# Patient Record
Sex: MALE | Race: WHITE | NOT HISPANIC OR LATINO | Employment: OTHER | URBAN - METROPOLITAN AREA
[De-identification: names, ages, dates, MRNs, and addresses within clinical notes are randomized per-mention and may not be internally consistent; named-entity substitution may affect disease eponyms.]

---

## 2017-01-05 ENCOUNTER — ALLSCRIPTS OFFICE VISIT (OUTPATIENT)
Dept: OTHER | Facility: OTHER | Age: 62
End: 2017-01-05

## 2017-01-05 ENCOUNTER — TRANSCRIBE ORDERS (OUTPATIENT)
Dept: LAB | Facility: HOSPITAL | Age: 62
End: 2017-01-05

## 2017-01-05 ENCOUNTER — APPOINTMENT (OUTPATIENT)
Dept: LAB | Facility: HOSPITAL | Age: 62
End: 2017-01-05
Attending: INTERNAL MEDICINE
Payer: COMMERCIAL

## 2017-01-05 DIAGNOSIS — E78.00 PURE HYPERCHOLESTEROLEMIA: ICD-10-CM

## 2017-01-05 DIAGNOSIS — J32.9 UNSPECIFIED SINUSITIS (CHRONIC): ICD-10-CM

## 2017-01-05 DIAGNOSIS — Z12.11 SPECIAL SCREENING FOR MALIGNANT NEOPLASMS, COLON: ICD-10-CM

## 2017-01-05 DIAGNOSIS — N40.0 BENIGN PROSTATIC HYPERPLASIA, PRESENCE OF LOWER URINARY TRACT SYMPTOMS UNSPECIFIED, UNSPECIFIED MORPHOLOGY: ICD-10-CM

## 2017-01-05 DIAGNOSIS — R10.0 ACUTE ABDOMINAL PAIN SYNDROME: ICD-10-CM

## 2017-01-05 DIAGNOSIS — R10.0 ACUTE ABDOMINAL PAIN SYNDROME: Primary | ICD-10-CM

## 2017-01-05 LAB
ALBUMIN SERPL BCP-MCNC: 3.9 G/DL (ref 3.5–5)
ALP SERPL-CCNC: 83 U/L (ref 46–116)
ALT SERPL W P-5'-P-CCNC: 33 U/L (ref 12–78)
ANION GAP SERPL CALCULATED.3IONS-SCNC: 6 MMOL/L (ref 4–13)
AST SERPL W P-5'-P-CCNC: 15 U/L (ref 5–45)
BACTERIA UR QL AUTO: ABNORMAL /HPF
BILIRUB SERPL-MCNC: 0.49 MG/DL (ref 0.2–1)
BILIRUB UR QL STRIP: NEGATIVE
BUN SERPL-MCNC: 16 MG/DL (ref 5–25)
CALCIUM SERPL-MCNC: 8.7 MG/DL (ref 8.3–10.1)
CHLORIDE SERPL-SCNC: 107 MMOL/L (ref 100–108)
CHOLEST SERPL-MCNC: 255 MG/DL (ref 50–200)
CLARITY UR: CLEAR
CO2 SERPL-SCNC: 28 MMOL/L (ref 21–32)
COLOR UR: YELLOW
CREAT SERPL-MCNC: 0.84 MG/DL (ref 0.6–1.3)
ERYTHROCYTE [DISTWIDTH] IN BLOOD BY AUTOMATED COUNT: 12.6 % (ref 11.6–15.1)
GFR SERPL CREATININE-BSD FRML MDRD: >60 ML/MIN/1.73SQ M
GLUCOSE SERPL-MCNC: 93 MG/DL (ref 65–140)
GLUCOSE UR STRIP-MCNC: NEGATIVE MG/DL
HCT VFR BLD AUTO: 43.7 % (ref 36.5–49.3)
HDLC SERPL-MCNC: 69 MG/DL (ref 40–60)
HGB BLD-MCNC: 14.9 G/DL (ref 12–17)
HGB UR QL STRIP.AUTO: NEGATIVE
KETONES UR STRIP-MCNC: NEGATIVE MG/DL
LDLC SERPL CALC-MCNC: 174 MG/DL (ref 0–100)
LEUKOCYTE ESTERASE UR QL STRIP: NEGATIVE
MCH RBC QN AUTO: 31.7 PG (ref 26.8–34.3)
MCHC RBC AUTO-ENTMCNC: 34.1 G/DL (ref 31.4–37.4)
MCV RBC AUTO: 93 FL (ref 82–98)
NITRITE UR QL STRIP: NEGATIVE
NON-SQ EPI CELLS URNS QL MICRO: ABNORMAL /HPF
PH UR STRIP.AUTO: 7 [PH] (ref 4.5–8)
PLATELET # BLD AUTO: 213 THOUSANDS/UL (ref 149–390)
PMV BLD AUTO: 9.7 FL (ref 8.9–12.7)
POTASSIUM SERPL-SCNC: 3.8 MMOL/L (ref 3.5–5.3)
PROT SERPL-MCNC: 7.2 G/DL (ref 6.4–8.2)
PROT UR STRIP-MCNC: NEGATIVE MG/DL
RBC # BLD AUTO: 4.7 MILLION/UL (ref 3.88–5.62)
RBC #/AREA URNS AUTO: ABNORMAL /HPF
SODIUM SERPL-SCNC: 141 MMOL/L (ref 136–145)
SP GR UR STRIP.AUTO: 1.02 (ref 1–1.03)
TRIGL SERPL-MCNC: 60 MG/DL
UROBILINOGEN UR QL STRIP.AUTO: 0.2 E.U./DL
WBC # BLD AUTO: 4.47 THOUSAND/UL (ref 4.31–10.16)
WBC #/AREA URNS AUTO: ABNORMAL /HPF

## 2017-01-05 PROCEDURE — 36415 COLL VENOUS BLD VENIPUNCTURE: CPT

## 2017-01-05 PROCEDURE — 80053 COMPREHEN METABOLIC PANEL: CPT

## 2017-01-05 PROCEDURE — 85027 COMPLETE CBC AUTOMATED: CPT

## 2017-01-05 PROCEDURE — 80061 LIPID PANEL: CPT

## 2017-01-05 PROCEDURE — 81001 URINALYSIS AUTO W/SCOPE: CPT | Performed by: INTERNAL MEDICINE

## 2017-01-25 ENCOUNTER — ALLSCRIPTS OFFICE VISIT (OUTPATIENT)
Dept: OTHER | Facility: OTHER | Age: 62
End: 2017-01-25

## 2017-02-02 ENCOUNTER — ALLSCRIPTS OFFICE VISIT (OUTPATIENT)
Dept: OTHER | Facility: OTHER | Age: 62
End: 2017-02-02

## 2017-02-03 PROBLEM — I48.91 ATRIAL FIBRILLATION (HCC): Chronic | Status: ACTIVE | Noted: 2017-02-03

## 2017-02-03 PROBLEM — I20.0 UNSTABLE ANGINA (HCC): Status: ACTIVE | Noted: 2017-02-03

## 2017-02-05 ENCOUNTER — TELEPHONE (OUTPATIENT)
Dept: INPATIENT UNIT | Facility: HOSPITAL | Age: 62
End: 2017-02-05

## 2017-02-06 ENCOUNTER — HOSPITAL ENCOUNTER (OUTPATIENT)
Dept: NON INVASIVE DIAGNOSTICS | Facility: HOSPITAL | Age: 62
Discharge: HOME | End: 2017-02-06
Attending: INTERNAL MEDICINE | Admitting: INTERNAL MEDICINE
Payer: COMMERCIAL

## 2017-02-06 ENCOUNTER — GENERIC CONVERSION - ENCOUNTER (OUTPATIENT)
Dept: OTHER | Facility: OTHER | Age: 62
End: 2017-02-06

## 2017-02-06 VITALS
RESPIRATION RATE: 18 BRPM | HEART RATE: 64 BPM | DIASTOLIC BLOOD PRESSURE: 76 MMHG | HEIGHT: 68 IN | OXYGEN SATURATION: 98 % | SYSTOLIC BLOOD PRESSURE: 136 MMHG | WEIGHT: 195 LBS | TEMPERATURE: 97.5 F | BODY MASS INDEX: 29.55 KG/M2

## 2017-02-06 DIAGNOSIS — R07.9 CHEST PAIN: ICD-10-CM

## 2017-02-06 LAB
ANION GAP SERPL CALCULATED.3IONS-SCNC: 6 MMOL/L (ref 4–13)
ATRIAL RATE: 57 BPM
BUN SERPL-MCNC: 19 MG/DL (ref 5–25)
CALCIUM SERPL-MCNC: 8.6 MG/DL (ref 8.3–10.1)
CHLORIDE SERPL-SCNC: 109 MMOL/L (ref 100–108)
CHOLEST SERPL-MCNC: 229 MG/DL (ref 50–200)
CO2 SERPL-SCNC: 26 MMOL/L (ref 21–32)
CREAT SERPL-MCNC: 0.85 MG/DL (ref 0.6–1.3)
ERYTHROCYTE [DISTWIDTH] IN BLOOD BY AUTOMATED COUNT: 12.3 % (ref 11.6–15.1)
GFR SERPL CREATININE-BSD FRML MDRD: >60 ML/MIN/1.73SQ M
GLUCOSE SERPL-MCNC: 88 MG/DL (ref 65–140)
HCT VFR BLD AUTO: 43.6 % (ref 36.5–49.3)
HDLC SERPL-MCNC: 70 MG/DL (ref 40–60)
HGB BLD-MCNC: 15.1 G/DL (ref 12–17)
INR PPP: 1.03 (ref 0.86–1.16)
LDLC SERPL CALC-MCNC: 146 MG/DL (ref 0–100)
MAGNESIUM SERPL-MCNC: 2.3 MG/DL (ref 1.6–2.6)
MCH RBC QN AUTO: 31.8 PG (ref 26.8–34.3)
MCHC RBC AUTO-ENTMCNC: 34.6 G/DL (ref 31.4–37.4)
MCV RBC AUTO: 92 FL (ref 82–98)
P AXIS: 47 DEGREES
PLATELET # BLD AUTO: 203 THOUSANDS/UL (ref 149–390)
PMV BLD AUTO: 9.5 FL (ref 8.9–12.7)
POTASSIUM SERPL-SCNC: 4 MMOL/L (ref 3.5–5.3)
PR INTERVAL: 170 MS
PROTHROMBIN TIME: 13.6 SECONDS (ref 12–14.3)
QRS AXIS: 12 DEGREES
QRSD INTERVAL: 78 MS
QT INTERVAL: 428 MS
QTC INTERVAL: 416 MS
RBC # BLD AUTO: 4.75 MILLION/UL (ref 3.88–5.62)
SODIUM SERPL-SCNC: 141 MMOL/L (ref 136–145)
T WAVE AXIS: 35 DEGREES
TRIGL SERPL-MCNC: 65 MG/DL
VENTRICULAR RATE: 57 BPM
WBC # BLD AUTO: 7.88 THOUSAND/UL (ref 4.31–10.16)

## 2017-02-06 PROCEDURE — 99152 MOD SED SAME PHYS/QHP 5/>YRS: CPT | Performed by: INTERNAL MEDICINE

## 2017-02-06 PROCEDURE — 83735 ASSAY OF MAGNESIUM: CPT | Performed by: INTERNAL MEDICINE

## 2017-02-06 PROCEDURE — C1887 CATHETER, GUIDING: HCPCS | Performed by: INTERNAL MEDICINE

## 2017-02-06 PROCEDURE — C1894 INTRO/SHEATH, NON-LASER: HCPCS | Performed by: INTERNAL MEDICINE

## 2017-02-06 PROCEDURE — 93458 L HRT ARTERY/VENTRICLE ANGIO: CPT | Performed by: INTERNAL MEDICINE

## 2017-02-06 PROCEDURE — C1769 GUIDE WIRE: HCPCS | Performed by: INTERNAL MEDICINE

## 2017-02-06 PROCEDURE — 85027 COMPLETE CBC AUTOMATED: CPT | Performed by: INTERNAL MEDICINE

## 2017-02-06 PROCEDURE — 93571 IV DOP VEL&/PRESS C FLO 1ST: CPT | Performed by: INTERNAL MEDICINE

## 2017-02-06 PROCEDURE — 99153 MOD SED SAME PHYS/QHP EA: CPT | Performed by: INTERNAL MEDICINE

## 2017-02-06 PROCEDURE — 85610 PROTHROMBIN TIME: CPT | Performed by: INTERNAL MEDICINE

## 2017-02-06 PROCEDURE — 80048 BASIC METABOLIC PNL TOTAL CA: CPT | Performed by: INTERNAL MEDICINE

## 2017-02-06 PROCEDURE — 80061 LIPID PANEL: CPT | Performed by: INTERNAL MEDICINE

## 2017-02-06 PROCEDURE — 93005 ELECTROCARDIOGRAM TRACING: CPT | Performed by: INTERNAL MEDICINE

## 2017-02-06 RX ORDER — MONTELUKAST SODIUM 10 MG/1
10 TABLET ORAL
COMMUNITY
End: 2018-01-27 | Stop reason: SDUPTHER

## 2017-02-06 RX ORDER — AMPICILLIN TRIHYDRATE 250 MG
1 CAPSULE ORAL DAILY
COMMUNITY

## 2017-02-06 RX ORDER — OXCARBAZEPINE 300 MG/1
300 TABLET, FILM COATED ORAL 2 TIMES DAILY
COMMUNITY
End: 2020-03-02 | Stop reason: SDUPTHER

## 2017-02-06 RX ORDER — SODIUM CHLORIDE 9 MG/ML
150 INJECTION, SOLUTION INTRAVENOUS CONTINUOUS
Status: DISPENSED | OUTPATIENT
Start: 2017-02-06 | End: 2017-02-06

## 2017-02-06 RX ORDER — MAG HYDROX/ALUMINUM HYD/SIMETH 400-400-40
1 SUSPENSION, ORAL (FINAL DOSE FORM) ORAL DAILY
COMMUNITY
End: 2018-02-02

## 2017-02-06 RX ORDER — LIDOCAINE HYDROCHLORIDE 10 MG/ML
INJECTION, SOLUTION INFILTRATION; PERINEURAL CODE/TRAUMA/SEDATION MEDICATION
Status: COMPLETED | OUTPATIENT
Start: 2017-02-06 | End: 2017-02-06

## 2017-02-06 RX ORDER — VERAPAMIL HYDROCHLORIDE 2.5 MG/ML
INJECTION, SOLUTION INTRAVENOUS CODE/TRAUMA/SEDATION MEDICATION
Status: COMPLETED | OUTPATIENT
Start: 2017-02-06 | End: 2017-02-06

## 2017-02-06 RX ORDER — DILTIAZEM HYDROCHLORIDE 120 MG/1
180 TABLET, FILM COATED ORAL DAILY
Qty: 45 TABLET | Refills: 0 | Status: SHIPPED | OUTPATIENT
Start: 2017-02-06 | End: 2018-07-02 | Stop reason: SDUPTHER

## 2017-02-06 RX ORDER — DIPHENOXYLATE HYDROCHLORIDE AND ATROPINE SULFATE 2.5; .025 MG/1; MG/1
1 TABLET ORAL
COMMUNITY

## 2017-02-06 RX ORDER — ASCORBIC ACID 500 MG
500 TABLET ORAL 2 TIMES DAILY
COMMUNITY
End: 2018-02-02 | Stop reason: DRUGHIGH

## 2017-02-06 RX ORDER — CHOLECALCIFEROL (VITAMIN D3) 25 MCG
1 CAPSULE ORAL 2 TIMES DAILY
COMMUNITY
End: 2018-08-17

## 2017-02-06 RX ORDER — SODIUM CHLORIDE 9 MG/ML
125 INJECTION, SOLUTION INTRAVENOUS CONTINUOUS
Status: DISCONTINUED | OUTPATIENT
Start: 2017-02-06 | End: 2017-02-06 | Stop reason: HOSPADM

## 2017-02-06 RX ORDER — NITROGLYCERIN 0.4 MG/1
0.4 TABLET SUBLINGUAL
Status: DISCONTINUED | OUTPATIENT
Start: 2017-02-06 | End: 2017-02-06 | Stop reason: HOSPADM

## 2017-02-06 RX ORDER — FENTANYL CITRATE 50 UG/ML
INJECTION, SOLUTION INTRAMUSCULAR; INTRAVENOUS CODE/TRAUMA/SEDATION MEDICATION
Status: COMPLETED | OUTPATIENT
Start: 2017-02-06 | End: 2017-02-06

## 2017-02-06 RX ORDER — DILTIAZEM HYDROCHLORIDE 120 MG/1
120 TABLET, FILM COATED ORAL DAILY
Status: ON HOLD | COMMUNITY
End: 2017-02-06

## 2017-02-06 RX ORDER — NITROGLYCERIN 20 MG/100ML
INJECTION INTRAVENOUS CODE/TRAUMA/SEDATION MEDICATION
Status: COMPLETED | OUTPATIENT
Start: 2017-02-06 | End: 2017-02-06

## 2017-02-06 RX ORDER — ASPIRIN 81 MG/1
324 TABLET, CHEWABLE ORAL ONCE
Status: COMPLETED | OUTPATIENT
Start: 2017-02-06 | End: 2017-02-06

## 2017-02-06 RX ORDER — HYOSCYAMINE SULFATE 0.125 MG
0.12 TABLET,DISINTEGRATING ORAL EVERY 4 HOURS PRN
COMMUNITY
End: 2018-08-17 | Stop reason: ALTCHOICE

## 2017-02-06 RX ORDER — MIDAZOLAM HYDROCHLORIDE 1 MG/ML
INJECTION INTRAMUSCULAR; INTRAVENOUS CODE/TRAUMA/SEDATION MEDICATION
Status: COMPLETED | OUTPATIENT
Start: 2017-02-06 | End: 2017-02-06

## 2017-02-06 RX ORDER — HEPARIN SODIUM 1000 [USP'U]/ML
INJECTION, SOLUTION INTRAVENOUS; SUBCUTANEOUS CODE/TRAUMA/SEDATION MEDICATION
Status: COMPLETED | OUTPATIENT
Start: 2017-02-06 | End: 2017-02-06

## 2017-02-06 RX ADMIN — HEPARIN SODIUM 4000 UNITS: 1000 INJECTION INTRAVENOUS; SUBCUTANEOUS at 15:35

## 2017-02-06 RX ADMIN — VERAPAMIL HYDROCHLORIDE 2.5 MG: 2.5 INJECTION, SOLUTION INTRAVENOUS at 15:35

## 2017-02-06 RX ADMIN — ASPIRIN 81 MG 324 MG: 81 TABLET ORAL at 12:35

## 2017-02-06 RX ADMIN — LIDOCAINE HYDROCHLORIDE 1 ML: 10 INJECTION, SOLUTION INFILTRATION; PERINEURAL at 15:26

## 2017-02-06 RX ADMIN — IOHEXOL 110 ML: 350 INJECTION, SOLUTION INTRAVENOUS at 16:11

## 2017-02-06 RX ADMIN — FENTANYL CITRATE 50 MCG: 50 INJECTION, SOLUTION INTRAMUSCULAR; INTRAVENOUS at 15:17

## 2017-02-06 RX ADMIN — SODIUM CHLORIDE 125 ML/HR: 0.9 INJECTION, SOLUTION INTRAVENOUS at 12:36

## 2017-02-06 RX ADMIN — MIDAZOLAM HYDROCHLORIDE 1 MG: 1 INJECTION, SOLUTION INTRAMUSCULAR; INTRAVENOUS at 15:31

## 2017-02-06 RX ADMIN — MIDAZOLAM HYDROCHLORIDE 2 MG: 1 INJECTION, SOLUTION INTRAMUSCULAR; INTRAVENOUS at 15:18

## 2017-02-06 RX ADMIN — FENTANYL CITRATE 25 MCG: 50 INJECTION, SOLUTION INTRAMUSCULAR; INTRAVENOUS at 15:31

## 2017-02-06 RX ADMIN — NITROGLYCERIN 200 MCG: 20 INJECTION INTRAVENOUS at 15:35

## 2017-02-16 ENCOUNTER — ALLSCRIPTS OFFICE VISIT (OUTPATIENT)
Dept: OTHER | Facility: OTHER | Age: 62
End: 2017-02-16

## 2017-02-16 ENCOUNTER — TRANSCRIBE ORDERS (OUTPATIENT)
Dept: ADMINISTRATIVE | Facility: HOSPITAL | Age: 62
End: 2017-02-16

## 2017-02-16 DIAGNOSIS — E88.01 ALPHA-1-ANTITRYPSIN DEFICIENCY (HCC): Primary | ICD-10-CM

## 2017-03-07 RX ORDER — ALBUTEROL SULFATE 2.5 MG/3ML
2.5 SOLUTION RESPIRATORY (INHALATION) ONCE AS NEEDED
Status: DISCONTINUED | OUTPATIENT
Start: 2017-03-07 | End: 2017-03-07

## 2017-03-07 RX ORDER — SODIUM CHLORIDE FOR INHALATION 0.9 %
3 VIAL, NEBULIZER (ML) INHALATION ONCE AS NEEDED
Status: DISCONTINUED | OUTPATIENT
Start: 2017-03-07 | End: 2017-03-07

## 2017-03-08 RX ORDER — ALBUTEROL SULFATE 2.5 MG/3ML
2.5 SOLUTION RESPIRATORY (INHALATION) ONCE AS NEEDED
Status: CANCELLED | OUTPATIENT
Start: 2017-03-08

## 2017-03-09 ENCOUNTER — HOSPITAL ENCOUNTER (OUTPATIENT)
Dept: NON INVASIVE DIAGNOSTICS | Facility: HOSPITAL | Age: 62
Discharge: HOME | End: 2017-03-09
Attending: INTERNAL MEDICINE
Payer: COMMERCIAL

## 2017-03-09 ENCOUNTER — HOSPITAL ENCOUNTER (OUTPATIENT)
Dept: PULMONOLOGY | Facility: HOSPITAL | Age: 62
Discharge: HOME | End: 2017-03-09
Attending: INTERNAL MEDICINE
Payer: COMMERCIAL

## 2017-03-09 ENCOUNTER — GENERIC CONVERSION - ENCOUNTER (OUTPATIENT)
Dept: OTHER | Facility: OTHER | Age: 62
End: 2017-03-09

## 2017-03-09 DIAGNOSIS — E88.01 ALPHA-1-ANTITRYPSIN DEFICIENCY (HCC): ICD-10-CM

## 2017-03-09 PROCEDURE — 94070 EVALUATION OF WHEEZING: CPT

## 2017-03-09 PROCEDURE — 94760 N-INVAS EAR/PLS OXIMETRY 1: CPT

## 2017-03-09 PROCEDURE — 94726 PLETHYSMOGRAPHY LUNG VOLUMES: CPT

## 2017-03-09 PROCEDURE — 94729 DIFFUSING CAPACITY: CPT

## 2017-03-09 PROCEDURE — 93306 TTE W/DOPPLER COMPLETE: CPT

## 2017-03-09 RX ORDER — ALBUTEROL SULFATE 2.5 MG/3ML
2.5 SOLUTION RESPIRATORY (INHALATION) ONCE AS NEEDED
Status: COMPLETED | OUTPATIENT
Start: 2017-03-09 | End: 2017-03-09

## 2017-03-09 RX ORDER — SODIUM CHLORIDE FOR INHALATION 0.9 %
3 VIAL, NEBULIZER (ML) INHALATION ONCE AS NEEDED
Status: COMPLETED | OUTPATIENT
Start: 2017-03-09 | End: 2017-03-09

## 2017-03-09 RX ADMIN — ISODIUM CHLORIDE 3 ML: 0.03 SOLUTION RESPIRATORY (INHALATION) at 14:00

## 2017-03-09 RX ADMIN — ALBUTEROL SULFATE 2.5 MG: 2.5 SOLUTION RESPIRATORY (INHALATION) at 14:00

## 2017-03-09 RX ADMIN — METHACHOLINE CHLORIDE 5 BREATH: 100 POWDER, FOR SOLUTION RESPIRATORY (INHALATION) at 13:30

## 2017-05-02 ENCOUNTER — APPOINTMENT (OUTPATIENT)
Dept: LAB | Facility: HOSPITAL | Age: 62
End: 2017-05-02
Payer: COMMERCIAL

## 2017-05-02 ENCOUNTER — TRANSCRIBE ORDERS (OUTPATIENT)
Dept: LAB | Facility: HOSPITAL | Age: 62
End: 2017-05-02

## 2017-05-02 DIAGNOSIS — Z12.5 ENCOUNTER FOR SCREENING FOR MALIGNANT NEOPLASM OF PROSTATE: ICD-10-CM

## 2017-05-02 LAB — PSA SERPL-MCNC: 2.2 NG/ML (ref 0–4)

## 2017-05-02 PROCEDURE — 36415 COLL VENOUS BLD VENIPUNCTURE: CPT

## 2017-05-02 PROCEDURE — G0103 PSA SCREENING: HCPCS

## 2017-05-09 ENCOUNTER — ALLSCRIPTS OFFICE VISIT (OUTPATIENT)
Dept: OTHER | Facility: OTHER | Age: 62
End: 2017-05-09

## 2017-07-11 ENCOUNTER — TRANSCRIBE ORDERS (OUTPATIENT)
Dept: LAB | Facility: HOSPITAL | Age: 62
End: 2017-07-11

## 2017-07-11 ENCOUNTER — APPOINTMENT (OUTPATIENT)
Dept: LAB | Facility: HOSPITAL | Age: 62
End: 2017-07-11
Attending: INTERNAL MEDICINE
Payer: COMMERCIAL

## 2017-07-11 DIAGNOSIS — R10.0 ACUTE ABDOMINAL PAIN SYNDROME: ICD-10-CM

## 2017-07-11 DIAGNOSIS — E78.00 PURE HYPERCHOLESTEROLEMIA: ICD-10-CM

## 2017-07-11 DIAGNOSIS — J32.9 UNSPECIFIED SINUSITIS (CHRONIC): ICD-10-CM

## 2017-07-11 DIAGNOSIS — N40.0 BENIGN PROSTATIC HYPERPLASIA, PRESENCE OF LOWER URINARY TRACT SYMPTOMS UNSPECIFIED, UNSPECIFIED MORPHOLOGY: Primary | ICD-10-CM

## 2017-07-11 DIAGNOSIS — Z12.11 SPECIAL SCREENING FOR MALIGNANT NEOPLASMS, COLON: ICD-10-CM

## 2017-07-11 DIAGNOSIS — Z00.00 ROUTINE GENERAL MEDICAL EXAMINATION AT A HEALTH CARE FACILITY: ICD-10-CM

## 2017-07-11 DIAGNOSIS — N40.0 BENIGN PROSTATIC HYPERPLASIA, PRESENCE OF LOWER URINARY TRACT SYMPTOMS UNSPECIFIED, UNSPECIFIED MORPHOLOGY: ICD-10-CM

## 2017-07-11 LAB
ALBUMIN SERPL BCP-MCNC: 4 G/DL (ref 3.5–5)
ALP SERPL-CCNC: 80 U/L (ref 46–116)
ALT SERPL W P-5'-P-CCNC: 36 U/L (ref 12–78)
ANION GAP SERPL CALCULATED.3IONS-SCNC: 6 MMOL/L (ref 4–13)
AST SERPL W P-5'-P-CCNC: 21 U/L (ref 5–45)
BILIRUB SERPL-MCNC: 0.31 MG/DL (ref 0.2–1)
BUN SERPL-MCNC: 19 MG/DL (ref 5–25)
CALCIUM SERPL-MCNC: 8.9 MG/DL (ref 8.3–10.1)
CHLORIDE SERPL-SCNC: 107 MMOL/L (ref 100–108)
CHOLEST SERPL-MCNC: 235 MG/DL (ref 50–200)
CO2 SERPL-SCNC: 28 MMOL/L (ref 21–32)
CREAT SERPL-MCNC: 0.9 MG/DL (ref 0.6–1.3)
ERYTHROCYTE [DISTWIDTH] IN BLOOD BY AUTOMATED COUNT: 12.6 % (ref 11.6–15.1)
GFR SERPL CREATININE-BSD FRML MDRD: >60 ML/MIN/1.73SQ M
GLUCOSE P FAST SERPL-MCNC: 89 MG/DL (ref 65–99)
HCT VFR BLD AUTO: 43.1 % (ref 36.5–49.3)
HDLC SERPL-MCNC: 68 MG/DL (ref 40–60)
HGB BLD-MCNC: 14.9 G/DL (ref 12–17)
LDLC SERPL CALC-MCNC: 155 MG/DL (ref 0–100)
MCH RBC QN AUTO: 31.8 PG (ref 26.8–34.3)
MCHC RBC AUTO-ENTMCNC: 34.6 G/DL (ref 31.4–37.4)
MCV RBC AUTO: 92 FL (ref 82–98)
PLATELET # BLD AUTO: 194 THOUSANDS/UL (ref 149–390)
PMV BLD AUTO: 9.5 FL (ref 8.9–12.7)
POTASSIUM SERPL-SCNC: 4 MMOL/L (ref 3.5–5.3)
PROT SERPL-MCNC: 7.4 G/DL (ref 6.4–8.2)
RBC # BLD AUTO: 4.69 MILLION/UL (ref 3.88–5.62)
SODIUM SERPL-SCNC: 141 MMOL/L (ref 136–145)
TRIGL SERPL-MCNC: 60 MG/DL
WBC # BLD AUTO: 4.84 THOUSAND/UL (ref 4.31–10.16)

## 2017-07-11 PROCEDURE — 36415 COLL VENOUS BLD VENIPUNCTURE: CPT

## 2017-07-11 PROCEDURE — 85027 COMPLETE CBC AUTOMATED: CPT

## 2017-07-11 PROCEDURE — 80061 LIPID PANEL: CPT

## 2017-07-11 PROCEDURE — 80053 COMPREHEN METABOLIC PANEL: CPT

## 2017-07-20 ENCOUNTER — ALLSCRIPTS OFFICE VISIT (OUTPATIENT)
Dept: OTHER | Facility: OTHER | Age: 62
End: 2017-07-20

## 2017-08-10 ENCOUNTER — ALLSCRIPTS OFFICE VISIT (OUTPATIENT)
Dept: OTHER | Facility: OTHER | Age: 62
End: 2017-08-10

## 2018-01-12 VITALS
WEIGHT: 202.25 LBS | DIASTOLIC BLOOD PRESSURE: 72 MMHG | HEIGHT: 69 IN | SYSTOLIC BLOOD PRESSURE: 116 MMHG | HEART RATE: 64 BPM | BODY MASS INDEX: 29.96 KG/M2

## 2018-01-12 VITALS
DIASTOLIC BLOOD PRESSURE: 70 MMHG | BODY MASS INDEX: 29.7 KG/M2 | WEIGHT: 200.5 LBS | HEIGHT: 69 IN | SYSTOLIC BLOOD PRESSURE: 122 MMHG | HEART RATE: 62 BPM

## 2018-01-12 VITALS
WEIGHT: 196 LBS | SYSTOLIC BLOOD PRESSURE: 100 MMHG | HEART RATE: 62 BPM | DIASTOLIC BLOOD PRESSURE: 62 MMHG | HEIGHT: 69 IN | BODY MASS INDEX: 29.03 KG/M2

## 2018-01-13 VITALS
BODY MASS INDEX: 29.99 KG/M2 | TEMPERATURE: 98.5 F | DIASTOLIC BLOOD PRESSURE: 80 MMHG | OXYGEN SATURATION: 97 % | HEIGHT: 69 IN | HEART RATE: 68 BPM | SYSTOLIC BLOOD PRESSURE: 138 MMHG | WEIGHT: 202.5 LBS

## 2018-01-13 VITALS
WEIGHT: 198.5 LBS | OXYGEN SATURATION: 98 % | SYSTOLIC BLOOD PRESSURE: 124 MMHG | DIASTOLIC BLOOD PRESSURE: 76 MMHG | HEIGHT: 69 IN | TEMPERATURE: 98.2 F | HEART RATE: 69 BPM | BODY MASS INDEX: 29.4 KG/M2

## 2018-01-14 VITALS
SYSTOLIC BLOOD PRESSURE: 110 MMHG | TEMPERATURE: 99 F | OXYGEN SATURATION: 98 % | WEIGHT: 199 LBS | HEART RATE: 70 BPM | DIASTOLIC BLOOD PRESSURE: 64 MMHG | HEIGHT: 69 IN | BODY MASS INDEX: 29.47 KG/M2

## 2018-01-14 VITALS
WEIGHT: 200.19 LBS | HEART RATE: 65 BPM | SYSTOLIC BLOOD PRESSURE: 126 MMHG | DIASTOLIC BLOOD PRESSURE: 74 MMHG | HEIGHT: 69 IN | BODY MASS INDEX: 29.65 KG/M2

## 2018-01-20 ENCOUNTER — APPOINTMENT (OUTPATIENT)
Dept: LAB | Facility: HOSPITAL | Age: 63
End: 2018-01-20
Attending: INTERNAL MEDICINE
Payer: COMMERCIAL

## 2018-01-20 ENCOUNTER — TRANSCRIBE ORDERS (OUTPATIENT)
Dept: LAB | Facility: HOSPITAL | Age: 63
End: 2018-01-20

## 2018-01-20 DIAGNOSIS — I10 ESSENTIAL (PRIMARY) HYPERTENSION: ICD-10-CM

## 2018-01-20 DIAGNOSIS — I48.0 PAROXYSMAL ATRIAL FIBRILLATION (HCC): ICD-10-CM

## 2018-01-20 DIAGNOSIS — J30.1 ALLERGIC RHINITIS DUE TO POLLEN: ICD-10-CM

## 2018-01-20 DIAGNOSIS — E78.00 PURE HYPERCHOLESTEROLEMIA: ICD-10-CM

## 2018-01-20 LAB
ALBUMIN SERPL BCP-MCNC: 3.8 G/DL (ref 3.5–5)
ALP SERPL-CCNC: 86 U/L (ref 46–116)
ALT SERPL W P-5'-P-CCNC: 30 U/L (ref 12–78)
ANION GAP SERPL CALCULATED.3IONS-SCNC: 5 MMOL/L (ref 4–13)
AST SERPL W P-5'-P-CCNC: 17 U/L (ref 5–45)
BILIRUB SERPL-MCNC: 0.59 MG/DL (ref 0.2–1)
BUN SERPL-MCNC: 17 MG/DL (ref 5–25)
CALCIUM SERPL-MCNC: 9.1 MG/DL (ref 8.3–10.1)
CHLORIDE SERPL-SCNC: 107 MMOL/L (ref 100–108)
CHOLEST SERPL-MCNC: 256 MG/DL (ref 50–200)
CO2 SERPL-SCNC: 29 MMOL/L (ref 21–32)
CREAT SERPL-MCNC: 0.84 MG/DL (ref 0.6–1.3)
ERYTHROCYTE [DISTWIDTH] IN BLOOD BY AUTOMATED COUNT: 12.2 % (ref 11.6–15.1)
GFR SERPL CREATININE-BSD FRML MDRD: 93 ML/MIN/1.73SQ M
GLUCOSE P FAST SERPL-MCNC: 87 MG/DL (ref 65–99)
HCT VFR BLD AUTO: 43.7 % (ref 36.5–49.3)
HDLC SERPL-MCNC: 59 MG/DL (ref 40–60)
HGB BLD-MCNC: 14.8 G/DL (ref 12–17)
LDLC SERPL CALC-MCNC: 182 MG/DL (ref 0–100)
MCH RBC QN AUTO: 31.5 PG (ref 26.8–34.3)
MCHC RBC AUTO-ENTMCNC: 33.9 G/DL (ref 31.4–37.4)
MCV RBC AUTO: 93 FL (ref 82–98)
PLATELET # BLD AUTO: 280 THOUSANDS/UL (ref 149–390)
PMV BLD AUTO: 9.3 FL (ref 8.9–12.7)
POTASSIUM SERPL-SCNC: 3.9 MMOL/L (ref 3.5–5.3)
PROT SERPL-MCNC: 7.5 G/DL (ref 6.4–8.2)
RBC # BLD AUTO: 4.7 MILLION/UL (ref 3.88–5.62)
SODIUM SERPL-SCNC: 141 MMOL/L (ref 136–145)
TRIGL SERPL-MCNC: 77 MG/DL
WBC # BLD AUTO: 6.66 THOUSAND/UL (ref 4.31–10.16)

## 2018-01-20 PROCEDURE — 80053 COMPREHEN METABOLIC PANEL: CPT

## 2018-01-20 PROCEDURE — 85027 COMPLETE CBC AUTOMATED: CPT

## 2018-01-20 PROCEDURE — 36415 COLL VENOUS BLD VENIPUNCTURE: CPT

## 2018-01-20 PROCEDURE — 80061 LIPID PANEL: CPT

## 2018-01-27 DIAGNOSIS — J30.1 CHRONIC SEASONAL ALLERGIC RHINITIS DUE TO POLLEN: Primary | ICD-10-CM

## 2018-01-29 RX ORDER — MONTELUKAST SODIUM 10 MG/1
TABLET ORAL
Qty: 90 TABLET | Refills: 1 | Status: SHIPPED | OUTPATIENT
Start: 2018-01-29 | End: 2018-01-30 | Stop reason: SDUPTHER

## 2018-01-30 DIAGNOSIS — J30.1 CHRONIC SEASONAL ALLERGIC RHINITIS DUE TO POLLEN: ICD-10-CM

## 2018-01-30 RX ORDER — MONTELUKAST SODIUM 10 MG/1
TABLET ORAL
Qty: 90 TABLET | Refills: 1 | Status: SHIPPED | OUTPATIENT
Start: 2018-01-30 | End: 2018-07-23 | Stop reason: SDUPTHER

## 2018-02-02 ENCOUNTER — OFFICE VISIT (OUTPATIENT)
Dept: INTERNAL MEDICINE CLINIC | Facility: CLINIC | Age: 63
End: 2018-02-02
Payer: COMMERCIAL

## 2018-02-02 VITALS
TEMPERATURE: 98.2 F | OXYGEN SATURATION: 98 % | HEART RATE: 63 BPM | WEIGHT: 199.2 LBS | DIASTOLIC BLOOD PRESSURE: 78 MMHG | HEIGHT: 69 IN | BODY MASS INDEX: 29.51 KG/M2 | SYSTOLIC BLOOD PRESSURE: 120 MMHG

## 2018-02-02 DIAGNOSIS — E78.00 HYPERCHOLESTEROLEMIA: ICD-10-CM

## 2018-02-02 DIAGNOSIS — I48.0 PAROXYSMAL ATRIAL FIBRILLATION (HCC): Chronic | ICD-10-CM

## 2018-02-02 DIAGNOSIS — J30.1 CHRONIC SEASONAL ALLERGIC RHINITIS DUE TO POLLEN: ICD-10-CM

## 2018-02-02 DIAGNOSIS — I25.10 CORONARY ARTERY DISEASE INVOLVING NATIVE CORONARY ARTERY OF NATIVE HEART WITHOUT ANGINA PECTORIS: Primary | ICD-10-CM

## 2018-02-02 PROBLEM — E88.01 ALPHA-1-ANTITRYPSIN DEFICIENCY (HCC): Status: ACTIVE | Noted: 2017-02-16

## 2018-02-02 PROBLEM — I10 BENIGN ESSENTIAL HYPERTENSION: Status: ACTIVE | Noted: 2017-01-03

## 2018-02-02 PROCEDURE — 99214 OFFICE O/P EST MOD 30 MIN: CPT | Performed by: INTERNAL MEDICINE

## 2018-02-02 RX ORDER — MULTIVIT WITH MINERALS/LUTEIN
250 TABLET ORAL 2 TIMES DAILY
COMMUNITY

## 2018-02-02 RX ORDER — ASPIRIN 81 MG/1
81 TABLET ORAL DAILY
COMMUNITY
End: 2019-09-23

## 2018-02-02 RX ORDER — TIZANIDINE 2 MG/1
2 TABLET ORAL
COMMUNITY
Start: 2013-03-26 | End: 2018-07-19 | Stop reason: ALTCHOICE

## 2018-02-02 NOTE — PROGRESS NOTES
Assessment/Plan:    Seasonal allergic rhinitis  Presently stable  Singulair was recently renewed  No other changes are necessary at the present time    Coronary artery disease involving native coronary artery  Clinically stable without any chest pain or dyspnea  Problematic management with the patient intolerant to statin medications  He may be a candidate for Repatha if he is not limited by his insurance  Hypercholesterolemia  As previously noted may be a candidate for  Repatha therapy    Atrial fibrillation ([address] )  Clinically stable  His insurance is dropping diltiazem off the formulary         Problem List Items Addressed This Visit        Respiratory    Seasonal allergic rhinitis     Presently stable  Singulair was recently renewed  No other changes are necessary at the present time         Relevant Orders    CBC and Platelet       Cardiovascular and Mediastinum    Atrial fibrillation (HCC) (Chronic)     Clinically stable  His insurance is dropping diltiazem off the formulary         Relevant Orders    CBC and Platelet    Coronary artery disease involving native coronary artery - Primary     Clinically stable without any chest pain or dyspnea  Problematic management with the patient intolerant to statin medications  He may be a candidate for Repatha if he is not limited by his insurance  Relevant Orders    CBC and Platelet    Comprehensive metabolic panel    Lipid Panel with Direct LDL reflex       Other    Hypercholesterolemia     As previously noted may be a candidate for  Repatha therapy         Relevant Orders    Comprehensive metabolic panel    Lipid Panel with Direct LDL reflex            Subjective:      Patient ID: Thee Almendarez is a 57 y o  male  The patient presents for 6 month follow-up visit and has no particular complaints at this time  He had some follow-up labs drawn prior to today's visit    His CMP is essentially normal   CBC is likewise normal his lipid profile reviewed baby modest increase in his total cholesterol level with an LDL cholesterol level calculated at 182  He has had no issues with any chest pain or dyspnea  He denies any palpitations  He has had no lightheadedness or dizziness  He denies any neurological symptoms  He has nocturia once or twice nightly but otherwise no problems  Appetite has been good weight has been stable        The following portions of the patient's history were reviewed and updated as appropriate: allergies, current medications, past family history, past medical history, past social history, past surgical history and problem list   Family History   Problem Relation Age of Onset    Diabetes type II Mother     Cancer Father      angioma     Social History     Social History    Marital status: /Civil Union     Spouse name: N/A    Number of children: N/A    Years of education: N/A     Occupational History    massage therapist      Social History Main Topics    Smoking status: Former Smoker    Smokeless tobacco: Former User    Alcohol use Yes      Comment: 6-8 ounces daily    Drug use: Yes     Types: Marijuana      Comment: Medical use    Sexual activity: Not on file     Other Topics Concern    Not on file     Social History Narrative    Caffeine: drinks 1 cup coffee/ tea a day       Past Medical History:   Diagnosis Date    A-fib ([facility] )     Allergic rhinitis     Cerebral vascular disease     Chronic sinusitis     Hyperlipidemia     Melanoma ([facility] )        Current Outpatient Prescriptions:     apixaban (ELIQUIS) 5 mg, Take 5 mg by mouth 2 (two) times a day, Disp: , Rfl:     ascorbic acid (VITAMIN C) 250 mg tablet, Take 250 mg by mouth 2 (two) times a day, Disp: , Rfl:     aspirin (ECOTRIN LOW STRENGTH) 81 mg EC tablet, Take 81 mg by mouth daily, Disp: , Rfl:     Cholecalciferol (VITAMIN D-3) 1000 UNITS CAPS, Take 1 capsule by mouth 2 (two) times a day, Disp: , Rfl:     CHONDROITIN SULFATE PO, Take 200 mg by mouth 2 (two) times a day, Disp: , Rfl:     Coenzyme Q10 (COQ10) 200 MG CAPS, Take 1 capsule by mouth daily, Disp: , Rfl:     diltiazem (CARDIZEM) 120 MG tablet, Take 1 5 tablets by mouth daily for 30 days, Disp: 45 tablet, Rfl: 0    Glucosamine-Chondroit-Vit C-Mn (GLUCOSAMINE CHONDR 1500 COMPLX PO), Take 1 tablet by mouth daily, Disp: , Rfl:     halobetasol (ULTRAVATE) 0 05 % cream, APPLY ONCE OR TWICE DAILY FOR 2-4 WEEKS AS NEEDED, Disp: , Rfl: 3    hyoscyamine (ANASPAZ) 0 125 mg, Take 0 125 mg by mouth 4 (four) times a day, Disp: , Rfl:     MAGNESIUM GLYCINATE PLUS PO, Take 400 mg by mouth daily, Disp: , Rfl:     montelukast (SINGULAIR) 10 mg tablet, TAKE 1 TABLET DAILY, Disp: 90 tablet, Rfl: 1    OXcarbazepine (TRILEPTAL) 300 mg tablet, Take 300 mg by mouth 2 (two) times a day, Disp: , Rfl:     tiZANidine (ZANAFLEX) 2 mg tablet, Take 2 mg by mouth, Disp: , Rfl:     multivitamin (THERAGRAN) TABS, Take 1 tablet by mouth daily, Disp: , Rfl:   Allergies   Allergen Reactions    Atorvastatin Fatigue and GI Intolerance     Category: Adverse Reaction; Other reaction(s): Muscle pain, constipation, sleepiness    Ezetimibe Fatigue and GI Intolerance     Category: Adverse Reaction;     Molds & Smuts Allergic Rhinitis     Category: Allergy;     Monascus Purpureus Went Yeast Fatigue     Category: Adverse Reaction;     Niacin Fatigue     Category: Adverse Reaction;     Pollen Extract Allergic Rhinitis     Category: Allergy;     Pregabalin Fatigue     Category: Adverse Reaction;     Statins Fatigue and GI Intolerance     Category: Adverse Reaction;      Past Surgical History:   Procedure Laterality Date    OTHER SURGICAL HISTORY  2003    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    PATENT FORAMEN OVALE CLOSURE         Review of Systems   Constitutional: Negative for activity change, appetite change, fatigue and unexpected weight change  HENT: Negative  Eyes: Negative  Respiratory: Negative    Negative for cough and shortness of breath  Cardiovascular: Negative for chest pain and palpitations  Gastrointestinal: Negative  Endocrine: Negative  Genitourinary: Negative  Nocturia x2   Musculoskeletal: Negative  Skin: Negative  Allergic/Immunologic: Negative  Neurological: Negative  Hematological: Negative  Psychiatric/Behavioral: Negative  Objective:     Physical Exam   Constitutional: He is oriented to person, place, and time  He appears well-developed and well-nourished  No distress  HENT:   Head: Normocephalic and atraumatic  Eyes: Conjunctivae and EOM are normal  Pupils are equal, round, and reactive to light  No scleral icterus  Neck: Normal range of motion  Neck supple  No JVD present  No tracheal deviation present  Cardiovascular: Normal rate, regular rhythm and normal heart sounds  Exam reveals no gallop  No murmur heard  Pulmonary/Chest: Effort normal and breath sounds normal  He has no wheezes  He has no rales  Abdominal: Soft  He exhibits no distension  Musculoskeletal: Normal range of motion  He exhibits no edema  Lymphadenopathy:     He has no cervical adenopathy  Neurological: He is alert and oriented to person, place, and time  Skin: Skin is warm and dry  Psychiatric: He has a normal mood and affect  Vitals reviewed

## 2018-02-02 NOTE — ASSESSMENT & PLAN NOTE
Presently stable  Singulair was recently renewed    No other changes are necessary at the present time

## 2018-02-02 NOTE — ASSESSMENT & PLAN NOTE
Clinically stable without any chest pain or dyspnea  Problematic management with the patient intolerant to statin medications  He may be a candidate for Repatha if he is not limited by his insurance

## 2018-02-20 ENCOUNTER — TELEPHONE (OUTPATIENT)
Dept: UROLOGY | Facility: AMBULATORY SURGERY CENTER | Age: 63
End: 2018-02-20

## 2018-02-20 DIAGNOSIS — N40.0 BENIGN PROSTATIC HYPERPLASIA, UNSPECIFIED WHETHER LOWER URINARY TRACT SYMPTOMS PRESENT: Primary | ICD-10-CM

## 2018-02-20 DIAGNOSIS — N20.0 NEPHROLITHIASIS: ICD-10-CM

## 2018-02-20 NOTE — TELEPHONE ENCOUNTER
Patient needed to reschedule his May appt to June  He needs a new PSA and KUB order mailed to his home address since the ones he has are expiring

## 2018-02-27 ENCOUNTER — TELEPHONE (OUTPATIENT)
Dept: CARDIOLOGY CLINIC | Facility: CLINIC | Age: 63
End: 2018-02-27

## 2018-02-27 NOTE — TELEPHONE ENCOUNTER
I am very confused  Never heard of denial of ditliazem  Maybe we just need to substitute brand? Ezequiel Thomas can you look into this?

## 2018-02-27 NOTE — TELEPHONE ENCOUNTER
Patient Ashley Freedman, QUINN O B  1955, called and wants to know if you could advise him in reference to insurance not covering his Diltiazem, he is very happy with this medication and wants to stay on it, is there any way something can be done to make this happen?

## 2018-03-17 DIAGNOSIS — I48.91 A-FIB (HCC): Primary | ICD-10-CM

## 2018-03-19 RX ORDER — APIXABAN 5 MG/1
TABLET, FILM COATED ORAL
Qty: 60 TABLET | Refills: 5 | Status: SHIPPED | OUTPATIENT
Start: 2018-03-19 | End: 2018-07-19

## 2018-04-03 NOTE — TELEPHONE ENCOUNTER
Liana Fong, pt left a message on the nurse line about the diltiazem and what is going on with it  He seems to think it's not resolved  Can you please call him ? Thanks

## 2018-05-03 DIAGNOSIS — N20.0 CALCULUS OF KIDNEY: ICD-10-CM

## 2018-05-03 DIAGNOSIS — Z12.5 ENCOUNTER FOR SCREENING FOR MALIGNANT NEOPLASM OF PROSTATE: ICD-10-CM

## 2018-06-09 ENCOUNTER — TRANSCRIBE ORDERS (OUTPATIENT)
Dept: RADIOLOGY | Facility: HOSPITAL | Age: 63
End: 2018-06-09

## 2018-06-09 ENCOUNTER — APPOINTMENT (OUTPATIENT)
Dept: LAB | Facility: HOSPITAL | Age: 63
End: 2018-06-09
Payer: COMMERCIAL

## 2018-06-09 ENCOUNTER — HOSPITAL ENCOUNTER (OUTPATIENT)
Dept: RADIOLOGY | Facility: HOSPITAL | Age: 63
Discharge: HOME | End: 2018-06-09
Payer: COMMERCIAL

## 2018-06-09 DIAGNOSIS — N20.0 NEPHROLITHIASIS: ICD-10-CM

## 2018-06-09 DIAGNOSIS — N40.0 BENIGN PROSTATIC HYPERPLASIA, UNSPECIFIED WHETHER LOWER URINARY TRACT SYMPTOMS PRESENT: ICD-10-CM

## 2018-06-09 LAB — PSA SERPL-MCNC: 2.6 NG/ML (ref 0–4)

## 2018-06-09 PROCEDURE — 84153 ASSAY OF PSA TOTAL: CPT

## 2018-06-09 PROCEDURE — 74018 RADEX ABDOMEN 1 VIEW: CPT

## 2018-06-12 PROBLEM — N40.1 BPH WITH OBSTRUCTION/LOWER URINARY TRACT SYMPTOMS: Status: ACTIVE | Noted: 2018-06-12

## 2018-06-12 PROBLEM — N13.8 BPH WITH OBSTRUCTION/LOWER URINARY TRACT SYMPTOMS: Status: ACTIVE | Noted: 2018-06-12

## 2018-06-12 NOTE — PROGRESS NOTES
6/13/2018      Chief Complaint   Patient presents with    Benign Prostatic Hypertrophy     1 yr f/u PSA 2 6 on 6/9/18       Assessment and Plan    70 y o  male managed by Dr Juliette Jones    1  BPH  - will trial flomax (SE profile reviewed) and FU 6 weeks for symptoms assessment   - discussed constipation control as this can improve his LUTs    2  Nephrolithiasis  - KUB has no apparent renal or ureteral calculi although renal shadows are partially obscured by overlying bowel gas  - stressed proper hydration  - KUB and U/S 1 year     3  Prostate cancer screening  - PSA 2 6 (6/9/18),  2 2 (5/2/17)   - PSA 1 year        History of Present Illness  Raf Peña is a 70 y o  male here for follow up evaluation of BPH, renal calculi, and prostate cancer screening  KUB has no apparent renal or ureteral calculi although renal shadows are partially obscured by overlying bowel gas  PSA is 2 6 and stable  LUTs as below  Of note, he previously had transrectal ultrasound-guided biopsy of the prostate performed by Dr Brian Hudson for an abnormal digital rectal examination  Review of Systems   Constitutional: Negative for activity change, chills and fever  Gastrointestinal: Negative for abdominal distention and abdominal pain  Musculoskeletal: Negative for back pain and gait problem  Psychiatric/Behavioral: Negative for behavioral problems and confusion  AUA SYMPTOM SCORE      Most Recent Value   AUA SYMPTOM SCORE   How often have you had a sensation of not emptying your bladder completely after you finished urinating? 5   How often have you had to urinate again less than two hours after you finished urinating? 2   How often have you found you stopped and started again several times when you urinate? 5   How often have you found it difficult to postpone urination? 0   How often have you had a weak urinary stream?  4   How often have you had to push or strain to begin urination?   0   How many times did you most typically get up to urinate from the time you went to bed at night until the time you got up in the morning? 2   Quality of Life: If you were to spend the rest of your life with your urinary condition just the way it is now, how would you feel about that?  2 (mostly satisfied)   AUA SYMPTOM SCORE  18 (moderate)          Urinary Incontinence Screening      Most Recent Value   Urinary Incontinence   Urinary Incontinence? No   Incomplete emptying? No   Urinary frequency? Yes   Urinary urgency? No   Urinary hesitancy? Yes [slow to initiate]   Dysuria (painful difficult urination)? No   Nocturia (waking up to use the bathroom)? Yes [2 times per night]   Straining (having to push to go)? No   Weak stream?  Yes [at times]   Intermittent stream?  Yes [somewhat]   Post void dribbling?   No          Past Medical History  Past Medical History:   Diagnosis Date    [facility]fib ([facility] )     Allergic rhinitis     Cerebral vascular disease     Chronic sinusitis     Hyperlipidemia     Melanoma ([facility] )        Past Social History  Past Surgical History:   Procedure Laterality Date    OTHER SURGICAL HISTORY  2003    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    PATENT FORAMEN OVALE CLOSURE       History   Smoking Status    Former Smoker   Smokeless Tobacco    Former User       Past Family History  Family History   Problem Relation Age of Onset    Diabetes type II Mother     Cancer Father      angioma       Past Social history  Social History     Social History    Marital status: /Civil Union     Spouse name: N/A    Number of children: N/A    Years of education: N/A     Occupational History    massage therapist      Social History Main Topics    Smoking status: Former Smoker    Smokeless tobacco: Former User    Alcohol use Yes      Comment: 6-8 ounces daily    Drug use: Yes     Types: Marijuana      Comment: Medical use    Sexual activity: Not on file     Other Topics Concern    Not on file     Social History Narrative    Caffeine: drinks 1 cup coffee/ tea a day  Current Medications  Current Outpatient Prescriptions   Medication Sig Dispense Refill    ascorbic acid (VITAMIN C) 250 mg tablet Take 250 mg by mouth 2 (two) times a day      aspirin (ECOTRIN LOW STRENGTH) 81 mg EC tablet Take 81 mg by mouth daily      Cholecalciferol (VITAMIN D-3) 1000 UNITS CAPS Take 1 capsule by mouth 2 (two) times a day      Coenzyme Q10 (COQ10) 200 MG CAPS Take 1 capsule by mouth daily      ELIQUIS 5 MG TAKE ONE TABLET BY MOUTH EVERY 12 HOURS 60 tablet 5    Glucosamine-Chondroit-Vit C-Mn (GLUCOSAMINE CHONDR 1500 COMPLX PO) Take 1 tablet by mouth daily      halobetasol (ULTRAVATE) 0 05 % cream APPLY ONCE OR TWICE DAILY FOR 2-4 WEEKS AS NEEDED  3    hyoscyamine (ANASPAZ) 0 125 mg Take 0 125 mg by mouth every 4 (four) hours as needed        MAGNESIUM GLYCINATE PLUS PO Take 400 mg by mouth daily      montelukast (SINGULAIR) 10 mg tablet TAKE 1 TABLET DAILY 90 tablet 1    multivitamin (THERAGRAN) TABS Take 1 tablet by mouth 2 (two) times a day        OXcarbazepine (TRILEPTAL) 300 mg tablet Take 300 mg by mouth 2 (two) times a day      CHONDROITIN SULFATE PO Take 200 mg by mouth 2 (two) times a day      diltiazem (CARDIZEM) 120 MG tablet Take 1 5 tablets by mouth daily for 30 days 45 tablet 0    tiZANidine (ZANAFLEX) 2 mg tablet Take 2 mg by mouth       No current facility-administered medications for this visit  Allergies  Allergies   Allergen Reactions    Atorvastatin Fatigue and GI Intolerance     Category: Adverse Reaction; Other reaction(s): Muscle pain, constipation, sleepiness    Ezetimibe Fatigue and GI Intolerance     Category: Adverse Reaction;     Molds & Smuts Allergic Rhinitis     Category: Allergy;     Monascus Purpureus Went Yeast Fatigue     Category: Adverse Reaction;     Niacin Fatigue     Category: Adverse Reaction;     Pollen Extract Allergic Rhinitis     Category:  Allergy;     Pregabalin Fatigue     Category: Adverse Reaction;     Statins Fatigue and GI Intolerance     Category: Adverse Reaction; The following portions of the patient's history were reviewed and updated as appropriate: allergies, current medications, past medical history, past social history, past surgical history and problem list       Vitals  Vitals:    06/13/18 1428   BP: 120/72   BP Location: Left arm   Patient Position: Sitting   Cuff Size: Large   Pulse: 64   Weight: 91 8 kg (202 lb 6 4 oz)   Height: 5' 9" (1 753 m)           Physical Exam  Constitutional   General appearance: Patient is seated and in no acute distress, well appearing and well nourished  Head and Face   Head and face: Normal     Eyes   Conjunctiva and lids: No erythema, swelling or discharge  Ears, Nose, Mouth, and Throat   Hearing: Normal     Pulmonary   Respiratory effort: No increased work of breathing or signs of respiratory distress  Cardiovascular   Examination of extremities for edema and/or varicosities: Normal     Abdomen   Abdomen: Non-tender, no masses  Genitourinary   Digital rectal exam of prostate: smooth, nontender, 50 g prostate with no nodules   Musculoskeletal   Gait and station: Normal     Skin   Skin and subcutaneous tissue: Warm, dry, and intact  No visible lesions or rashes    Psychiatric   Judgment and insight: Normal  Recent and remote memory:  Normal  Mood and affect: Normal      Results  No results found for this or any previous visit (from the past 1 hour(s)) ]  Lab Results   Component Value Date    PSA 2 6 06/09/2018    PSA 2 2 05/02/2017    PSA 1 7 05/12/2015     Lab Results   Component Value Date    GLUCOSE 88 02/06/2017    CALCIUM 9 1 01/20/2018     01/20/2018    K 3 9 01/20/2018    CO2 29 01/20/2018     01/20/2018    BUN 17 01/20/2018    CREATININE 0 84 01/20/2018     Lab Results   Component Value Date    WBC 6 66 01/20/2018    HGB 14 8 01/20/2018    HCT 43 7 01/20/2018    MCV 93 01/20/2018    PLT 280 01/20/2018       KUB  ABDOMEN     INDICATION:   N20 0: Calculus of kidney  "PT STATES HISTORY OF LEFT SIDED KIDNEY STONE  DENIES ANY CURRENT PAIN OR DISCOMFORT"     COMPARISON:  AP abdomen 5/23/2016      VIEWS:  AP supine        FINDINGS:     There is a nonobstructive bowel gas pattern      No discernible free air on this supine study  Upright or left lateral decubitus imaging is more sensitive to detect subtle free air in the appropriate setting      No apparent renal or ureteral calculi; renal shadows are partially obscured by overlying bowel gas  Calcifications throughout the pelvis are unchanged from the prior study in keeping with phleboliths       Visualized lung bases are clear      Visualized osseous structures are unremarkable for the patient's age      IMPRESSION:     No definite urinary tract calculi  Orders  No orders of the defined types were placed in this encounter

## 2018-06-13 ENCOUNTER — OFFICE VISIT (OUTPATIENT)
Dept: UROLOGY | Facility: AMBULATORY SURGERY CENTER | Age: 63
End: 2018-06-13
Payer: COMMERCIAL

## 2018-06-13 VITALS
DIASTOLIC BLOOD PRESSURE: 72 MMHG | SYSTOLIC BLOOD PRESSURE: 120 MMHG | HEART RATE: 64 BPM | WEIGHT: 202.4 LBS | HEIGHT: 69 IN | BODY MASS INDEX: 29.98 KG/M2

## 2018-06-13 DIAGNOSIS — N20.0 NEPHROLITHIASIS: ICD-10-CM

## 2018-06-13 DIAGNOSIS — N40.1 BPH WITH OBSTRUCTION/LOWER URINARY TRACT SYMPTOMS: Primary | ICD-10-CM

## 2018-06-13 DIAGNOSIS — N13.8 BPH WITH OBSTRUCTION/LOWER URINARY TRACT SYMPTOMS: Primary | ICD-10-CM

## 2018-06-13 PROCEDURE — 99213 OFFICE O/P EST LOW 20 MIN: CPT | Performed by: PHYSICIAN ASSISTANT

## 2018-06-13 RX ORDER — TAMSULOSIN HYDROCHLORIDE 0.4 MG/1
0.4 CAPSULE ORAL
Qty: 30 CAPSULE | Refills: 2 | Status: SHIPPED | OUTPATIENT
Start: 2018-06-13 | End: 2018-07-19 | Stop reason: ALTCHOICE

## 2018-06-15 ENCOUNTER — TELEPHONE (OUTPATIENT)
Dept: UROLOGY | Facility: AMBULATORY SURGERY CENTER | Age: 63
End: 2018-06-15

## 2018-06-15 DIAGNOSIS — Z12.5 PROSTATE CANCER SCREENING: ICD-10-CM

## 2018-06-15 DIAGNOSIS — N20.0 NEPHROLITHIASIS: Primary | ICD-10-CM

## 2018-06-15 NOTE — TELEPHONE ENCOUNTER
Patient can stop flomax and cancel appointment  Will need FU in 1 year with KUB US and PSA prior  Orders in

## 2018-06-15 NOTE — TELEPHONE ENCOUNTER
Patient managed by Dr Love Tate and seen by Elijah Jacinto 6/13/18 in f/u for kidney stones and BPH  Patient was placed on Flomax, and took the first dose last night  He had a very hard time getting out of bed this morning  Patient had extreme fatigue, dizziness, and felt as though he was in a fog  Patient is not interested in trying another medication similar to Flomax at this time  He admits to being very sensitive to meds  He requested cancelling appt to follow in 4 wks to evaluate the efficacy of Flomax  Advised patient to call office with any difficulty voiding or dysuria    Patient states he believes he was placed on Flomax for his urinary frequency, but will call office if symptoms worsen or progress

## 2018-07-02 ENCOUNTER — TELEPHONE (OUTPATIENT)
Dept: CARDIOLOGY CLINIC | Facility: CLINIC | Age: 63
End: 2018-07-02

## 2018-07-02 DIAGNOSIS — I48.0 PAF (PAROXYSMAL ATRIAL FIBRILLATION) (HCC): Primary | ICD-10-CM

## 2018-07-02 NOTE — TELEPHONE ENCOUNTER
Called pt, informed him to hold diltiazem for the next 2 days and restart on lower dose as per Dr Vito Wood  Will get lab work done tomorrow

## 2018-07-02 NOTE — TELEPHONE ENCOUNTER
Can you lower dilitazem 120mg daily? Hold it for 2 days before starting w lower dose  Have him increase hydration, check BMP today or tomrrow

## 2018-07-02 NOTE — TELEPHONE ENCOUNTER
Pt called and stated that he has been feeling light headed and dizzy lately  Bryan Rowan him spoke to someone a couple of weeks ago and they suggested to get a BP cuff in which he did  States he has noticed his BP is lower in the mornings ranging anywhere between 89/66 to 92/65 with HR of 56- 61  And he is fatigue, could go back to bed  Toward the afternoon his BP is around 119/70 with HR at 71  He is currently taking diltiazem 180mg daily  Also has a F/U appt on 07/19

## 2018-07-03 RX ORDER — DILTIAZEM HYDROCHLORIDE 120 MG/1
120 TABLET, FILM COATED ORAL DAILY
Qty: 30 TABLET | Refills: 5 | Status: SHIPPED | OUTPATIENT
Start: 2018-07-03 | End: 2018-07-19

## 2018-07-05 ENCOUNTER — APPOINTMENT (OUTPATIENT)
Dept: LAB | Facility: HOSPITAL | Age: 63
End: 2018-07-05
Attending: INTERNAL MEDICINE
Payer: COMMERCIAL

## 2018-07-05 LAB
ANION GAP SERPL CALCULATED.3IONS-SCNC: 3 MMOL/L (ref 4–13)
BUN SERPL-MCNC: 16 MG/DL (ref 5–25)
CALCIUM SERPL-MCNC: 9 MG/DL (ref 8.3–10.1)
CHLORIDE SERPL-SCNC: 108 MMOL/L (ref 100–108)
CO2 SERPL-SCNC: 28 MMOL/L (ref 21–32)
CREAT SERPL-MCNC: 0.95 MG/DL (ref 0.6–1.3)
GFR SERPL CREATININE-BSD FRML MDRD: 84 ML/MIN/1.73SQ M
GLUCOSE P FAST SERPL-MCNC: 87 MG/DL (ref 65–99)
POTASSIUM SERPL-SCNC: 4.1 MMOL/L (ref 3.5–5.3)
SODIUM SERPL-SCNC: 139 MMOL/L (ref 136–145)

## 2018-07-05 PROCEDURE — 36415 COLL VENOUS BLD VENIPUNCTURE: CPT

## 2018-07-05 PROCEDURE — 80048 BASIC METABOLIC PNL TOTAL CA: CPT

## 2018-07-19 ENCOUNTER — OFFICE VISIT (OUTPATIENT)
Dept: CARDIOLOGY CLINIC | Facility: CLINIC | Age: 63
End: 2018-07-19
Payer: COMMERCIAL

## 2018-07-19 VITALS
BODY MASS INDEX: 30.92 KG/M2 | WEIGHT: 204 LBS | HEART RATE: 63 BPM | SYSTOLIC BLOOD PRESSURE: 118 MMHG | DIASTOLIC BLOOD PRESSURE: 70 MMHG | HEIGHT: 68 IN

## 2018-07-19 DIAGNOSIS — I48.0 PAROXYSMAL ATRIAL FIBRILLATION (HCC): Primary | ICD-10-CM

## 2018-07-19 PROCEDURE — 99214 OFFICE O/P EST MOD 30 MIN: CPT | Performed by: INTERNAL MEDICINE

## 2018-07-19 PROCEDURE — 93000 ELECTROCARDIOGRAM COMPLETE: CPT | Performed by: INTERNAL MEDICINE

## 2018-07-19 NOTE — PROGRESS NOTES
HEART AND VASCULAR  CARDIAC ELECTROPHYSIOLOGY   HEART RHYTHM CENTER  [facility]    Outpatient  Follow-up  Today's Date: 07/19/18        Patient name: Adela Zepeda  YOB: 1955  Sex: male         Chief Complaint: f/u afib      ASSESSMENT:  Problem List Items Addressed This Visit     Atrial fibrillation ([address] ) - Primary (Chronic)    Relevant Orders    POCT ECG        71 yo male  1) Chest pain and SOB; S/p Cath 2017  50% mid LAD just distal to prior stent, FFR neg  CP better  Mild  He had Stent to LAD in 2011 at [facility]  Can't tolerate statins    2)F/u for Paroxysmal afib  No recent episodes  One episodes since last visit he had on 9/14/16 for 10 hours controlled on Diltiazem 120mg daily and took two extra pills, and on Eliquis  First diagnosed 2006 after PFO closure (percutaneous) poorly controlled on propafenone so went on to amiodarone for about 5 months  He went all the way until May 2015 w/o episode when he felt it again, frequent episodes usually lasting minutes  Finally went to ER July 10 2015 and was in afib, started on Diltiazem gtt and converted  He then went on Diltiazem 120mg daily and stopped metoprolol  H/o percutaenous PFO closure4) H/o CVA insular, this is what lead to PFO closure  He has minimal residual defecits  5) Intolerant to statins, tried 5 of them  3) symptomatic hypotension perhaps related to diltiazem, dizzyness, improved with holding diltiazem, BP records SBP 80-90s w symptoms    PLAN:  1  D/c diltiazem restart prn for afib  2  D/c eliquis, he has not had afib in about 2 years  He checks pulse regularly and feels his episodes       Follow up in: 12 months    Orders Placed This Encounter   Procedures    POCT ECG     Medications Discontinued During This Encounter   Medication Reason    tamsulosin (FLOMAX) 0 4 mg Therapy completed    tiZANidine (ZANAFLEX) 2 mg tablet Therapy completed [facility]   HPI/Subjective:   69 yo male  1) Chest pain and SOB; S/p Cath 6 months ago  50% mid LAD just distal to prior stent, FFR neg  CP better  Mild  He had Stent to LAD in 2011 at [facility]  He has been off aspirin and just on Eliquis for years  2)F/u for Paroxysmal afib  No recent episodes  One episodes since last visit he had on 9/14/16 for 10 hours controlled on Diltiazem 120mg daily and took two extra pills, and on Eliquis  First diagnosed 2006 after PFO closure (percutaneous) poorly controlled on propafenone so went on to amiodarone for about 5 months  He went all the way until May 2015 w/o episode when he felt it again, frequent episodes usually lasting minutes  Finally went to ER July 10 2015 and was in afib, started on Diltiazem gtt and converted  He then went on Diltiazem 120mg daily and stopped metoprolol  H/o percutaenous PFO closure4) H/o CVA insular, this is what lead to PFO closure  He has minimal residual defecits  5) Intolerant to statins, tried 5 of them  3) symptomatic hypotension perhaps related to diltiazem, dizzyness, improved with holding diltiazem, BP records SBP 80-90s w symptoms    Please note HPI is listed by problem with with update following it, it is copied again in the assessment above and reflects medical decision making as well  Complete 12 point ROS reviewed and otherwise non pertinent or negative except as per HPI pertinent positives in Cardiovascular and Respiratory emphasized  Please see paper chart for outpatient clinic patients where the patient completed the 12 point ROS survey             Past Medical History:   Diagnosis Date    A-fib [facility])     Allergic rhinitis     Cerebral vascular disease     Chronic sinusitis     Hyperlipidemia     Melanoma (HCC)        Allergies   Allergen Reactions    Atorvastatin Fatigue and GI Intolerance     Other reaction(s): Muscle pain, constipation, sleepiness  Category: Adverse Reaction; Other reaction(s): Muscle pain, constipation, sleepiness    Ezetimibe Fatigue and GI Intolerance     Category: Adverse Reaction;     Molds & Smuts Allergic Rhinitis     Category: Allergy;     Monascus Purpureus Went Yeast Fatigue     Category: Adverse Reaction;     Niacin Fatigue     Category: Adverse Reaction;     Pollen Extract Allergic Rhinitis     Category: Allergy;     Pregabalin Fatigue     Category: Adverse Reaction;     Statins Fatigue and GI Intolerance     Category: Adverse Reaction;      I reviewed the Home Medication list and Allergies in the chart     Scheduled Meds:  Current Outpatient Prescriptions   Medication Sig Dispense Refill    ascorbic acid (VITAMIN C) 250 mg tablet Take 250 mg by mouth 2 (two) times a day      aspirin (ECOTRIN LOW STRENGTH) 81 mg EC tablet Take 81 mg by mouth daily      Cholecalciferol (VITAMIN D-3) 1000 UNITS CAPS Take 1 capsule by mouth 2 (two) times a day      CHONDROITIN SULFATE PO Take 200 mg by mouth 2 (two) times a day      Coenzyme Q10 (COQ10) 200 MG CAPS Take 1 capsule by mouth daily      diltiazem (CARDIZEM) 120 MG tablet Take 1 tablet (120 mg total) by mouth daily for 30 days (Patient taking differently: Take 120 mg by mouth daily 0 5mg tab daily ) 30 tablet 5    ELIQUIS 5 MG TAKE ONE TABLET BY MOUTH EVERY 12 HOURS 60 tablet 5    Glucosamine-Chondroit-Vit C-Mn (GLUCOSAMINE CHONDR 1500 COMPLX PO) Take 1 tablet by mouth daily      halobetasol (ULTRAVATE) 0 05 % cream APPLY ONCE OR TWICE DAILY FOR 2-4 WEEKS AS NEEDED  3    hyoscyamine (ANASPAZ) 0 125 mg Take 0 125 mg by mouth every 4 (four) hours as needed        MAGNESIUM GLYCINATE PLUS PO Take 400 mg by mouth daily      montelukast (SINGULAIR) 10 mg tablet TAKE 1 TABLET DAILY 90 tablet 1    multivitamin (THERAGRAN) TABS Take 1 tablet by mouth 2 (two) times a day        OXcarbazepine (TRILEPTAL) 300 mg tablet Take 300 mg by mouth 2 (two) times a day       No current facility-administered medications for this visit  PRN Meds:         Family History   Problem Relation Age of Onset    Diabetes type II Mother     Cancer Father         angioma       Social History     Social History    Marital status: /Civil Union     Spouse name: N/A    Number of children: N/A    Years of education: N/A     Occupational History    massage therapist      Social History Main Topics    Smoking status: Former Smoker    Smokeless tobacco: Former User    Alcohol use Yes      Comment: 6-8 ounces daily    Drug use: Yes     Types: Marijuana      Comment: Medical use    Sexual activity: Not on file     Other Topics Concern    Not on file     Social History Narrative    Caffeine: drinks 1 cup coffee/ tea a day  OBJECTIVE:    /70 (BP Location: Left arm, Patient Position: Sitting, Cuff Size: Large)   Pulse 63   Ht 5' 8" (1 727 m)   Wt 92 5 kg (204 lb)   BMI 31 02 kg/m²   Vitals:    07/19/18 0853   Weight: 92 5 kg (204 lb)     GEN: No acute distress, Alert and oriented, well appearing  HEENT:Head, neck, ears, oral pharynx: Mucus membranes moist, oral pharynx clear, nares clear  External ears normal  EYES: Pupils equal, sclera anicteric, midline, normal conjuctiva  NECK: No JVD, supple, no obvious masses or thryomegaly or goiter  CARDIOVASCULAR:  RRR, No murmur, rub, gallops S1,S2  LUNGS: Clear To auscultation bilaterally, normal effort, no rales, rhonchi, crackles  ABDOMEN:  nondistended,  without obvious organomegaly or ascites  EXTREMITIES/VASCULAR:  No edema  Radial pulses intact, pedal pulses difficult to palpate, warm an well perfused  PSYCH: Normal Affect, no overt suicidal ideation, linear speech pattern without evidence of psychosis     NEURO: Grossly intact, moving all extremiteis equal, face symmetric, alert and responsive, no obvious focal defecits  GAIT:  Ambulates normally without difficulty  HEME: No bleeding, bruising, petechia, purpura  SKIN: No significant rashes, warm, no diaphoresis or pallor  Lab Results:       LABS:      Chemistry        Component Value Date/Time     2018 0737     2016 0740    K 4 1 2018 0737    K 4 3 2016 0740     2018 0737     2016 0740    CO2 28 2018 0737    CO2 26 2016 0740    BUN 16 2018 0737    BUN 18 2016 0740    CREATININE 0 95 2018 0737    CREATININE 0 91 2016 0740        Component Value Date/Time    CALCIUM 9 0 2018 0737    CALCIUM 9 0 2016 0740    ALKPHOS 86 2018 0716    ALKPHOS 78 2016 0740    AST 17 2018 0716    AST 22 2016 0740    ALT 30 2018 0716    ALT 24 2016 0740    BILITOT 0 59 2018 0716    BILITOT 0 3 2016 0740            Lab Results   Component Value Date    CHOL 256 (H) 2018    CHOL 235 (H) 2017    CHOL 229 (H) 2017     Lab Results   Component Value Date    HDL 59 2018    HDL 68 (H) 2017    HDL 70 (H) 2017     Lab Results   Component Value Date    LDLCALC 182 (H) 2018    LDLCALC 155 (H) 2017    LDLCALC 146 (H) 2017     Lab Results   Component Value Date    TRIG 77 2018    TRIG 60 2017    TRIG 65 2017     No components found for: CHOLHDL    IMAGING: No results found  Cardiac testing:   Results for orders placed during the hospital encounter of 17   Echo complete with contrast if indicated    Narrative [address]  [address]  [phone]    Transthoracic Echocardiogram  2D, M-mode, Doppler, and Color Doppler    Study date:  09-Mar-2017    Patient: Barrie Galvez  MR number: [id number]  Account number: [de-identified]  : 1955  Age: 59 years  Gender: Male  Status: Outpatient  Location: Echo lab  Height: 68 in  Weight: 194 7 lb  BP: 122/ 70 mmHg    Indications: Cardiac murmur;  Alpha 1 antitrypsin deficiency    Diagnoses: R01 1 - Cardiac murmur, unspecified    Sonographer:  Isela Gitelman, BS, RDCS  Primary Physician:  Lanis Bamberger, MD  Referring Physician:  Tavares Marie MD  Group:  [facility]  Interpreting Physician:  Dari Elliott MD    SUMMARY    LEFT VENTRICLE:  Systolic function was normal  Ejection fraction was estimated to be 66 %  There were no regional wall motion abnormalities  ATRIAL SEPTUM:  There was a closure device in place  MITRAL VALVE:  There was trace regurgitation  TRICUSPID VALVE:  There was trace regurgitation  PULMONIC VALVE:  There was trace regurgitation  HISTORY: PRIOR HISTORY: Angina; Atrial fibrillation; Coronary artery disease; Chest pain; Shortness of breath; PFO closure with cardioseal    PROCEDURE: The procedure was performed in the echo lab  This was a routine study  The transthoracic approach was used  The study included complete 2D imaging, M-mode, complete spectral Doppler, and color Doppler  The heart rate was 66 bpm,  at the start of the study  Images were obtained from the parasternal, apical, subcostal, and suprasternal notch acoustic windows  Image quality was adequate  LEFT VENTRICLE: Size was normal  Systolic function was normal  Ejection fraction was estimated to be 66 %  There were no regional wall motion abnormalities  Wall thickness was normal  There was no evidence of concentric hypertrophy  DOPPLER: Left ventricular diastolic function parameters were normal     RIGHT VENTRICLE: The size was normal  Systolic function was normal  Wall thickness was normal     LEFT ATRIUM: Size was normal     ATRIAL SEPTUM: There was a closure device in place  RIGHT ATRIUM: Size was normal     MITRAL VALVE: Valve structure was normal  There was normal leaflet separation  DOPPLER: The transmitral velocity was within the normal range  There was no evidence for stenosis  There was trace regurgitation      AORTIC VALVE: The valve was trileaflet  Leaflets exhibited normal thickness and normal cuspal separation  DOPPLER: Transaortic velocity was within the normal range  There was no evidence for stenosis  There was no regurgitation  TRICUSPID VALVE: The valve structure was normal  There was normal leaflet separation  DOPPLER: The transtricuspid velocity was within the normal range  There was no evidence for stenosis  There was trace regurgitation  Pulmonary artery  systolic pressure was within the normal range  PULMONIC VALVE: Leaflets exhibited normal thickness, no calcification, and normal cuspal separation  DOPPLER: The transpulmonic velocity was within the normal range  There was trace regurgitation  PERICARDIUM: There was no pericardial effusion  The pericardium was normal in appearance  AORTA: The root exhibited normal size  SYSTEM MEASUREMENT TABLES    2D  %FS: 32 64 %  Ao Diam: 3 26 cm  EDV(Teich): 63 83 ml  EF(Cube): 69 43 %  EF(Teich): 61 74 %  ESV(Cube): 17 42 ml  ESV(Teich): 24 42 ml  IVSd: 1 29 cm  LA Area: 14 5 cm2  LA Diam: 2 97 cm  LVEDV MOD A4C: 74 15 ml  LVEF MOD A4C: 76 28 %  LVESV MOD A4C: 17 59 ml  LVIDd: 3 85 cm  LVIDs: 2 59 cm  LVLd A4C: 8 41 cm  LVLs A4C: 6 28 cm  LVPWd: 1 06 cm  RA Area: 15 47 cm2  SI(Cube): 19 58 ml/m2  SI(Teich): 19 51 ml/m2  SV MOD A4C: 56 57 ml  SV(Cube): 39 55 ml  SV(Teich): 39 41 ml  rv diam: 3 12 cm    CW  TR Vmax: 2 36 m/s  TR maxP 32 mmHg    MM  TAPSE: 3 1 cm    PW  E': 0 08 m/s  E/E': 7 1  MV A Marcio: 0 59 m/s  MV Dec Edgar: 2 22 m/s2  MV DecT: 270 24 ms  MV E Marcio: 0 6 m/s  MV E/A Ratio: 1 02    [employer] Accredited Echocardiography Laboratory    Prepared and electronically signed by    Jessica Barcenas MD  Signed 09-Mar-2017 19:10:14       No results found for this or any previous visit  No results found for this or any previous visit  No results found for this or any previous visit          I reviewed and interpreted the following LABS/EKG/TELE/IMAGING and below is summary of my interpretation (if data available):    LABS:  HDL is 59    Current EKG and Rhythm Strip: NSR normal EKG

## 2018-07-23 DIAGNOSIS — J30.1 CHRONIC SEASONAL ALLERGIC RHINITIS DUE TO POLLEN: ICD-10-CM

## 2018-07-23 RX ORDER — MONTELUKAST SODIUM 10 MG/1
TABLET ORAL
Qty: 90 TABLET | Refills: 1 | Status: SHIPPED | OUTPATIENT
Start: 2018-07-23 | End: 2018-07-27 | Stop reason: SDUPTHER

## 2018-07-25 DIAGNOSIS — J30.1 CHRONIC SEASONAL ALLERGIC RHINITIS DUE TO POLLEN: ICD-10-CM

## 2018-07-27 DIAGNOSIS — J30.1 CHRONIC SEASONAL ALLERGIC RHINITIS DUE TO POLLEN: ICD-10-CM

## 2018-07-27 RX ORDER — MONTELUKAST SODIUM 10 MG/1
10 TABLET ORAL DAILY
Qty: 90 TABLET | Refills: 1 | Status: SHIPPED | OUTPATIENT
Start: 2018-07-27 | End: 2019-01-07 | Stop reason: SDUPTHER

## 2018-08-13 ENCOUNTER — TRANSCRIBE ORDERS (OUTPATIENT)
Dept: LAB | Facility: HOSPITAL | Age: 63
End: 2018-08-13

## 2018-08-13 ENCOUNTER — APPOINTMENT (OUTPATIENT)
Dept: LAB | Facility: HOSPITAL | Age: 63
End: 2018-08-13
Attending: INTERNAL MEDICINE
Payer: COMMERCIAL

## 2018-08-13 DIAGNOSIS — E78.00 HYPERCHOLESTEROLEMIA: ICD-10-CM

## 2018-08-13 DIAGNOSIS — I48.0 PAROXYSMAL ATRIAL FIBRILLATION (HCC): ICD-10-CM

## 2018-08-13 DIAGNOSIS — J30.1 SEASONAL ALLERGIC RHINITIS DUE TO POLLEN: Primary | ICD-10-CM

## 2018-08-13 DIAGNOSIS — J30.1 SEASONAL ALLERGIC RHINITIS DUE TO POLLEN: ICD-10-CM

## 2018-08-13 DIAGNOSIS — I25.10 CORONARY ARTERY DISEASE INVOLVING NATIVE CORONARY ARTERY OF NATIVE HEART WITHOUT ANGINA PECTORIS: ICD-10-CM

## 2018-08-13 LAB
ALBUMIN SERPL BCP-MCNC: 3.9 G/DL (ref 3.5–5)
ALP SERPL-CCNC: 67 U/L (ref 46–116)
ALT SERPL W P-5'-P-CCNC: 36 U/L (ref 12–78)
ANION GAP SERPL CALCULATED.3IONS-SCNC: 4 MMOL/L (ref 4–13)
AST SERPL W P-5'-P-CCNC: 15 U/L (ref 5–45)
BILIRUB SERPL-MCNC: 0.4 MG/DL (ref 0.2–1)
BUN SERPL-MCNC: 24 MG/DL (ref 5–25)
CALCIUM SERPL-MCNC: 8.5 MG/DL (ref 8.3–10.1)
CHLORIDE SERPL-SCNC: 108 MMOL/L (ref 100–108)
CHOLEST SERPL-MCNC: 263 MG/DL (ref 50–200)
CO2 SERPL-SCNC: 27 MMOL/L (ref 21–32)
CREAT SERPL-MCNC: 0.88 MG/DL (ref 0.6–1.3)
ERYTHROCYTE [DISTWIDTH] IN BLOOD BY AUTOMATED COUNT: 12.2 % (ref 11.6–15.1)
GFR SERPL CREATININE-BSD FRML MDRD: 90 ML/MIN/1.73SQ M
GLUCOSE P FAST SERPL-MCNC: 90 MG/DL (ref 65–99)
HCT VFR BLD AUTO: 44.2 % (ref 36.5–49.3)
HDLC SERPL-MCNC: 66 MG/DL (ref 40–60)
HGB BLD-MCNC: 14.3 G/DL (ref 12–17)
LDLC SERPL CALC-MCNC: 184 MG/DL (ref 0–100)
MCH RBC QN AUTO: 30.6 PG (ref 26.8–34.3)
MCHC RBC AUTO-ENTMCNC: 32.4 G/DL (ref 31.4–37.4)
MCV RBC AUTO: 95 FL (ref 82–98)
PLATELET # BLD AUTO: 211 THOUSANDS/UL (ref 149–390)
PMV BLD AUTO: 9.7 FL (ref 8.9–12.7)
POTASSIUM SERPL-SCNC: 3.9 MMOL/L (ref 3.5–5.3)
PROT SERPL-MCNC: 7.1 G/DL (ref 6.4–8.2)
RBC # BLD AUTO: 4.67 MILLION/UL (ref 3.88–5.62)
SODIUM SERPL-SCNC: 139 MMOL/L (ref 136–145)
TRIGL SERPL-MCNC: 67 MG/DL
WBC # BLD AUTO: 4.35 THOUSAND/UL (ref 4.31–10.16)

## 2018-08-13 PROCEDURE — 80053 COMPREHEN METABOLIC PANEL: CPT

## 2018-08-13 PROCEDURE — 80061 LIPID PANEL: CPT

## 2018-08-13 PROCEDURE — 36415 COLL VENOUS BLD VENIPUNCTURE: CPT

## 2018-08-13 PROCEDURE — 85027 COMPLETE CBC AUTOMATED: CPT

## 2018-08-17 ENCOUNTER — OFFICE VISIT (OUTPATIENT)
Dept: INTERNAL MEDICINE CLINIC | Facility: CLINIC | Age: 63
End: 2018-08-17
Payer: COMMERCIAL

## 2018-08-17 VITALS
WEIGHT: 205.6 LBS | BODY MASS INDEX: 31.16 KG/M2 | HEART RATE: 62 BPM | SYSTOLIC BLOOD PRESSURE: 122 MMHG | DIASTOLIC BLOOD PRESSURE: 80 MMHG | TEMPERATURE: 98.8 F | HEIGHT: 68 IN | OXYGEN SATURATION: 97 %

## 2018-08-17 DIAGNOSIS — E78.00 HYPERCHOLESTEROLEMIA: ICD-10-CM

## 2018-08-17 DIAGNOSIS — M54.50 CHRONIC LOW BACK PAIN WITHOUT SCIATICA, UNSPECIFIED BACK PAIN LATERALITY: ICD-10-CM

## 2018-08-17 DIAGNOSIS — M47.816 SPONDYLOSIS OF LUMBAR REGION WITHOUT MYELOPATHY OR RADICULOPATHY: ICD-10-CM

## 2018-08-17 DIAGNOSIS — I48.0 PAROXYSMAL ATRIAL FIBRILLATION (HCC): Chronic | ICD-10-CM

## 2018-08-17 DIAGNOSIS — G89.29 CHRONIC LOW BACK PAIN WITHOUT SCIATICA, UNSPECIFIED BACK PAIN LATERALITY: ICD-10-CM

## 2018-08-17 DIAGNOSIS — H04.123 DRY EYES: Primary | ICD-10-CM

## 2018-08-17 PROBLEM — I48.91 ATRIAL FIBRILLATION (HCC): Chronic | Status: RESOLVED | Noted: 2017-02-03 | Resolved: 2018-08-17

## 2018-08-17 PROCEDURE — 3725F SCREEN DEPRESSION PERFORMED: CPT | Performed by: INTERNAL MEDICINE

## 2018-08-17 PROCEDURE — 99213 OFFICE O/P EST LOW 20 MIN: CPT | Performed by: INTERNAL MEDICINE

## 2018-08-17 PROCEDURE — 1036F TOBACCO NON-USER: CPT | Performed by: INTERNAL MEDICINE

## 2018-08-17 PROCEDURE — 1101F PT FALLS ASSESS-DOCD LE1/YR: CPT | Performed by: INTERNAL MEDICINE

## 2018-08-17 RX ORDER — CELECOXIB 200 MG/1
200 CAPSULE ORAL DAILY
Qty: 90 CAPSULE | Refills: 1
Start: 2018-08-17 | End: 2019-02-25 | Stop reason: SINTOL

## 2018-08-17 NOTE — ASSESSMENT & PLAN NOTE
Diltiazem has been discontinued because of hypotension  Eliquis has been discontinued because it has been greater than 2 years since the patient has had an episode of atrial fibrillation

## 2018-08-17 NOTE — ASSESSMENT & PLAN NOTE
Patient has been intolerant to statins previously  He has been mildly tolerant to red yeast rice which he will try once again

## 2018-08-17 NOTE — PROGRESS NOTES
Assessment/Plan:    Hypercholesterolemia  Patient has been intolerant to statins previously  He has been mildly tolerant to red yeast rice which he will try once again  Osteoarthritis    Back pain has been stable with the use of Celebrex 200 mg daily    Atrial fibrillation (HCC)   Diltiazem has been discontinued because of hypotension  Eliquis has been discontinued because it has been greater than 2 years since the patient has had an episode of atrial fibrillation  Diagnoses and all orders for this visit:    Dry eyes  -     Cholecalciferol (VITAMIN D-3) 5000 units TABS; Take 1 tablet by mouth daily    Chronic low back pain without sciatica, unspecified back pain laterality  -     celecoxib (CeleBREX) 200 mg capsule; Take 1 capsule (200 mg total) by mouth daily  -     CBC; Future  -     Comprehensive metabolic panel; Future    Paroxysmal atrial fibrillation (HCC)    Spondylosis of lumbar region without myelopathy or radiculopathy    Hypercholesterolemia  -     Lipid panel; Future          Subjective:      Patient ID: Alex Quiles is a 70 y o  male  Patient presents to the office for follow-up visit  He has a history of paroxysmal atrial fibrillation, patent [place] and nonobstructive coronary artery disease  He has not had any issues with chest pain or palpitations  He had been on diltiazem for prophylactic treatment for his atrial fibrillation however he developed  Hypotension several months back and his diltiazem was discontinued  He has felt well ever since  He is now taking Celebrex for his low back pain which has provided him with some significant relief and improvement  In addition he takes vitamin-D supplementation along with glucosamine chondroitin and MSM supplements          Family History   Problem Relation Age of Onset    Diabetes type II Mother     Cancer Father         angioma     Social History     Social History    Marital status: /Civil Union     Spouse name: N/A    Number of children: N/A    Years of education: N/A     Occupational History    massage therapist      Social History Main Topics    Smoking status: Former Smoker    Smokeless tobacco: Former User    Alcohol use Yes      Comment: 6-8 ounces daily    Drug use: Yes     Types: Marijuana      Comment: Medical use    Sexual activity: Not on file     Other Topics Concern    Not on file     Social History Narrative    Caffeine: drinks 1 cup coffee/ tea a day  Past Medical History:   Diagnosis Date    A-fib ([facility] )     Allergic rhinitis     Cerebral vascular disease     Chronic sinusitis     Hyperlipidemia     Melanoma ([facility] )        Current Outpatient Prescriptions:     ascorbic acid (VITAMIN C) 250 mg tablet, Take 250 mg by mouth 2 (two) times a day, Disp: , Rfl:     aspirin (ECOTRIN LOW STRENGTH) 81 mg EC tablet, Take 81 mg by mouth daily, Disp: , Rfl:     Coenzyme Q10 (COQ10) 200 MG CAPS, Take 1 capsule by mouth daily, Disp: , Rfl:     Glucosamine-Chondroit-Vit C-Mn (GLUCOSAMINE CHONDR 1500 COMPLX PO), Take 1 tablet by mouth daily, Disp: , Rfl:     MAGNESIUM GLYCINATE PLUS PO, Take 400 mg by mouth daily, Disp: , Rfl:     montelukast (SINGULAIR) 10 mg tablet, Take 1 tablet (10 mg total) by mouth daily, Disp: 90 tablet, Rfl: 1    multivitamin (THERAGRAN) TABS, Take 1 tablet by mouth 2 (two) times a day  , Disp: , Rfl:     OXcarbazepine (TRILEPTAL) 300 mg tablet, Take 300 mg by mouth 2 (two) times a day, Disp: , Rfl:     celecoxib (CeleBREX) 200 mg capsule, Take 1 capsule (200 mg total) by mouth daily, Disp: 90 capsule, Rfl: 1    Cholecalciferol (VITAMIN D-3) 5000 units TABS, Take 1 tablet by mouth daily, Disp: 100 tablet, Rfl: 0  Allergies   Allergen Reactions    Atorvastatin Fatigue and GI Intolerance     Other reaction(s): Muscle pain, constipation, sleepiness  Category: Adverse Reaction;    Other reaction(s): Muscle pain, constipation, sleepiness    Ezetimibe Fatigue and GI Intolerance     Category: Adverse Reaction;     Molds & Smuts Allergic Rhinitis     Category: Allergy;     Monascus Purpureus Went Yeast Fatigue     Category: Adverse Reaction;     Niacin Fatigue     Category: Adverse Reaction;     Pollen Extract Allergic Rhinitis     Category: Allergy;     Pregabalin Fatigue     Category: Adverse Reaction;     Statins Fatigue and GI Intolerance     Category: Adverse Reaction;      Past Surgical History:   Procedure Laterality Date    OTHER SURGICAL HISTORY  2003    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    PATENT FORAMEN OVALE CLOSURE           Review of Systems   Constitutional: Negative  HENT: Negative  Eyes:        Dry eyes   Respiratory: Negative  Cardiovascular: Negative  Gastrointestinal: Negative  Endocrine: Negative  Genitourinary: Negative  Musculoskeletal: Positive for back pain (Mild to moderate in severity but chronic)  Skin: Negative  Neurological: Negative  Hematological: Negative  Psychiatric/Behavioral: Negative  Objective:      /80 (BP Location: Left arm, Patient Position: Sitting, Cuff Size: Standard)   Pulse 62   Temp 98 8 °F (37 1 °C) (Oral)   Ht 5' 8" (1 727 m)   Wt 93 3 kg (205 lb 9 6 oz)   SpO2 97%   BMI 31 26 kg/m²          Physical Exam   Constitutional: He is oriented to person, place, and time  He appears well-developed and well-nourished  HENT:   Head: Normocephalic and atraumatic  Eyes: Conjunctivae are normal    Neck: Neck supple  No JVD present  No tracheal deviation present  Cardiovascular: Normal rate, regular rhythm and normal heart sounds  No murmur heard  Pulmonary/Chest: Effort normal  No respiratory distress  Abdominal: He exhibits no distension  Musculoskeletal: He exhibits no edema or deformity  Neurological: He is alert and oriented to person, place, and time  He has normal reflexes  Grossly, no focal neurological deficits are appreciated  Skin: Skin is warm and dry  Psychiatric: He has a normal mood and affect  Thought content normal    Vitals reviewed

## 2019-01-07 DIAGNOSIS — J30.1 CHRONIC SEASONAL ALLERGIC RHINITIS DUE TO POLLEN: ICD-10-CM

## 2019-01-07 RX ORDER — MONTELUKAST SODIUM 10 MG/1
10 TABLET ORAL DAILY
Qty: 90 TABLET | Refills: 1 | Status: SHIPPED | OUTPATIENT
Start: 2019-01-07 | End: 2019-07-16 | Stop reason: SDUPTHER

## 2019-02-21 ENCOUNTER — APPOINTMENT (OUTPATIENT)
Dept: LAB | Facility: HOSPITAL | Age: 64
End: 2019-02-21
Attending: INTERNAL MEDICINE
Payer: COMMERCIAL

## 2019-02-21 DIAGNOSIS — M54.50 CHRONIC LOW BACK PAIN WITHOUT SCIATICA, UNSPECIFIED BACK PAIN LATERALITY: ICD-10-CM

## 2019-02-21 DIAGNOSIS — E78.00 HYPERCHOLESTEROLEMIA: ICD-10-CM

## 2019-02-21 DIAGNOSIS — G89.29 CHRONIC LOW BACK PAIN WITHOUT SCIATICA, UNSPECIFIED BACK PAIN LATERALITY: ICD-10-CM

## 2019-02-21 LAB
ALBUMIN SERPL BCP-MCNC: 4.2 G/DL (ref 3.5–5)
ALP SERPL-CCNC: 66 U/L (ref 46–116)
ALT SERPL W P-5'-P-CCNC: 32 U/L (ref 12–78)
ANION GAP SERPL CALCULATED.3IONS-SCNC: 6 MMOL/L (ref 4–13)
AST SERPL W P-5'-P-CCNC: 16 U/L (ref 5–45)
BILIRUB SERPL-MCNC: 0.55 MG/DL (ref 0.2–1)
BUN SERPL-MCNC: 24 MG/DL (ref 5–25)
CALCIUM SERPL-MCNC: 9 MG/DL (ref 8.3–10.1)
CHLORIDE SERPL-SCNC: 107 MMOL/L (ref 100–108)
CHOLEST SERPL-MCNC: 267 MG/DL (ref 50–200)
CO2 SERPL-SCNC: 27 MMOL/L (ref 21–32)
CREAT SERPL-MCNC: 0.83 MG/DL (ref 0.6–1.3)
ERYTHROCYTE [DISTWIDTH] IN BLOOD BY AUTOMATED COUNT: 12.1 % (ref 11.6–15.1)
GFR SERPL CREATININE-BSD FRML MDRD: 92 ML/MIN/1.73SQ M
GLUCOSE P FAST SERPL-MCNC: 86 MG/DL (ref 65–99)
HCT VFR BLD AUTO: 43.3 % (ref 36.5–49.3)
HDLC SERPL-MCNC: 72 MG/DL (ref 40–60)
HGB BLD-MCNC: 14.6 G/DL (ref 12–17)
LDLC SERPL CALC-MCNC: 182 MG/DL (ref 0–100)
MCH RBC QN AUTO: 32 PG (ref 26.8–34.3)
MCHC RBC AUTO-ENTMCNC: 33.7 G/DL (ref 31.4–37.4)
MCV RBC AUTO: 95 FL (ref 82–98)
NONHDLC SERPL-MCNC: 195 MG/DL
PLATELET # BLD AUTO: 188 THOUSANDS/UL (ref 149–390)
PMV BLD AUTO: 10.1 FL (ref 8.9–12.7)
POTASSIUM SERPL-SCNC: 4.2 MMOL/L (ref 3.5–5.3)
PROT SERPL-MCNC: 7.3 G/DL (ref 6.4–8.2)
RBC # BLD AUTO: 4.56 MILLION/UL (ref 3.88–5.62)
SODIUM SERPL-SCNC: 140 MMOL/L (ref 136–145)
TRIGL SERPL-MCNC: 67 MG/DL
WBC # BLD AUTO: 4.46 THOUSAND/UL (ref 4.31–10.16)

## 2019-02-21 PROCEDURE — 36415 COLL VENOUS BLD VENIPUNCTURE: CPT

## 2019-02-21 PROCEDURE — 80053 COMPREHEN METABOLIC PANEL: CPT

## 2019-02-21 PROCEDURE — 80061 LIPID PANEL: CPT

## 2019-02-21 PROCEDURE — 85027 COMPLETE CBC AUTOMATED: CPT

## 2019-02-25 ENCOUNTER — OFFICE VISIT (OUTPATIENT)
Dept: INTERNAL MEDICINE CLINIC | Facility: CLINIC | Age: 64
End: 2019-02-25
Payer: COMMERCIAL

## 2019-02-25 VITALS
TEMPERATURE: 98.2 F | DIASTOLIC BLOOD PRESSURE: 68 MMHG | WEIGHT: 204.4 LBS | HEIGHT: 68 IN | SYSTOLIC BLOOD PRESSURE: 102 MMHG | OXYGEN SATURATION: 98 % | BODY MASS INDEX: 30.98 KG/M2 | HEART RATE: 63 BPM

## 2019-02-25 DIAGNOSIS — I10 BENIGN ESSENTIAL HYPERTENSION: ICD-10-CM

## 2019-02-25 DIAGNOSIS — M47.816 SPONDYLOSIS OF LUMBAR REGION WITHOUT MYELOPATHY OR RADICULOPATHY: ICD-10-CM

## 2019-02-25 DIAGNOSIS — I25.10 CORONARY ARTERY DISEASE INVOLVING NATIVE CORONARY ARTERY OF NATIVE HEART WITHOUT ANGINA PECTORIS: ICD-10-CM

## 2019-02-25 DIAGNOSIS — Z11.59 NEED FOR HEPATITIS C SCREENING TEST: Primary | ICD-10-CM

## 2019-02-25 DIAGNOSIS — E78.00 HYPERCHOLESTEROLEMIA: ICD-10-CM

## 2019-02-25 DIAGNOSIS — J30.2 SEASONAL ALLERGIC RHINITIS, UNSPECIFIED TRIGGER: ICD-10-CM

## 2019-02-25 PROBLEM — I20.0 UNSTABLE ANGINA (HCC): Status: RESOLVED | Noted: 2017-02-03 | Resolved: 2019-02-25

## 2019-02-25 PROCEDURE — 3074F SYST BP LT 130 MM HG: CPT | Performed by: INTERNAL MEDICINE

## 2019-02-25 PROCEDURE — 3008F BODY MASS INDEX DOCD: CPT | Performed by: INTERNAL MEDICINE

## 2019-02-25 PROCEDURE — 1160F RVW MEDS BY RX/DR IN RCRD: CPT | Performed by: INTERNAL MEDICINE

## 2019-02-25 PROCEDURE — 99214 OFFICE O/P EST MOD 30 MIN: CPT | Performed by: INTERNAL MEDICINE

## 2019-02-25 PROCEDURE — 3078F DIAST BP <80 MM HG: CPT | Performed by: INTERNAL MEDICINE

## 2019-02-25 PROCEDURE — 1036F TOBACCO NON-USER: CPT | Performed by: INTERNAL MEDICINE

## 2019-02-25 RX ORDER — NAPROXEN 250 MG/1
250 TABLET ORAL 2 TIMES DAILY WITH MEALS
COMMUNITY
End: 2019-02-25 | Stop reason: ALTCHOICE

## 2019-02-25 RX ORDER — MONTELUKAST SODIUM 4 MG/1
2 TABLET, CHEWABLE ORAL DAILY
Qty: 60 TABLET | Refills: 5 | Status: SHIPPED | OUTPATIENT
Start: 2019-02-25 | End: 2019-08-28 | Stop reason: ALTCHOICE

## 2019-02-25 NOTE — ASSESSMENT & PLAN NOTE
Predominantly LDL elevation  We will trial the patient on Colestid 2 g daily  We will check his LDL cholesterol in 2 months

## 2019-02-25 NOTE — PATIENT INSTRUCTIONS
We suggested the use of [place] tablets after activity to reduce tissue swelling and inflammation    Labs are scheduled for 2 months for LDL levels and also for 6 months for routine follow-ups

## 2019-02-25 NOTE — ASSESSMENT & PLAN NOTE
Frequent episodes of low back pain at times radiating both legs  Patient will look in to epidural injections in 1 to 2 months if his symptoms persist   He has been intolerant to NSAIDs for various side effects such as pruritus from Celebrex and upset stomach from naproxen

## 2019-02-25 NOTE — ASSESSMENT & PLAN NOTE
Blood pressure is well controlled without medications  We will remove this diagnosis from his active problems

## 2019-02-25 NOTE — PROGRESS NOTES
Assessment/Plan:    Seasonal allergic rhinitis  Continues on Singulair on a daily basis  Osteoarthritis  Frequent episodes of low back pain at times radiating both legs  Patient will look in to epidural injections in 1 to 2 months if his symptoms persist   He has been intolerant to NSAIDs for various side effects such as pruritus from Celebrex and upset stomach from naproxen  Hypercholesterolemia  Predominantly LDL elevation  We will trial the patient on Colestid 2 g daily  We will check his LDL cholesterol in 2 months  Coronary artery disease involving native coronary artery  No issues with chest pain or dyspnea  Denies palpitations  Benign essential hypertension  Blood pressure is well controlled without medications  We will remove this diagnosis from his active problems  Diagnoses and all orders for this visit:    Need for hepatitis C screening test  -     Hepatitis C antibody; Future    Hypercholesterolemia  -     colestipol (COLESTID) 1 g tablet; Take 2 tablets (2 g total) by mouth daily  -     LDL cholesterol, direct; Future  -     CBC and Platelet; Future  -     Comprehensive metabolic panel; Future  -     Lipid panel; Future    Benign essential hypertension  -     CBC and Platelet; Future  -     Comprehensive metabolic panel; Future  -     Lipid panel; Future    Spondylosis of lumbar region without myelopathy or radiculopathy    Coronary artery disease involving native coronary artery of native heart without angina pectoris    Seasonal allergic rhinitis, unspecified trigger    Other orders  -     Discontinue: naproxen (NAPROSYN) 250 mg tablet; Take 250 mg by mouth 2 (two) times a day with meals          Subjective:      Patient ID: Tra James is a 70 y o  male  Patient presents to the office for follow-up visit  He has some issues regarding ongoing back pain  He has some mild sciatic type symptoms  He is concerned that he will need some low back surgery in the future  He has never had any epidural injections  We discussed this and the patient will pursue this over the next few months  He has had no issues with chest pain or dyspnea  His blood pressure has been well controlled  He is not taking any medication at the current time  He has had no issues or symptoms suggestive of recurrent nephrolithiasis  His lab work was reviewed  Total cholesterol is elevated and it is predominantly a LDL cholesterol  Cholesterol is 267  His LDL cholesterol is 182  Both his HDL and triglyceride levels are at goal   He has been intolerant to statin medications but he has never been tried on a bile acid sequestrant  Family History   Problem Relation Age of Onset    Diabetes type II Mother     Cancer Father         angioma     Social History     Socioeconomic History    Marital status: /Civil Union     Spouse name: Not on file    Number of children: Not on file    Years of education: Not on file    Highest education level: Not on file   Occupational History    Occupation: massage therapist   Social Needs    Financial resource strain: Not on file    Food insecurity:     Worry: Not on file     Inability: Not on file    Transportation needs:     Medical: Not on file     Non-medical: Not on file   Tobacco Use    Smoking status: Former Smoker     Years: 10 00     Last attempt to quit:      Years since quittin 1    Smokeless tobacco: Former User    Tobacco comment: Cigars    Substance and Sexual Activity    Alcohol use:  Yes     Alcohol/week: 0 6 oz     Types: 1 Glasses of wine per week     Frequency: 4 or more times a week     Drinks per session: 1 or 2     Binge frequency: Daily or almost daily    Drug use: Not Currently     Types: Marijuana     Comment: Medical use    Sexual activity: Not on file   Lifestyle    Physical activity:     Days per week: Not on file     Minutes per session: Not on file    Stress: Not on file   Relationships    Social connections:     Talks on phone: Not on file     Gets together: Not on file     Attends [employer] service: Not on file     Active member of club or organization: Not on file     Attends meetings of clubs or organizations: Not on file     Relationship status: Not on file    Intimate partner violence:     Fear of current or ex partner: Not on file     Emotionally abused: Not on file     Physically abused: Not on file     Forced sexual activity: Not on file   Other Topics Concern    Not on file   Social History Narrative    Caffeine: drinks 1 cup coffee/ tea a day  Past Medical History:   Diagnosis Date    [facility]     Allergic rhinitis     Cerebral vascular disease     Chronic sinusitis     Hyperlipidemia     [facility]        Current Outpatient Medications:     ascorbic acid (VITAMIN C) 250 mg tablet, Take 250 mg by mouth 2 (two) times a day, Disp: , Rfl:     aspirin (ECOTRIN LOW STRENGTH) 81 mg EC tablet, Take 81 mg by mouth daily, Disp: , Rfl:     Cholecalciferol (VITAMIN D-3) 5000 units TABS, Take 1 tablet by mouth daily, Disp: 100 tablet, Rfl: 0    Coenzyme Q10 (COQ10) 200 MG CAPS, Take 1 capsule by mouth daily, Disp: , Rfl:     Glucosamine-Chondroit-Vit C-Mn (GLUCOSAMINE CHONDR 1500 COMPLX PO), Take 1 tablet by mouth 2 (two) times a day , Disp: , Rfl:     MAGNESIUM GLYCINATE PLUS PO, Take 400 mg by mouth daily, Disp: , Rfl:     montelukast (SINGULAIR) 10 mg tablet, Take 1 tablet (10 mg total) by mouth daily, Disp: 90 tablet, Rfl: 1    multivitamin (THERAGRAN) TABS, Take 1 tablet by mouth 2 (two) times a day  , Disp: , Rfl:     OXcarbazepine (TRILEPTAL) 300 mg tablet, Take 300 mg by mouth 2 (two) times a day, Disp: , Rfl:     colestipol (COLESTID) 1 g tablet, Take 2 tablets (2 g total) by mouth daily, Disp: 60 tablet, Rfl: 5  Allergies   Allergen Reactions    Atorvastatin Fatigue and GI Intolerance     Other reaction(s): Muscle pain, constipation, sleepiness  Category:  Adverse Reaction; Other reaction(s): Muscle pain, constipation, sleepiness    Ezetimibe Fatigue and GI Intolerance     Category: Adverse Reaction;     Molds & Smuts Allergic Rhinitis     Category: Allergy;     Monascus Purpureus Went Yeast Fatigue     Category: Adverse Reaction;     Niacin Fatigue     Category: Adverse Reaction;     Pollen Extract Allergic Rhinitis     Category: Allergy;     Pregabalin Fatigue     Category: Adverse Reaction;     Statins Fatigue and GI Intolerance     Category: Adverse Reaction;      Past Surgical History:   Procedure Laterality Date    OTHER SURGICAL HISTORY  2003    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    PATENT FORAMEN OVALE CLOSURE           Review of Systems   Constitutional: Positive for activity change (Due to intermittent back pain  )  Negative for chills, diaphoresis, fatigue and fever  HENT: Negative  Eyes: Negative  Respiratory: Negative  Cardiovascular: Negative  Gastrointestinal: Negative  Genitourinary: Negative  Musculoskeletal: Positive for back pain  Neurological: Negative  Hematological: Negative  Psychiatric/Behavioral: Negative  Objective:      /68 (BP Location: Left arm, Patient Position: Sitting, Cuff Size: Standard)   Pulse 63   Temp 98 2 °F (36 8 °C) (Oral)   Ht 5' 8" (1 727 m) Comment: shoes on  Wt 92 7 kg (204 lb 6 4 oz) Comment: shoes on  SpO2 98%   BMI 31 08 kg/m²          Physical Exam   Constitutional: He is oriented to person, place, and time  He appears well-developed and well-nourished  No distress  HENT:   Head: Normocephalic  Right Ear: External ear normal    Left Ear: External ear normal    Eyes: Pupils are equal, round, and reactive to light  No scleral icterus  Neck: Neck supple  No JVD present  No tracheal deviation present  Cardiovascular: Normal rate, regular rhythm and normal heart sounds  No murmur heard  Pulmonary/Chest: Effort normal and breath sounds normal  No respiratory distress  He has no wheezes  He has no rales  Abdominal: He exhibits no distension  Musculoskeletal: He exhibits no edema, tenderness or deformity  Neurological: He is alert and oriented to person, place, and time  Grossly, no focal neurological deficits appreciated  Skin: Skin is warm and dry  He is not diaphoretic  Psychiatric: He has a normal mood and affect  Thought content normal    Vitals reviewed

## 2019-06-04 ENCOUNTER — TELEPHONE (OUTPATIENT)
Dept: UROLOGY | Facility: MEDICAL CENTER | Age: 64
End: 2019-06-04

## 2019-06-14 ENCOUNTER — HOSPITAL ENCOUNTER (OUTPATIENT)
Dept: RADIOLOGY | Facility: HOSPITAL | Age: 64
Discharge: HOME | End: 2019-06-14
Payer: COMMERCIAL

## 2019-06-14 ENCOUNTER — APPOINTMENT (OUTPATIENT)
Dept: LAB | Facility: HOSPITAL | Age: 64
End: 2019-06-14
Payer: COMMERCIAL

## 2019-06-14 ENCOUNTER — TRANSCRIBE ORDERS (OUTPATIENT)
Dept: RADIOLOGY | Facility: HOSPITAL | Age: 64
End: 2019-06-14

## 2019-06-14 DIAGNOSIS — N20.0 NEPHROLITHIASIS: ICD-10-CM

## 2019-06-14 DIAGNOSIS — Z12.5 PROSTATE CANCER SCREENING: ICD-10-CM

## 2019-06-14 LAB — PSA SERPL-MCNC: 2.1 NG/ML (ref 0–4)

## 2019-06-14 PROCEDURE — 74018 RADEX ABDOMEN 1 VIEW: CPT

## 2019-06-14 PROCEDURE — 76770 US EXAM ABDO BACK WALL COMP: CPT

## 2019-06-14 PROCEDURE — G0103 PSA SCREENING: HCPCS

## 2019-06-14 PROCEDURE — 36415 COLL VENOUS BLD VENIPUNCTURE: CPT

## 2019-06-20 ENCOUNTER — TELEPHONE (OUTPATIENT)
Dept: UROLOGY | Facility: CLINIC | Age: 64
End: 2019-06-20

## 2019-06-20 DIAGNOSIS — N28.1 COMPLEX RENAL CYST: Primary | ICD-10-CM

## 2019-06-27 ENCOUNTER — TELEPHONE (OUTPATIENT)
Dept: UROLOGY | Facility: AMBULATORY SURGERY CENTER | Age: 64
End: 2019-06-27

## 2019-06-27 DIAGNOSIS — N28.1 COMPLEX RENAL CYST: Primary | ICD-10-CM

## 2019-07-15 ENCOUNTER — APPOINTMENT (OUTPATIENT)
Dept: LAB | Facility: HOSPITAL | Age: 64
End: 2019-07-15
Payer: COMMERCIAL

## 2019-07-15 DIAGNOSIS — N28.1 COMPLEX RENAL CYST: ICD-10-CM

## 2019-07-15 LAB
ANION GAP SERPL CALCULATED.3IONS-SCNC: 3 MMOL/L (ref 4–13)
BUN SERPL-MCNC: 21 MG/DL (ref 5–25)
CALCIUM SERPL-MCNC: 9 MG/DL (ref 8.3–10.1)
CHLORIDE SERPL-SCNC: 108 MMOL/L (ref 100–108)
CO2 SERPL-SCNC: 29 MMOL/L (ref 21–32)
CREAT SERPL-MCNC: 0.88 MG/DL (ref 0.6–1.3)
GFR SERPL CREATININE-BSD FRML MDRD: 90 ML/MIN/1.73SQ M
GLUCOSE P FAST SERPL-MCNC: 90 MG/DL (ref 65–99)
POTASSIUM SERPL-SCNC: 3.7 MMOL/L (ref 3.5–5.3)
SODIUM SERPL-SCNC: 140 MMOL/L (ref 136–145)

## 2019-07-15 PROCEDURE — 36415 COLL VENOUS BLD VENIPUNCTURE: CPT

## 2019-07-15 PROCEDURE — 80048 BASIC METABOLIC PNL TOTAL CA: CPT

## 2019-07-16 ENCOUNTER — HOSPITAL ENCOUNTER (OUTPATIENT)
Dept: RADIOLOGY | Facility: HOSPITAL | Age: 64
Discharge: HOME | End: 2019-07-16
Attending: UROLOGY
Payer: COMMERCIAL

## 2019-07-16 DIAGNOSIS — N28.1 COMPLEX RENAL CYST: ICD-10-CM

## 2019-07-16 DIAGNOSIS — J30.1 CHRONIC SEASONAL ALLERGIC RHINITIS DUE TO POLLEN: ICD-10-CM

## 2019-07-16 PROCEDURE — A9585 GADOBUTROL INJECTION: HCPCS | Performed by: UROLOGY

## 2019-07-16 PROCEDURE — 74183 MRI ABD W/O CNTR FLWD CNTR: CPT

## 2019-07-16 RX ORDER — MONTELUKAST SODIUM 10 MG/1
10 TABLET ORAL DAILY
Qty: 90 TABLET | Refills: 1 | Status: SHIPPED | OUTPATIENT
Start: 2019-07-16 | End: 2020-01-20 | Stop reason: SDUPTHER

## 2019-07-16 RX ADMIN — GADOBUTROL 9 ML: 604.72 INJECTION INTRAVENOUS at 20:52

## 2019-07-31 ENCOUNTER — OFFICE VISIT (OUTPATIENT)
Dept: UROLOGY | Facility: AMBULATORY SURGERY CENTER | Age: 64
End: 2019-07-31
Payer: COMMERCIAL

## 2019-07-31 VITALS
WEIGHT: 205.8 LBS | HEIGHT: 68 IN | DIASTOLIC BLOOD PRESSURE: 88 MMHG | SYSTOLIC BLOOD PRESSURE: 140 MMHG | BODY MASS INDEX: 31.19 KG/M2 | HEART RATE: 61 BPM

## 2019-07-31 DIAGNOSIS — Z12.5 SCREENING FOR PROSTATE CANCER: Primary | ICD-10-CM

## 2019-07-31 DIAGNOSIS — N28.1 RENAL CYST: ICD-10-CM

## 2019-07-31 DIAGNOSIS — K86.2 PANCREATIC CYST: ICD-10-CM

## 2019-07-31 PROCEDURE — 99214 OFFICE O/P EST MOD 30 MIN: CPT | Performed by: UROLOGY

## 2019-07-31 PROCEDURE — 4040F PNEUMOC VAC/ADMIN/RCVD: CPT | Performed by: UROLOGY

## 2019-07-31 RX ORDER — HYOSCYAMINE SULFATE 0.125 MG
0.12 TABLET,DISINTEGRATING ORAL 2 TIMES DAILY
COMMUNITY

## 2019-07-31 NOTE — PROGRESS NOTES
7/31/2019    [facility]  1955  [id number]        Assessment  Left renal cysts, routine prostate cancer screening, 2 mm pancreatic head cyst, history of nephrolithiasis without recurrence      Discussion  I provided the patient and his wife with reassurance that digital rectal examination and PSA are within normal limits  I recommend a referral to Surgical Oncology regarding the very small and likely benign appearing pancreatic head cyst   Follow-up will be in 1 year with a retroperitoneal ultrasound along with a PSA level  History of Present Illness  75 y o  male with a history of a left renal cyst 2 2 x 1 9 cm  This was initially identified on a retroperitoneal ultrasound  Recent KUB shows no evidence of stones  He returns in follow-up today  A recent PSA level is 2 1  Previously his PSA level has been between 2 2 in 2 6 over the last 2 years  He denies any significant lower urinary tract symptoms  He returns in follow-up after a MRI of the abdomen was performed for further evaluation of the left renal cyst             AUA Symptom Score  AUA SYMPTOM SCORE      Most Recent Value   AUA SYMPTOM SCORE   How often have you had a sensation of not emptying your bladder completely after you finished urinating? 0   How often have you had to urinate again less than two hours after you finished urinating? 3   How often have you found you stopped and started again several times when you urinate? 5   How often have you found it difficult to postpone urination? 0   How often have you had a weak urinary stream?  4   How often have you had to push or strain to begin urination? 0   How many times did you most typically get up to urinate from the time you went to bed at night until the time you got up in the morning?   1   Quality of Life: If you were to spend the rest of your life with your urinary condition just the way it is now, how would you feel about that?  3   AUA SYMPTOM SCORE  13 Review of Systems  Review of Systems   Constitutional: Negative  HENT: Negative  Eyes: Negative  Respiratory: Negative  Cardiovascular: Negative  Gastrointestinal: Negative  Endocrine: Negative  Genitourinary: Negative  Musculoskeletal: Negative  Skin: Negative  Allergic/Immunologic: Negative  Neurological: Negative  Hematological: Negative  Psychiatric/Behavioral: Negative  Past Medical History  Past Medical History:   Diagnosis Date    A-fib ([place] [address] )     Allergic rhinitis     Cerebral vascular disease     Chronic sinusitis     Hyperlipidemia     Melanoma ([place] [address] )        Past Social History  Past Surgical History:   Procedure Laterality Date    OTHER SURGICAL HISTORY      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    PATENT FORAMEN OVALE CLOSURE         Past Family History  Family History   Problem Relation Age of Onset    Diabetes type II Mother     Cancer Father         angioma       Past Social history  Social History     Socioeconomic History    Marital status: /Civil Union     Spouse name: Not on file    Number of children: Not on file    Years of education: Not on file    Highest education level: Not on file   Occupational History    Occupation: massage therapist   Social Needs    Financial resource strain: Not on file    Food insecurity:     Worry: Not on file     Inability: Not on file    Transportation needs:     Medical: Not on file     Non-medical: Not on file   Tobacco Use    Smoking status: Former Smoker     Years: 10 00     Last attempt to quit:      Years since quittin 6    Smokeless tobacco: Former User    Tobacco comment: Cigars    Substance and Sexual Activity    Alcohol use:  Yes     Alcohol/week: 1 0 standard drinks     Types: 1 Glasses of wine per week     Frequency: 4 or more times a week     Drinks per session: 1 or 2     Binge frequency: Daily or almost daily    Drug use: Not Currently     Types: Marijuana Comment: Medical use    Sexual activity: Not on file   Lifestyle    Physical activity:     Days per week: Not on file     Minutes per session: Not on file    Stress: Not on file   Relationships    Social connections:     Talks on phone: Not on file     Gets together: Not on file     Attends Jewish service: Not on file     Active member of club or organization: Not on file     Attends meetings of clubs or organizations: Not on file     Relationship status: Not on file    Intimate partner violence:     Fear of current or ex partner: Not on file     Emotionally abused: Not on file     Physically abused: Not on file     Forced sexual activity: Not on file   Other Topics Concern    Not on file   Social History Narrative    Caffeine: drinks 1 cup coffee/ tea a day         Current Medications  Current Outpatient Medications   Medication Sig Dispense Refill    ascorbic acid (VITAMIN C) 250 mg tablet Take 250 mg by mouth 2 (two) times a day      aspirin (ECOTRIN LOW STRENGTH) 81 mg EC tablet Take 81 mg by mouth daily      Cholecalciferol (VITAMIN D-3) 5000 units TABS Take 1 tablet by mouth daily 100 tablet 0    Coenzyme Q10 (COQ10) 200 MG CAPS Take 1 capsule by mouth daily      Glucosamine-Chondroit-Vit C-Mn (GLUCOSAMINE CHONDR 1500 COMPLX PO) Take 1 tablet by mouth 2 (two) times a day       hyoscyamine (ANASPAZ) 0 125 mg Take 0 125 mg by mouth      MAGNESIUM GLYCINATE PLUS PO Take 400 mg by mouth daily      montelukast (SINGULAIR) 10 mg tablet Take 1 tablet (10 mg total) by mouth daily 90 tablet 1    multivitamin (THERAGRAN) TABS Take 1 tablet by mouth 2 (two) times a day        other medication, see sig, Medication/product name: [id number]  Strength: 3 capsules  Sig (include dose, route, frequency): daily      OXcarbazepine (TRILEPTAL) 300 mg tablet Take 300 mg by mouth 2 (two) times a day      colestipol (COLESTID) 1 g tablet Take 2 tablets (2 g total) by mouth daily (Patient not taking: Reported on 7/31/2019) 60 tablet 5     No current facility-administered medications for this visit  Allergies  Allergies   Allergen Reactions    Atorvastatin Fatigue and GI Intolerance     Other reaction(s): Muscle pain, constipation, sleepiness  Category: Adverse Reaction; Other reaction(s): Muscle pain, constipation, sleepiness    Ezetimibe Fatigue and GI Intolerance     Category: Adverse Reaction;     Molds & Smuts Allergic Rhinitis     Category: Allergy;     Monascus Purpureus Went Yeast Fatigue     Category: Adverse Reaction;     Niacin Fatigue     Category: Adverse Reaction;     Pollen Extract Allergic Rhinitis     Category: Allergy;     Pregabalin Fatigue     Category: Adverse Reaction;     Statins Fatigue and GI Intolerance     Category: Adverse Reaction;        Past Medical History, Social History, Family History, medications and allergies were reviewed  Vitals  Vitals:    07/31/19 1441   BP: 140/88   BP Location: Left arm   Patient Position: Sitting   Cuff Size: Adult   Pulse: 61   Weight: 93 4 kg (205 lb 12 8 oz)   Height: 5' 8" (1 727 m)       Physical Exam  Physical Exam  On examination he is in no acute distress  His abdomen is soft nontender nondistended   examination reveals normal phallus, scrotum and scrotal contents  Digital rectal examination reveals a 40 g prostate which is smooth and firm without nodularity  Skin is warm  Extremities without edema    Neurologic is grossly intact and nonfocal   Gait normal   Affect normal      Results  Lab Results   Component Value Date    PSA 2 1 06/14/2019    PSA 2 6 06/09/2018    PSA 2 2 05/02/2017     Lab Results   Component Value Date    GLUCOSE 92 06/11/2015    CALCIUM 9 0 07/15/2019     01/30/2016    K 3 7 07/15/2019    CO2 29 07/15/2019     07/15/2019    BUN 21 07/15/2019    CREATININE 0 88 07/15/2019     Lab Results   Component Value Date    WBC 4 46 02/21/2019    HGB 14 6 02/21/2019    HCT 43 3 02/21/2019    MCV 95 02/21/2019  02/21/2019         Office Urine Dip  No results found for this or any previous visit (from the past 1 hour(s)) ]

## 2019-08-02 ENCOUNTER — TELEPHONE (OUTPATIENT)
Dept: GASTROENTEROLOGY | Facility: AMBULARY SURGERY CENTER | Age: 64
End: 2019-08-02

## 2019-08-02 ENCOUNTER — TELEPHONE (OUTPATIENT)
Dept: GASTROENTEROLOGY | Facility: CLINIC | Age: 64
End: 2019-08-02

## 2019-08-23 ENCOUNTER — TELEPHONE (OUTPATIENT)
Dept: UROLOGY | Facility: MEDICAL CENTER | Age: 64
End: 2019-08-23

## 2019-08-23 ENCOUNTER — APPOINTMENT (OUTPATIENT)
Dept: LAB | Facility: HOSPITAL | Age: 64
End: 2019-08-23
Attending: INTERNAL MEDICINE
Payer: COMMERCIAL

## 2019-08-23 DIAGNOSIS — E78.00 HYPERCHOLESTEROLEMIA: ICD-10-CM

## 2019-08-23 DIAGNOSIS — Z11.59 NEED FOR HEPATITIS C SCREENING TEST: ICD-10-CM

## 2019-08-23 DIAGNOSIS — I10 BENIGN ESSENTIAL HYPERTENSION: ICD-10-CM

## 2019-08-23 LAB
ALBUMIN SERPL BCP-MCNC: 4.3 G/DL (ref 3.5–5)
ALP SERPL-CCNC: 73 U/L (ref 46–116)
ALT SERPL W P-5'-P-CCNC: 31 U/L (ref 12–78)
ANION GAP SERPL CALCULATED.3IONS-SCNC: 9 MMOL/L (ref 4–13)
AST SERPL W P-5'-P-CCNC: 15 U/L (ref 5–45)
BILIRUB SERPL-MCNC: 0.6 MG/DL (ref 0.2–1)
BUN SERPL-MCNC: 19 MG/DL (ref 5–25)
CALCIUM SERPL-MCNC: 9.2 MG/DL (ref 8.3–10.1)
CHLORIDE SERPL-SCNC: 110 MMOL/L (ref 100–108)
CHOLEST SERPL-MCNC: 259 MG/DL (ref 50–200)
CO2 SERPL-SCNC: 26 MMOL/L (ref 21–32)
CREAT SERPL-MCNC: 0.86 MG/DL (ref 0.6–1.3)
ERYTHROCYTE [DISTWIDTH] IN BLOOD BY AUTOMATED COUNT: 12.3 % (ref 11.6–15.1)
GFR SERPL CREATININE-BSD FRML MDRD: 90 ML/MIN/1.73SQ M
GLUCOSE P FAST SERPL-MCNC: 80 MG/DL (ref 65–99)
HCT VFR BLD AUTO: 45.7 % (ref 36.5–49.3)
HCV AB SER QL: NORMAL
HDLC SERPL-MCNC: 68 MG/DL (ref 40–60)
HGB BLD-MCNC: 15.1 G/DL (ref 12–17)
LDLC SERPL CALC-MCNC: 178 MG/DL (ref 0–100)
LDLC SERPL DIRECT ASSAY-MCNC: 175 MG/DL (ref 0–100)
MCH RBC QN AUTO: 31.9 PG (ref 26.8–34.3)
MCHC RBC AUTO-ENTMCNC: 33 G/DL (ref 31.4–37.4)
MCV RBC AUTO: 96 FL (ref 82–98)
NONHDLC SERPL-MCNC: 191 MG/DL
PLATELET # BLD AUTO: 202 THOUSANDS/UL (ref 149–390)
PMV BLD AUTO: 10.2 FL (ref 8.9–12.7)
POTASSIUM SERPL-SCNC: 4.2 MMOL/L (ref 3.5–5.3)
PROT SERPL-MCNC: 7.1 G/DL (ref 6.4–8.2)
RBC # BLD AUTO: 4.74 MILLION/UL (ref 3.88–5.62)
SODIUM SERPL-SCNC: 145 MMOL/L (ref 136–145)
TRIGL SERPL-MCNC: 65 MG/DL
WBC # BLD AUTO: 4.92 THOUSAND/UL (ref 4.31–10.16)

## 2019-08-23 PROCEDURE — 86803 HEPATITIS C AB TEST: CPT

## 2019-08-23 PROCEDURE — 80061 LIPID PANEL: CPT

## 2019-08-23 PROCEDURE — 85027 COMPLETE CBC AUTOMATED: CPT

## 2019-08-23 PROCEDURE — 36415 COLL VENOUS BLD VENIPUNCTURE: CPT

## 2019-08-23 PROCEDURE — 80053 COMPREHEN METABOLIC PANEL: CPT

## 2019-08-23 PROCEDURE — 83721 ASSAY OF BLOOD LIPOPROTEIN: CPT

## 2019-08-23 NOTE — TELEPHONE ENCOUNTER
Patient of Dr Josse Campuzano seen in [facility] office  Called with concern regarding Insurance  Ct scan had been denied- originally scheduled, 7/28  Rescheduled and had been informed to have a MRI on 7/16  Now being told that the insurance did not preauth this test and will not pay  Please call to discuss  Patient can be reached at [phone]

## 2019-08-23 NOTE — TELEPHONE ENCOUNTER
I spoke with the patient to let him know the MRI was authorized and to contact me if he would get a bill from [facility] since we did get an authorization

## 2019-08-28 ENCOUNTER — OFFICE VISIT (OUTPATIENT)
Dept: INTERNAL MEDICINE CLINIC | Facility: CLINIC | Age: 64
End: 2019-08-28
Payer: COMMERCIAL

## 2019-08-28 VITALS
OXYGEN SATURATION: 98 % | WEIGHT: 209 LBS | SYSTOLIC BLOOD PRESSURE: 102 MMHG | HEIGHT: 69 IN | BODY MASS INDEX: 30.96 KG/M2 | TEMPERATURE: 98.5 F | HEART RATE: 63 BPM | DIASTOLIC BLOOD PRESSURE: 70 MMHG

## 2019-08-28 DIAGNOSIS — E78.00 HYPERCHOLESTEROLEMIA: ICD-10-CM

## 2019-08-28 DIAGNOSIS — Z23 NEED FOR PNEUMOCOCCAL VACCINATION: Primary | ICD-10-CM

## 2019-08-28 DIAGNOSIS — I25.10 CORONARY ARTERY DISEASE INVOLVING NATIVE CORONARY ARTERY OF NATIVE HEART WITHOUT ANGINA PECTORIS: ICD-10-CM

## 2019-08-28 DIAGNOSIS — M47.816 SPONDYLOSIS OF LUMBAR REGION WITHOUT MYELOPATHY OR RADICULOPATHY: ICD-10-CM

## 2019-08-28 DIAGNOSIS — K58.9 IRRITABLE BOWEL SYNDROME WITHOUT DIARRHEA: ICD-10-CM

## 2019-08-28 DIAGNOSIS — Z00.00 MEDICARE ANNUAL WELLNESS VISIT, INITIAL: ICD-10-CM

## 2019-08-28 PROCEDURE — 90670 PCV13 VACCINE IM: CPT

## 2019-08-28 PROCEDURE — G0438 PPPS, INITIAL VISIT: HCPCS | Performed by: INTERNAL MEDICINE

## 2019-08-28 PROCEDURE — G0009 ADMIN PNEUMOCOCCAL VACCINE: HCPCS

## 2019-08-28 PROCEDURE — 99214 OFFICE O/P EST MOD 30 MIN: CPT | Performed by: INTERNAL MEDICINE

## 2019-08-28 NOTE — PROGRESS NOTES
Assessment and Plan:     Problem List Items Addressed This Visit     None         History of Present Illness:     Patient presents for Medicare Annual Wellness visit    Patient Care Team:  Jesus Geiger MD as PCP - General  MD Brandon Burgos MD     Problem List:     Patient Active Problem List   Diagnosis    Seasonal allergic rhinitis    Osteoarthritis    Nephrolithiasis    Irritable bowel syndrome    Hypercholesterolemia    Coronary atherosclerosis    Coronary artery disease involving native coronary artery    Alpha-1-antitrypsin deficiency ([facility] )    BPH with obstruction/lower urinary tract symptoms      Past Medical and Surgical History:     Past Medical History:   Diagnosis Date    A-fib ([place] [id number] )     Allergic rhinitis     Cerebral vascular disease     Chronic sinusitis     Hyperlipidemia     Melanoma ([facility] )      Past Surgical History:   Procedure Laterality Date    OTHER SURGICAL HISTORY      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    PATENT FORAMEN OVALE CLOSURE        Family History:     Family History   Problem Relation Age of Onset    Diabetes type II Mother     Cancer Father         angioma      Social History:     Social History     Tobacco Use   Smoking Status Former Smoker    Years: 10 00    Last attempt to quit:     Years since quittin 6   Smokeless Tobacco Former User   Tobacco Comment    Cigars      Social History     Substance and Sexual Activity   Alcohol Use Yes    Alcohol/week: 1 0 standard drinks    Types: 1 Glasses of wine per week    Frequency: 4 or more times a week    Drinks per session: 1 or 2    Binge frequency: Daily or almost daily     Social History     Substance and Sexual Activity   Drug Use Not Currently    Types: Marijuana    Comment: Medical use      Medications and Allergies:     Current Outpatient Medications   Medication Sig Dispense Refill    ascorbic acid (VITAMIN C) 250 mg tablet Take 250 mg by mouth 2 (two) times a day      aspirin (ECOTRIN LOW STRENGTH) 81 mg EC tablet Take 81 mg by mouth daily      Cholecalciferol (VITAMIN D-3) 5000 units TABS Take 1 tablet by mouth daily 100 tablet 0    Coenzyme Q10 (COQ10) 200 MG CAPS Take 1 capsule by mouth daily      colestipol (COLESTID) 1 g tablet Take 2 tablets (2 g total) by mouth daily (Patient not taking: Reported on 7/31/2019) 60 tablet 5    Glucosamine-Chondroit-Vit C-Mn (GLUCOSAMINE CHONDR 1500 COMPLX PO) Take 1 tablet by mouth 2 (two) times a day       hyoscyamine (ANASPAZ) 0 125 mg Take 0 125 mg by mouth      MAGNESIUM GLYCINATE PLUS PO Take 400 mg by mouth daily      montelukast (SINGULAIR) 10 mg tablet Take 1 tablet (10 mg total) by mouth daily 90 tablet 1    multivitamin (THERAGRAN) TABS Take 1 tablet by mouth 2 (two) times a day        other medication, see sig, Medication/product name: [id number]  Strength: 3 capsules  Sig (include dose, route, frequency): daily      OXcarbazepine (TRILEPTAL) 300 mg tablet Take 300 mg by mouth 2 (two) times a day       No current facility-administered medications for this visit  Allergies   Allergen Reactions    Atorvastatin Fatigue and GI Intolerance     Other reaction(s): Muscle pain, constipation, sleepiness  Category: Adverse Reaction; Other reaction(s): Muscle pain, constipation, sleepiness    Ezetimibe Fatigue and GI Intolerance     Category: Adverse Reaction;     Molds & Smuts Allergic Rhinitis     Category: Allergy;     Monascus Purpureus Went Yeast Fatigue     Category: Adverse Reaction;     Niacin Fatigue     Category: Adverse Reaction;     Pollen Extract Allergic Rhinitis     Category: Allergy;     Pregabalin Fatigue     Category: Adverse Reaction;     Statins Fatigue and GI Intolerance     Category:  Adverse Reaction;       Immunizations:     Immunization History   Administered Date(s) Administered    Influenza Quadrivalent, 6-35 Months IM 11/12/2015, 01/05/2017    Influenza TIV (IM) 11/03/2014      Medicare Screening Tests and Risk Assessments:     Kiki Fulton is here for his Initial Wellness visit  Last Medicare Wellness visit information reviewed, patient interviewed and updates made to the record today  Health Risk Assessment:  Patient rates overall health as very good  Patient feels that their physical health rating is Slightly better  Eyesight was rated as Slightly worse  Hearing was rated as Slightly worse  Patient feels that their emotional and mental health rating is Much better  Pain experienced by patient in the last 7 days has been Some  Patient's pain rating has been 7/10  Emotional/Mental Health:  Patient has not been feeling nervous/anxious  PHQ-9 Depression Screening:    Frequency of the following problems over the past two weeks:      1  Little interest or pleasure in doing things: 0 - not at all      2  Feeling down, depressed, or hopeless: 0 - not at all  PHQ-2 Score: 0          Broken Bones/Falls: Fall Risk Assessment:    In the past year, patient has experienced: History of falling in past year    Number of falls: 2 or more    Injured during fall: No      Patient feels steady when standing or walking  Patient is not worried about falling  Bladder/Bowel:  Patient has not leaked urine accidently in the last six months  Patient reports no loss of bowel control  Immunizations:  Patient has not had a flu vaccination within the last year  Patient has not received a pneumonia shot  Patient has not received a shingles shot  Patient has not received tetanus/diphtheria shot  Home Safety:  Patient does not have trouble with stairs inside or outside of their home  Patient currently reports that there are no safety hazards present in home, working smoke alarms, working carbon monoxide detectors        Preventative Screenings:   prostate cancer screen performed, 7/31/2019  colon cancer screen completed, 10/3/2016  cholesterol screen completed, 8/23/2019  glaucoma eye exam completed, Nutrition:  Current diet: Regular, No Added Salt, Low Carb and Limited junk food with servings of the following:    Medications:  Patient is currently taking over-the-counter supplements  List of OTC medications includes: See med list   Patient is able to manage medications  Lifestyle Choices:  Patient reports no tobacco use  Patient has smoked or used tobacco in the past   Patient has stopped his tobacco use  Tobacco use quit date: 1986  Patient reports alcohol use  Alcohol use per week: daily  Patient drives a vehicle  Patient wears seat belt  Activities of Daily Living:  Can get out of bed by his or her self, able to dress self, able to make own meals, able to do own shopping, able to bathe self, can do own laundry/housekeeping, can manage own money, pay bills and track expenses    Previous Hospitalizations:  No hospitalization or ED visit in past 12 months        Advanced Directives:  Patient has decided on a power of   Patient has spoken to designated power of   Patient has completed advanced directive          Preventative Screening/Counseling:      Cardiovascular:      General: Risks and Benefits Discussed and Screening Current          Diabetes:      General: Risks and Benefits Discussed and Screening Current          Colorectal Cancer:      General: Risks and Benefits Discussed and Screening Current      Due for studies: Colonoscopy - Low Risk          Prostate Cancer:      General: Risks and Benefits Discussed and Screening Current          Osteoporosis:      General: Risks and Benefits Discussed and Screening Not Indicated          AAA:      General: Risks and Benefits Discussed and Screening Not Indicated          Glaucoma:      General: Risks and Benefits Discussed and Screening Current          HIV:      General: Risks and Benefits Discussed and Screening Not Indicated          Hepatitis C:      General: Risks and Benefits Discussed and Screening Current        Advanced Directives:   Patient has living will for healthcare, has durable POA for healthcare, patient has an advanced directive  Information on ACP and/or AD provided  No 5 wishes given  End of life assessment reviewed with patient  Provider agrees with end of life decisions        Immunizations:      Influenza: Risks & Benefits Discussed and Patient Declines      Pneumococcal: Risks & Benefits Discussed and Pneumococcal Due Today      Shingrix: Risks & Benefits Discussed      Hepatitis B (Low risk patients): Series Not Indicated      Hepatitis B (Medium to high risk patients): Patient Declines      Zostavax: Risks & Benefits Discussed and Patient Declines      TD: Risks & Benefits Discussed and Td Vaccine Needed Today      TDAP: Tdap Vaccine Needed Today and Risks & Benefits Discussed

## 2019-08-28 NOTE — ASSESSMENT & PLAN NOTE
Patient has been using a combination of over-the-counter and physical therapy/chiropractic manipulation

## 2019-08-28 NOTE — PATIENT INSTRUCTIONS
Obesity   AMBULATORY CARE:   Obesity  is when your body mass index (BMI) is greater than 30  Your healthcare provider will use your height and weight to measure your BMI  The risks of obesity include  many health problems, such as injuries or physical disability  You may need tests to check for the following:  · Diabetes     · High blood pressure or high cholesterol     · Heart disease     · Gallbladder or liver disease     · Cancer of the colon, breast, prostate, liver, or kidney     · Sleep apnea     · Arthritis or gout  Seek care immediately if:   · You have a severe headache, confusion, or difficulty speaking  · You have weakness on one side of your body  · You have chest pain, sweating, or shortness of breath  Contact your healthcare provider if:   · You have symptoms of gallbladder or liver disease, such as pain in your upper abdomen  · You have knee or hip pain and discomfort while walking  · You have symptoms of diabetes, such as intense hunger and thirst, and frequent urination  · You have symptoms of sleep apnea, such as snoring or daytime sleepiness  · You have questions or concerns about your condition or care  Treatment for obesity  focuses on helping you lose weight to improve your health  Even a small decrease in BMI can reduce the risk for many health problems  Your healthcare provider will help you set a weight-loss goal   · Lifestyle changes  are the first step in treating obesity  These include making healthy food choices and getting regular physical activity  Your healthcare provider may suggest a weight-loss program that involves coaching, education, and therapy  · Medicine  may help you lose weight when it is used with a healthy diet and physical activity  · Surgery  can help you lose weight if you are very obese and have other health problems  There are several types of weight-loss surgery  Ask your healthcare provider for more information    Be successful losing weight:   · Set small, realistic goals  An example of a small goal is to walk for 20 minutes 5 days a week  Anther goal is to lose 5% of your body weight  · Tell friends, family members, and coworkers about your goals  and ask for their support  Ask a friend to lose weight with you, or join a weight-loss support group  · Identify foods or triggers that may cause you to overeat , and find ways to avoid them  Remove tempting high-calorie foods from your home and workplace  Place a bowl of fresh fruit on your kitchen counter  If stress causes you to eat, then find other ways to cope with stress  · Keep a diary to track what you eat and drink  Also write down how many minutes of physical activity you do each day  Weigh yourself once a week and record it in your diary  Eating changes: You will need to eat 500 to 1,000 fewer calories each day than you currently eat to lose 1 to 2 pounds a week  The following changes will help you cut calories:  · Eat smaller portions  Use small plates, no larger than 9 inches in diameter  Fill your plate half full of fruits and vegetables  Measure your food using measuring cups until you know what a serving size looks like  · Eat 3 meals and 1 or 2 snacks each day  Plan your meals in advance  Louise Keep and eat at home most of the time  Eat slowly  · Eat fruits and vegetables at every meal   They are low in calories and high in fiber, which makes you feel full  Do not add butter, margarine, or cream sauce to vegetables  Use herbs to season steamed vegetables  · Eat less fat and fewer fried foods  Eat more baked or grilled chicken and fish  These protein sources are lower in calories and fat than red meat  Limit fast food  Dress your salads with olive oil and vinegar instead of bottled dressing  · Limit the amount of sugar you eat  Do not drink sugary beverages  Limit alcohol  Activity changes:  Physical activity is good for your body in many ways   It helps you burn calories and build strong muscles  It decreases stress and depression, and improves your mood  It can also help you sleep better  Talk to your healthcare provider before you begin an exercise program   · Exercise for at least 30 minutes 5 days a week  Start slowly  Set aside time each day for physical activity that you enjoy and that is convenient for you  It is best to do both weight training and an activity that increases your heart rate, such as walking, bicycling, or swimming  · Find ways to be more active  Do yard work and housecleaning  Walk up the stairs instead of using elevators  Spend your leisure time going to events that require walking, such as outdoor festivals or fairs  This extra physical activity can help you lose weight and keep it off  Follow up with your healthcare provider as directed: You may need to meet with a dietitian  Write down your questions so you remember to ask them during your visits  © 2017 [address] [employer] Information is for End User's use only and may not be sold, redistributed or otherwise used for commercial purposes  All illustrations and images included in CareNotes® are the copyrighted property of [employer]  or Pablo Blake  The above information is an  only  It is not intended as medical advice for individual conditions or treatments  Talk to your doctor, nurse or pharmacist before following any medical regimen to see if it is safe and effective for you  Urinary Incontinence   WHAT YOU NEED TO KNOW:   What is urinary incontinence? Urinary incontinence (UI) is when you lose control of your bladder  What causes UI? UI occurs because your bladder cannot store or empty urine properly  The following are the most common types of UI:  · Stress incontinence  is when you leak urine due to increased bladder pressure  This may happen when you cough, sneeze, or exercise       · Urge incontinence  is when you feel the need to urinate right away and leak urine accidentally  · Mixed incontinence  is when you have both stress and urge UI  What are the signs and symptoms of UI?   · You feel like your bladder does not empty completely when you urinate  · You urinate often and need to urinate immediately  · You leak urine when you sleep, or you wake up with the urge to urinate  · You leak urine when you cough, sneeze, exercise, or laugh  How is UI diagnosed? Your healthcare provider will ask how often you leak urine and whether you have stress or urge symptoms  Tell him which medicines you take, how often you urinate, and how much liquid you drink each day  You may need any of the following tests:  · Urine tests  may show infection or kidney function  · A pelvic exam  may be done to check for blockages  A pelvic exam will also show if your bladder, uterus, or other organs have moved out of place  · An x-ray, ultrasound, or CT  may show problems with parts of your urinary system  You may be given contrast liquid to help your organs show up better in the pictures  Tell the healthcare provider if you have ever had an allergic reaction to contrast liquid  Do not enter the MRI room with anything metal  Metal can cause serious injury  Tell the healthcare provider if you have any metal in or on your body  · A bladder scan  will show how much urine is left in your bladder after you urinate  You will be asked to urinate and then healthcare providers will use a small ultrasound machine to check the urine left in your bladder  · Cystometry  is used to check the function of your urinary system  Your healthcare provider checks the pressure in your bladder while filling it with fluid  Your bladder pressure may also be tested when your bladder is full and while you urinate  How is UI treated? · Medicines  can help strengthen your bladder control      · Electrical stimulation  is used to send a small amount of electrical energy to your pelvic floor muscles  This helps control your bladder function  Electrodes may be placed outside your body or in your rectum  For women, the electrodes may be placed in the vagina  · A bulking agent  may be injected into the wall of your urethra to make it thicker  This helps keep your urethra closed and decreases urine leakage  · Devices  such as a clamp, pessary, or tampon may help stop urine leaks  Ask your healthcare provider for more information about these and other devices  · Surgery  may be needed if other treatments do not work  Several types of surgery can help improve your bladder control  Ask your healthcare provider for more information about the surgery you may need  How can I manage my symptoms? · Do pelvic muscle exercises often  Your pelvic muscles help you stop urinating  Squeeze these muscles tight for 5 seconds, then relax for 5 seconds  Gradually work up to squeezing for 10 seconds  Do 3 sets of 15 repetitions a day, or as directed  This will help strengthen your pelvic muscles and improve bladder control  · A catheter  may be used to help empty your bladder  A catheter is a tiny, plastic tube that is put into your bladder to drain your urine  Your healthcare provider may tell you to use a catheter to prevent your bladder from getting too full and leaking urine  · Keep a UI record  Write down how often you leak urine and how much you leak  Make a note of what you were doing when you leaked urine  · Train your bladder  Go to the bathroom at set times, such as every 2 hours, even if you do not feel the urge to go  You can also try to hold your urine when you feel the urge to go  For example, hold your urine for 5 minutes when you feel the urge to go  As that becomes easier, hold your urine for 10 minutes  · Drink liquids as directed  Ask your healthcare provider how much liquid to drink each day and which liquids are best for you   You may need to limit the amount of liquid you drink to help control your urine leakage  Limit or do not have drinks that contain caffeine or alcohol  Do not drink any liquid right before you go to bed  · Prevent constipation  Eat a variety of high-fiber foods  Good examples are high-fiber cereals, beans, vegetables, and whole-grain breads  Prune juice may help make your bowel movement softer  Walking is the best way to trigger your intestines to have a bowel movement  · Exercise regularly and maintain a healthy weight  Ask your healthcare provider how much you should weigh and about the best exercise plan for you  Weight loss and exercise will decrease pressure on your bladder and help you control your leakage  Ask him to help you create a weight loss plan if you are overweight  When should I seek immediate care? · You have severe pain  · You are confused or cannot think clearly  When should I contact my healthcare provider? · You have a fever  · You see blood in your urine  · You have pain when you urinate  · You have new or worse pain, even after treatment  · Your mouth feels dry or you have vision changes  · Your urine is cloudy or smells bad  · You have questions or concerns about your condition or care  CARE AGREEMENT:   You have the right to help plan your care  Learn about your health condition and how it may be treated  Discuss treatment options with your caregivers to decide what care you want to receive  You always have the right to refuse treatment  The above information is an  only  It is not intended as medical advice for individual conditions or treatments  Talk to your doctor, nurse or pharmacist before following any medical regimen to see if it is safe and effective for you  © 2017 [address] [employer] Information is for End User's use only and may not be sold, redistributed or otherwise used for commercial purposes   All illustrations and images included in CareNotes® are the copyrighted property of [employer]  or Pablo Blake  Cigarette Smoking and Your Health   AMBULATORY CARE:   Risks to your health if you smoke:  Nicotine and other chemicals found in tobacco damage every cell in your body  Even if you are a light smoker, you have an increased risk for cancer, heart disease, and lung disease  If you are pregnant or have diabetes, smoking increases your risk for complications  Benefits to your health if you stop smoking:   · You decrease respiratory symptoms such as coughing, wheezing, and shortness of breath  · You reduce your risk for cancers of the lung, mouth, throat, kidney, bladder, pancreas, stomach, and cervix  If you already have cancer, you increase the benefits of chemotherapy  You also reduce your risk for cancer returning or a second cancer from developing  · You reduce your risk for heart disease, blood clots, heart attack, and stroke  · You reduce your risk for lung infections, and diseases such as pneumonia, asthma, chronic bronchitis, and emphysema  · Your circulation improves  More oxygen can be delivered to your body  If you have diabetes, you lower your risk for complications, such as kidney, artery, and eye diseases  You also lower your risk for nerve damage  Nerve damage can lead to amputations, poor vision, and blindness  · You improve your body's ability to heal and to fight infections  Benefits to the health of others if you stop smoking:  Tobacco is harmful to nonsmokers who breathe in your secondhand smoke  The following are ways the health of others around you may improve when you stop smoking:  · You lower the risks for lung cancer and heart disease in nonsmoking adults  · If you are pregnant, you lower the risk for miscarriage, early delivery, low birth weight, and stillbirth  You also lower your baby's risk for SIDS, obesity, developmental delay, and neurobehavioral problems, such as ADHD  · If you have children, you lower their risk for ear infections, colds, pneumonia, bronchitis, and asthma  For more information and support to stop smoking:   · Smokefree  gov  Phone: 0- 047 - 756-1658  Web Address: www smokefrUmbel  Follow up with your healthcare provider as directed:  Write down your questions so you remember to ask them during your visits  © 2017 [address] [employer] Information is for End User's use only and may not be sold, redistributed or otherwise used for commercial purposes  All illustrations and images included in CareNotes® are the copyrighted property of [employer]  or Pablo Blake  The above information is an  only  It is not intended as medical advice for individual conditions or treatments  Talk to your doctor, nurse or pharmacist before following any medical regimen to see if it is safe and effective for you  Fall Prevention   WHAT YOU NEED TO KNOW:   What is fall prevention? Fall prevention includes ways to make your home and other areas safer  It also includes ways you can move more carefully to prevent a fall  What increases my risk for falls? · Lack of vitamin D    · Not getting enough sleep each night    · Trouble walking or keeping your balance, or foot problems    · Health conditions that cause changes in your blood pressure, vision, or muscle strength and coordination    · Medicines that make you dizzy, weak, or sleepy    · Problems seeing clearly    · Shoes that have high heels or are not supportive    · Tripping hazards, such as items left on the floor, no handrails on the stairs, or broken steps  How can I help protect myself from falls? · Stand or sit up slowly  This may help you keep your balance and prevent falls  If you need to get up during the night, sit up first  Be sure you are fully awake before you stand  Turn on the light before you start walking  Go slowly in case you are still sleepy   Make sure you will not trip over any pets sleeping in the bedroom  · Use assistive devices as directed  Your healthcare provider may suggest that you use a cane or walker to help you keep your balance  You may need to have grab bars put in your bathroom near the toilet or in the shower  · Wear shoes that fit well and have soles that   Wear shoes both inside and outside  Use slippers with good   Do not wear shoes with high heels  · Wear a personal alarm  This is a device that allows you to call 911 if you fall and need help  Ask your healthcare provider for more information  · Stay active  Exercise can help strengthen your muscles and improve your balance  Your healthcare provider may recommend water aerobics or walking  He or she may also recommend physical therapy to improve your coordination  Never start an exercise program without talking to your healthcare provider first      · Manage medical conditions  Keep all appointments with your healthcare providers  Visit your eye doctor as directed  How can I make my home safer? · Add items to prevent falls in the bathroom  Put nonslip strips on your bath or shower floor to prevent you from slipping  Use a bath mat if you do not have carpet in the bathroom  This will prevent you from falling when you step out of the bath or shower  Use a shower seat so you do not need to stand while you shower  Sit on the toilet or a chair in your bathroom to dry yourself and put on clothing  This will prevent you from losing your balance from drying or dressing yourself while you are standing  · Keep paths clear  Remove books, shoes, and other objects from walkways and stairs  Place cords for telephones and lamps out of the way so that you do not need to walk over them  Tape them down if you cannot move them  Remove small rugs  If you cannot remove a rug, secure it with double-sided tape  This will prevent you from tripping  · Install bright lights in your home  Use night lights to help light paths to the bathroom or kitchen  Always turn on the light before you start walking  · Keep items you use often on shelves within reach  Do not use a step stool to help you reach an item  · Paint or place reflective tape on the edges of your stairs  This will help you see the stairs better  Call 911 or have someone else call if:   · You have fallen and are unconscious  · You have fallen and cannot move part of your body  Contact your healthcare provider if:   · You have fallen and have pain or a headache  · You have questions or concerns about your condition or care  CARE AGREEMENT:   You have the right to help plan your care  Learn about your health condition and how it may be treated  Discuss treatment options with your caregivers to decide what care you want to receive  You always have the right to refuse treatment  The above information is an  only  It is not intended as medical advice for individual conditions or treatments  Talk to your doctor, nurse or pharmacist before following any medical regimen to see if it is safe and effective for you  © 2017 [address] [facility] Information is for End User's use only and may not be sold, redistributed or otherwise used for commercial purposes  All illustrations and images included in CareNotes® are the copyrighted property of [employer]  or Pablo Blake  Advance Directives   WHAT YOU NEED TO KNOW:   What are advance directives? Advance directives are legal documents that state your wishes and plans for medical care  These plans are made ahead of time in case you lose your ability to make decisions for yourself  Advance directives can apply to any medical decision, such as the treatments you want, and if you want to donate organs  What are the types of advance directives? There are many types of advance directives, and each state has rules about how to use them   You may choose a combination of any of the following:  · Living will: This is a written record of the treatment you want  You can also choose which treatments you do not want, which to limit, and which to stop at a certain time  This includes surgery, medicine, IV fluid, and tube feedings  · Durable power of  for healthcare [facility]): This is a written record that states who you want to make healthcare choices for you when you are unable to make them for yourself  This person, called a proxy, is usually a family member or a friend  You may choose more than 1 proxy  · Do not resuscitate (DNR) order:  A DNR order is used in case your heart stops beating or you stop breathing  It is a request not to have certain forms of treatment, such as CPR  A DNR order may be included in other types of advance directives  · Medical directive: This covers the care that you want if you are in a coma, near death, or unable to make decisions for yourself  You can list the treatments you want for each condition  Treatment may include pain medicine, surgery, blood transfusions, dialysis, IV or tube feedings, and a ventilator (breathing machine)  · Values history: This document has questions about your views, beliefs, and how you feel and think about life  This information can help others choose the care that you would choose  Why are advance directives important? An advance directive helps you control your care  Although spoken wishes may be used, it is better to have your wishes written down  Spoken wishes can be misunderstood, or not followed  Treatments may be given even if you do not want them  An advance directive may make it easier for your family to make difficult choices about your care  How do I decide what to put in my advance directives? · Make informed decisions:  Make sure you fully understand treatments or care you may receive   Think about the benefits and problems your decisions could cause for you or your family  Talk to healthcare providers if you have concerns or questions before you write down your wishes  You may also want to talk with your [employer] or , or a   Check your state laws to make sure that what you put in your advance directive is legal      · Sign all forms:  Sign and date your advance directive when you have finished  You may also need 2 witnesses to sign the forms  Witnesses cannot be your doctor or his staff, your spouse, heirs or beneficiaries, people you owe money to, or your chosen proxy  Talk to your family, proxy, and healthcare providers about your advance directive  Give each person a copy, and keep one for yourself in a place you can get to easily  Do not keep it hidden or locked away  · Review and revise your plans: You can revise your advance directive at any time, as long as you are able to make decisions  Review your plan every year, and when there are changes in your life, or your health  When you make changes, let your family, proxy, and healthcare providers know  Give each a new copy  Where can I find more information? · [employer]  [address] , [address]  Phone: 8- [phone] - [phone]  Phone: 4- 195 - [phone]  Web Address: http://www  aafp org  · [address] [facility])  [address] [facility], [address]  Phone: 9- [phone]  Phone: [phone]  Web Address: Mary estrada  [facility] AGREEMENT:   You have the right to help plan your care  To help with this plan, you must learn about your health condition and treatment options  You must also learn about advance directives and how they are used  Work with your healthcare providers to decide what care will be used to treat you  You always have the right to refuse treatment  The above information is an  only   It is not intended as medical advice for individual conditions or treatments  Talk to your doctor, nurse or pharmacist before following any medical regimen to see if it is safe and effective for you  © 2017 [address] [employer] Information is for End User's use only and may not be sold, redistributed or otherwise used for commercial purposes  All illustrations and images included in CareNotes® are the copyrighted property of [employer]  or Pablo Blake

## 2019-08-28 NOTE — ASSESSMENT & PLAN NOTE
Patient has been intolerant to a variety of cholesterol-lowering medications including statins, ezetimibe, Colestid

## 2019-08-28 NOTE — ASSESSMENT & PLAN NOTE
No history of chest pain  History of stent to the LAD in 2011  On aspirin 81 mg    Intolerant to cholesterol lowering medications

## 2019-08-28 NOTE — PROGRESS NOTES
Assessment/Plan:    Osteoarthritis  Patient has been using a combination of over-the-counter and physical therapy/chiropractic manipulation    Irritable bowel syndrome  Stable with symptomatic treatments    Hypercholesterolemia  Patient has been intolerant to a variety of cholesterol-lowering medications including statins, ezetimibe, Colestid    Coronary artery disease involving native coronary artery  No history of chest pain  History of stent to the LAD in 2011  On aspirin 81 mg  Intolerant to cholesterol lowering medications     Need for pneumococcal vaccination  Given today    Medicare annual wellness visit, initial  Prevnar 13 was administered today       Diagnoses and all orders for this visit:    Need for pneumococcal vaccination  -     PNEUMOCOCCAL CONJUGATE VACCINE 13-VALENT GREATER THAN 6 MONTHS    Coronary artery disease involving native coronary artery of native heart without angina pectoris  -     CBC and Platelet; Future  -     Comprehensive metabolic panel; Future  -     Lipid panel; Future    Irritable bowel syndrome without diarrhea    Hypercholesterolemia  -     CBC and Platelet; Future  -     Comprehensive metabolic panel; Future    Spondylosis of lumbar region without myelopathy or radiculopathy    Medicare annual wellness visit, initial    Other orders  -     [address]; Take 2 Doses by mouth daily  -     Probiotic Product (PROBIOTIC-10) CAPS; Take 2 capsules by mouth daily          Subjective:      Patient ID: Fan Davis is a 75 y o  male  Patient presents for follow-up visit  He does not have any significant complaints at this time  He has some minor arthritis issues which flare up from time to time but he deals with it through a combination of over-the-counter medications and use of chiropractic manipulation  He does attempt to do some walking but the longer he walks the more his low-back acts up  He has not had any issues with any chest pain or palpitations of late  He has a history of PTCA and stenting back in 2011  He has a history of PFO closure following and insular infarct  His bowel issues have been relatively stable  He is not complaining of any abdominal pain or severe diarrhea  Labs are reviewed  His CBC is within normal limits  His CMP is essentially normal other than a mildly elevated chloride level  Liver enzymes are normal renal function normal blood sugar is with in normal limits  His lipid profile is elevated  Total cholesterol is elevated at 259  HDL cholesterol is 68 and LDL cholesterol is 178  Direct LDL was 175  Family History   Problem Relation Age of Onset    Diabetes type II Mother     Cancer Father         angioma     Social History     Socioeconomic History    Marital status: /Civil Union     Spouse name: Not on file    Number of children: Not on file    Years of education: Not on file    Highest education level: Not on file   Occupational History    Occupation: massage therapist   Social Needs    Financial resource strain: Not on file    Food insecurity:     Worry: Not on file     Inability: Not on file    Transportation needs:     Medical: Not on file     Non-medical: Not on file   Tobacco Use    Smoking status: Former Smoker     Years: 10 00     Last attempt to quit:      Years since quittin 6    Smokeless tobacco: Former User    Tobacco comment: Cigars    Substance and Sexual Activity    Alcohol use:  Yes     Alcohol/week: 1 0 standard drinks     Types: 1 Glasses of wine per week     Frequency: 4 or more times a week     Drinks per session: 1 or 2     Binge frequency: Daily or almost daily    Drug use: Not Currently     Types: Marijuana     Comment: Medical use (CBD)    Sexual activity: Not on file   Lifestyle    Physical activity:     Days per week: Not on file     Minutes per session: Not on file    Stress: Not on file   Relationships    Social connections:     Talks on phone: Not on file     Gets together: Not on file     Attends Orthodox service: Not on file     Active member of club or organization: Not on file     Attends meetings of clubs or organizations: Not on file     Relationship status: Not on file    Intimate partner violence:     Fear of current or ex partner: Not on file     Emotionally abused: Not on file     Physically abused: Not on file     Forced sexual activity: Not on file   Other Topics Concern    Not on file   Social History Narrative    Caffeine: drinks 1 cup coffee/ tea a day       Past Medical History:   Diagnosis Date    A-fib ([facility] )     Allergic rhinitis     Benign cyst of kidney     Cerebral vascular disease     Chronic sinusitis     Hyperlipidemia     Melanoma ([facility] )     Pancreas cyst        Current Outpatient Medications:     ascorbic acid (VITAMIN C) 250 mg tablet, Take 250 mg by mouth 2 (two) times a day, Disp: , Rfl:     aspirin (ECOTRIN LOW STRENGTH) 81 mg EC tablet, Take 81 mg by mouth daily, Disp: , Rfl:     Cholecalciferol (VITAMIN D-3) 5000 units TABS, Take 1 tablet by mouth daily, Disp: 100 tablet, Rfl: 0    Cod Liver Oil OIL, Take 2 Doses by mouth daily, Disp: , Rfl:     Coenzyme Q10 (COQ10) 200 MG CAPS, Take 1 capsule by mouth daily, Disp: , Rfl:     Glucosamine-Chondroit-Vit C-Mn (GLUCOSAMINE CHONDR 1500 COMPLX PO), Take 1 tablet by mouth 2 (two) times a day , Disp: , Rfl:     hyoscyamine (ANASPAZ) 0 125 mg, Take 0 125 mg by mouth every 4 (four) hours as needed , Disp: , Rfl:     MAGNESIUM GLYCINATE PLUS PO, Take 400 mg by mouth daily, Disp: , Rfl:     montelukast (SINGULAIR) 10 mg tablet, Take 1 tablet (10 mg total) by mouth daily, Disp: 90 tablet, Rfl: 1    multivitamin (THERAGRAN) TABS, Take 1 tablet by mouth 2 (two) times a day  , Disp: , Rfl:     other medication, see sig,, Medication/product name: B-C-Q Strength: 3 capsules Sig (include dose, route, frequency): daily, Disp: , Rfl:     OXcarbazepine (TRILEPTAL) 300 mg tablet, Take 300 mg by mouth 2 (two) times a day, Disp: , Rfl:     Probiotic Product (PROBIOTIC-10) CAPS, Take 2 capsules by mouth daily, Disp: , Rfl:   Allergies   Allergen Reactions    Ezetimibe Fatigue and GI Intolerance     Category: Adverse Reaction;     Molds & Smuts Allergic Rhinitis     Category: Allergy;     Monascus Purpureus Went Yeast Fatigue     Category: Adverse Reaction;     Niacin Fatigue     Category: Adverse Reaction;     Pollen Extract Allergic Rhinitis     Category: Allergy;     Pregabalin Fatigue     Category: Adverse Reaction;     Statins Fatigue and GI Intolerance     Category: Adverse Reaction;     Atorvastatin GI Intolerance and Fatigue     Other reaction(s): Muscle pain, constipation, sleepiness  Category: Adverse Reaction; Other reaction(s): Muscle pain, constipation, sleepiness     Past Surgical History:   Procedure Laterality Date    OTHER SURGICAL HISTORY  2003    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    PATENT FORAMEN OVALE CLOSURE           Review of Systems   HENT: Negative  Eyes: Negative  Respiratory: Negative  Cardiovascular: Negative  Gastrointestinal: Positive for abdominal pain (Intermittent) and diarrhea (Intermittent)  Genitourinary: Negative  Musculoskeletal: Positive for arthralgias, back pain and myalgias  Neurological: Negative  Hematological: Negative  Psychiatric/Behavioral: Negative  Objective:      /70 (BP Location: Left arm, Patient Position: Sitting, Cuff Size: Standard)   Pulse 63   Temp 98 5 °F (36 9 °C) (Oral)   Ht 5' 8 9" (1 75 m)   Wt 94 8 kg (209 lb)   SpO2 98%   BMI 30 96 kg/m²  BMI Counseling: Body mass index is 30 96 kg/m²  Discussed the patient's BMI with him  The BMI is above average  BMI counseling and education was provided to the patient   Nutrition recommendations include reducing portion sizes, decreasing overall calorie intake, reducing fast food intake, consuming healthier snacks, decreasing soda and/or juice intake, moderation in carbohydrate intake and increasing intake of lean protein  Exercise recommendations include exercising 3-5 times per week  Physical Exam   Constitutional: He is oriented to person, place, and time  He appears well-developed and well-nourished  No distress  HENT:   Head: Normocephalic and atraumatic  Right Ear: External ear normal    Left Ear: External ear normal    Eyes: Conjunctivae are normal  No scleral icterus  Neck: Neck supple  No JVD present  Carotid bruit is not present  No tracheal deviation present  No thyromegaly present  Cardiovascular: Normal rate, regular rhythm and normal heart sounds  No murmur heard  Pulmonary/Chest: Effort normal and breath sounds normal  No respiratory distress  Abdominal: Soft  He exhibits no distension  Musculoskeletal: He exhibits no edema or deformity  Lymphadenopathy:     He has no cervical adenopathy  Neurological: He is alert and oriented to person, place, and time  Grossly, no focal neurological deficits are appreciated  Skin: Skin is warm and dry  No rash noted  He is not diaphoretic  No erythema  Psychiatric: He has a normal mood and affect  His behavior is normal    Vitals reviewed

## 2019-09-13 DIAGNOSIS — M47.816 SPONDYLOSIS OF LUMBAR REGION WITHOUT MYELOPATHY OR RADICULOPATHY: Primary | ICD-10-CM

## 2019-09-13 RX ORDER — TIZANIDINE 2 MG/1
2 TABLET ORAL 2 TIMES DAILY PRN
Qty: 20 TABLET | Refills: 0 | Status: SHIPPED | OUTPATIENT
Start: 2019-09-13 | End: 2020-04-14 | Stop reason: SDUPTHER

## 2019-09-13 NOTE — TELEPHONE ENCOUNTER
Patient is currently in [place] and is requesting a refill of Tizanidine      Last ov 8/28/19  Next ov 2/28/19

## 2019-09-23 ENCOUNTER — OFFICE VISIT (OUTPATIENT)
Dept: CARDIOLOGY CLINIC | Facility: CLINIC | Age: 64
End: 2019-09-23
Payer: COMMERCIAL

## 2019-09-23 VITALS
HEIGHT: 68 IN | SYSTOLIC BLOOD PRESSURE: 124 MMHG | BODY MASS INDEX: 31.07 KG/M2 | WEIGHT: 205 LBS | DIASTOLIC BLOOD PRESSURE: 84 MMHG | HEART RATE: 70 BPM

## 2019-09-23 DIAGNOSIS — I48.0 PAROXYSMAL ATRIAL FIBRILLATION (HCC): Primary | ICD-10-CM

## 2019-09-23 PROCEDURE — 93000 ELECTROCARDIOGRAM COMPLETE: CPT | Performed by: INTERNAL MEDICINE

## 2019-09-23 PROCEDURE — 99214 OFFICE O/P EST MOD 30 MIN: CPT | Performed by: INTERNAL MEDICINE

## 2019-09-23 NOTE — PROGRESS NOTES
HEART AND VASCULAR  CARDIAC ELECTROPHYSIOLOGY   HEART RHYTHM CENTER  [facility]    Outpatient  Follow-up  Today's Date: 09/23/19        Patient name: Lory Mobley  YOB: 1955  Sex: male         Chief Complaint: f/u afib      ASSESSMENT:  Problem List Items Addressed This Visit     None      Visit Diagnoses     Paroxysmal atrial fibrillation ([address] )    -  Primary    Relevant Orders    POCT ECG        74 yo male      1) F/u for Paroxysmal afib  Likely episode in August while dehydrated, felt lightheaded, had low BP and fast HR  One episodes since last visit he had on 9/14/16 for 10 hours controlled on Diltiazem 120mg daily and took two extra pills, and on Eliquis  First diagnosed 2006 after PFO closure (percutaneous) poorly controlled on propafenone so went on to amiodarone for about 5 months  He went all the way until May 2015 w/o episode when he felt it again, frequent episodes usually lasting minutes  Finally went to ER July 10 2015 and was in afib, started on Diltiazem gtt and converted  [id number] (CVA, age, CAD)  2) Stable CAD, s/p Cath 2017 after CP  50% mid LAD just distal to prior stent, FFR neg  CP better  Mild  He had Stent to LAD in 2011 at [facility]  Can't tolerate statins  3)  H/o percutaenous PFO closure4) H/o CVA insular, this is what lead to PFO closure  He has minimal residual defecits  5) Intolerant to statins, tried 5 of them  3) symptomatic hypotension perhaps related to diltiazem, dizzyness, improved with holding diltiazem, BP records SBP 80-90s w symptoms    PLAN:  1  Resume eliquis,   2  Diltiazem prn  3  Stop asa/cod oil, recent NE study showed no benefit to adding aspiring to NOAC in stable CAD      Follow up in: 12 months    Orders Placed This Encounter   Procedures    POCT ECG     There are no discontinued medications        HPI/Subjective:   74 yo male  1) F/u for Paroxysmal afib  Likely episode in August while dehydrated, felt lightheaded, had low BP and fast HR  One episodes since last visit he had on 9/14/16 for 10 hours controlled on Diltiazem 120mg daily and took two extra pills, and on Eliquis  First diagnosed 2006 after PFO closure (percutaneous) poorly controlled on propafenone so went on to amiodarone for about 5 months  He went all the way until May 2015 w/o episode when he felt it again, frequent episodes usually lasting minutes  Finally went to ER July 10 2015 and was in afib, started on Diltiazem gtt and converted  [id number] (CVA, age, CAD)  2) Stable CAD, s/p Cath 2017 after CP  50% mid LAD just distal to prior stent, FFR neg  CP better  Mild  He had Stent to LAD in 2011 at [facility]  Can't tolerate statins  3)  H/o percutaenous PFO closure4) H/o CVA insular, this is what lead to PFO closure  He has minimal residual defecits  5) Intolerant to statins, tried 5 of them  3) symptomatic hypotension perhaps related to diltiazem, dizzyness, improved with holding diltiazem, BP records SBP 80-90s w symptoms    Please note HPI is listed by problem with with update following it, it is copied again in the assessment above and reflects medical decision making as well  Complete 12 point ROS reviewed and otherwise non pertinent or negative except as per HPI pertinent positives in Cardiovascular and Respiratory emphasized  Please see paper chart for outpatient clinic patients where the patient completed the 12 point ROS survey  Past Medical History:   Diagnosis Date    A-fib [facility])     Allergic rhinitis     Benign cyst of kidney     Cerebral vascular disease     Chronic sinusitis     Hyperlipidemia     Melanoma (HCC)     Pancreas cyst        Allergies   Allergen Reactions    Ezetimibe Fatigue and GI Intolerance     Category: Adverse Reaction;     Molds & Smuts Allergic Rhinitis     Category:  Allergy;     Monascus Evone Fergusson Yeast Fatigue     Category: Adverse Reaction;     Niacin Fatigue     Category: Adverse Reaction;     Pollen Extract Allergic Rhinitis     Category: Allergy;     Pregabalin Fatigue     Category: Adverse Reaction;     Statins Fatigue and GI Intolerance     Category: Adverse Reaction;     Atorvastatin GI Intolerance and Fatigue     Other reaction(s): Muscle pain, constipation, sleepiness  Category: Adverse Reaction; Other reaction(s): Muscle pain, constipation, sleepiness     I reviewed the Home Medication list and Allergies in the chart  Scheduled Meds:  Current Outpatient Medications   Medication Sig Dispense Refill    ascorbic acid (VITAMIN C) 250 mg tablet Take 250 mg by mouth 2 (two) times a day      aspirin (ECOTRIN LOW STRENGTH) 81 mg EC tablet Take 81 mg by mouth daily      Cholecalciferol (VITAMIN D-3) 5000 units TABS Take 1 tablet by mouth daily 100 tablet 0    Cod Liver Oil OIL Take 2 Doses by mouth daily      Coenzyme Q10 (COQ10) 200 MG CAPS Take 1 capsule by mouth daily      Glucosamine-Chondroit-Vit C-Mn (GLUCOSAMINE CHONDR 1500 COMPLX PO) Take 1 tablet by mouth 2 (two) times a day       hyoscyamine (ANASPAZ) 0 125 mg Take 0 125 mg by mouth every 4 (four) hours as needed       MAGNESIUM GLYCINATE PLUS PO Take 400 mg by mouth daily      montelukast (SINGULAIR) 10 mg tablet Take 1 tablet (10 mg total) by mouth daily 90 tablet 1    multivitamin (THERAGRAN) TABS Take 1 tablet by mouth 2 (two) times a day        other medication, see sig, Medication/product name: B-C-Q  Strength: 3 capsules  Sig (include dose, route, frequency): daily      OXcarbazepine (TRILEPTAL) 300 mg tablet Take 300 mg by mouth 2 (two) times a day      Probiotic Product (PROBIOTIC-10) CAPS Take 2 capsules by mouth daily      tiZANidine (ZANAFLEX) 2 mg tablet Take 1 tablet (2 mg total) by mouth 2 (two) times a day as needed for muscle spasms 20 tablet 0     No current facility-administered medications for this visit  PRN Meds:         Family History   Problem Relation Age of Onset    Diabetes type II Mother     Cancer Father         angioma       Social History     Socioeconomic History    Marital status: /Civil Union     Spouse name: Not on file    Number of children: Not on file    Years of education: Not on file    Highest education level: Not on file   Occupational History    Occupation: massage therapist   Social Needs    Financial resource strain: Not on file    Food insecurity:     Worry: Not on file     Inability: Not on file    Transportation needs:     Medical: Not on file     Non-medical: Not on file   Tobacco Use    Smoking status: Former Smoker     Years: 10 00     Last attempt to quit:      Years since quittin 7    Smokeless tobacco: Former User    Tobacco comment: Cigars    Substance and Sexual Activity    Alcohol use: Yes     Alcohol/week: 1 0 standard drinks     Types: 1 Glasses of wine per week     Frequency: 4 or more times a week     Drinks per session: 1 or 2     Binge frequency: Daily or almost daily    Drug use: Not Currently     Types: Marijuana     Comment: Medical use (CBD)    Sexual activity: Not on file   Lifestyle    Physical activity:     Days per week: Not on file     Minutes per session: Not on file    Stress: Not on file   Relationships    Social connections:     Talks on phone: Not on file     Gets together: Not on file     Attends Confucianism service: Not on file     Active member of club or organization: Not on file     Attends meetings of clubs or organizations: Not on file     Relationship status: Not on file    Intimate partner violence:     Fear of current or ex partner: Not on file     Emotionally abused: Not on file     Physically abused: Not on file     Forced sexual activity: Not on file   Other Topics Concern    Not on file   Social History Narrative    Caffeine: drinks 1 cup coffee/ tea a day           OBJECTIVE:    /84   Pulse 70   Ht 5' 8" (1 727 m)   Wt 93 kg (205 lb)   BMI 31 17 kg/m²   Vitals:    09/23/19 1335   Weight: 93 kg (205 lb)     GEN: No acute distress, Alert and oriented, well appearing  HEENT:Head, neck, ears, oral pharynx: Mucus membranes moist, oral pharynx clear, nares clear  External ears normal  EYES: Pupils equal, sclera anicteric, midline, normal conjuctiva  NECK: No JVD, supple, no obvious masses or thryomegaly or goiter  CARDIOVASCULAR:  RRR, No murmur, rub, gallops S1,S2  LUNGS: Clear To auscultation bilaterally, normal effort, no rales, rhonchi, crackles  ABDOMEN:  nondistended,  without obvious organomegaly or ascites  EXTREMITIES/VASCULAR:  No edema  Radial pulses intact, pedal pulses difficult to palpate, warm an well perfused  PSYCH: Normal Affect, no overt suicidal ideation, linear speech pattern without evidence of psychosis  NEURO: Grossly intact, moving all extremiteis equal, face symmetric, alert and responsive, no obvious focal defecits  GAIT:  Ambulates normally without difficulty  HEME: No bleeding, bruising, petechia, purpura  SKIN: No significant rashes, warm, no diaphoresis or pallor       Lab Results:       LABS:      Chemistry        Component Value Date/Time     01/30/2016 0740    K 4 2 08/23/2019 0940    K 4 3 01/30/2016 0740     (H) 08/23/2019 0940     01/30/2016 0740    CO2 26 08/23/2019 0940    CO2 26 01/30/2016 0740    BUN 19 08/23/2019 0940    BUN 18 01/30/2016 0740    CREATININE 0 86 08/23/2019 0940    CREATININE 0 91 01/30/2016 0740        Component Value Date/Time    CALCIUM 9 2 08/23/2019 0940    CALCIUM 9 0 01/30/2016 0740    ALKPHOS 73 08/23/2019 0940    ALKPHOS 78 01/30/2016 0740    AST 15 08/23/2019 0940    AST 22 01/30/2016 0740    ALT 31 08/23/2019 0940    ALT 24 01/30/2016 0740    BILITOT 0 3 01/30/2016 0740            Lab Results   Component Value Date    CHOL 197 01/30/2016    CHOL 216 05/12/2015    CHOL 207 08/14/2014     Lab Results   Component Value Date HDL 68 (H) 2019    HDL 72 (H) 2019    HDL 66 (H) 2018     Lab Results   Component Value Date    LDLCALC 178 (H) 2019    LDLCALC 182 (H) 2019    LDLCALC 184 (H) 2018     Lab Results   Component Value Date    TRIG 65 2019    TRIG 67 2019    TRIG 67 2018     No results found for: CHOLHDL    IMAGING: No results found  Cardiac testing:   Results for orders placed during the hospital encounter of 17   Echo complete with contrast if indicated    Narrative [address]  [address], [address] [facility]  [phone]    Transthoracic Echocardiogram  2D, M-mode, Doppler, and Color Doppler    Study date:  09-Mar-2017    Patient: Angela Kimble  MR number: [id number]  Account number: [de-identified]  : 1955  Age: 59 years  Gender: Male  Status: Outpatient  Location: Echo lab  Height: 68 in  Weight: 194 7 lb  BP: 122/ 70 mmHg    Indications: Cardiac murmur; Alpha 1 antitrypsin deficiency    Diagnoses: R01 1 - Cardiac murmur, unspecified    Sonographer:  FRANCES Grissom, RDCS  Primary Physician:  Hai Brasher MD  Referring Physician:  Deon Freedman MD  Group:  [facility]  Interpreting Physician:  Usman Alvarez MD    SUMMARY    LEFT VENTRICLE:  Systolic function was normal  Ejection fraction was estimated to be 66 %  There were no regional wall motion abnormalities  ATRIAL SEPTUM:  There was a closure device in place  MITRAL VALVE:  There was trace regurgitation  TRICUSPID VALVE:  There was trace regurgitation  PULMONIC VALVE:  There was trace regurgitation  HISTORY: PRIOR HISTORY: Angina; Atrial fibrillation; Coronary artery disease; Chest pain; Shortness of breath; PFO closure with cardioseal    PROCEDURE: The procedure was performed in the echo lab  This was a routine study  The transthoracic approach was used   The study included complete 2D imaging, M-mode, complete spectral Doppler, and color Doppler  The heart rate was 66 bpm,  at the start of the study  Images were obtained from the parasternal, apical, subcostal, and suprasternal notch acoustic windows  Image quality was adequate  LEFT VENTRICLE: Size was normal  Systolic function was normal  Ejection fraction was estimated to be 66 %  There were no regional wall motion abnormalities  Wall thickness was normal  There was no evidence of concentric hypertrophy  DOPPLER: Left ventricular diastolic function parameters were normal     RIGHT VENTRICLE: The size was normal  Systolic function was normal  Wall thickness was normal     LEFT ATRIUM: Size was normal     ATRIAL SEPTUM: There was a closure device in place  RIGHT ATRIUM: Size was normal     MITRAL VALVE: Valve structure was normal  There was normal leaflet separation  DOPPLER: The transmitral velocity was within the normal range  There was no evidence for stenosis  There was trace regurgitation  AORTIC VALVE: The valve was trileaflet  Leaflets exhibited normal thickness and normal cuspal separation  DOPPLER: Transaortic velocity was within the normal range  There was no evidence for stenosis  There was no regurgitation  TRICUSPID VALVE: The valve structure was normal  There was normal leaflet separation  DOPPLER: The transtricuspid velocity was within the normal range  There was no evidence for stenosis  There was trace regurgitation  Pulmonary artery  systolic pressure was within the normal range  PULMONIC VALVE: Leaflets exhibited normal thickness, no calcification, and normal cuspal separation  DOPPLER: The transpulmonic velocity was within the normal range  There was trace regurgitation  PERICARDIUM: There was no pericardial effusion  The pericardium was normal in appearance  AORTA: The root exhibited normal size      SYSTEM MEASUREMENT TABLES    2D  %FS: 32 64 %  Ao Diam: 3 26 cm  EDV(Teich): 63 83 ml  EF(Cube): 69 43 %  EF(Teich): 61 74 %  ESV(Cube): 17 42 ml  ESV(Teich): 24 42 ml  IVSd: 1 29 cm  LA Area: 14 5 cm2  LA Diam: 2 97 cm  LVEDV MOD A4C: 74 15 ml  LVEF MOD A4C: 76 28 %  LVESV MOD A4C: 17 59 ml  LVIDd: 3 85 cm  LVIDs: 2 59 cm  LVLd A4C: 8 41 cm  LVLs A4C: 6 28 cm  LVPWd: 1 06 cm  RA Area: 15 47 cm2  SI(Cube): 19 58 ml/m2  SI(Teich): 19 51 ml/m2  SV MOD A4C: 56 57 ml  SV(Cube): 39 55 ml  SV(Teich): 39 41 ml  rv diam: 3 12 cm    CW  TR Vmax: 2 36 m/s  TR maxP 32 mmHg    MM  TAPSE: 3 1 cm    PW  E': 0 08 m/s  E/E': 7 1  MV A Marcio: 0 59 m/s  MV Dec [place]: 2 22 m/s2  MV DecT: 270 24 ms  MV E Marcio: 0 6 m/s  MV E/A Ratio: 1 02    [employer] Accredited Echocardiography Laboratory    Prepared and electronically signed by    Lidia Colón MD  Signed 09-Mar-2017 19:10:14       No results found for this or any previous visit  No results found for this or any previous visit  No results found for this or any previous visit          I reviewed and interpreted the following LABS/EKG/TELE/IMAGING and below is summary of my interpretation (if data available):    LABS:  HDL is 59    Current EKG and Rhythm Strip: NSR normal EKG

## 2019-09-25 ENCOUNTER — CONSULT (OUTPATIENT)
Dept: GASTROENTEROLOGY | Facility: CLINIC | Age: 64
End: 2019-09-25
Payer: COMMERCIAL

## 2019-09-25 VITALS
SYSTOLIC BLOOD PRESSURE: 123 MMHG | WEIGHT: 205 LBS | HEART RATE: 60 BPM | HEIGHT: 69 IN | BODY MASS INDEX: 30.36 KG/M2 | TEMPERATURE: 98 F | DIASTOLIC BLOOD PRESSURE: 82 MMHG

## 2019-09-25 DIAGNOSIS — K86.2 PANCREATIC CYST: Primary | ICD-10-CM

## 2019-09-25 DIAGNOSIS — K58.1 IRRITABLE BOWEL SYNDROME WITH CONSTIPATION: ICD-10-CM

## 2019-09-25 PROCEDURE — 99204 OFFICE O/P NEW MOD 45 MIN: CPT | Performed by: INTERNAL MEDICINE

## 2019-09-25 NOTE — PROGRESS NOTES
[facility] - Outpatient Consultation  Zoey Valenzuela 75 y o  male MRN: [id number]  Encounter: [id number]          ASSESSMENT AND PLAN:  Mr Theresa Clark was seen today for pancreatic cyst found on MRI  Diagnoses and all orders for this visit:    1  Pancreatic cyst: A 2 mm pancreatic head cyst was suggested on MRI abdomen  for assessment of a complex renal cyst  He denies a hx of pancreatitis and other pancreas problems  He denies greasy or loose stools, chronic abdominal pain or weight loss  He has alpha- 1- antitrypsin deficiency and is not on supplements  He will call our office if he notes unintentional weight loss, chronic abdominal pain radiating to his back or oily stools, as he is at increased risk for chronic pancreatitis due to alpha- 1- antitrypsin deficiency  I informed the patient that this cyst is not cancer and advised him that we will monitor the cyst for growth via MRI in 2 years  2  Irritable bowel syndrome with constipation: He states his bowel movements are irregular at time with occasional constipation, excess gas and bloating  Oatmeal exacerbates his excess gas and bloating  He treats his occasional constipation with prune juice, which he states is effective  He has used Miralax in the past but reports increased bowel movement frequency and urgency  He will continue prune juice and add a half capful of Miralax as needed       3  Colon cancer screening: He had a colonocopy with Dr Yeison Olguin 3 years ago and will be due for repeat screening colonoscopy in 2021        ______________________________________________________________________    HPI:  Zoey Valenzuela is a 75 y o  male with CAD, OA, BPH, hypercholesterolemia, IBS, alpha-1-antitrypsin deficiency and a hx of nephrolithiasis referred by Dr Dexter Zapata for evaluation and managment of a pancreatic cyst found on MRI abdomen (kidneys) for assessment of a complex renal cyst  A 2 mm pancreatic head cyst was suggested on imaging, as well as 2 small foci of intraglandular fat on the pancreatic head, anterior and posterior  He denies a hx of pancreatitis and other pancreas problems  He denies greasy or loose stools, chronic abdominal pain or weight loss  He also denies epigastric burning pain or reflux  He does not take supplements for alpha- 1- antitrypsin deficiency  He had screening completed at [facility]      He states his bowel movements are irregular at time with occasional constipation, excess gas and bloating  Oatmeal exacerbates his excess gas and bloating  He treats his occasional constipation with prune juice, which he states is effective  He has used Miralax in the past but reports increased bowel movement frequency and urgency  He had a colonocopy with Dr Lit Schaefer 3 years ago and will be due for repeat screening colonoscopy in 2021  REVIEW OF SYSTEMS:    CONSTITUTIONAL: Denies any fever, chills, rigors, and weight loss  HEENT: No earache or tinnitus  Denies hearing loss or visual disturbances  CARDIOVASCULAR: No chest pain or palpitations  RESPIRATORY: Denies any cough, hemoptysis, shortness of breath or dyspnea on exertion  GASTROINTESTINAL: As noted in the History of Present Illness  GENITOURINARY: No problems with urination  Denies any hematuria or dysuria  NEUROLOGIC: No dizziness or vertigo, denies headaches  MUSCULOSKELETAL: Denies any muscle or joint pain  SKIN: Denies skin rashes or itching  ENDOCRINE: Denies excessive thirst  Denies intolerance to heat or cold  PSYCHOSOCIAL: Denies depression or anxiety  Denies any recent memory loss         Historical Information   Past Medical History:   Diagnosis Date    [facility] )     Allergic rhinitis     Benign cyst of kidney     Cerebral vascular disease     Chronic sinusitis     Hyperlipidemia     [facility]anoma ([facility] )     Pancreas cyst      Past Surgical History:   Procedure Laterality Date    OTHER SURGICAL HISTORY      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    PATENT FORAMEN OVALE CLOSURE       Social History   Social History     Substance and Sexual Activity   Alcohol Use Yes    Alcohol/week: 1 0 standard drinks    Types: 1 Glasses of wine per week    Frequency: 4 or more times a week    Drinks per session: 1 or 2    Binge frequency: Daily or almost daily    Comment: daily wine 8oz     Social History     Substance and Sexual Activity   Drug Use Not Currently    Comment: Medical use (CBD)     Social History     Tobacco Use   Smoking Status Former Smoker    Years: 10 00    Last attempt to quit:     Years since quittin 7   Smokeless Tobacco Former User   Tobacco Comment    Cigars      Family History   Problem Relation Age of Onset    Diabetes type II Mother     Cancer Father         angioma       Meds/Allergies       Current Outpatient Medications:     apixaban (ELIQUIS) 5 mg    ascorbic acid (VITAMIN C) 250 mg tablet    Cholecalciferol (VITAMIN D-3) 5000 units TABS    Coenzyme Q10 (COQ10) 200 MG CAPS    Glucosamine-Chondroit-Vit C-Mn (GLUCOSAMINE CHONDR 1500 COMPLX PO)    MAGNESIUM GLYCINATE PLUS PO    montelukast (SINGULAIR) 10 mg tablet    multivitamin (THERAGRAN) TABS    other medication, see sig,    OXcarbazepine (TRILEPTAL) 300 mg tablet    Probiotic Product (PROBIOTIC-10) CAPS    tiZANidine (ZANAFLEX) 2 mg tablet    hyoscyamine (ANASPAZ) 0 125 mg    Allergies   Allergen Reactions    Ezetimibe Fatigue and GI Intolerance     Category: Adverse Reaction;     Molds & Smuts Allergic Rhinitis     Category: Allergy;     Monascus Purpureus Went Yeast Fatigue     Category: Adverse Reaction;     Niacin Fatigue     Category: Adverse Reaction;     Pollen Extract Allergic Rhinitis     Category: Allergy;     Pregabalin Fatigue     Category: Adverse Reaction;     Statins Fatigue and GI Intolerance     Category:  Adverse Reaction;     Atorvastatin GI Intolerance and Fatigue     Other reaction(s): Muscle pain, constipation, sleepiness  Category: Adverse Reaction; Other reaction(s): Muscle pain, constipation, sleepiness           Objective     Blood pressure 123/82, pulse 60, temperature 98 °F (36 7 °C), temperature source Tympanic, height 5' 8 6" (1 742 m), weight 93 kg (205 lb)  Body mass index is 30 63 kg/m²  PHYSICAL EXAM:      General Appearance:   Alert, cooperative, no distress   HEENT:   Normocephalic, atraumatic, anicteric      Neck:  Supple, symmetrical, trachea midline   Lungs:   Clear to auscultation bilaterally; no rales, rhonchi or wheezing; respirations unlabored    Heart[de-identified]   Regular rate and rhythm; no murmur, rub, or gallop  Abdomen:   Soft, non-tender, non-distended; normal bowel sounds; no masses, no organomegaly    Genitalia:   Deferred    Rectal:   Deferred    Extremities:  No cyanosis, clubbing or edema    Pulses:  2+ and symmetric    Skin:  No jaundice, rashes, or lesions    Lymph nodes:  No palpable cervical lymphadenopathy        Lab Results:   No visits with results within 1 Day(s) from this visit     Latest known visit with results is:   Appointment on 08/23/2019   Component Date Value    Hepatitis C Ab 08/23/2019 Non-reactive     WBC 08/23/2019 4 92     RBC 08/23/2019 4 74     Hemoglobin 08/23/2019 15 1     Hematocrit 08/23/2019 45 7     MCV 08/23/2019 96     MCH 08/23/2019 31 9     MCHC 08/23/2019 33 0     RDW 08/23/2019 12 3     Platelets 01/73/4366 202     MPV 08/23/2019 10 2     Sodium 08/23/2019 145     Potassium 08/23/2019 4 2     Chloride 08/23/2019 110*    CO2 08/23/2019 26     ANION GAP 08/23/2019 9     BUN 08/23/2019 19     Creatinine 08/23/2019 0 86     Glucose, Fasting 08/23/2019 80     Calcium 08/23/2019 9 2     AST 08/23/2019 15     ALT 08/23/2019 31     Alkaline Phosphatase 08/23/2019 73     Total Protein 08/23/2019 7 1     Albumin 08/23/2019 4 3     Total Bilirubin 08/23/2019 0 60     eGFR 08/23/2019 90     Cholesterol 08/23/2019 259*    Triglycerides 08/23/2019 65     HDL, Direct 08/23/2019 68*    LDL Calculated 08/23/2019 178*    Non-HDL-Chol (CHOL-HDL) 08/23/2019 191     LDL Direct 08/23/2019 175*         Radiology Results:   No results found  Attestation:   By signing my name below, Santy Barrios, attest that this documentation has been prepared under the direction and in the presence of SIMIN Stevens  Electronically Signed: Rosas Mills  9/25/19       I, Carson Olmedo, personally performed the services described in this documentation  All medical record entries made by the rosas were at my direction and in my presence  I have reviewed the chart and discharge instructions and agree that the record reflects my personal performance and is accurate and complete   SIMIN Stevens  9/25/19

## 2019-09-25 NOTE — PATIENT INSTRUCTIONS
Continue prune juice as tolerated for occasional constipation  Miralax dose can be reduced to a half capful or less if prune juice is not sufficient  Repeat MRI abdomen in 2 years  Screening colonoscopy in 2 years

## 2019-10-11 ENCOUNTER — CLINICAL SUPPORT (OUTPATIENT)
Dept: PAIN MEDICINE | Facility: CLINIC | Age: 64
End: 2019-10-11
Payer: COMMERCIAL

## 2019-10-11 VITALS
HEIGHT: 68 IN | HEART RATE: 61 BPM | DIASTOLIC BLOOD PRESSURE: 78 MMHG | TEMPERATURE: 98.6 F | BODY MASS INDEX: 31.07 KG/M2 | SYSTOLIC BLOOD PRESSURE: 126 MMHG | WEIGHT: 205 LBS

## 2019-10-11 DIAGNOSIS — M51.36 DDD (DEGENERATIVE DISC DISEASE), LUMBAR: ICD-10-CM

## 2019-10-11 DIAGNOSIS — M54.16 LUMBAR RADICULOPATHY: Primary | ICD-10-CM

## 2019-10-11 DIAGNOSIS — M47.816 LUMBAR SPONDYLOSIS: ICD-10-CM

## 2019-10-11 DIAGNOSIS — M54.40 LOW BACK PAIN WITH SCIATICA, SCIATICA LATERALITY UNSPECIFIED, UNSPECIFIED BACK PAIN LATERALITY, UNSPECIFIED CHRONICITY: ICD-10-CM

## 2019-10-11 PROBLEM — M51.369 DDD (DEGENERATIVE DISC DISEASE), LUMBAR: Status: ACTIVE | Noted: 2019-10-11

## 2019-10-11 PROCEDURE — 99204 OFFICE O/P NEW MOD 45 MIN: CPT | Performed by: ANESTHESIOLOGY

## 2019-10-11 NOTE — PROGRESS NOTES
Assessment  1  Lumbar radiculopathy    2  Low back pain with sciatica, sciatica laterality unspecified, unspecified back pain laterality, unspecified chronicity    3  DDD (degenerative disc disease), lumbar    4  Lumbar spondylosis        Plan  75-year-old male presenting for initial consultation regarding a long-standing history of lumbosacral back pain with radiculopathy in the L5 distribution of the right lower extremity which has precipitated over the past few years  He denies any specific trauma or inciting event  The patient does have an MRI of his lumbar spine from 2008 revealing degenerative disc disease and spondylosis with foraminal stenosis from L3-4-L5-S1  No evidence of neural impingement at that time  He has undergone chiropractic treatment in the past with some transient relief although he has felt that he has plateaued  The patient has tried Lyrica and gabapentin in the past although he was unable tolerate the side effects  He is unable to take NSAIDs secondary to anticoagulation  He does take tizanidine 2 mg q 8 hours p r n  With mild to moderate relief  The patient's low back pain seems to be multifactorial including myofascial and facet mediated components  The patient does appear to have an L5 radiculopathy likely stemming from foraminal stenosis  1  I will order physical therapy as I feel the patient may benefit from [facility] based exercises  2  The patient may continue with tizanidine 2-4 mg q 8 hours p r n  For his myofascial pain  3  We will avoid NSAIDs secondary to anticoagulation  4  I will follow up the patient in 2 months       My impressions and treatment recommendations were discussed in detail with the patient who verbalized understanding and had no further questions  Discharge instructions were provided  I personally saw and examined the patient and I agree with the above discussed plan of care  No orders of the defined types were placed in this encounter      No orders of the defined types were placed in this encounter  History of Present Illness    Gema Cotton is a 75 y o  male presenting for initial consultation regarding a long-standing history of lumbosacral back pain with radiation into the lateral aspect of the right lower extremity with associated numbness and paresthesias which has precipitated over the past few years  He denies any significant weakness of the right lower extremity  He denies any left lower extremity symptoms, bladder bowel incontinence, or saddle anesthesia  He denies any specific trauma or inciting event  The patient does have an MRI of his lumbar spine from 2008 revealing degenerative disc disease and spondylosis with foraminal stenosis from L3-4-L5-S1  No evidence of neural impingement at that time  He has undergone chiropractic treatment in the past with some transient relief although he has felt that he has plateaued  The patient has tried Lyrica and gabapentin in the past although he was unable tolerate the side effects  He is unable to take NSAIDs secondary to anticoagulation  He does take tizanidine 2 mg q 8 hours p r n  With mild to moderate relief  The patient rates his pain a 4/10 on the pain is nearly constant  The pain is worse in the evening  The pain is described as dull, aching, pins and needles, and numbness  The pain is increased with standing, bending, sitting, and walking  The pain is decreased with lying down  He has found some relief with exercise, osteopathic treatment, ice, acupuncture, a TENS unit, and chiropractic treatment  I have personally reviewed and/or updated the patient's past medical history, past surgical history, family history, social history, current medications, allergies, and vital signs today       Other than as stated above, the patient denies any interval changes in medications, medical condition, mental condition, symptoms, or allergies since the last office visit  Review of Systems   Constitutional: Negative for fever and unexpected weight change  HENT: Positive for hearing loss  Negative for trouble swallowing  Eyes: Negative for visual disturbance  Respiratory: Negative for shortness of breath and wheezing  Cardiovascular: Negative for chest pain and palpitations  Gastrointestinal: Negative for constipation, diarrhea, nausea and vomiting  Endocrine: Negative for cold intolerance, heat intolerance and polydipsia  Genitourinary: Negative for difficulty urinating and frequency  Musculoskeletal: Positive for joint swelling and myalgias  Negative for arthralgias and gait problem  Skin: Negative for rash  Neurological: Positive for headaches  Negative for dizziness, seizures, syncope and weakness  Hematological: Does not bruise/bleed easily  Psychiatric/Behavioral: Negative for dysphoric mood  All other systems reviewed and are negative        Patient Active Problem List   Diagnosis    Seasonal allergic rhinitis    Osteoarthritis    Nephrolithiasis    Irritable bowel syndrome    Hypercholesterolemia    Coronary atherosclerosis    Coronary artery disease involving native coronary artery    Alpha-[facility]-antitrypsin deficiency ([facility] )    BPH with obstruction/lower urinary tract symptoms    Need for pneumococcal vaccination    Medicare annual wellness visit, initial    Pancreatic cyst       Past Medical History:   Diagnosis Date    A-fib ([place] [address] )     Allergic rhinitis     Benign cyst of kidney     Cerebral vascular disease     Chronic sinusitis     Hyperlipidemia     Melanoma ([facility] )     Pancreas cyst        Past Surgical History:   Procedure Laterality Date    OTHER SURGICAL HISTORY  2003    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    PATENT FORAMEN OVALE CLOSURE         Family History   Problem Relation Age of Onset    Diabetes type II Mother     Cancer Father         angioma       Social History     Occupational History    Occupation: massage therapist   Tobacco Use    Smoking status: Former Smoker     Years: 10 00     Last attempt to quit:      Years since quittin 7    Smokeless tobacco: Former User    Tobacco comment: Cigars    Substance and Sexual Activity    Alcohol use: Yes     Alcohol/week: 1 0 standard drinks     Types: 1 Glasses of wine per week     Frequency: 4 or more times a week     Drinks per session: 1 or 2     Binge frequency: Daily or almost daily     Comment: daily wine 8oz    Drug use: Not Currently     Comment: Medical use (CBD)    Sexual activity: Not on file       Current Outpatient Medications on File Prior to Visit   Medication Sig    apixaban (ELIQUIS) 5 mg Take 1 tablet (5 mg total) by mouth 2 (two) times a day    ascorbic acid (VITAMIN C) 250 mg tablet Take 250 mg by mouth 2 (two) times a day    Cholecalciferol (VITAMIN D-3) 5000 units TABS Take 1 tablet by mouth daily    Coenzyme Q10 (COQ10) 200 MG CAPS Take 1 capsule by mouth daily    Glucosamine-Chondroit-Vit C-Mn (GLUCOSAMINE CHONDR 1500 COMPLX PO) Take 1 tablet by mouth 2 (two) times a day     MAGNESIUM GLYCINATE PLUS PO Take 400 mg by mouth daily    montelukast (SINGULAIR) 10 mg tablet Take 1 tablet (10 mg total) by mouth daily    multivitamin (THERAGRAN) TABS Take 1 tablet by mouth 2 (two) times a day      OXcarbazepine (TRILEPTAL) 300 mg tablet Take 300 mg by mouth 2 (two) times a day    Probiotic Product (PROBIOTIC-10) CAPS Take 2 capsules by mouth daily    hyoscyamine (ANASPAZ) 0 125 mg Take 0 125 mg by mouth every 4 (four) hours as needed     other medication, see sig, Medication/product name: [id number]  Strength: 3 capsules  Sig (include dose, route, frequency): daily    tiZANidine (ZANAFLEX) 2 mg tablet Take 1 tablet (2 mg total) by mouth 2 (two) times a day as needed for muscle spasms (Patient not taking: Reported on 10/11/2019)     No current facility-administered medications on file prior to visit          Allergies   Allergen Reactions    Ezetimibe Fatigue and GI Intolerance     Category: Adverse Reaction;     Molds & Smuts Allergic Rhinitis     Category: Allergy;     Monascus Purpureus Went Yeast Fatigue     Category: Adverse Reaction;     Niacin Fatigue     Category: Adverse Reaction;     Pollen Extract Allergic Rhinitis     Category: Allergy;     Pregabalin Fatigue     Category: Adverse Reaction;     Statins Fatigue and GI Intolerance     Category: Adverse Reaction;     Atorvastatin GI Intolerance and Fatigue     Other reaction(s): Muscle pain, constipation, sleepiness  Category: Adverse Reaction; Other reaction(s): Muscle pain, constipation, sleepiness       Physical Exam    Ht 5' 8" (1 727 m)   Wt 93 kg (205 lb)   BMI 31 17 kg/m²     Constitutional: normal, well developed, well nourished, alert, in no distress and non-toxic and no overt pain behavior  Eyes: anicteric  HEENT: grossly intact  Neck: supple, symmetric, trachea midline and no masses   Pulmonary:even and unlabored  Cardiovascular:No edema or pitting edema present  Skin:Normal without rashes or lesions and well hydrated  Psychiatric:Mood and affect appropriate  Neurologic:Cranial Nerves II-XII grossly intact  Musculoskeletal:normal gait  Right lumbar paraspinals mildly tender to palpation from L4-L5  Bilateral SI joints nontender to palpation  Bilateral patellar and Achilles reflexes were 2/4 and symmetrical   No clonus was noted bilaterally  Bilateral lower extremity strength 5/5 in all muscle groups  Sensation intact to light touch in L3 through S1 dermatomes bilaterally  Equivocal straight leg raise on the right and negative on the left  Negative Adams's test bilaterally      Imaging  Imaging reviewed

## 2019-10-31 ENCOUNTER — TELEPHONE (OUTPATIENT)
Dept: PAIN MEDICINE | Facility: CLINIC | Age: 64
End: 2019-10-31

## 2019-10-31 NOTE — TELEPHONE ENCOUNTER
lmom for pt to cb to r/s appt due to change in providers schedule please r/s to week before david aly  Thank you

## 2019-12-06 ENCOUNTER — OFFICE VISIT (OUTPATIENT)
Dept: PAIN MEDICINE | Facility: CLINIC | Age: 64
End: 2019-12-06
Payer: COMMERCIAL

## 2019-12-06 VITALS
BODY MASS INDEX: 31.07 KG/M2 | HEART RATE: 73 BPM | HEIGHT: 68 IN | SYSTOLIC BLOOD PRESSURE: 128 MMHG | WEIGHT: 205 LBS | DIASTOLIC BLOOD PRESSURE: 80 MMHG

## 2019-12-06 DIAGNOSIS — M51.36 DDD (DEGENERATIVE DISC DISEASE), LUMBAR: ICD-10-CM

## 2019-12-06 DIAGNOSIS — M47.816 LUMBAR SPONDYLOSIS: ICD-10-CM

## 2019-12-06 DIAGNOSIS — M54.16 LUMBAR RADICULOPATHY: Primary | ICD-10-CM

## 2019-12-06 PROCEDURE — 99214 OFFICE O/P EST MOD 30 MIN: CPT | Performed by: NURSE PRACTITIONER

## 2019-12-06 RX ORDER — DULOXETIN HYDROCHLORIDE 30 MG/1
30 CAPSULE, DELAYED RELEASE ORAL DAILY
Qty: 30 CAPSULE | Refills: 0 | Status: SHIPPED | OUTPATIENT
Start: 2019-12-06 | End: 2020-01-03

## 2019-12-06 NOTE — PATIENT INSTRUCTIONS
Duloxetine (By mouth)   Duloxetine (doo-LOX-[id number]-teen)  Treats depression, anxiety, diabetic peripheral neuropathy, fibromyalgia, and chronic muscle or bone pain  This medicine is an SSNRI  Brand Name(s): Cymbalta, DermacinRx Diana Fernandes   There may be other brand names for this medicine  When This Medicine Should Not Be Used: This medicine is not right for everyone  Do not use it if you had an allergic reaction to duloxetine  How to Use This Medicine:   Capsule, Delayed Release Capsule  · Take your medicine as directed  Your dose may need to be changed several times to find what works best for you  · Delayed-release capsule: Swallow the capsule whole  Do not crush, chew, break, or open it  · This medicine should come with a Medication Guide  Ask your pharmacist for a copy if you do not have one  · Missed dose: Take a dose as soon as you remember  If it is almost time for your next dose, wait until then and take a regular dose  Do not take extra medicine to make up for a missed dose  · Store the medicine in a closed container at room temperature, away from heat, moisture, and direct light  Drugs and Foods to Avoid:   Ask your doctor or pharmacist before using any other medicine, including over-the-counter medicines, vitamins, and herbal products  · Do not take duloxetine if you have used an MAO inhibitor (MAOI) within the past 14 days  Do not start taking an MAO inhibitor within 5 days of stopping duloxetine  · Some medicines can affect how duloxetine works   Tell your doctor if you are using any of the following:  ¨ Buspirone, cimetidine, ciprofloxacin, enoxacin, fentanyl, lithium, Adwoa's wort, theophylline, tramadol, tryptophan, or warfarin  ¨ Amphetamines  ¨ Blood pressure medicine  ¨ Diuretic (water pill)  ¨ Medicine for heart rhythm problems (including flecainide, propafenone, quinidine)  ¨ Medicine to treat migraine headaches (including triptans)  ¨ NSAID pain or arthritis medicine (including aspirin, celecoxib, diclofenac, ibuprofen, naproxen)  ¨ Other medicine to treat depression or mood disorders (including amitriptyline, desipramine, fluoxetine, imipramine, nortriptyline, paroxetine)  ¨ Phenothiazine medicine (including thioridazine)  · Tell your doctor if you use anything else that makes you sleepy  Some examples are allergy medicine, narcotic pain medicine, and alcohol  · Do not drink alcohol while you are using this medicine  Warnings While Using This Medicine:   · Tell your doctor if you are pregnant or breastfeeding, or if you have kidney disease, liver disease, diabetes, digestion problems, glaucoma, heart disease, high or low blood pressure, or problems with urination  Tell your doctor if you smoke or you have a history of seizures, or drug or alcohol addiction  · This medicine may cause the following problems:   ¨ Serious liver problems  ¨ Serotonin syndrome (more likely when used with certain other medicines)  ¨ Increased risk of bleeding problems  ¨ Serious skin reactions  ¨ Low sodium levels in the blood  · This medicine can increase thoughts of suicide  Tell your doctor right away if you start to feel depressed and have thoughts about hurting yourself  · This medicine can cause changes in your blood pressure  This may make you dizzy or drowsy  Do not drive or do anything that could be dangerous until you know how this medicine affects you  Stand up slowly to avoid falls  · Do not stop using this medicine suddenly  Your doctor will need to slowly decrease your dose before you stop it completely  · Your doctor will check your progress and the effects of this medicine at regular visits  Keep all appointments  · Keep all medicine out of the reach of children  Never share your medicine with anyone    Possible Side Effects While Using This Medicine:   Call your doctor right away if you notice any of these side effects:  · Allergic reaction: Itching or hives, swelling in your face or hands, swelling or tingling in your mouth or throat, chest tightness, trouble breathing  · Anxiety, restlessness, fever, fast heartbeat, sweating, muscle spasms, diarrhea, seeing or hearing things that are not there  · Blistering, peeling, red skin rash  · Confusion, weakness, muscle twitching  · Dark urine or pale stools, nausea, vomiting, loss of appetite, stomach pain, yellow skin or eyes  · Decrease in how much or how often you urinate  · Eye pain, vision changes, seeing halos around lights  · Feeling more energetic than usual  · Lightheadedness, dizziness, or fainting  · Unusual moods or behaviors, worsening depression, thoughts about hurting yourself, trouble sleeping  · Unusual bleeding or bruising  If you notice these less serious side effects, talk with your doctor:   · Decrease in appetite or weight  · Dry mouth, constipation, mild nausea  · Unusual drowsiness, sleepiness, or tiredness  If you notice other side effects that you think are caused by this medicine, tell your doctor  Call your doctor for medical advice about side effects  You may report side effects to FDA at 3-984-FDA-8133  © 2017 [address] [employer] Information is for End User's use only and may not be sold, redistributed or otherwise used for commercial purposes  The above information is an  only  It is not intended as medical advice for individual conditions or treatments  Talk to your doctor, nurse or pharmacist before following any medical regimen to see if it is safe and effective for you

## 2019-12-06 NOTE — PROGRESS NOTES
Assessment:  1  Lumbar radiculopathy    2  DDD (degenerative disc disease), lumbar    3  Lumbar spondylosis        Plan:  The patient has now completed 12 sessions of physical therapy dedicated to his low back and lumbar radiculopathy with some improvement, however states the pain is still constant in his right lower extremity  1  I will order an MRI of the lumbar spine for further evaluation of the patient's symptoms  Depending on results, patient may benefit from a lumbar epidural steroid injection  2  I will initiate the patient on duloxetine 30 mg daily for his osteoarthritic and neuropathic complaints  The patient denied being prescribed any anti-depressant and/or psychiatric medications  I reviewed with the patient that it may take 3-4 weeks for the medication's effects to be noticed and that it should never be abruptly stopped  Possible side effects include but are not limited to; vertigo, lethargy, nausea, worsening depression/anxiety, and confusion  I advised the patient to call our office if they experience any side effects  The patient verbalized an understanding  3  I will avoid NSAIDs secondary to anticoagulation  4  Patient may continue tizanidine as needed  5  The patient will follow-up in 4 weeks for medication prescription refill and reevaluation  The patient was advised to contact the office should their symptoms worsen in the interim  The patient was agreeable and verbalized an understanding  History of Present Illness: The patient is a 75 y o  male last seen on 10/11/19 who presents for a follow up office visit in regards to chronic lumbosacral back pain with radiation into the right lower extremity in the L5-S1 distributions with associated numbness  The patient denies any left lower extremity symptoms, bowel or bladder incontinence, or saddle anesthesia    He did have an MRI of the lumbar spine in 2008 which revealed degenerative disc disease and spondylosis with foraminal stenosis at L3-4 and L5-S1 without neural impingement at that time  He did complete a full course of physical therapy dedicated to his low back and radicular symptoms with some improvement, however the pain is still relatively constant  He has tried and failed Lyrica and gabapentin and is unable to take NSAIDs secondary to anticoagulation  He gets mild relief with oxcarbazepine and tizanidine  The patient currently rates his pain a 3/10 on the numeric pain rating scale  States pain is constant nature bothersome the morning in the evening  He characterizes the pain as dull aching, numbness and pins and needles  I have personally reviewed and/or updated the patient's past medical history, past surgical history, family history, social history, current medications, allergies, and vital signs today  Review of Systems:    Review of Systems   Respiratory: Negative for shortness of breath  Cardiovascular: Negative for chest pain  Gastrointestinal: Negative for constipation, diarrhea, nausea and vomiting  Musculoskeletal: Negative for arthralgias, gait problem, joint swelling and myalgias  Skin: Negative for rash  Neurological: Negative for dizziness, seizures and weakness  All other systems reviewed and are negative          Past Medical History:   Diagnosis Date    [facility]fib ([facility] )     Allergic rhinitis     Benign cyst of kidney     Cerebral vascular disease     Chronic sinusitis     Hyperlipidemia     Melanoma ([facility] )     Pancreas cyst        Past Surgical History:   Procedure Laterality Date    OTHER SURGICAL HISTORY  2003    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    PATENT FORAMEN OVALE CLOSURE         Family History   Problem Relation Age of Onset    Diabetes type II Mother     Cancer Father         angioma       Social History     Occupational History    Occupation: massage therapist   Tobacco Use    Smoking status: Former Smoker     Years: 10 00     Last attempt to quit: 1986     Years since quittin 9    Smokeless tobacco: Former User    Tobacco comment: Cigars    Substance and Sexual Activity    Alcohol use:  Yes     Alcohol/week: 1 0 standard drinks     Types: 1 Glasses of wine per week     Frequency: 4 or more times a week     Drinks per session: 1 or 2     Binge frequency: Daily or almost daily     Comment: daily wine 8oz    Drug use: Not Currently     Comment: Medical use (CBD)    Sexual activity: Not on file         Current Outpatient Medications:     apixaban (ELIQUIS) 5 mg, Take 1 tablet (5 mg total) by mouth 2 (two) times a day, Disp: 120 tablet, Rfl: 5    ascorbic acid (VITAMIN C) 250 mg tablet, Take 250 mg by mouth 2 (two) times a day, Disp: , Rfl:     Cholecalciferol (VITAMIN D-3) 5000 units TABS, Take 1 tablet by mouth daily, Disp: 100 tablet, Rfl: 0    Coenzyme Q10 (COQ10) 200 MG CAPS, Take 1 capsule by mouth daily, Disp: , Rfl:     Glucosamine-Chondroit-Vit C-Mn (GLUCOSAMINE CHONDR 1500 COMPLX PO), Take 1 tablet by mouth 2 (two) times a day , Disp: , Rfl:     hyoscyamine (ANASPAZ) 0 125 mg, Take 0 125 mg by mouth every 4 (four) hours as needed , Disp: , Rfl:     MAGNESIUM GLYCINATE PLUS PO, Take 400 mg by mouth daily, Disp: , Rfl:     montelukast (SINGULAIR) 10 mg tablet, Take 1 tablet (10 mg total) by mouth daily, Disp: 90 tablet, Rfl: 1    multivitamin (THERAGRAN) TABS, Take 1 tablet by mouth 2 (two) times a day  , Disp: , Rfl:     other medication, see sig,, Medication/product name: B-C-Q Strength: 3 capsules Sig (include dose, route, frequency): daily, Disp: , Rfl:     OXcarbazepine (TRILEPTAL) 300 mg tablet, Take 300 mg by mouth 2 (two) times a day, Disp: , Rfl:     Probiotic Product (PROBIOTIC-10) CAPS, Take 2 capsules by mouth daily, Disp: , Rfl:     DULoxetine (CYMBALTA) 30 mg delayed release capsule, Take 1 capsule (30 mg total) by mouth daily, Disp: 30 capsule, Rfl: 0    tiZANidine (ZANAFLEX) 2 mg tablet, Take 1 tablet (2 mg total) by mouth 2 (two) times a day as needed for muscle spasms (Patient not taking: Reported on 10/11/2019), Disp: 20 tablet, Rfl: 0    Allergies   Allergen Reactions    Ezetimibe Fatigue and GI Intolerance     Category: Adverse Reaction;     Molds & Smuts Allergic Rhinitis     Category: Allergy;     Monascus Purpureus Went Yeast Fatigue     Category: Adverse Reaction;     Niacin Fatigue     Category: Adverse Reaction;     Pollen Extract Allergic Rhinitis     Category: Allergy;     Pregabalin Fatigue     Category: Adverse Reaction;     Statins Fatigue and GI Intolerance     Category: Adverse Reaction;     Atorvastatin GI Intolerance and Fatigue     Other reaction(s): Muscle pain, constipation, sleepiness  Category: Adverse Reaction; Other reaction(s): Muscle pain, constipation, sleepiness       Physical Exam:    /80   Pulse 73   Ht 5' 8" (1 727 m)   Wt 93 kg (205 lb)   BMI 31 17 kg/m²     Constitutional:normal, well developed, well nourished, alert, in no distress and non-toxic and no overt pain behavior  Eyes:anicteric  HEENT:grossly intact  Neck:supple, symmetric, trachea midline and no masses   Pulmonary:even and unlabored  Cardiovascular:No edema or pitting edema present  Skin:Normal without rashes or lesions and well hydrated  Psychiatric:Mood and affect appropriate  Neurologic:Cranial Nerves II-XII grossly intact  Musculoskeletal:normal gait    Equivocal seated straight leg raise on the right negative on the left      Imaging  MRI lumbar spine without contrast    (Results Pending)         Orders Placed This Encounter   Procedures    MRI lumbar spine without contrast

## 2019-12-20 ENCOUNTER — HOSPITAL ENCOUNTER (OUTPATIENT)
Dept: RADIOLOGY | Facility: HOSPITAL | Age: 64
Discharge: HOME | End: 2019-12-20
Payer: COMMERCIAL

## 2019-12-20 DIAGNOSIS — M54.16 LUMBAR RADICULOPATHY: ICD-10-CM

## 2019-12-20 PROCEDURE — 72148 MRI LUMBAR SPINE W/O DYE: CPT

## 2019-12-23 ENCOUNTER — TELEPHONE (OUTPATIENT)
Dept: PAIN MEDICINE | Facility: CLINIC | Age: 64
End: 2019-12-23

## 2019-12-23 NOTE — TELEPHONE ENCOUNTER
S/w pt, explained MRI results  Pt is interested in injection  Pt said that Dr Kain Salguero with [facility] prescribes his Eliquis, informed pt that we will send hold form and call once we get it back  Pt verbalized understanding

## 2019-12-23 NOTE — TELEPHONE ENCOUNTER
Anti-coag hold form faxed to Dr Leticia Ames with [facility] ([facility] Cardiology  Procedure: R L4 and L5 TFESI  Fax #: [phone]  Phone #: [phone]

## 2019-12-23 NOTE — TELEPHONE ENCOUNTER
The patient does have some stenosis on MRI at varying levels from L3-4 to L5-S1  Since his symptoms are all right-sided, I can offer him a right L4 and L5 TFESI with Dr Anand Lomas to address the inflammatory component of his pain  We would need to obtain clearance for his to hold his Eliquis if he would be interested in this

## 2020-01-03 ENCOUNTER — OFFICE VISIT (OUTPATIENT)
Dept: PAIN MEDICINE | Facility: CLINIC | Age: 65
End: 2020-01-03
Payer: COMMERCIAL

## 2020-01-03 ENCOUNTER — TELEPHONE (OUTPATIENT)
Dept: PAIN MEDICINE | Facility: CLINIC | Age: 65
End: 2020-01-03

## 2020-01-03 VITALS
BODY MASS INDEX: 31.07 KG/M2 | DIASTOLIC BLOOD PRESSURE: 66 MMHG | SYSTOLIC BLOOD PRESSURE: 114 MMHG | HEIGHT: 68 IN | HEART RATE: 72 BPM | WEIGHT: 205 LBS

## 2020-01-03 DIAGNOSIS — M54.16 LUMBAR RADICULOPATHY: Primary | ICD-10-CM

## 2020-01-03 PROCEDURE — 99213 OFFICE O/P EST LOW 20 MIN: CPT | Performed by: NURSE PRACTITIONER

## 2020-01-03 PROCEDURE — 1160F RVW MEDS BY RX/DR IN RCRD: CPT | Performed by: NURSE PRACTITIONER

## 2020-01-03 NOTE — TELEPHONE ENCOUNTER
Fannie Zuluaga (nurse) at Dr Claudia Ricci office (cardiology) states that Dr Claudia Ricci will only allow the patient to hold Eliquis for 3 days  States patient is low risk but not zero risk for CVA during hold  Sheyla Click that our office requires a 4 day hold and will need to s/w JW regarding scheduling the patient         Procedure:  R L4 and L5 TFESI

## 2020-01-03 NOTE — PROGRESS NOTES
Assessment:  1  Lumbar radiculopathy        Plan:  Patient continues with right-sided low back pain that will radiate into his right groin, anteromedial aspect of his right leg, and foot to the great toe  He was prescribed Duloxetine last office visit, however states he never initiated on the medication secondary to concern for side effects  He is interested in moving forward with right L4 and L5 TFESI, however we did reach out to cardiology for anticoagulation hold and have not yet heard back with clearance  We will call him to schedule once we receive this from their office  History of Present Illness: The patient is a 75 y o  male last seen on 12/6/19 who presents for a follow up office visit in regards to chronic lumbosacral back pain with radiation into the anteromedial aspect of his right leg, groin and foot to the great toe secondary to lumbar spondylosis, lumbar degenerative disc disease and lumbar stenosis  The patient denies any left lower extremity symptoms, bowel or bladder incontinence, or saddle anesthesia  The patient has unfortunately tried and failed gabapentin and Lyrica in the past and he is unable to take NSAIDs secondary to anticoagulation  He was prescribed Duloxetine last office visit, however never initiated the medication secondary to concern for side effects  I did offer him a right L4 and L5 TFESI with Dr Rosette Castellano since he was having pain in the posterior aspect of his RLE last office visit,  however given his current symptoms and distrubution, may better benefit from a right L3 and L4 TFESI  We have not yet received clearance to hold his anticoagulation from cardiology  Patient currently rates his pain as 3/10 on the numeric pain rating scale  States the pain is constant nature and bothersome the morning and at night  He characterizes the pain as dull aching, numbness and pins and needles      I have personally reviewed and/or updated the patient's past medical history, past surgical history, family history, social history, current medications, allergies, and vital signs today  Review of Systems:    Review of Systems   Respiratory: Negative for shortness of breath  Cardiovascular: Negative for chest pain  Gastrointestinal: Negative for constipation, diarrhea, nausea and vomiting  Musculoskeletal: Positive for gait problem  Negative for arthralgias, joint swelling and myalgias  Skin: Negative for rash  Neurological: Negative for dizziness, seizures and weakness  All other systems reviewed and are negative  Past Medical History:   Diagnosis Date    [facility]fib ([facility] )     Allergic rhinitis     Benign cyst of kidney     Cerebral vascular disease     Chronic sinusitis     Hyperlipidemia     Melanoma ([facility] )     Pancreas cyst        Past Surgical History:   Procedure Laterality Date    OTHER SURGICAL HISTORY      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    PATENT FORAMEN OVALE CLOSURE         Family History   Problem Relation Age of Onset    Diabetes type II Mother     Cancer Father         angioma       Social History     Occupational History    Occupation: massage therapist   Tobacco Use    Smoking status: Former Smoker     Years: 10 00     Last attempt to quit:      Years since quittin 0    Smokeless tobacco: Former User    Tobacco comment: Cigars    Substance and Sexual Activity    Alcohol use:  Yes     Alcohol/week: 1 0 standard drinks     Types: 1 Glasses of wine per week     Frequency: 4 or more times a week     Drinks per session: 1 or 2     Binge frequency: Daily or almost daily     Comment: daily wine 8oz    Drug use: Not Currently     Comment: Medical use (CBD)    Sexual activity: Not on file         Current Outpatient Medications:     apixaban (ELIQUIS) 5 mg, Take 1 tablet (5 mg total) by mouth 2 (two) times a day, Disp: 120 tablet, Rfl: 5    ascorbic acid (VITAMIN C) 250 mg tablet, Take 250 mg by mouth 2 (two) times a day, Disp: , Rfl:    Cholecalciferol (VITAMIN D-3) 5000 units TABS, Take 1 tablet by mouth daily, Disp: 100 tablet, Rfl: 0    Coenzyme Q10 (COQ10) 200 MG CAPS, Take 1 capsule by mouth daily, Disp: , Rfl:     Glucosamine-Chondroit-Vit C-Mn (GLUCOSAMINE CHONDR 1500 COMPLX PO), Take 1 tablet by mouth 2 (two) times a day , Disp: , Rfl:     hyoscyamine (ANASPAZ) 0 125 mg, Take 0 125 mg by mouth every 4 (four) hours as needed , Disp: , Rfl:     MAGNESIUM GLYCINATE PLUS PO, Take 400 mg by mouth daily, Disp: , Rfl:     montelukast (SINGULAIR) 10 mg tablet, Take 1 tablet (10 mg total) by mouth daily, Disp: 90 tablet, Rfl: 1    multivitamin (THERAGRAN) TABS, Take 1 tablet by mouth 2 (two) times a day  , Disp: , Rfl:     other medication, see sig,, Medication/product name: B-C-Q Strength: 3 capsules Sig (include dose, route, frequency): daily, Disp: , Rfl:     OXcarbazepine (TRILEPTAL) 300 mg tablet, Take 300 mg by mouth 2 (two) times a day, Disp: , Rfl:     Probiotic Product (PROBIOTIC-10) CAPS, Take 2 capsules by mouth daily, Disp: , Rfl:     tiZANidine (ZANAFLEX) 2 mg tablet, Take 1 tablet (2 mg total) by mouth 2 (two) times a day as needed for muscle spasms, Disp: 20 tablet, Rfl: 0    Allergies   Allergen Reactions    Ezetimibe Fatigue and GI Intolerance     Category: Adverse Reaction;     Molds & Smuts Allergic Rhinitis     Category: Allergy;     Monascus Purpureus Went Yeast Fatigue     Category: Adverse Reaction;     Niacin Fatigue     Category: Adverse Reaction;     Pollen Extract Allergic Rhinitis     Category: Allergy;     Pregabalin Fatigue     Category: Adverse Reaction;     Statins Fatigue and GI Intolerance     Category: Adverse Reaction;     Atorvastatin GI Intolerance and Fatigue     Other reaction(s): Muscle pain, constipation, sleepiness  Category: Adverse Reaction;    Other reaction(s): Muscle pain, constipation, sleepiness       Physical Exam:    /66   Pulse 72   Ht 5' 8" (1 727 m)   Wt 93 kg (205 lb) BMI 31 17 kg/m²     Constitutional:normal, well developed, well nourished, alert, in no distress and non-toxic and no overt pain behavior  Eyes:anicteric  HEENT:grossly intact  Neck:supple, symmetric, trachea midline and no masses   Pulmonary:even and unlabored  Cardiovascular:No edema or pitting edema present  Skin:Normal without rashes or lesions and well hydrated  Psychiatric:Mood and affect appropriate  Neurologic:Cranial Nerves II-XII grossly intact  Musculoskeletal:normal gait      Imaging  No orders to display     MRI LUMBAR SPINE WITHOUT CONTRAST     INDICATION: [id number]: Radiculopathy, lumbar region      COMPARISON:  1/11/2008     TECHNIQUE:  Sagittal T1, sagittal T2, sagittal inversion recovery, axial T1 and axial T2, coronal T2  Imaging performed on 1 5T MRI  IMAGE QUALITY:  Diagnostic     FINDINGS:     VERTEBRAL BODIES:  There are 5 lumbar type vertebral bodies  Normal alignment of the lumbar spine  Grade 1 L3 on L4 anterolisthesis secondary to facet arthropathy    No scoliosis  No compression fracture  Normal marrow signal is identified within the   visualized bony structures  No discrete marrow lesion      SACRUM:  Normal signal within the sacrum  No evidence of insufficiency or stress fracture      DISTAL CORD AND CONUS:  Normal size and signal within the distal cord and conus      PARASPINAL SOFT TISSUES:  Bilateral renal cysts noted      LOWER THORACIC DISC SPACES:  Normal disc height and signal   No disc herniation, canal stenosis or foraminal narrowing      LUMBAR DISC SPACES:     L1-L2:  Normal      L2-L3:  Normal      L3-L4:  Minimal L3 on L4 anterolisthesis secondary to facet arthropathy  Disc desiccation without significant loss of disc height at this level  There is a mild circumferential disc bulge and bilateral facet hypertrophy evident   There is mild central   canal encroachment with mild right and minimal left foraminal narrowing      L4-L5:  Disc desiccation without loss of disc height  Circumferential disc bulge and bilateral facet hypertrophy with ligamentum flavum infolding  The central canal remains patent with mild left and mild to moderate right foraminal narrowing      L5-S1:  Disc desiccation with mild loss of disc height  Disc bulge with bilateral facet hypertrophy and ligamentum flavum infolding  The central canal remains patent with minimal right and mild left foraminal stenosis      IMPRESSION:  1  Mild to moderate spondylotic changes of the lower lumbar spine as detailed above  No focal disc herniation or critical stenosis  2  Bilateral renal cysts noted        No orders of the defined types were placed in this encounter

## 2020-01-03 NOTE — TELEPHONE ENCOUNTER
Okay to proceed with injection providing the patient understands there is a slightly higher risk of bleeding  I will discuss this further with him prior to the procedure if he wishes to move forward    If the patient wishes to discuss this further at an office visit, please schedule

## 2020-01-03 NOTE — TELEPHONE ENCOUNTER
Just checking on the status of this anticoagulation hold  The patient is interested in moving forward with the epidural steroid injection

## 2020-01-03 NOTE — TELEPHONE ENCOUNTER
Patient   [phone]  Yareli Paulino     Patient is requesting a call back  He would like to schedule a procedure  Please follow up thank you

## 2020-01-07 NOTE — TELEPHONE ENCOUNTER
Called pt, scheduled RT L4 & L5 TFESI on Wed 01/15/20 arr at 08:45  Pt is aware of the increased risk with holding eliquis for only 3 days (ok per Mercedes Lay), and last dose will be on Sat 01/11/20  Went over pre procedure instructions, no vaccinations 2 weeks prior/post proc, NPO 1 hr prior, if sick or on abx needs to call to rs, wear loose, comf clothing- no buttons/zippers, needs   Pt verbalized understanding

## 2020-01-15 ENCOUNTER — HOSPITAL ENCOUNTER (OUTPATIENT)
Dept: RADIOLOGY | Facility: CLINIC | Age: 65
Discharge: HOME | End: 2020-01-15
Admitting: ANESTHESIOLOGY
Payer: COMMERCIAL

## 2020-01-15 VITALS
RESPIRATION RATE: 20 BRPM | SYSTOLIC BLOOD PRESSURE: 117 MMHG | TEMPERATURE: 98.4 F | DIASTOLIC BLOOD PRESSURE: 77 MMHG | OXYGEN SATURATION: 97 % | HEART RATE: 61 BPM

## 2020-01-15 DIAGNOSIS — M54.16 LUMBAR RADICULOPATHY: ICD-10-CM

## 2020-01-15 PROCEDURE — 64484 NJX AA&/STRD TFRM EPI L/S EA: CPT | Performed by: ANESTHESIOLOGY

## 2020-01-15 PROCEDURE — 64483 NJX AA&/STRD TFRM EPI L/S 1: CPT | Performed by: ANESTHESIOLOGY

## 2020-01-15 RX ORDER — PAPAVERINE HCL 150 MG
20 CAPSULE, EXTENDED RELEASE ORAL ONCE
Status: COMPLETED | OUTPATIENT
Start: 2020-01-15 | End: 2020-01-15

## 2020-01-15 RX ORDER — LIDOCAINE HYDROCHLORIDE 10 MG/ML
5 INJECTION, SOLUTION EPIDURAL; INFILTRATION; INTRACAUDAL; PERINEURAL ONCE
Status: COMPLETED | OUTPATIENT
Start: 2020-01-15 | End: 2020-01-15

## 2020-01-15 RX ADMIN — LIDOCAINE HYDROCHLORIDE 4 ML: 10 INJECTION, SOLUTION EPIDURAL; INFILTRATION; INTRACAUDAL; PERINEURAL at 09:18

## 2020-01-15 RX ADMIN — DEXAMETHASONE SODIUM PHOSPHATE 15 MG: 10 INJECTION, SOLUTION INTRAMUSCULAR; INTRAVENOUS at 09:22

## 2020-01-15 RX ADMIN — IOHEXOL 2 ML: 300 INJECTION, SOLUTION INTRAVENOUS at 09:20

## 2020-01-15 RX ADMIN — LIDOCAINE HYDROCHLORIDE 2 ML: 20 INJECTION, SOLUTION EPIDURAL; INFILTRATION; INTRACAUDAL; PERINEURAL at 09:22

## 2020-01-15 NOTE — DISCHARGE INSTRUCTIONS
Epidural Steroid Injection   WHAT YOU NEED TO KNOW:   An epidural steroid injection (CARLEEN) is a procedure to inject steroid medicine into the epidural space  The epidural space is between your spinal cord and vertebrae  Steroids reduce inflammation and fluid buildup in your spine that may be causing pain  You may be given pain medicine along with the steroids  ACTIVITY  · Do not drive or operate machinery today  · No strenuous activity today - bending, lifting, etc   · You may resume normal activites starting tomorrow - start slowly and as tolerated  · You may shower today, but no tub baths or hot tubs  · You may have numbness for several hours from the local anesthetic  Please use caution and common sense, especially with weight-bearing activities  CARE OF THE INJECTION SITE  · If you have soreness or pain, apply ice to the area today (20 minutes on/20 minutes off)  · Starting tomorrow, you may use warm, moist heat or ice if needed  · You may have an increase or change in your discomfort for 36-48 hours after your treatment  · Apply ice and continue with any pain medication you have been prescribed  · Notify [facility] if you have any of the following: redness, drainage, swelling, headache, stiff neck or fever above 100°F     SPECIAL INSTRUCTIONS  · Our office will contact you in approximately 7 days for a progress report  MEDICATIONS  · Continue to take all routine medications  · Our office may have instructed you to hold some medications  If you have a problem specifically related to your procedure, please call our office at [phone]  Problems not related to your procedure should be directed to your primary care physician

## 2020-01-15 NOTE — H&P
History of Present Illness: The patient is a 75 y o  male who presents with complaints of low back and right leg pain      Patient Active Problem List   Diagnosis    Seasonal allergic rhinitis    Osteoarthritis    Nephrolithiasis    Irritable bowel syndrome    Hypercholesterolemia    Coronary atherosclerosis    Coronary artery disease involving native coronary artery    [facility]    BPH with obstruction/lower urinary tract symptoms    Need for pneumococcal vaccination    Medicare annual wellness visit, initial    Pancreatic cyst    Lumbar radiculopathy    Low back pain with sciatica    DDD (degenerative disc disease), lumbar    Lumbar spondylosis       Past Medical History:   Diagnosis Date    [facility]     Allergic rhinitis     Benign cyst of kidney     Cerebral vascular disease     Chronic sinusitis     Hyperlipidemia     [facility] ([facility] )     Pancreas cyst        Past Surgical History:   Procedure Laterality Date    OTHER SURGICAL HISTORY  2003    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    PATENT FORAMEN OVALE CLOSURE           Current Outpatient Medications:     apixaban (ELIQUIS) 5 mg, Take 1 tablet (5 mg total) by mouth 2 (two) times a day, Disp: 120 tablet, Rfl: 5    ascorbic acid (VITAMIN C) 250 mg tablet, Take 250 mg by mouth 2 (two) times a day, Disp: , Rfl:     Cholecalciferol (VITAMIN D-3) 5000 units TABS, Take 1 tablet by mouth daily, Disp: 100 tablet, Rfl: 0    Coenzyme Q10 (COQ10) 200 MG CAPS, Take 1 capsule by mouth daily, Disp: , Rfl:     Glucosamine-Chondroit-Vit C-Mn (GLUCOSAMINE CHONDR 1500 COMPLX PO), Take 1 tablet by mouth 2 (two) times a day , Disp: , Rfl:     hyoscyamine (ANASPAZ) 0 125 mg, Take 0 125 mg by mouth every 4 (four) hours as needed , Disp: , Rfl:     MAGNESIUM GLYCINATE PLUS PO, Take 400 mg by mouth daily, Disp: , Rfl:     montelukast (SINGULAIR) 10 mg tablet, Take 1 tablet (10 mg total) by mouth daily, Disp: 90 tablet, Rfl: 1   multivitamin (THERAGRAN) TABS, Take 1 tablet by mouth 2 (two) times a day  , Disp: , Rfl:     other medication, see sig,, Medication/product name: B-C-Q Strength: 3 capsules Sig (include dose, route, frequency): daily, Disp: , Rfl:     OXcarbazepine (TRILEPTAL) 300 mg tablet, Take 300 mg by mouth 2 (two) times a day, Disp: , Rfl:     Probiotic Product (PROBIOTIC-10) CAPS, Take 2 capsules by mouth daily, Disp: , Rfl:     tiZANidine (ZANAFLEX) 2 mg tablet, Take 1 tablet (2 mg total) by mouth 2 (two) times a day as needed for muscle spasms, Disp: 20 tablet, Rfl: 0    Allergies   Allergen Reactions    Ezetimibe Fatigue and GI Intolerance     Category: Adverse Reaction;     Molds & Smuts Allergic Rhinitis     Category: Allergy;     Monascus Purpureus Went Yeast Fatigue     Category: Adverse Reaction;     Niacin Fatigue     Category: Adverse Reaction;     Pollen Extract Allergic Rhinitis     Category: Allergy;     Pregabalin Fatigue     Category: Adverse Reaction;     Statins Fatigue and GI Intolerance     Category: Adverse Reaction;     Atorvastatin GI Intolerance and Fatigue     Other reaction(s): Muscle pain, constipation, sleepiness  Category: Adverse Reaction; Other reaction(s): Muscle pain, constipation, sleepiness       Physical Exam:   Vitals:    01/15/20 0858   BP: 116/73   Pulse: 66   Resp: 20   Temp: 98 4 °F (36 9 °C)   SpO2: 97%     General: Awake, Alert, Oriented x 3, Mood and affect appropriate  Respiratory: Respirations even and unlabored  Cardiovascular: Peripheral pulses intact; no edema  Musculoskeletal Exam:  Right lumbar paraspinals tender to palpation  ASA Score: 3         Assessment:   1   Lumbar radiculopathy        Plan: R L4 and L5 TFESI

## 2020-01-20 DIAGNOSIS — J30.1 CHRONIC SEASONAL ALLERGIC RHINITIS DUE TO POLLEN: ICD-10-CM

## 2020-01-20 RX ORDER — MONTELUKAST SODIUM 10 MG/1
10 TABLET ORAL DAILY
Qty: 90 TABLET | Refills: 1 | Status: SHIPPED | OUTPATIENT
Start: 2020-01-20 | End: 2020-07-22 | Stop reason: SDUPTHER

## 2020-01-22 ENCOUNTER — DOCUMENTATION (OUTPATIENT)
Dept: UROLOGY | Facility: AMBULATORY SURGERY CENTER | Age: 65
End: 2020-01-22

## 2020-01-22 ENCOUNTER — TELEPHONE (OUTPATIENT)
Dept: PAIN MEDICINE | Facility: CLINIC | Age: 65
End: 2020-01-22

## 2020-01-22 ENCOUNTER — TELEPHONE (OUTPATIENT)
Dept: UROLOGY | Facility: MEDICAL CENTER | Age: 65
End: 2020-01-22

## 2020-01-22 ENCOUNTER — TELEPHONE (OUTPATIENT)
Dept: UROLOGY | Facility: AMBULATORY SURGERY CENTER | Age: 65
End: 2020-01-22

## 2020-01-22 NOTE — TELEPHONE ENCOUNTER
Patient questioned as to which MRI result needed (lumbar or abdomen)  Patient requesting MRI of abdomen  Informed Patient report is printed and ready for  at   Patient repeats back understanding

## 2020-01-22 NOTE — TELEPHONE ENCOUNTER
Patient of Dr Priscilla Arroyo seen at the [facility]  Patient called in regard to need for copy of recent MRI  He reported he will drop into office to  a copy  Please be aware   He can be reached at [phone] if questions    Thank you

## 2020-01-24 NOTE — TELEPHONE ENCOUNTER
Patient states  he has   50 % of improvement & pain level 1or 2/10   I will send over to the Dr to see what the next step will be            Patient contact # [phone]

## 2020-02-27 ENCOUNTER — APPOINTMENT (OUTPATIENT)
Dept: LAB | Facility: HOSPITAL | Age: 65
End: 2020-02-27
Attending: INTERNAL MEDICINE
Payer: COMMERCIAL

## 2020-02-27 DIAGNOSIS — E78.00 HYPERCHOLESTEROLEMIA: ICD-10-CM

## 2020-02-27 DIAGNOSIS — I25.10 CORONARY ARTERY DISEASE INVOLVING NATIVE CORONARY ARTERY OF NATIVE HEART WITHOUT ANGINA PECTORIS: ICD-10-CM

## 2020-02-27 LAB
ALBUMIN SERPL BCP-MCNC: 3.9 G/DL (ref 3.5–5)
ALP SERPL-CCNC: 82 U/L (ref 46–116)
ALT SERPL W P-5'-P-CCNC: 33 U/L (ref 12–78)
ANION GAP SERPL CALCULATED.3IONS-SCNC: 3 MMOL/L (ref 4–13)
AST SERPL W P-5'-P-CCNC: 16 U/L (ref 5–45)
BILIRUB SERPL-MCNC: 0.29 MG/DL (ref 0.2–1)
BUN SERPL-MCNC: 17 MG/DL (ref 5–25)
CALCIUM SERPL-MCNC: 8.8 MG/DL (ref 8.3–10.1)
CHLORIDE SERPL-SCNC: 109 MMOL/L (ref 100–108)
CHOLEST SERPL-MCNC: 250 MG/DL (ref 50–200)
CO2 SERPL-SCNC: 28 MMOL/L (ref 21–32)
CREAT SERPL-MCNC: 0.85 MG/DL (ref 0.6–1.3)
ERYTHROCYTE [DISTWIDTH] IN BLOOD BY AUTOMATED COUNT: 12.2 % (ref 11.6–15.1)
GFR SERPL CREATININE-BSD FRML MDRD: 91 ML/MIN/1.73SQ M
GLUCOSE P FAST SERPL-MCNC: 92 MG/DL (ref 65–99)
HCT VFR BLD AUTO: 45.2 % (ref 36.5–49.3)
HDLC SERPL-MCNC: 68 MG/DL
HGB BLD-MCNC: 14.9 G/DL (ref 12–17)
LDLC SERPL CALC-MCNC: 171 MG/DL (ref 0–100)
MCH RBC QN AUTO: 31.7 PG (ref 26.8–34.3)
MCHC RBC AUTO-ENTMCNC: 33 G/DL (ref 31.4–37.4)
MCV RBC AUTO: 96 FL (ref 82–98)
NONHDLC SERPL-MCNC: 182 MG/DL
PLATELET # BLD AUTO: 191 THOUSANDS/UL (ref 149–390)
PMV BLD AUTO: 9.6 FL (ref 8.9–12.7)
POTASSIUM SERPL-SCNC: 4.2 MMOL/L (ref 3.5–5.3)
PROT SERPL-MCNC: 7.2 G/DL (ref 6.4–8.2)
RBC # BLD AUTO: 4.7 MILLION/UL (ref 3.88–5.62)
SODIUM SERPL-SCNC: 140 MMOL/L (ref 136–145)
TRIGL SERPL-MCNC: 57 MG/DL
WBC # BLD AUTO: 4.43 THOUSAND/UL (ref 4.31–10.16)

## 2020-02-27 PROCEDURE — 36415 COLL VENOUS BLD VENIPUNCTURE: CPT

## 2020-02-27 PROCEDURE — 80061 LIPID PANEL: CPT

## 2020-02-27 PROCEDURE — 85027 COMPLETE CBC AUTOMATED: CPT

## 2020-02-27 PROCEDURE — 80053 COMPREHEN METABOLIC PANEL: CPT

## 2020-03-02 ENCOUNTER — OFFICE VISIT (OUTPATIENT)
Dept: INTERNAL MEDICINE CLINIC | Facility: CLINIC | Age: 65
End: 2020-03-02
Payer: COMMERCIAL

## 2020-03-02 VITALS
TEMPERATURE: 98.2 F | HEIGHT: 68 IN | DIASTOLIC BLOOD PRESSURE: 80 MMHG | HEART RATE: 65 BPM | WEIGHT: 205 LBS | SYSTOLIC BLOOD PRESSURE: 122 MMHG | OXYGEN SATURATION: 98 % | BODY MASS INDEX: 31.07 KG/M2

## 2020-03-02 DIAGNOSIS — I63.9 CRYPTOGENIC STROKE (HCC): ICD-10-CM

## 2020-03-02 DIAGNOSIS — E78.00 HYPERCHOLESTEROLEMIA: ICD-10-CM

## 2020-03-02 DIAGNOSIS — M47.816 SPONDYLOSIS OF LUMBAR REGION WITHOUT MYELOPATHY OR RADICULOPATHY: Primary | ICD-10-CM

## 2020-03-02 DIAGNOSIS — I25.10 ATHEROSCLEROSIS OF NATIVE CORONARY ARTERY OF NATIVE HEART, ANGINA PRESENCE UNSPECIFIED: ICD-10-CM

## 2020-03-02 DIAGNOSIS — M54.16 LUMBAR RADICULOPATHY: ICD-10-CM

## 2020-03-02 DIAGNOSIS — M51.36 DDD (DEGENERATIVE DISC DISEASE), LUMBAR: ICD-10-CM

## 2020-03-02 DIAGNOSIS — M47.816 LUMBAR SPONDYLOSIS: ICD-10-CM

## 2020-03-02 DIAGNOSIS — E88.01 ALPHA-1-ANTITRYPSIN DEFICIENCY (HCC): ICD-10-CM

## 2020-03-02 PROCEDURE — 1036F TOBACCO NON-USER: CPT | Performed by: INTERNAL MEDICINE

## 2020-03-02 PROCEDURE — 3079F DIAST BP 80-89 MM HG: CPT | Performed by: INTERNAL MEDICINE

## 2020-03-02 PROCEDURE — 99214 OFFICE O/P EST MOD 30 MIN: CPT | Performed by: INTERNAL MEDICINE

## 2020-03-02 PROCEDURE — 4040F PNEUMOC VAC/ADMIN/RCVD: CPT | Performed by: INTERNAL MEDICINE

## 2020-03-02 PROCEDURE — 1160F RVW MEDS BY RX/DR IN RCRD: CPT | Performed by: INTERNAL MEDICINE

## 2020-03-02 PROCEDURE — 3008F BODY MASS INDEX DOCD: CPT | Performed by: INTERNAL MEDICINE

## 2020-03-02 PROCEDURE — 3074F SYST BP LT 130 MM HG: CPT | Performed by: INTERNAL MEDICINE

## 2020-03-02 RX ORDER — OXCARBAZEPINE 300 MG/1
300 TABLET, FILM COATED ORAL 2 TIMES DAILY
Qty: 180 TABLET | Refills: 1 | Status: SHIPPED | OUTPATIENT
Start: 2020-03-02 | End: 2020-08-27 | Stop reason: SDUPTHER

## 2020-03-02 NOTE — ASSESSMENT & PLAN NOTE
We will see if the patient is a candidate for Repatha injections given his history of coronary artery disease and intolerance to statins

## 2020-03-02 NOTE — ASSESSMENT & PLAN NOTE
Patient has remote history of cryptogenic stroke, atrial fibrillation and PFO  PFO has been closed  Patient has not demonstrated any recurrence of his atrial fibrillation in a period of over than 10 years  Most recent cardiogram show normal sinus rhythm  Ambulatory EKG monitoring showed predominantly sinus rhythm with APCs  Patient does not wish to continue long-term anticoagulation

## 2020-03-02 NOTE — PROGRESS NOTES
Assessment/Plan:    Osteoarthritis  Patient continues with physical therapy for his low back  Recently underwent epidural steroid injection which did decrease his pain  Hypercholesterolemia  We will see if the patient is a candidate for Repatha injections given his history of coronary artery disease and intolerance to statins  Coronary atherosclerosis  With associated statin intolerance along with intolerance to Colestid, ezetimibe  We will check with insurance coverage regarding the possibility of Repatha    Alpha-1-antitrypsin deficiency [facility])  Patient reports no issues  He does use filters when exposed to environmental dusts and pollens    Cryptogenic stroke [facility])  Patient has remote history of cryptogenic stroke, atrial fibrillation and PFO  PFO has been closed  Patient has not demonstrated any recurrence of his atrial fibrillation in a period of over than 10 years  Most recent cardiogram show normal sinus rhythm  Ambulatory EKG monitoring showed predominantly sinus rhythm with APCs  Patient does not wish to continue long-term anticoagulation  Diagnoses and all orders for this visit:    Spondylosis of lumbar region without myelopathy or radiculopathy    Lumbar radiculopathy  -     OXcarbazepine (TRILEPTAL) 300 mg tablet; Take 1 tablet (300 mg total) by mouth 2 (two) times a day    Lumbar spondylosis    Hypercholesterolemia  -     CBC and Platelet; Future  -     Comprehensive metabolic panel; Future    DDD (degenerative disc disease), lumbar    Atherosclerosis of native coronary artery of native heart, angina presence unspecified  -     CBC and Platelet; Future  -     Comprehensive metabolic panel; Future    Alpha-1-antitrypsin deficiency ([facility] )    Cryptogenic stroke (HCC)          Subjective:      Patient ID: Matty Hardwick is a 75 y o  male  75 y o  Male presenting today for follow up visit  He reports no acute issues at this time   His biggest complaint since he was last seen here is his back pain and radiculopathy, for which he currently takes Trileptal  He received an epidural steroid injection in 2020 which has significantly relieved his pain  He also reported bleeding hemorrhoids back in 2020 which have since resolved after colonoscopy and banding procedure  He stopped Eliquis prior to the epidural steroid injection and colonoscopy/banding and has not restarted it  He does not feel Eliquis is needed for him because he does not believe he has Afib  Previous EKG's and cardiology notes were reviewed  EKG's on file showed normal sinus rhythm  He also had a holter monitor that did not show any evidence of Afib during that time  He is otherwise in good health  He did not complain about any constipation or diarrhea and stated his IBS was under control with hyoscyamine  Family History   Problem Relation Age of Onset    Diabetes type II Mother     Cancer Father         angioma     Social History     Socioeconomic History    Marital status: /Civil Union     Spouse name: Not on file    Number of children: Not on file    Years of education: Not on file    Highest education level: Not on file   Occupational History    Occupation: massage therapist   Social Needs    Financial resource strain: Not on file    Food insecurity:     Worry: Not on file     Inability: Not on file    Transportation needs:     Medical: Not on file     Non-medical: Not on file   Tobacco Use    Smoking status: Former Smoker     Years: 10 00     Last attempt to quit:      Years since quittin 1    Smokeless tobacco: Former User    Tobacco comment: Cigars    Substance and Sexual Activity    Alcohol use:  Yes     Alcohol/week: 1 0 standard drinks     Types: 1 Glasses of wine per week     Frequency: 4 or more times a week     Drinks per session: 1 or 2     Binge frequency: Daily or almost daily     Comment: daily wine 8oz    Drug use: Not Currently     Comment: Medical use (CBD)    Sexual activity: Not on file   Lifestyle    Physical activity:     Days per week: Not on file     Minutes per session: Not on file    Stress: Not on file   Relationships    Social connections:     Talks on phone: Not on file     Gets together: Not on file     Attends Protestant service: Not on file     Active member of club or organization: Not on file     Attends meetings of clubs or organizations: Not on file     Relationship status: Not on file    Intimate partner violence:     Fear of current or ex partner: Not on file     Emotionally abused: Not on file     Physically abused: Not on file     Forced sexual activity: Not on file   Other Topics Concern    Not on file   Social History Narrative    Caffeine: drinks 1 cup coffee/ tea a day       Past Medical History:   Diagnosis Date    A[facility]fib ([facility] )     Allergic rhinitis     Benign cyst of kidney     Cerebral vascular disease     Chronic sinusitis     Hyperlipidemia     Melanoma ([facility] )     Pancreas cyst        Current Outpatient Medications:     ascorbic acid (VITAMIN C) 250 mg tablet, Take 250 mg by mouth 2 (two) times a day, Disp: , Rfl:     Cholecalciferol (VITAMIN D-3) 5000 units TABS, Take 1 tablet by mouth daily, Disp: 100 tablet, Rfl: 0    Coenzyme Q10 (COQ10) 200 MG CAPS, Take 1 capsule by mouth daily, Disp: , Rfl:     Glucosamine-Chondroit-Vit C-Mn (GLUCOSAMINE CHONDR 1500 COMPLX PO), Take 1 tablet by mouth 2 (two) times a day , Disp: , Rfl:     hyoscyamine (ANASPAZ) 0 125 mg, Take 0 125 mg by mouth 3 (three) times a day , Disp: , Rfl:     MAGNESIUM GLYCINATE PLUS PO, Take 400 mg by mouth daily, Disp: , Rfl:     montelukast (SINGULAIR) 10 mg tablet, Take 1 tablet (10 mg total) by mouth daily, Disp: 90 tablet, Rfl: 1    multivitamin (THERAGRAN) TABS, Take 1 tablet by mouth 2 (two) times a day  , Disp: , Rfl:     other medication, see sig,, Medication/product name: B-C-Q Strength: 3 capsules Sig (include dose, route, frequency): daily, Disp: , Rfl:   OXcarbazepine (TRILEPTAL) 300 mg tablet, Take 1 tablet (300 mg total) by mouth 2 (two) times a day, Disp: 180 tablet, Rfl: 1    Probiotic Product (PROBIOTIC-10) CAPS, Take 2 capsules by mouth daily, Disp: , Rfl:     apixaban (ELIQUIS) 5 mg, Take 1 tablet (5 mg total) by mouth 2 (two) times a day (Patient not taking: Reported on 3/2/2020), Disp: 120 tablet, Rfl: 5    tiZANidine (ZANAFLEX) 2 mg tablet, Take 1 tablet (2 mg total) by mouth 2 (two) times a day as needed for muscle spasms (Patient not taking: Reported on 3/2/2020), Disp: 20 tablet, Rfl: 0  Allergies   Allergen Reactions    Ezetimibe Fatigue and GI Intolerance     Category: Adverse Reaction;     Molds & Smuts Allergic Rhinitis     Category: Allergy;     Monascus Purpureus Went Yeast Fatigue     Category: Adverse Reaction;     Niacin Fatigue     Category: Adverse Reaction;     Pollen Extract Allergic Rhinitis     Category: Allergy;     Pregabalin Fatigue     Category: Adverse Reaction;     Statins Fatigue and GI Intolerance     Category: Adverse Reaction;     Atorvastatin GI Intolerance and Fatigue     Other reaction(s): Muscle pain, constipation, sleepiness  Category: Adverse Reaction; Other reaction(s): Muscle pain, constipation, sleepiness     Past Surgical History:   Procedure Laterality Date    OTHER SURGICAL HISTORY  2003    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    PATENT FORAMEN OVALE CLOSURE           Review of Systems   Constitutional: Negative for chills and fatigue  HENT: Negative for ear pain, rhinorrhea and trouble swallowing  Eyes: Negative for visual disturbance  Respiratory: Negative for shortness of breath  Cardiovascular: Negative for chest pain  Gastrointestinal: Negative for abdominal distention, blood in stool and nausea  Intermittent constipation due to IBS   Musculoskeletal: Positive for back pain and neck pain  Allergic/Immunologic: Positive for environmental allergies     Neurological: Negative for syncope and headaches  Objective:      /80 (BP Location: Left arm, Patient Position: Sitting, Cuff Size: Standard)   Pulse 65   Temp 98 2 °F (36 8 °C) (Oral)   Ht 5' 7 72" (1 72 m)   Wt 93 kg (205 lb)   SpO2 98%   BMI 31 43 kg/m²          Physical Exam   Constitutional: He is oriented to person, place, and time  No distress  HENT:   Head: Normocephalic and atraumatic  Right Ear: External ear normal    Left Ear: External ear normal    Eyes: EOM are normal    Neck: No JVD present  Cardiovascular: Normal rate and regular rhythm  Pulmonary/Chest: Breath sounds normal  No respiratory distress  Abdominal: Soft  Bowel sounds are normal  He exhibits no distension  There is no tenderness  There is no rebound and no guarding  Musculoskeletal: He exhibits no edema  Neurological: He is alert and oriented to person, place, and time  Skin: Skin is warm and dry  Capillary refill takes less than 2 seconds

## 2020-03-02 NOTE — ASSESSMENT & PLAN NOTE
With associated statin intolerance along with intolerance to Colestid, ezetimibe    We will check with insurance coverage regarding the possibility of Repatha

## 2020-03-02 NOTE — ASSESSMENT & PLAN NOTE
Patient continues with physical therapy for his low back  Recently underwent epidural steroid injection which did decrease his pain

## 2020-04-14 DIAGNOSIS — M47.816 SPONDYLOSIS OF LUMBAR REGION WITHOUT MYELOPATHY OR RADICULOPATHY: ICD-10-CM

## 2020-04-14 RX ORDER — TIZANIDINE 2 MG/1
2 TABLET ORAL 2 TIMES DAILY PRN
Qty: 20 TABLET | Refills: 0 | Status: SHIPPED | OUTPATIENT
Start: 2020-04-14 | End: 2021-01-12 | Stop reason: SDUPTHER

## 2020-04-15 ENCOUNTER — TELEPHONE (OUTPATIENT)
Dept: PAIN MEDICINE | Facility: CLINIC | Age: 65
End: 2020-04-15

## 2020-04-20 ENCOUNTER — TELEMEDICINE (OUTPATIENT)
Dept: PAIN MEDICINE | Facility: CLINIC | Age: 65
End: 2020-04-20
Payer: COMMERCIAL

## 2020-04-20 ENCOUNTER — TELEPHONE (OUTPATIENT)
Dept: CARDIOLOGY CLINIC | Facility: CLINIC | Age: 65
End: 2020-04-20

## 2020-04-20 DIAGNOSIS — M54.16 LUMBAR RADICULOPATHY: Primary | ICD-10-CM

## 2020-04-20 DIAGNOSIS — M51.36 DDD (DEGENERATIVE DISC DISEASE), LUMBAR: ICD-10-CM

## 2020-04-20 DIAGNOSIS — M47.816 LUMBAR SPONDYLOSIS: ICD-10-CM

## 2020-04-20 PROCEDURE — 99442 PR PHYS/QHP TELEPHONE EVALUATION 11-20 MIN: CPT | Performed by: NURSE PRACTITIONER

## 2020-04-20 RX ORDER — DULOXETIN HYDROCHLORIDE 20 MG/1
20 CAPSULE, DELAYED RELEASE ORAL DAILY
Qty: 30 CAPSULE | Refills: 1 | Status: SHIPPED | OUTPATIENT
Start: 2020-04-20 | End: 2020-06-15 | Stop reason: SDUPTHER

## 2020-06-13 DIAGNOSIS — M54.16 LUMBAR RADICULOPATHY: ICD-10-CM

## 2020-06-15 ENCOUNTER — TELEPHONE (OUTPATIENT)
Dept: PAIN MEDICINE | Facility: CLINIC | Age: 65
End: 2020-06-15

## 2020-06-15 ENCOUNTER — TELEPHONE (OUTPATIENT)
Dept: CARDIOLOGY CLINIC | Facility: CLINIC | Age: 65
End: 2020-06-15

## 2020-06-15 ENCOUNTER — TELEMEDICINE (OUTPATIENT)
Dept: PAIN MEDICINE | Facility: CLINIC | Age: 65
End: 2020-06-15
Payer: COMMERCIAL

## 2020-06-15 DIAGNOSIS — M54.40 LOW BACK PAIN WITH SCIATICA, SCIATICA LATERALITY UNSPECIFIED, UNSPECIFIED BACK PAIN LATERALITY, UNSPECIFIED CHRONICITY: ICD-10-CM

## 2020-06-15 DIAGNOSIS — G89.4 CHRONIC PAIN SYNDROME: Primary | ICD-10-CM

## 2020-06-15 DIAGNOSIS — M54.16 LUMBAR RADICULOPATHY: ICD-10-CM

## 2020-06-15 DIAGNOSIS — M47.816 LUMBAR SPONDYLOSIS: ICD-10-CM

## 2020-06-15 DIAGNOSIS — M51.36 DDD (DEGENERATIVE DISC DISEASE), LUMBAR: ICD-10-CM

## 2020-06-15 PROCEDURE — 99442 PR PHYS/QHP TELEPHONE EVALUATION 11-20 MIN: CPT | Performed by: NURSE PRACTITIONER

## 2020-06-15 RX ORDER — DULOXETIN HYDROCHLORIDE 20 MG/1
20 CAPSULE, DELAYED RELEASE ORAL DAILY
Qty: 30 CAPSULE | Refills: 1 | Status: SHIPPED | OUTPATIENT
Start: 2020-06-15 | End: 2020-09-03 | Stop reason: SDUPTHER

## 2020-06-15 RX ORDER — DULOXETIN HYDROCHLORIDE 20 MG/1
CAPSULE, DELAYED RELEASE ORAL
Qty: 30 CAPSULE | Refills: 1 | OUTPATIENT
Start: 2020-06-15

## 2020-06-17 ENCOUNTER — HOSPITAL ENCOUNTER (OUTPATIENT)
Dept: RADIOLOGY | Facility: CLINIC | Age: 65
Discharge: HOME | End: 2020-06-17
Attending: ANESTHESIOLOGY
Payer: COMMERCIAL

## 2020-06-17 VITALS
DIASTOLIC BLOOD PRESSURE: 73 MMHG | RESPIRATION RATE: 20 BRPM | OXYGEN SATURATION: 98 % | TEMPERATURE: 98 F | SYSTOLIC BLOOD PRESSURE: 122 MMHG | HEART RATE: 58 BPM

## 2020-06-17 DIAGNOSIS — M54.16 LUMBAR RADICULOPATHY: ICD-10-CM

## 2020-06-17 PROCEDURE — 64483 NJX AA&/STRD TFRM EPI L/S 1: CPT | Performed by: ANESTHESIOLOGY

## 2020-06-17 PROCEDURE — 64484 NJX AA&/STRD TFRM EPI L/S EA: CPT | Performed by: ANESTHESIOLOGY

## 2020-06-17 RX ORDER — PAPAVERINE HCL 150 MG
20 CAPSULE, EXTENDED RELEASE ORAL ONCE
Status: COMPLETED | OUTPATIENT
Start: 2020-06-17 | End: 2020-06-17

## 2020-06-17 RX ORDER — LIDOCAINE HYDROCHLORIDE 10 MG/ML
5 INJECTION, SOLUTION EPIDURAL; INFILTRATION; INTRACAUDAL; PERINEURAL ONCE
Status: COMPLETED | OUTPATIENT
Start: 2020-06-17 | End: 2020-06-17

## 2020-06-17 RX ADMIN — LIDOCAINE HYDROCHLORIDE 2 ML: 20 INJECTION, SOLUTION EPIDURAL; INFILTRATION; INTRACAUDAL; PERINEURAL at 11:11

## 2020-06-17 RX ADMIN — LIDOCAINE HYDROCHLORIDE 4 ML: 10 INJECTION, SOLUTION EPIDURAL; INFILTRATION; INTRACAUDAL; PERINEURAL at 11:06

## 2020-06-17 RX ADMIN — DEXAMETHASONE SODIUM PHOSPHATE 15 MG: 10 INJECTION, SOLUTION INTRAMUSCULAR; INTRAVENOUS at 11:11

## 2020-06-17 RX ADMIN — IOHEXOL 2 ML: 300 INJECTION, SOLUTION INTRAVENOUS at 11:09

## 2020-06-24 ENCOUNTER — TELEPHONE (OUTPATIENT)
Dept: PAIN MEDICINE | Facility: CLINIC | Age: 65
End: 2020-06-24

## 2020-07-08 ENCOUNTER — OFFICE VISIT (OUTPATIENT)
Dept: INTERNAL MEDICINE CLINIC | Facility: CLINIC | Age: 65
End: 2020-07-08
Payer: COMMERCIAL

## 2020-07-08 VITALS
TEMPERATURE: 99.3 F | SYSTOLIC BLOOD PRESSURE: 132 MMHG | BODY MASS INDEX: 30.01 KG/M2 | HEART RATE: 71 BPM | OXYGEN SATURATION: 98 % | WEIGHT: 202.6 LBS | DIASTOLIC BLOOD PRESSURE: 70 MMHG | HEIGHT: 69 IN

## 2020-07-08 DIAGNOSIS — Z23 NEED FOR TDAP VACCINATION: ICD-10-CM
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
## 2020-07-08 PROCEDURE — 90715 TDAP VACCINE 7 YRS/> IM: CPT | Performed by: INTERNAL MEDICINE

## 2020-07-08 PROCEDURE — 3008F BODY MASS INDEX DOCD: CPT | Performed by: INTERNAL MEDICINE

## 2020-07-08 PROCEDURE — 1160F RVW MEDS BY RX/DR IN RCRD: CPT | Performed by: INTERNAL MEDICINE

## 2020-07-08 PROCEDURE — 99213 OFFICE O/P EST LOW 20 MIN: CPT | Performed by: INTERNAL MEDICINE

## 2020-07-08 PROCEDURE — 4040F PNEUMOC VAC/ADMIN/RCVD: CPT | Performed by: INTERNAL MEDICINE

## 2020-07-08 PROCEDURE — 90471 IMMUNIZATION ADMIN: CPT | Performed by: INTERNAL MEDICINE

## 2020-07-08 PROCEDURE — 1036F TOBACCO NON-USER: CPT | Performed by: INTERNAL MEDICINE

## 2020-07-08 PROCEDURE — 3075F SYST BP GE 130 - 139MM HG: CPT | Performed by: INTERNAL MEDICINE

## 2020-07-08 PROCEDURE — 3078F DIAST BP <80 MM HG: CPT | Performed by: INTERNAL MEDICINE

## 2020-07-08 RX ORDER — AMOXICILLIN AND CLAVULANATE POTASSIUM 875; 125 MG/1; MG/1
1 TABLET, FILM COATED ORAL EVERY 12 HOURS SCHEDULED
Qty: 20 TABLET | Refills: 0 | Status: SHIPPED | OUTPATIENT
Start: 2020-07-08 | End: 2020-07-18

## 2020-07-08 NOTE — PROGRESS NOTES
Assessment/Plan:    Cat bite  Rest, ice, elevation, compression for left hand  Will treat with Augmentin for 10 days along with multiple vitamin  Will also update tetanus              There are no diagnoses linked to this encounter  Subjective:   Chief Complaint   Patient presents with   [facility]     pt was bitten by his cat last night on left hand        Patient ID: Alexandria Sims is a 77 y o  male  Cat bite:    Was playing with his two cats last night when 1 of them got excited and scratched him on his left hand  Try to get his and away but was unable to do so on time  Last night was a small coins sized inflamed area the site of injury  However this morning he noticed that his hand was swollen and red and inflamed on the extensor aspect with the edema spreading to the wrist crease on the extensor aspect  He also has some mild tenderness and throbbing  No pain along the forearm/arm, no fever no chills  The following portions of the patient's history were reviewed and updated as appropriate: past family history, past medical history, past social history, past surgical history and problem list     Review of Systems   Respiratory: Negative for wheezing  Gastrointestinal: Positive for constipation  Hemorrhoids   Allergic/Immunologic: Positive for environmental allergies  All other systems reviewed and are negative          Past Medical History:   Diagnosis Date    [facility]     Allergic rhinitis     Benign cyst of kidney     Cerebral vascular disease     Chronic sinusitis     Hyperlipidemia     Melanoma (HCC)     Pancreas cyst          Current Outpatient Medications:     amoxicillin-clavulanate (Augmentin) 875-125 mg per tablet, Take 1 tablet by mouth every 12 (twelve) hours for 10 days, Disp: 20 tablet, Rfl: 0    apixaban (ELIQUIS) 5 mg, Take 1 tablet (5 mg total) by mouth 2 (two) times a day (Patient not taking: Reported on 3/2/2020), Disp: 120 tablet, Rfl: 5   ascorbic acid (VITAMIN C) 250 mg tablet, Take 250 mg by mouth 2 (two) times a day, Disp: , Rfl:     Cholecalciferol (VITAMIN D-3) 5000 units TABS, Take 1 tablet by mouth daily, Disp: 100 tablet, Rfl: 0    Coenzyme Q10 (COQ10) 200 MG CAPS, Take 1 capsule by mouth daily, Disp: , Rfl:     DULoxetine (CYMBALTA) 20 mg capsule, Take 1 capsule (20 mg total) by mouth daily, Disp: 30 capsule, Rfl: 1    Glucosamine-Chondroit-Vit C-Mn (GLUCOSAMINE CHONDR 1500 COMPLX PO), Take 1 tablet by mouth 2 (two) times a day , Disp: , Rfl:     hyoscyamine (ANASPAZ) 0 125 mg, Take 0 125 mg by mouth 3 (three) times a day , Disp: , Rfl:     hyoscyamine (LEVSIN/SL) 0 125 mg SL tablet, 3 (three) times a day, Disp: , Rfl:     MAGNESIUM GLYCINATE PLUS PO, Take 400 mg by mouth daily, Disp: , Rfl:     montelukast (SINGULAIR) 10 mg tablet, Take 1 tablet (10 mg total) by mouth daily, Disp: 90 tablet, Rfl: 1    multivitamin (THERAGRAN) TABS, Take 1 tablet by mouth 2 (two) times a day  , Disp: , Rfl:     other medication, see sig,, Medication/product name: B-C-Q Strength: 3 capsules Sig (include dose, route, frequency): daily, Disp: , Rfl:     OXcarbazepine (TRILEPTAL) 300 mg tablet, Take 1 tablet (300 mg total) by mouth 2 (two) times a day, Disp: 180 tablet, Rfl: 1    Probiotic Product (PROBIOTIC-10) CAPS, Take 2 capsules by mouth daily, Disp: , Rfl:     tiZANidine (ZANAFLEX) 2 mg tablet, Take 1 tablet (2 mg total) by mouth 2 (two) times a day as needed for muscle spasms, Disp: 20 tablet, Rfl: 0    Allergies   Allergen Reactions    Ezetimibe Fatigue and GI Intolerance     Category: Adverse Reaction;     Molds & Smuts Allergic Rhinitis     Category: Allergy;     Monascus Purpureus Went Yeast Fatigue     Category: Adverse Reaction;     Niacin Fatigue     Category: Adverse Reaction;     Pollen Extract Allergic Rhinitis     Category: Allergy;     Pregabalin Fatigue     Category:  Adverse Reaction;     Statins Fatigue and GI Intolerance Category: Adverse Reaction;     Atorvastatin GI Intolerance and Fatigue     Other reaction(s): Muscle pain, constipation, sleepiness  Category: Adverse Reaction; Other reaction(s): Muscle pain, constipation, sleepiness       Social History   Past Surgical History:   Procedure Laterality Date    OTHER SURGICAL HISTORY  2003    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    PATENT FORAMEN OVALE CLOSURE       Family History   Problem Relation Age of Onset    Diabetes type II Mother     Cancer Father         angioma       Objective:  /70 (BP Location: Left arm, Patient Position: Sitting, Cuff Size: Large)   Pulse 71   Temp 99 3 °F (37 4 °C) (Tympanic)   Ht 5' 8 7" (1 745 m)   Wt 91 9 kg (202 lb 9 6 oz)   SpO2 98% Comment: ra  BMI 30 18 kg/m²     No results found for this or any previous visit (from the past 1344 hour(s))  Physical Exam   Constitutional: He appears well-developed and well-nourished  No distress  HENT:   Head: Normocephalic and atraumatic  Eyes: Pupils are equal, round, and reactive to light  EOM are normal    Neck: Normal range of motion  Neck supple  Pulmonary/Chest: Effort normal and breath sounds normal    Skin: Skin is warm and dry  Abrasion noted  There is erythema  Erythema over extensor aspect of left hand around site of puncture  Mild erythema extending to the extensor crease of the left hand   Psychiatric: He has a normal mood and affect   His behavior is normal  Judgment and thought content normal

## 2020-07-08 NOTE — ASSESSMENT & PLAN NOTE
Rest, ice, elevation, compression for left hand  Will treat with Augmentin for 10 days along with multiple vitamin    Will also update tetanus

## 2020-07-20 ENCOUNTER — APPOINTMENT (OUTPATIENT)
Dept: LAB | Facility: CLINIC | Age: 65
End: 2020-07-20
Payer: COMMERCIAL

## 2020-07-20 ENCOUNTER — TRANSCRIBE ORDERS (OUTPATIENT)
Dept: LAB | Facility: CLINIC | Age: 65
End: 2020-07-20

## 2020-07-20 ENCOUNTER — HOSPITAL ENCOUNTER (OUTPATIENT)
Dept: RADIOLOGY | Facility: HOSPITAL | Age: 65
Discharge: HOME | End: 2020-07-20
Attending: UROLOGY
Payer: COMMERCIAL

## 2020-07-20 DIAGNOSIS — Z12.5 SCREENING FOR PROSTATE CANCER: ICD-10-CM

## 2020-07-20 DIAGNOSIS — N28.1 RENAL CYST: ICD-10-CM

## 2020-07-20 LAB — PSA SERPL-MCNC: 2.4 NG/ML (ref 0–4)

## 2020-07-20 PROCEDURE — 36415 COLL VENOUS BLD VENIPUNCTURE: CPT

## 2020-07-20 PROCEDURE — 76770 US EXAM ABDO BACK WALL COMP: CPT

## 2020-07-20 PROCEDURE — G0103 PSA SCREENING: HCPCS

## 2020-07-22 DIAGNOSIS — J30.1 CHRONIC SEASONAL ALLERGIC RHINITIS DUE TO POLLEN: ICD-10-CM

## 2020-07-22 RX ORDER — MONTELUKAST SODIUM 10 MG/1
10 TABLET ORAL DAILY
Qty: 90 TABLET | Refills: 1 | Status: SHIPPED | OUTPATIENT
Start: 2020-07-22 | End: 2021-01-19 | Stop reason: SDUPTHER

## 2020-07-29 ENCOUNTER — OFFICE VISIT (OUTPATIENT)
Dept: UROLOGY | Facility: AMBULATORY SURGERY CENTER | Age: 65
End: 2020-07-29
Payer: COMMERCIAL

## 2020-07-29 VITALS
HEART RATE: 65 BPM | BODY MASS INDEX: 30.52 KG/M2 | TEMPERATURE: 97.1 F | HEIGHT: 68 IN | SYSTOLIC BLOOD PRESSURE: 124 MMHG | DIASTOLIC BLOOD PRESSURE: 70 MMHG | WEIGHT: 201.4 LBS

## 2020-07-29 DIAGNOSIS — Z12.5 SCREENING FOR PROSTATE CANCER: Primary | ICD-10-CM

## 2020-07-29 DIAGNOSIS — N52.01 ERECTILE DYSFUNCTION DUE TO ARTERIAL INSUFFICIENCY: ICD-10-CM

## 2020-07-29 PROCEDURE — 3078F DIAST BP <80 MM HG: CPT | Performed by: UROLOGY

## 2020-07-29 PROCEDURE — 3074F SYST BP LT 130 MM HG: CPT | Performed by: UROLOGY

## 2020-07-29 PROCEDURE — 3008F BODY MASS INDEX DOCD: CPT | Performed by: UROLOGY

## 2020-07-29 PROCEDURE — 1160F RVW MEDS BY RX/DR IN RCRD: CPT | Performed by: UROLOGY

## 2020-07-29 PROCEDURE — 1036F TOBACCO NON-USER: CPT | Performed by: UROLOGY

## 2020-07-29 PROCEDURE — 99214 OFFICE O/P EST MOD 30 MIN: CPT | Performed by: UROLOGY

## 2020-07-29 PROCEDURE — 4040F PNEUMOC VAC/ADMIN/RCVD: CPT | Performed by: UROLOGY

## 2020-07-29 RX ORDER — SILDENAFIL CITRATE 20 MG/1
TABLET ORAL
Qty: 30 TABLET | Refills: 3 | Status: SHIPPED | OUTPATIENT
Start: 2020-07-29 | End: 2020-07-29 | Stop reason: CLARIF

## 2020-07-29 RX ORDER — SILDENAFIL CITRATE 20 MG/1
TABLET ORAL
Qty: 30 TABLET | Refills: 3 | Status: SHIPPED | OUTPATIENT
Start: 2020-07-29 | End: 2021-08-19

## 2020-07-29 NOTE — PROGRESS NOTES
7/29/2020    Jamal Edmondson  1955  [id number]        Assessment  Routine prostate cancer screening, renal cysts, erectile dysfunction    Discussion  Provided him with reassurance that his renal ultrasound shows stable simple bilateral renal cysts  In addition his PSA is stable at 2 4  With regards to his erectile dysfunction I recommended trial of generic sildenafil 20 mg tablets  He may take up to 5 tablets for a total of 100 mg 1 hour prior to intercourse      History of Present Illness  77 y o  male with a history of PSA of 2 1 from 2019  Previously PSA levels have been as high as 2 6  His most recent PSA level is 2 4 from July 2020  He returns in follow-up today  A recent upper tract ultrasound of his kidneys shows stable simple bilateral renal cysts  He denies any flank pain or hematuria  His wife is present with him in the office today to discuss his erectile dysfunction  He has difficulty both obtaining as well as maintaining erections  AUA Symptom Score      Review of Systems  Review of Systems   Constitutional: Negative  HENT: Negative  Eyes: Negative  Respiratory: Negative  Cardiovascular: Negative  Gastrointestinal: Negative  Endocrine: Negative  Genitourinary:        Per HPI   Musculoskeletal: Negative  Skin: Negative  Allergic/Immunologic: Negative  Neurological: Negative  Hematological: Negative  Psychiatric/Behavioral: Negative            Past Medical History  Past Medical History:   Diagnosis Date    [facility]     Allergic rhinitis     Benign cyst of kidney     Cerebral vascular disease     Chronic sinusitis     Hyperlipidemia     [facility]     Pancreas cyst        Past Social History  Past Surgical History:   Procedure Laterality Date    OTHER SURGICAL HISTORY  2003    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    PATENT FORAMEN OVALE CLOSURE         Past Family History  Family History   Problem Relation Age of Onset    Diabetes type II Mother     Cancer Father         angioma       Past Social history  Social History     Socioeconomic History    Marital status: /Civil Union     Spouse name: Not on file    Number of children: Not on file    Years of education: Not on file    Highest education level: Not on file   Occupational History    Occupation: massage therapist   Social Needs    Financial resource strain: Not on file    Food insecurity:     Worry: Not on file     Inability: Not on file    Transportation needs:     Medical: Not on file     Non-medical: Not on file   Tobacco Use    Smoking status: Former Smoker     Years: 10 00     Last attempt to quit:      Years since quittin 5    Smokeless tobacco: Former User    Tobacco comment: Cigars    Substance and Sexual Activity    Alcohol use: Yes     Alcohol/week: 1 0 standard drinks     Types: 1 Glasses of wine per week     Frequency: 4 or more times a week     Drinks per session: 1 or 2     Binge frequency: Daily or almost daily     Comment: daily wine 8oz    Drug use: Not Currently     Comment: Medical use (CBD)    Sexual activity: Yes   Lifestyle    Physical activity:     Days per week: Not on file     Minutes per session: Not on file    Stress: Not on file   Relationships    Social connections:     Talks on phone: Not on file     Gets together: Not on file     Attends Catholic service: Not on file     Active member of club or organization: Not on file     Attends meetings of clubs or organizations: Not on file     Relationship status: Not on file    Intimate partner violence:     Fear of current or ex partner: Not on file     Emotionally abused: Not on file     Physically abused: Not on file     Forced sexual activity: Not on file   Other Topics Concern    Not on file   Social History Narrative    Caffeine: drinks 1 cup coffee/ tea a day         Current Medications  Current Outpatient Medications   Medication Sig Dispense Refill    ascorbic acid (VITAMIN C) 250 mg tablet Take 250 mg by mouth 2 (two) times a day      Cholecalciferol (VITAMIN D-3) 5000 units TABS Take 1 tablet by mouth daily 100 tablet 0    Coenzyme Q10 (COQ10) 200 MG CAPS Take 1 capsule by mouth daily      DULoxetine (CYMBALTA) 20 mg capsule Take 1 capsule (20 mg total) by mouth daily 30 capsule 1    Glucosamine-Chondroit-Vit C-Mn (GLUCOSAMINE CHONDR 1500 COMPLX PO) Take 1 tablet by mouth 2 (two) times a day       hyoscyamine (ANASPAZ) 0 125 mg Take 0 125 mg by mouth 3 (three) times a day       MAGNESIUM GLYCINATE PLUS PO Take 400 mg by mouth daily      montelukast (SINGULAIR) 10 mg tablet Take 1 tablet (10 mg total) by mouth daily 90 tablet 1    multivitamin (THERAGRAN) TABS Take 1 tablet by mouth 2 (two) times a day        OXcarbazepine (TRILEPTAL) 300 mg tablet Take 1 tablet (300 mg total) by mouth 2 (two) times a day 180 tablet 1    Probiotic Product (PROBIOTIC-10) CAPS Take 2 capsules by mouth daily      tiZANidine (ZANAFLEX) 2 mg tablet Take 1 tablet (2 mg total) by mouth 2 (two) times a day as needed for muscle spasms 20 tablet 0     No current facility-administered medications for this visit  Allergies  Allergies   Allergen Reactions    Ezetimibe Fatigue and GI Intolerance     Category: Adverse Reaction;     Molds & Smuts Allergic Rhinitis     Category: Allergy;     Monascus Purpureus Went Yeast Fatigue     Category: Adverse Reaction;     Niacin Fatigue     Category: Adverse Reaction;     Pollen Extract Allergic Rhinitis     Category: Allergy;     Pregabalin Fatigue     Category: Adverse Reaction;     Statins Fatigue and GI Intolerance     Category: Adverse Reaction;     Atorvastatin GI Intolerance and Fatigue     Other reaction(s): Muscle pain, constipation, sleepiness  Category: Adverse Reaction;    Other reaction(s): Muscle pain, constipation, sleepiness       Past Medical History, Social History, Family History, medications and allergies were reviewed  Vitals  Vitals:    07/29/20 0906   BP: 124/70   BP Location: Left arm   Patient Position: Sitting   Cuff Size: Adult   Pulse: 65   Temp: (!) 97 1 °F (36 2 °C)   Weight: 91 4 kg (201 lb 6 4 oz)   Height: 5' 8" (1 727 m)       Physical Exam  Physical Exam  On examination he is in no acute distress  His abdomen is soft nontender nondistended   examination reveals normal phallus, scrotum and scrotal contents  Prostate is 40-45 g smooth, firm, anodular  Skin is warm  Extremities without edema    Neurologic is grossly intact and nonfocal   Gait normal   Affect normal    Results  Lab Results   Component Value Date    PSA 2 4 07/20/2020    PSA 2 1 06/14/2019    PSA 2 6 06/09/2018     Lab Results   Component Value Date    GLUCOSE 92 06/11/2015    CALCIUM 8 8 02/27/2020     01/30/2016    K 4 2 02/27/2020    CO2 28 02/27/2020     (H) 02/27/2020    BUN 17 02/27/2020    CREATININE 0 85 02/27/2020     Lab Results   Component Value Date    WBC 4 43 02/27/2020    HGB 14 9 02/27/2020    HCT 45 2 02/27/2020    MCV 96 02/27/2020     02/27/2020         Office Urine Dip  No results found for this or any previous visit (from the past 1 hour(s)) ]

## 2020-08-27 DIAGNOSIS — M54.16 LUMBAR RADICULOPATHY: ICD-10-CM

## 2020-08-29 RX ORDER — OXCARBAZEPINE 300 MG/1
300 TABLET, FILM COATED ORAL 2 TIMES DAILY
Qty: 180 TABLET | Refills: 1 | Status: SHIPPED | OUTPATIENT
Start: 2020-08-29 | End: 2021-01-19 | Stop reason: SDUPTHER

## 2020-09-03 ENCOUNTER — TELEPHONE (OUTPATIENT)
Dept: PAIN MEDICINE | Facility: CLINIC | Age: 65
End: 2020-09-03

## 2020-09-03 DIAGNOSIS — M54.16 LUMBAR RADICULOPATHY: ICD-10-CM

## 2020-09-03 RX ORDER — DULOXETIN HYDROCHLORIDE 20 MG/1
20 CAPSULE, DELAYED RELEASE ORAL DAILY
Qty: 30 CAPSULE | Refills: 2 | Status: SHIPPED | OUTPATIENT
Start: 2020-09-03 | End: 2020-09-08

## 2020-09-03 NOTE — TELEPHONE ENCOUNTER
Pt contacted Call Center requested refill of their medication  Medication Name:  duloxaine  Dosage of Med:  30 mg    Frequency of Med:  1 x a day    Remaining Medication:  1 1/2 wks left    Pharmacy and Location:    [facility]    Pt  Preferred Callback Phone Number:    [phone]  Thank you      Pt is now taking 30 mg of duloxitine working well for him

## 2020-09-03 NOTE — TELEPHONE ENCOUNTER
SW patient state duloxetine 30 mg is working well for him  Denies any side effects at this time       6/15/2020 Virtual Visit with [facility]

## 2020-09-04 NOTE — TELEPHONE ENCOUNTER
Patient calling back requesting 30mg, I made him aware request was placed for 30 mg for the Duloxetine he will call the pharmacy again       Thank you

## 2020-09-08 RX ORDER — DULOXETIN HYDROCHLORIDE 30 MG/1
30 CAPSULE, DELAYED RELEASE ORAL DAILY
Qty: 30 CAPSULE | Refills: 1 | Status: SHIPPED | OUTPATIENT
Start: 2020-09-08 | End: 2020-11-13 | Stop reason: SDUPTHER

## 2020-09-08 NOTE — TELEPHONE ENCOUNTER
SW patient, states that he called about 5 days ago and requested a medication refill which was taken by Lucio Orellana (phone room) the request shows 30 mg and a comment that 30 mg is working well for him  Then it looks like [facility] [address] ordered 20 mg that was sent to the pharmacy  So, the patient said he had a prescription for 30 mg left over because initially he was on 30 mg and he wasn't tolerating them and was decreased to 20 mg  Patient said he had a flare up on 8/20 and since he had some 30 mg left over took them and his pain was much better  Patient said in a couple of days when everything settled down he tried going back on his 20 mg and it wasn't  helping with his pain so he went back on the 30 mg and then he called to get a refill on the 30 mg

## 2020-09-08 NOTE — TELEPHONE ENCOUNTER
SW patient and made aware that duloxetine 20 mg was ordered and will make [facility] [address] aware that 30 mg should have been ordered  Please advise   TY

## 2020-09-08 NOTE — TELEPHONE ENCOUNTER
When did the patient increase to 30mg? My last office note, I have that he was still on the 20mg and I don't see any other notation from our office stating to increase

## 2020-09-29 ENCOUNTER — TELEPHONE (OUTPATIENT)
Dept: INTERNAL MEDICINE CLINIC | Facility: CLINIC | Age: 65
End: 2020-09-29

## 2020-09-29 NOTE — TELEPHONE ENCOUNTER
Patient looking to have his labwork done before his next appt with you  Could you write an order for his lipid to be done also? ?

## 2020-10-01 DIAGNOSIS — E78.00 HYPERCHOLESTEROLEMIA: Primary | ICD-10-CM

## 2020-10-01 NOTE — TELEPHONE ENCOUNTER
REINA on [phone] stating that his request was granted  The order is in the chart  IF he needs a paper copy to take to an outside lab, he is to call with further instructions

## 2020-10-06 ENCOUNTER — APPOINTMENT (OUTPATIENT)
Dept: LAB | Facility: HOSPITAL | Age: 65
End: 2020-10-06
Attending: INTERNAL MEDICINE
Payer: COMMERCIAL

## 2020-10-06 DIAGNOSIS — E78.00 HYPERCHOLESTEROLEMIA: ICD-10-CM

## 2020-10-06 DIAGNOSIS — I25.10 ATHEROSCLEROSIS OF NATIVE CORONARY ARTERY OF NATIVE HEART, ANGINA PRESENCE UNSPECIFIED: ICD-10-CM

## 2020-10-06 LAB
CHOLEST SERPL-MCNC: 301 MG/DL (ref 50–200)
ERYTHROCYTE [DISTWIDTH] IN BLOOD BY AUTOMATED COUNT: 12.1 % (ref 11.6–15.1)
HCT VFR BLD AUTO: 44.8 % (ref 36.5–49.3)
HDLC SERPL-MCNC: 75 MG/DL
HGB BLD-MCNC: 15.1 G/DL (ref 12–17)
LDLC SERPL CALC-MCNC: 207 MG/DL (ref 0–100)
MCH RBC QN AUTO: 32.2 PG (ref 26.8–34.3)
MCHC RBC AUTO-ENTMCNC: 33.7 G/DL (ref 31.4–37.4)
MCV RBC AUTO: 96 FL (ref 82–98)
NONHDLC SERPL-MCNC: 226 MG/DL
PLATELET # BLD AUTO: 201 THOUSANDS/UL (ref 149–390)
PMV BLD AUTO: 9.3 FL (ref 8.9–12.7)
RBC # BLD AUTO: 4.69 MILLION/UL (ref 3.88–5.62)
TRIGL SERPL-MCNC: 94 MG/DL
WBC # BLD AUTO: 4.88 THOUSAND/UL (ref 4.31–10.16)

## 2020-10-06 PROCEDURE — 85027 COMPLETE CBC AUTOMATED: CPT

## 2020-10-06 PROCEDURE — 80061 LIPID PANEL: CPT

## 2020-10-06 PROCEDURE — 36415 COLL VENOUS BLD VENIPUNCTURE: CPT

## 2020-10-12 ENCOUNTER — OFFICE VISIT (OUTPATIENT)
Dept: INTERNAL MEDICINE CLINIC | Facility: CLINIC | Age: 65
End: 2020-10-12
Payer: COMMERCIAL

## 2020-10-12 VITALS
BODY MASS INDEX: 30.65 KG/M2 | HEIGHT: 68 IN | WEIGHT: 202.2 LBS | SYSTOLIC BLOOD PRESSURE: 114 MMHG | DIASTOLIC BLOOD PRESSURE: 68 MMHG | OXYGEN SATURATION: 98 % | TEMPERATURE: 98.3 F | HEART RATE: 62 BPM

## 2020-10-12 DIAGNOSIS — Z23 NEED FOR INFLUENZA VACCINATION: ICD-10-CM

## 2020-10-12 DIAGNOSIS — M47.816 LUMBAR SPONDYLOSIS: ICD-10-CM

## 2020-10-12 DIAGNOSIS — Z23 NEED FOR PNEUMOCOCCAL VACCINE: ICD-10-CM

## 2020-10-12 DIAGNOSIS — E78.00 HYPERCHOLESTEROLEMIA: Primary | ICD-10-CM

## 2020-10-12 DIAGNOSIS — B35.4 TINEA CORPORIS: ICD-10-CM

## 2020-10-12 DIAGNOSIS — Z00.00 MEDICARE ANNUAL WELLNESS VISIT, INITIAL: ICD-10-CM

## 2020-10-12 PROCEDURE — G0009 ADMIN PNEUMOCOCCAL VACCINE: HCPCS | Performed by: INTERNAL MEDICINE

## 2020-10-12 PROCEDURE — 1160F RVW MEDS BY RX/DR IN RCRD: CPT | Performed by: INTERNAL MEDICINE

## 2020-10-12 PROCEDURE — 1125F AMNT PAIN NOTED PAIN PRSNT: CPT | Performed by: INTERNAL MEDICINE

## 2020-10-12 PROCEDURE — G0439 PPPS, SUBSEQ VISIT: HCPCS | Performed by: INTERNAL MEDICINE

## 2020-10-12 PROCEDURE — 1036F TOBACCO NON-USER: CPT | Performed by: INTERNAL MEDICINE

## 2020-10-12 PROCEDURE — 1170F FXNL STATUS ASSESSED: CPT | Performed by: INTERNAL MEDICINE

## 2020-10-12 PROCEDURE — 3078F DIAST BP <80 MM HG: CPT | Performed by: INTERNAL MEDICINE

## 2020-10-12 PROCEDURE — 3725F SCREEN DEPRESSION PERFORMED: CPT | Performed by: INTERNAL MEDICINE

## 2020-10-12 PROCEDURE — 99214 OFFICE O/P EST MOD 30 MIN: CPT | Performed by: INTERNAL MEDICINE

## 2020-10-12 PROCEDURE — 90662 IIV NO PRSV INCREASED AG IM: CPT | Performed by: INTERNAL MEDICINE

## 2020-10-12 PROCEDURE — 90732 PPSV23 VACC 2 YRS+ SUBQ/IM: CPT | Performed by: INTERNAL MEDICINE

## 2020-10-12 PROCEDURE — G0008 ADMIN INFLUENZA VIRUS VAC: HCPCS | Performed by: INTERNAL MEDICINE

## 2020-10-12 PROCEDURE — 4040F PNEUMOC VAC/ADMIN/RCVD: CPT | Performed by: INTERNAL MEDICINE

## 2020-10-12 RX ORDER — CLOBETASOL PROPIONATE 0.5 MG/G
1 CREAM TOPICAL 2 TIMES DAILY PRN
COMMUNITY
Start: 2020-08-13

## 2020-10-12 RX ORDER — KETOCONAZOLE 20 MG/G
CREAM TOPICAL DAILY
Qty: 15 G | Refills: 0 | Status: SHIPPED | OUTPATIENT
Start: 2020-10-12 | End: 2021-05-07 | Stop reason: HOSPADM

## 2020-10-12 RX ORDER — COLESEVELAM 180 1/1
1250 TABLET ORAL 2 TIMES DAILY WITH MEALS
Qty: 120 TABLET | Refills: 5 | Status: SHIPPED | OUTPATIENT
Start: 2020-10-12 | End: 2021-04-14 | Stop reason: SDUPTHER

## 2020-11-05 ENCOUNTER — OFFICE VISIT (OUTPATIENT)
Dept: CARDIOLOGY CLINIC | Facility: CLINIC | Age: 65
End: 2020-11-05
Payer: COMMERCIAL

## 2020-11-05 VITALS
DIASTOLIC BLOOD PRESSURE: 70 MMHG | BODY MASS INDEX: 32.02 KG/M2 | HEART RATE: 63 BPM | TEMPERATURE: 98 F | HEIGHT: 67 IN | SYSTOLIC BLOOD PRESSURE: 116 MMHG | WEIGHT: 204 LBS

## 2020-11-05 DIAGNOSIS — I48.0 PAROXYSMAL ATRIAL FIBRILLATION (HCC): Primary | ICD-10-CM

## 2020-11-05 PROCEDURE — 1036F TOBACCO NON-USER: CPT | Performed by: INTERNAL MEDICINE

## 2020-11-05 PROCEDURE — 93000 ELECTROCARDIOGRAM COMPLETE: CPT | Performed by: INTERNAL MEDICINE

## 2020-11-05 PROCEDURE — 3074F SYST BP LT 130 MM HG: CPT | Performed by: INTERNAL MEDICINE

## 2020-11-05 PROCEDURE — 3008F BODY MASS INDEX DOCD: CPT | Performed by: INTERNAL MEDICINE

## 2020-11-05 PROCEDURE — 1160F RVW MEDS BY RX/DR IN RCRD: CPT | Performed by: INTERNAL MEDICINE

## 2020-11-05 PROCEDURE — 99214 OFFICE O/P EST MOD 30 MIN: CPT | Performed by: INTERNAL MEDICINE

## 2020-11-05 PROCEDURE — 3078F DIAST BP <80 MM HG: CPT | Performed by: INTERNAL MEDICINE

## 2020-11-12 ENCOUNTER — TELEPHONE (OUTPATIENT)
Dept: PAIN MEDICINE | Facility: CLINIC | Age: 65
End: 2020-11-12

## 2020-11-12 DIAGNOSIS — M54.16 LUMBAR RADICULOPATHY: ICD-10-CM

## 2020-11-13 RX ORDER — DULOXETIN HYDROCHLORIDE 30 MG/1
30 CAPSULE, DELAYED RELEASE ORAL DAILY
Qty: 30 CAPSULE | Refills: 2 | Status: SHIPPED | OUTPATIENT
Start: 2020-11-13 | End: 2021-01-12 | Stop reason: SDUPTHER

## 2020-12-02 ENCOUNTER — OFFICE VISIT (OUTPATIENT)
Dept: PAIN MEDICINE | Facility: CLINIC | Age: 65
End: 2020-12-02
Payer: COMMERCIAL

## 2020-12-02 VITALS
WEIGHT: 207 LBS | TEMPERATURE: 97.9 F | HEART RATE: 66 BPM | DIASTOLIC BLOOD PRESSURE: 77 MMHG | BODY MASS INDEX: 32.49 KG/M2 | HEIGHT: 67 IN | SYSTOLIC BLOOD PRESSURE: 120 MMHG

## 2020-12-02 DIAGNOSIS — M54.16 LUMBAR RADICULOPATHY: Primary | ICD-10-CM

## 2020-12-02 DIAGNOSIS — M51.36 DDD (DEGENERATIVE DISC DISEASE), LUMBAR: ICD-10-CM

## 2020-12-02 DIAGNOSIS — M54.40 LOW BACK PAIN WITH SCIATICA, SCIATICA LATERALITY UNSPECIFIED, UNSPECIFIED BACK PAIN LATERALITY, UNSPECIFIED CHRONICITY: ICD-10-CM

## 2020-12-02 DIAGNOSIS — M47.816 LUMBAR SPONDYLOSIS: ICD-10-CM

## 2020-12-02 PROCEDURE — 3008F BODY MASS INDEX DOCD: CPT | Performed by: NURSE PRACTITIONER

## 2020-12-02 PROCEDURE — 99213 OFFICE O/P EST LOW 20 MIN: CPT | Performed by: NURSE PRACTITIONER

## 2020-12-02 PROCEDURE — 3074F SYST BP LT 130 MM HG: CPT | Performed by: NURSE PRACTITIONER

## 2020-12-02 PROCEDURE — 3078F DIAST BP <80 MM HG: CPT | Performed by: NURSE PRACTITIONER

## 2020-12-02 PROCEDURE — 1036F TOBACCO NON-USER: CPT | Performed by: NURSE PRACTITIONER

## 2020-12-02 PROCEDURE — 1160F RVW MEDS BY RX/DR IN RCRD: CPT | Performed by: NURSE PRACTITIONER

## 2020-12-07 ENCOUNTER — TELEPHONE (OUTPATIENT)
Dept: PAIN MEDICINE | Facility: CLINIC | Age: 65
End: 2020-12-07

## 2021-01-04 ENCOUNTER — HOSPITAL ENCOUNTER (OUTPATIENT)
Dept: RADIOLOGY | Facility: CLINIC | Age: 66
Discharge: HOME | End: 2021-01-04
Attending: ANESTHESIOLOGY | Admitting: ANESTHESIOLOGY
Payer: COMMERCIAL

## 2021-01-04 VITALS
SYSTOLIC BLOOD PRESSURE: 126 MMHG | HEART RATE: 58 BPM | TEMPERATURE: 97.4 F | OXYGEN SATURATION: 98 % | DIASTOLIC BLOOD PRESSURE: 76 MMHG | RESPIRATION RATE: 18 BRPM

## 2021-01-04 DIAGNOSIS — M54.16 LUMBAR RADICULOPATHY: ICD-10-CM

## 2021-01-04 PROCEDURE — 64483 NJX AA&/STRD TFRM EPI L/S 1: CPT | Performed by: ANESTHESIOLOGY

## 2021-01-04 PROCEDURE — 64484 NJX AA&/STRD TFRM EPI L/S EA: CPT | Performed by: ANESTHESIOLOGY

## 2021-01-04 RX ORDER — PAPAVERINE HCL 150 MG
20 CAPSULE, EXTENDED RELEASE ORAL ONCE
Status: COMPLETED | OUTPATIENT
Start: 2021-01-04 | End: 2021-01-04

## 2021-01-04 RX ORDER — LIDOCAINE HYDROCHLORIDE 10 MG/ML
5 INJECTION, SOLUTION EPIDURAL; INFILTRATION; INTRACAUDAL; PERINEURAL ONCE
Status: COMPLETED | OUTPATIENT
Start: 2021-01-04 | End: 2021-01-04

## 2021-01-04 RX ADMIN — LIDOCAINE HYDROCHLORIDE 5 ML: 10 INJECTION, SOLUTION EPIDURAL; INFILTRATION; INTRACAUDAL; PERINEURAL at 14:41

## 2021-01-04 RX ADMIN — LIDOCAINE HYDROCHLORIDE 2 ML: 20 INJECTION, SOLUTION EPIDURAL; INFILTRATION; INTRACAUDAL; PERINEURAL at 14:43

## 2021-01-04 RX ADMIN — IOHEXOL 2 ML: 300 INJECTION, SOLUTION INTRAVENOUS at 14:42

## 2021-01-04 RX ADMIN — DEXAMETHASONE SODIUM PHOSPHATE 15 MG: 10 INJECTION, SOLUTION INTRAMUSCULAR; INTRAVENOUS at 14:44

## 2021-01-04 NOTE — H&P
History of Present Illness: The patient is a 77 y o  male who presents with complaints of   Low back and leg pain      Patient Active Problem List   Diagnosis    Seasonal allergic rhinitis    Osteoarthritis    Nephrolithiasis    Irritable bowel syndrome    Hypercholesterolemia    Coronary atherosclerosis    Coronary artery disease involving native coronary artery    [facility]antitry[facility] deficiency [facility]    BPH with obstruction/lower urinary tract symptoms    Need for pneumococcal vaccination    Medicare annual wellness visit, initial    Pancreatic cyst    Lumbar radiculopathy    Low back pain with sciatica    DDD (degenerative disc disease), lumbar    Lumbar spondylosis    Cryptogenic stroke ([facility] )    Cat bite    Tinea corporis       Past Medical History:   Diagnosis Date    [facility] )     Allergic rhinitis     Benign cyst of kidney     Cerebral vascular disease     Chronic sinusitis     Hyperlipidemia     [facility]     Pancreas cyst        Past Surgical History:   Procedure Laterality Date    OTHER SURGICAL HISTORY  2003    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    PATENT FORAMEN OVALE CLOSURE           Current Outpatient Medications:     apixaban (Eliquis) 5 mg, Take 1 tablet (5 mg total) by mouth 2 (two) times a day, Disp: 180 tablet, Rfl: 3    ascorbic acid (VITAMIN C) 250 mg tablet, Take 250 mg by mouth 2 (two) times a day, Disp: , Rfl:     Cholecalciferol (VITAMIN D-3) 5000 units TABS, Take 1 tablet by mouth daily, Disp: 100 tablet, Rfl: 0    clobetasol (TEMOVATE) 0 05 % cream, Apply 1 application topically 2 (two) times a day Apply sparingly to affected areas, Disp: , Rfl:     Coenzyme Q10 (COQ10) 200 MG CAPS, Take 1 capsule by mouth daily, Disp: , Rfl:     colesevelam (WELCHOL) 625 mg tablet, Take 2 tablets (1,250 mg total) by mouth 2 (two) times a day with meals, Disp: 120 tablet, Rfl: 5    DULoxetine (CYMBALTA) 30 mg delayed release capsule, Take 1 capsule (30 mg total) by mouth daily, Disp: 30 capsule, Rfl: 2    Glucosamine-Chondroit-Vit C-Mn (GLUCOSAMINE CHONDR 1500 COMPLX PO), Take 1 tablet by mouth 2 (two) times a day , Disp: , Rfl:     hyoscyamine (ANASPAZ) 0 125 mg, Take 0 125 mg by mouth 3 (three) times a day , Disp: , Rfl:     ketoconazole (NIZORAL) 2 % cream, Apply topically daily, Disp: 15 g, Rfl: 0    MAGNESIUM GLYCINATE PLUS PO, Take 400 mg by mouth 2 (two) times a day , Disp: , Rfl:     montelukast (SINGULAIR) 10 mg tablet, Take 1 tablet (10 mg total) by mouth daily, Disp: 90 tablet, Rfl: 1    multivitamin (THERAGRAN) TABS, Take 1 tablet by mouth 2 (two) times a day  , Disp: , Rfl:     OXcarbazepine (TRILEPTAL) 300 mg tablet, Take 1 tablet (300 mg total) by mouth 2 (two) times a day, Disp: 180 tablet, Rfl: 1    sildenafil (REVATIO) 20 mg tablet, Take up to 5, 20 mg tablets (100 mg) one hour prior to intercourse as needed, Disp: 30 tablet, Rfl: 3    tiZANidine (ZANAFLEX) 2 mg tablet, Take 1 tablet (2 mg total) by mouth 2 (two) times a day as needed for muscle spasms, Disp: 20 tablet, Rfl: 0    Allergies   Allergen Reactions    Ezetimibe Fatigue and GI Intolerance     Category: Adverse Reaction;     Molds & Smuts Allergic Rhinitis     Category: Allergy;     Monascus Purpureus Went Yeast Fatigue     Category: Adverse Reaction;     Niacin Fatigue     Category: Adverse Reaction;     Pollen Extract Allergic Rhinitis     Category: Allergy;     Pregabalin Fatigue     Category: Adverse Reaction;     Statins Fatigue and GI Intolerance     Category: Adverse Reaction;     Atorvastatin GI Intolerance and Fatigue     Other reaction(s): Muscle pain, constipation, sleepiness  Category: Adverse Reaction;    Other reaction(s): Muscle pain, constipation, sleepiness       Physical Exam:   Vitals:    01/04/21 1426   BP: 120/67   Pulse: 76   Resp: 18   Temp: (!) 97 4 °F (36 3 °C)   SpO2: 96%     General: Awake, Alert, Oriented x 3, Mood and affect appropriate  Respiratory: Respirations even and unlabored  Cardiovascular: Peripheral pulses intact; no edema  Musculoskeletal Exam:   Bilateral lumbar paraspinals tender to palpation  ASA Score: 3    Patient/Chart Verification  Patient ID Verified: Verbal  ID Band Applied: No  Consents Confirmed: Procedural, To be obtained in the Pre-Procedure area  H&P( within 30 days) Verified: To be obtained in the Pre-Procedure area  Allergies Reviewed: Yes  Anticoag/NSAID held?: Yes(eliquis held since 12/31 )  Currently on antibiotics?: No    Assessment:   1   Lumbar radiculopathy        Plan: Right L4 and L5 TFESI

## 2021-01-04 NOTE — DISCHARGE INSTR - LAB
Epidural Steroid Injection   WHAT YOU NEED TO KNOW:   An epidural steroid injection (CARLEEN) is a procedure to inject steroid medicine into the epidural space  The epidural space is between your spinal cord and vertebrae  Steroids reduce inflammation and fluid buildup in your spine that may be causing pain  You may be given pain medicine along with the steroids  ACTIVITY  · Do not drive or operate machinery today  · No strenuous activity today  bending, lifting, etc   · You may resume normal activites starting tomorrow  start slowly and as tolerated  · You may shower today, but no tub baths or hot tubs  · You may have numbness for several hours from the local anesthetic  Please use caution and common sense, especially with weight-bearing activities  CARE OF THE INJECTION SITE  · If you have soreness or pain, apply ice to the area today (20 minutes on/20 minutes off)  · Starting tomorrow, you may use warm, moist heat or ice if needed  · You may have an increase or change in your discomfort for 36-48 hours after your treatment  · Apply ice and continue with any pain medication you have been prescribed  · [facility]ify [facility] if you have any of the following: redness, drainage, swelling, headache, stiff neck or fever above 100°F     SPECIAL INSTRUCTIONS  · Our office will contact you in approximately 7 days for a progress report  MEDICATIONS  · Continue to take all routine medications  · Our office may have instructed you to hold some medications  If you have a problem specifically related to your procedure, please call our office at [phone]  Problems not related to your procedure should be directed to your primary care physician

## 2021-01-11 ENCOUNTER — VBI (OUTPATIENT)
Dept: ADMINISTRATIVE | Facility: OTHER | Age: 66
End: 2021-01-11

## 2021-01-11 ENCOUNTER — TELEPHONE (OUTPATIENT)
Dept: PAIN MEDICINE | Facility: CLINIC | Age: 66
End: 2021-01-11

## 2021-01-12 ENCOUNTER — TELEMEDICINE (OUTPATIENT)
Dept: PAIN MEDICINE | Facility: CLINIC | Age: 66
End: 2021-01-12
Payer: COMMERCIAL

## 2021-01-12 DIAGNOSIS — M54.40 LOW BACK PAIN WITH SCIATICA, SCIATICA LATERALITY UNSPECIFIED, UNSPECIFIED BACK PAIN LATERALITY, UNSPECIFIED CHRONICITY: ICD-10-CM

## 2021-01-12 DIAGNOSIS — M47.816 LUMBAR SPONDYLOSIS: ICD-10-CM

## 2021-01-12 DIAGNOSIS — M54.16 LUMBAR RADICULOPATHY: ICD-10-CM

## 2021-01-12 DIAGNOSIS — M47.816 SPONDYLOSIS OF LUMBAR REGION WITHOUT MYELOPATHY OR RADICULOPATHY: ICD-10-CM

## 2021-01-12 DIAGNOSIS — G89.4 CHRONIC PAIN SYNDROME: Primary | ICD-10-CM

## 2021-01-12 PROCEDURE — 99442 PR PHYS/QHP TELEPHONE EVALUATION 11-20 MIN: CPT | Performed by: NURSE PRACTITIONER

## 2021-01-12 RX ORDER — DULOXETIN HYDROCHLORIDE 30 MG/1
30 CAPSULE, DELAYED RELEASE ORAL DAILY
Qty: 30 CAPSULE | Refills: 2 | Status: SHIPPED | OUTPATIENT
Start: 2021-01-12 | End: 2021-04-05 | Stop reason: SDUPTHER

## 2021-01-12 RX ORDER — TIZANIDINE 2 MG/1
2 TABLET ORAL 2 TIMES DAILY PRN
Qty: 60 TABLET | Refills: 0 | Status: SHIPPED | OUTPATIENT
Start: 2021-01-12 | End: 2021-04-05 | Stop reason: SDUPTHER

## 2021-01-12 NOTE — PATIENT INSTRUCTIONS
Plan:  1  We can repeat right L4 and L5 TFESI p r n  He does understand that he might even get further improvement over the next week  2  Patient may continue duloxetine 30 mg daily and tizanidine 2 mg twice a day p r n  Myofascial pain as prescribed  Both of these medications were refilled today  3  I will avoid NSAIDs secondary to anticoagulation  4  The patient may continue Tylenol p r n  And should not exceed more than 3000 mg in 24 hours  5   I will follow up with the patient in 12 weeks or sooner if needed

## 2021-01-12 NOTE — PROGRESS NOTES
Virtual Brief Visit    Assessment/Plan:    Problem List Items Addressed This Visit        Nervous and Auditory    Lumbar radiculopathy    Relevant Medications    DULoxetine (CYMBALTA) 30 mg delayed release capsule    Low back pain with sciatica       Musculoskeletal and Integument    Osteoarthritis    Relevant Medications    tiZANidine (ZANAFLEX) 2 mg tablet    Lumbar spondylosis      Other Visit Diagnoses     Chronic pain syndrome    -  Primary                Reason for visit is low back and RLE pain    Chief Complaint   Patient presents with    Virtual Brief Visit        Encounter provider MARGIE Taylor    Provider located at [address] [place] [id number]    Recent Visits  Date Type Provider Dept   01/11/21 Telephone Carrie Tena MA [facility]   Showing recent visits within past 7 days and meeting all other requirements     Today's Visits  Date Type Provider Dept   01/12/21 Telemedicine Marce South today's visits and meeting all other requirements     Future Appointments  No visits were found meeting these conditions  Showing future appointments within next 150 days and meeting all other requirements        After connecting through telephone, the patient was identified by name and date of birth  [employer] was informed that this is a telemedicine visit and that the visit is being conducted through telephone  My office door was closed  No one else was in the room  He acknowledged consent and understanding of privacy and security of the platform  The patient has agreed to participate and understands he can discontinue the visit at any time  It was my intent to perform this visit via video technology but the patient was not able to do a video connection so the visit was completed via audio telephone only  Patient is aware this is a billable service  Subjective    Claudia Salinas is a 77 y o  male last seen on December 2, 2020 returns for follow-up visit regards to chronic lumbosacral back pain with radiation into the anteromedial aspect of the right lower extremity secondary to lumbar spinal stenosis, lumbar degenerative disc disease, lumbar spondylosis, lumbar radiculopathy and chronic pain syndrome  The patient denies left lower extremity symptoms, bowel or bladder incontinence or saddle anesthesia  Patient is status post right L4 and L5 TFESI with Dr Bhumika Majano on January 4, 2021  Although the patient is only 1 week post procedure, he is already reporting a 50% improvement of pain  He currently rates his pain a 2/10 on the numeric pain rating scale  He is managing his pain with duloxetine 30 mg daily and tizanidine 2 mg p r n  Which he states he mostly takes p r n  At bedtime  He is unable to take NSAIDs secondary to anticoagulation   He is currently planning to reestablish and home exercise program   HPI     Past Medical History:   Diagnosis Date    [facility] )     Allergic rhinitis     Benign cyst of kidney     Cerebral vascular disease     Chronic sinusitis     Hyperlipidemia     [facility]anoma ([facility] )     Pancreas cyst        Past Surgical History:   Procedure Laterality Date    OTHER SURGICAL HISTORY  2003    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    PATENT FORAMEN OVALE CLOSURE         Current Outpatient Medications   Medication Sig Dispense Refill    apixaban (Eliquis) 5 mg Take 1 tablet (5 mg total) by mouth 2 (two) times a day 180 tablet 3    ascorbic acid (VITAMIN C) 250 mg tablet Take 250 mg by mouth 2 (two) times a day      Cholecalciferol (VITAMIN D-3) 5000 units TABS Take 1 tablet by mouth daily 100 tablet 0    clobetasol (TEMOVATE) 0 05 % cream Apply 1 application topically 2 (two) times a day Apply sparingly to affected areas      Coenzyme Q10 (COQ10) 200 MG CAPS Take 1 capsule by mouth daily      colesevelam (WELCHOL) 625 mg tablet Take 2 tablets (1,250 mg total) by mouth 2 (two) times a day with meals 120 tablet 5    DULoxetine (CYMBALTA) 30 mg delayed release capsule Take 1 capsule (30 mg total) by mouth daily 30 capsule 2    Glucosamine-Chondroit-Vit C-Mn (GLUCOSAMINE CHONDR 1500 COMPLX PO) Take 1 tablet by mouth 2 (two) times a day       hyoscyamine (ANASPAZ) 0 125 mg Take 0 125 mg by mouth 3 (three) times a day       ketoconazole (NIZORAL) 2 % cream Apply topically daily 15 g 0    MAGNESIUM GLYCINATE PLUS PO Take 400 mg by mouth 2 (two) times a day       montelukast (SINGULAIR) 10 mg tablet Take 1 tablet (10 mg total) by mouth daily 90 tablet 1    multivitamin (THERAGRAN) TABS Take 1 tablet by mouth 2 (two) times a day        OXcarbazepine (TRILEPTAL) 300 mg tablet Take 1 tablet (300 mg total) by mouth 2 (two) times a day 180 tablet 1    sildenafil (REVATIO) 20 mg tablet Take up to 5, 20 mg tablets (100 mg) one hour prior to intercourse as needed 30 tablet 3    tiZANidine (ZANAFLEX) 2 mg tablet Take 1 tablet (2 mg total) by mouth 2 (two) times a day as needed for muscle spasms 60 tablet 0     No current facility-administered medications for this visit  Allergies   Allergen Reactions    Ezetimibe Fatigue and GI Intolerance     Category: Adverse Reaction;     Molds & Smuts Allergic Rhinitis     Category: Allergy;     Monascus Purpureus Went Yeast Fatigue     Category: Adverse Reaction;     Niacin Fatigue     Category: Adverse Reaction;     Pollen Extract Allergic Rhinitis     Category: Allergy;     Pregabalin Fatigue     Category: Adverse Reaction;     Statins Fatigue and GI Intolerance     Category: Adverse Reaction;     Atorvastatin GI Intolerance and Fatigue     Other reaction(s): Muscle pain, constipation, sleepiness  Category: Adverse Reaction; Other reaction(s): Muscle pain, constipation, sleepiness       Review of Systems    There were no vitals filed for this visit  Plan:  1   We can repeat right L4 and L5 TFESI p r n  He does understand that he might even get further improvement over the next week  2  Patient may continue duloxetine 30 mg daily and tizanidine 2 mg twice a day p r n  Myofascial pain as prescribed  Both of these medications were refilled today  3  I will avoid NSAIDs secondary to anticoagulation  4  The patient may continue Tylenol p r n  And should not exceed more than 3000 mg in 24 hours  5  I will follow up with the patient in 12 weeks or sooner if needed    I spent 15 minutes directly with the patient during this visit    VIRTUAL VISIT DISCLAIMER    Dakota Neri acknowledges that he has consented to an online visit or consultation  He understands that the online visit is based solely on information provided by him, and that, in the absence of a face-to-face physical evaluation by the physician, the diagnosis he receives is both limited and provisional in terms of accuracy and completeness  This is not intended to replace a full medical face-to-face evaluation by the physician  Dakota Neri understands and accepts these terms

## 2021-01-18 NOTE — TELEPHONE ENCOUNTER
Patient states he has a little of improvement and a little tighness  & pain level 4/10  Please be advise thank you      Patient call back # [phone]

## 2021-01-19 DIAGNOSIS — J30.1 CHRONIC SEASONAL ALLERGIC RHINITIS DUE TO POLLEN: ICD-10-CM

## 2021-01-19 DIAGNOSIS — M54.16 LUMBAR RADICULOPATHY: ICD-10-CM

## 2021-01-19 RX ORDER — MONTELUKAST SODIUM 10 MG/1
10 TABLET ORAL DAILY
Qty: 90 TABLET | Refills: 1 | Status: SHIPPED | OUTPATIENT
Start: 2021-01-19 | End: 2021-07-23 | Stop reason: SDUPTHER

## 2021-01-19 RX ORDER — OXCARBAZEPINE 300 MG/1
300 TABLET, FILM COATED ORAL 2 TIMES DAILY
Qty: 180 TABLET | Refills: 1 | Status: SHIPPED | OUTPATIENT
Start: 2021-01-19 | End: 2021-08-30 | Stop reason: SDUPTHER

## 2021-02-03 ENCOUNTER — TELEPHONE (OUTPATIENT)
Dept: OTHER | Facility: OTHER | Age: 66
End: 2021-02-03

## 2021-02-05 DIAGNOSIS — I48.91 A-FIB (HCC): Primary | ICD-10-CM

## 2021-03-10 DIAGNOSIS — Z23 ENCOUNTER FOR IMMUNIZATION: ICD-10-CM

## 2021-03-17 ENCOUNTER — IMMUNIZATIONS (OUTPATIENT)
Dept: FAMILY MEDICINE CLINIC | Facility: HOSPITAL | Age: 66
End: 2021-03-17

## 2021-03-17 DIAGNOSIS — Z23 ENCOUNTER FOR IMMUNIZATION: Primary | ICD-10-CM

## 2021-03-17 PROCEDURE — 91301 SARS-COV-2 / COVID-19 MRNA VACCINE (MODERNA) 100 MCG: CPT

## 2021-03-17 PROCEDURE — 0011A SARS-COV-2 / COVID-19 MRNA VACCINE (MODERNA) 100 MCG: CPT

## 2021-04-05 ENCOUNTER — OFFICE VISIT (OUTPATIENT)
Dept: PAIN MEDICINE | Facility: CLINIC | Age: 66
End: 2021-04-05
Payer: COMMERCIAL

## 2021-04-05 VITALS
SYSTOLIC BLOOD PRESSURE: 132 MMHG | TEMPERATURE: 98.5 F | WEIGHT: 208 LBS | DIASTOLIC BLOOD PRESSURE: 76 MMHG | HEIGHT: 67 IN | BODY MASS INDEX: 32.65 KG/M2 | HEART RATE: 67 BPM

## 2021-04-05 DIAGNOSIS — M51.36 DDD (DEGENERATIVE DISC DISEASE), LUMBAR: ICD-10-CM

## 2021-04-05 DIAGNOSIS — M54.12 CERVICAL RADICULOPATHY: ICD-10-CM

## 2021-04-05 DIAGNOSIS — G89.4 CHRONIC PAIN SYNDROME: Primary | ICD-10-CM

## 2021-04-05 DIAGNOSIS — M47.816 SPONDYLOSIS OF LUMBAR REGION WITHOUT MYELOPATHY OR RADICULOPATHY: ICD-10-CM

## 2021-04-05 DIAGNOSIS — M47.816 LUMBAR SPONDYLOSIS: ICD-10-CM

## 2021-04-05 DIAGNOSIS — M54.40 LOW BACK PAIN WITH SCIATICA, SCIATICA LATERALITY UNSPECIFIED, UNSPECIFIED BACK PAIN LATERALITY, UNSPECIFIED CHRONICITY: ICD-10-CM

## 2021-04-05 DIAGNOSIS — M54.2 NECK PAIN: ICD-10-CM

## 2021-04-05 DIAGNOSIS — M54.16 LUMBAR RADICULOPATHY: ICD-10-CM

## 2021-04-05 PROCEDURE — 99214 OFFICE O/P EST MOD 30 MIN: CPT | Performed by: NURSE PRACTITIONER

## 2021-04-05 RX ORDER — TIZANIDINE 2 MG/1
2 TABLET ORAL 2 TIMES DAILY PRN
Qty: 60 TABLET | Refills: 0 | Status: SHIPPED | OUTPATIENT
Start: 2021-04-05 | End: 2021-12-09 | Stop reason: SDUPTHER

## 2021-04-05 RX ORDER — DULOXETIN HYDROCHLORIDE 30 MG/1
30 CAPSULE, DELAYED RELEASE ORAL DAILY
Qty: 30 CAPSULE | Refills: 2 | Status: SHIPPED | OUTPATIENT
Start: 2021-04-05 | End: 2021-07-02 | Stop reason: SDUPTHER

## 2021-04-05 NOTE — PROGRESS NOTES
Assessment:  1  Chronic pain syndrome    2  Lumbar radiculopathy    3  Low back pain with sciatica, sciatica laterality unspecified, unspecified back pain laterality, unspecified chronicity    4  DDD (degenerative disc disease), lumbar    5  Lumbar spondylosis    6  Spondylosis of lumbar region without myelopathy or radiculopathy    7  Neck pain    8  Cervical radiculopathy        Plan:  1  I will order an xray of the cervical spine  2  I will order PT for patient's neck complaints as I feel he'd benefit from Trae based exercises along with exercises to aid in myofascial release  3  Patient may continue duloxetine 30 mg daily and tizanidine 2 mg p r n  myofascial pain  These medications were refilled today   4  I will avoid NSAIDs secondary to anticoagulation  5  The patient may continue Tylenol p r n  and should not exceed more than 3000 mg in 24 hours   6  We can repeat right L4 and L5 TFESI p r n   7  The patient will follow-up in 6 weeks or sooner if needed     M*Modal software was used to dictate this note  It may contain errors with dictating incorrect words or incorrect spelling  Please contact the provider directly with any questions  History of Present Illness: The patient is a 77 y o  male last seen on 1/12/21 who presents for a follow up office visit in regards to chronic  Right-sided lumbosacral back right-sided lumbosacral back pain with radiation into the anteromedial aspect of the right lower extremity secondary to  Lumbar stenosis, lumbar degenerative disc disease, lumbar spondylosis, lumbar radiculopathy and chronic pain syndrome  The patient denies bowel or bladder incontinence or saddle anesthesia  The patient is status post right L4 and L5 TFESI with Dr Gus Mason on January 4, 2021 with ongoing relief of his right lower extremity symptoms at this time    He is currently managing his pain with duloxetine 30 mg daily and tizanidine 2 mg p r n  myofascial pain with a 75-80% improvement of his pain without side effects  He is unable to take NSAIDs secondary to anticoagulation    The patient also has a secondary complaint of neck pain that will intermittently radiate into the lateral aspect of his left triceps  He does also have some numbness of the right hand  Although this is a new complaint, he states he has had cervical issues for years  He states he has had some sort of cervical injections in the past  I do not have any imaging of the cervical spine for review  The patient has not done any formal physical therapy for this issue  the patient currently rates his pain a 2/10 on the numeric pain rating scale  States his pain is intermittent in nature bothersome the morning  He characterizes pain as pins and needles  I have personally reviewed and/or updated the patient's past medical history, past surgical history, family history, social history, current medications, allergies, and vital signs today  Review of Systems:    Review of Systems   Respiratory: Negative for shortness of breath  Cardiovascular: Negative for chest pain  Gastrointestinal: Negative for constipation, diarrhea, nausea and vomiting  Musculoskeletal: Negative for arthralgias, gait problem, joint swelling and myalgias  Skin: Negative for rash  Neurological: Negative for dizziness, seizures and weakness  All other systems reviewed and are negative          Past Medical History:   Diagnosis Date    [facility] ([facility] )     Allergic rhinitis     Benign cyst of kidney     Cerebral vascular disease     Chronic sinusitis     Hyperlipidemia     [facility]     Pancreas cyst        Past Surgical History:   Procedure Laterality Date    OTHER SURGICAL HISTORY  2003    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    PATENT FORAMEN OVALE CLOSURE         Family History   Problem Relation Age of Onset    Diabetes type II Mother     Cancer Father         angioma       Social History     Occupational History    Occupation: massage therapist   Tobacco Use    Smoking status: Former Smoker     Years: 10 00     Quit date:      Years since quittin 2    Smokeless tobacco: Former User    Tobacco comment: Cigars    Substance and Sexual Activity    Alcohol use:  Yes     Alcohol/week: 1 0 standard drinks     Types: 1 Glasses of wine per week     Frequency: 4 or more times a week     Drinks per session: 1 or 2     Binge frequency: Daily or almost daily     Comment: daily wine 8oz    Drug use: Not Currently     Comment: Medical use (CBD)    Sexual activity: Yes         Current Outpatient Medications:     apixaban (Eliquis) 5 mg, Take 1 tablet (5 mg total) by mouth 2 (two) times a day, Disp: 180 tablet, Rfl: 3    ascorbic acid (VITAMIN C) 250 mg tablet, Take 250 mg by mouth 2 (two) times a day, Disp: , Rfl:     Cholecalciferol (VITAMIN D-3) 5000 units TABS, Take 1 tablet by mouth daily, Disp: 100 tablet, Rfl: 0    clobetasol (TEMOVATE) 0 05 % cream, Apply 1 application topically 2 (two) times a day Apply sparingly to affected areas, Disp: , Rfl:     Coenzyme Q10 (COQ10) 200 MG CAPS, Take 1 capsule by mouth daily, Disp: , Rfl:     colesevelam (WELCHOL) 625 mg tablet, Take 2 tablets (1,250 mg total) by mouth 2 (two) times a day with meals, Disp: 120 tablet, Rfl: 5    diltiazem (CARDIZEM) 30 mg tablet, Take 1 tablet (30 mg total) by mouth 3 (three) times a day as needed (afib), Disp: 30 tablet, Rfl: 3    DULoxetine (CYMBALTA) 30 mg delayed release capsule, Take 1 capsule (30 mg total) by mouth daily, Disp: 30 capsule, Rfl: 2    Glucosamine-Chondroit-Vit C-Mn (GLUCOSAMINE CHONDR 1500 COMPLX PO), Take 1 tablet by mouth 2 (two) times a day , Disp: , Rfl:     hyoscyamine (ANASPAZ) 0 125 mg, Take 0 125 mg by mouth 3 (three) times a day , Disp: , Rfl:     ketoconazole (NIZORAL) 2 % cream, Apply topically daily, Disp: 15 g, Rfl: 0    MAGNESIUM GLYCINATE PLUS PO, Take 400 mg by mouth 2 (two) times a day , Disp: , Rfl:    montelukast (SINGULAIR) 10 mg tablet, Take 1 tablet (10 mg total) by mouth daily, Disp: 90 tablet, Rfl: 1    multivitamin (THERAGRAN) TABS, Take 1 tablet by mouth 2 (two) times a day  , Disp: , Rfl:     OXcarbazepine (TRILEPTAL) 300 mg tablet, Take 1 tablet (300 mg total) by mouth 2 (two) times a day, Disp: 180 tablet, Rfl: 1    sildenafil (REVATIO) 20 mg tablet, Take up to 5, 20 mg tablets (100 mg) one hour prior to intercourse as needed, Disp: 30 tablet, Rfl: 3    tiZANidine (ZANAFLEX) 2 mg tablet, Take 1 tablet (2 mg total) by mouth 2 (two) times a day as needed for muscle spasms, Disp: 60 tablet, Rfl: 0    Allergies   Allergen Reactions    Ezetimibe Fatigue and GI Intolerance     Category: Adverse Reaction;     Molds & Smuts Allergic Rhinitis     Category: Allergy;     Monascus Purpureus Went Yeast Fatigue     Category: Adverse Reaction;     Niacin Fatigue     Category: Adverse Reaction;     Other Other (See Comments)    Pollen Extract Allergic Rhinitis     Category: Allergy;     Pregabalin Fatigue     Category: Adverse Reaction;     Statins Fatigue and GI Intolerance     Category: Adverse Reaction;     Atorvastatin GI Intolerance and Fatigue     Other reaction(s): Muscle pain, constipation, sleepiness  Category: Adverse Reaction; Other reaction(s): Muscle pain, constipation, sleepiness       Physical Exam:    /76   Pulse 67   Temp 98 5 °F (36 9 °C)   Ht 5' 7" (1 702 m)   Wt 94 3 kg (208 lb)   BMI 32 58 kg/m²     Constitutional:normal, well developed, well nourished, alert, in no distress and non-toxic and no overt pain behavior  Eyes:anicteric  HEENT:grossly intact  Neck:supple, symmetric, trachea midline and no masses   Pulmonary:even and unlabored  Cardiovascular:No edema or pitting edema present  Skin:Normal without rashes or lesions and well hydrated  Psychiatric:Mood and affect appropriate  Neurologic:Cranial Nerves II-XII grossly intact  Musculoskeletal:normal  Gait    Bilateral cervical paraspinal musculature minimally tender to palpation  Left trapezius musculature tender to palpation  Bilateral upper extremity strength 5/5 in all muscle groups  Full range of motion all planes of the cervical spine  Sensation intact to light touch in C5 through T1 dermatomes bilaterally  Negative Laughlin's reflex bilaterally  Negative Spurling's bilaterally        Imaging  X-ray cervical spine complete 4 or 5 vw    (Results Pending)         Orders Placed This Encounter   Procedures    X-ray cervical spine complete 4 or 5 vw    Ambulatory referral to Physical Therapy

## 2021-04-07 ENCOUNTER — RA CDI HCC (OUTPATIENT)
Dept: OTHER | Facility: HOSPITAL | Age: 66
End: 2021-04-07

## 2021-04-07 NOTE — PROGRESS NOTES
[facility]  coding oppertunities          Chart reviewed, no opportunity found: CHART REVIEWED, NO OPPORTUNITY FOUND

## 2021-04-12 ENCOUNTER — APPOINTMENT (OUTPATIENT)
Dept: LAB | Facility: HOSPITAL | Age: 66
End: 2021-04-12
Attending: INTERNAL MEDICINE
Payer: COMMERCIAL

## 2021-04-12 DIAGNOSIS — M47.816 LUMBAR SPONDYLOSIS: ICD-10-CM

## 2021-04-12 DIAGNOSIS — E78.00 HYPERCHOLESTEROLEMIA: ICD-10-CM

## 2021-04-12 LAB
ALBUMIN SERPL BCP-MCNC: 4 G/DL (ref 3.5–5)
ALP SERPL-CCNC: 82 U/L (ref 46–116)
ALT SERPL W P-5'-P-CCNC: 38 U/L (ref 12–78)
ANION GAP SERPL CALCULATED.3IONS-SCNC: 3 MMOL/L (ref 4–13)
AST SERPL W P-5'-P-CCNC: 18 U/L (ref 5–45)
BILIRUB SERPL-MCNC: 0.43 MG/DL (ref 0.2–1)
BUN SERPL-MCNC: 20 MG/DL (ref 5–25)
CALCIUM SERPL-MCNC: 8.8 MG/DL (ref 8.3–10.1)
CHLORIDE SERPL-SCNC: 110 MMOL/L (ref 100–108)
CHOLEST SERPL-MCNC: 264 MG/DL (ref 50–200)
CO2 SERPL-SCNC: 27 MMOL/L (ref 21–32)
CREAT SERPL-MCNC: 0.96 MG/DL (ref 0.6–1.3)
ERYTHROCYTE [DISTWIDTH] IN BLOOD BY AUTOMATED COUNT: 12.4 % (ref 11.6–15.1)
GFR SERPL CREATININE-BSD FRML MDRD: 81 ML/MIN/1.73SQ M
GLUCOSE P FAST SERPL-MCNC: 94 MG/DL (ref 65–99)
HCT VFR BLD AUTO: 45.2 % (ref 36.5–49.3)
HDLC SERPL-MCNC: 78 MG/DL
HGB BLD-MCNC: 14.7 G/DL (ref 12–17)
LDLC SERPL CALC-MCNC: 172 MG/DL (ref 0–100)
MCH RBC QN AUTO: 31.5 PG (ref 26.8–34.3)
MCHC RBC AUTO-ENTMCNC: 32.5 G/DL (ref 31.4–37.4)
MCV RBC AUTO: 97 FL (ref 82–98)
NONHDLC SERPL-MCNC: 186 MG/DL
PLATELET # BLD AUTO: 204 THOUSANDS/UL (ref 149–390)
PMV BLD AUTO: 9.2 FL (ref 8.9–12.7)
POTASSIUM SERPL-SCNC: 4.1 MMOL/L (ref 3.5–5.3)
PROT SERPL-MCNC: 7.6 G/DL (ref 6.4–8.2)
RBC # BLD AUTO: 4.67 MILLION/UL (ref 3.88–5.62)
SODIUM SERPL-SCNC: 140 MMOL/L (ref 136–145)
TRIGL SERPL-MCNC: 71 MG/DL
WBC # BLD AUTO: 4.31 THOUSAND/UL (ref 4.31–10.16)

## 2021-04-12 PROCEDURE — 80061 LIPID PANEL: CPT

## 2021-04-12 PROCEDURE — 36415 COLL VENOUS BLD VENIPUNCTURE: CPT

## 2021-04-12 PROCEDURE — 85027 COMPLETE CBC AUTOMATED: CPT

## 2021-04-12 PROCEDURE — 80053 COMPREHEN METABOLIC PANEL: CPT

## 2021-04-14 ENCOUNTER — OFFICE VISIT (OUTPATIENT)
Dept: INTERNAL MEDICINE CLINIC | Facility: CLINIC | Age: 66
End: 2021-04-14
Payer: COMMERCIAL

## 2021-04-14 VITALS
SYSTOLIC BLOOD PRESSURE: 108 MMHG | HEIGHT: 67 IN | HEART RATE: 61 BPM | OXYGEN SATURATION: 97 % | TEMPERATURE: 98.7 F | DIASTOLIC BLOOD PRESSURE: 66 MMHG | WEIGHT: 209.4 LBS | BODY MASS INDEX: 32.87 KG/M2

## 2021-04-14 DIAGNOSIS — I25.10 ATHEROSCLEROSIS OF NATIVE CORONARY ARTERY OF NATIVE HEART, ANGINA PRESENCE UNSPECIFIED: ICD-10-CM

## 2021-04-14 DIAGNOSIS — I48.0 PAROXYSMAL ATRIAL FIBRILLATION (HCC): ICD-10-CM

## 2021-04-14 DIAGNOSIS — E78.00 HYPERCHOLESTEROLEMIA: ICD-10-CM

## 2021-04-14 DIAGNOSIS — M47.816 LUMBAR SPONDYLOSIS: Primary | ICD-10-CM
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
## 2021-04-14 PROCEDURE — 3008F BODY MASS INDEX DOCD: CPT | Performed by: INTERNAL MEDICINE

## 2021-04-14 PROCEDURE — 99213 OFFICE O/P EST LOW 20 MIN: CPT | Performed by: INTERNAL MEDICINE

## 2021-04-14 PROCEDURE — 1036F TOBACCO NON-USER: CPT | Performed by: INTERNAL MEDICINE

## 2021-04-14 PROCEDURE — 1160F RVW MEDS BY RX/DR IN RCRD: CPT | Performed by: INTERNAL MEDICINE

## 2021-04-14 RX ORDER — COLESEVELAM 180 1/1
1250 TABLET ORAL 2 TIMES DAILY WITH MEALS
Qty: 360 TABLET | Refills: 1 | Status: SHIPPED | OUTPATIENT
Start: 2021-04-14 | End: 2021-10-05 | Stop reason: SDUPTHER

## 2021-04-14 NOTE — PROGRESS NOTES
Assessment/Plan:    Paroxysmal atrial fibrillation (HCC)  Maintained on Eliquis, otherwise no issues  No bleeding  Lumbar spondylosis  Chronic and recurrent lumbar back pain for which patient has been following with pain management  Has had multiple injections with varying degrees of relief would like to pursue other avenues of treatment/therapy  Hypercholesterolemia  Intolerant to stains  Cholesterol remains elevated but improved with Cosevelam  Discussed the addition of ezetimibe in the future  Patient would prefer to wait a few more months with diet and cosevelam before deciding on additional therapy  Consideration of Repatha if adequate coverage by insurance  Coronary atherosclerosis  No issues with chest pain, pressure, dyspnea  Diagnoses and all orders for this visit:    Lumbar spondylosis    Hypercholesterolemia  -     colesevelam (WELCHOL) 625 mg tablet; Take 2 tablets (1,250 mg total) by mouth 2 (two) times a day with meals  -     CBC and Platelet; Future  -     Comprehensive metabolic panel; Future  -     Lipid panel; Future    Atherosclerosis of native coronary artery of native heart, angina presence unspecified  -     Comprehensive metabolic panel; Future  -     Lipid panel; Future    Paroxysmal atrial fibrillation (HCC)          Subjective:      Patient ID: Max Whitten is a 77 y o  male  Presents for a follow up visit  Hx CAD, PAF, chronic low back pain, hypercholesterolemia  Patient has been intolerant to statins and was trialed on cosevelam  Cholesterol levels are slightly improved but not near goal levels given his history of CAD  LDL cholesterol is 172 down from 207 in October  Has also bee experiencing some recurrence of his lumbar back pain precipitated by using a rowing machine for a few minutes  No history of any chest pains or palpitations  No bleeding issues  Serum chemistries are normal as is his CBC        Family History   Problem Relation Age of Onset    Diabetes type II Mother     Cancer Father         angioma     Social History     Socioeconomic History    Marital status: /Civil Union     Spouse name: Not on file    Number of children: Not on file    Years of education: Not on file    Highest education level: Not on file   Occupational History    Occupation: massage therapist   Social Needs    Financial resource strain: Not on file    Food insecurity     Worry: Not on file     Inability: Not on file   [employer] needs     Medical: Not on file     Non-medical: Not on file   Tobacco Use    Smoking status: Former Smoker     Years: 10 00     Quit date:      Years since quittin 3    Smokeless tobacco: Former User     Types: Chew    Tobacco comment: Cigars    Substance and Sexual Activity    Alcohol use: Yes     Alcohol/week: 1 0 standard drinks     Types: 1 Glasses of wine per week     Frequency: 4 or more times a week     Drinks per session: 1 or 2     Binge frequency: Daily or almost daily     Comment: daily wine 8oz    Drug use: Not Currently     Types: Marijuana     Comment: Medical use (CBD)    Sexual activity: Yes   Lifestyle    Physical activity     Days per week: Not on file     Minutes per session: Not on file    Stress: Not on file   Relationships    Social connections     Talks on phone: Not on file     Gets together: Not on file     Attends Mandaeism service: Not on file     Active member of club or organization: Not on file     Attends meetings of clubs or organizations: Not on file     Relationship status: Not on file    Intimate partner violence     Fear of current or ex partner: Not on file     Emotionally abused: Not on file     Physically abused: Not on file     Forced sexual activity: Not on file   Other Topics Concern    Not on file   Social History Narrative    Caffeine: drinks 1 cup coffee/ tea a day       Past Medical History:   Diagnosis Date    [facility]     Allergic rhinitis     Benign cyst of kidney     Cat bite 7/8/2020    Cerebral vascular disease     Chronic sinusitis     Hyperlipidemia     [facility]     Pancreas cyst        Current Outpatient Medications:     apixaban (Eliquis) 5 mg, Take 1 tablet (5 mg total) by mouth 2 (two) times a day, Disp: 180 tablet, Rfl: 3    ascorbic acid (VITAMIN C) 250 mg tablet, Take 250 mg by mouth 2 (two) times a day, Disp: , Rfl:     Cholecalciferol (VITAMIN D-3) 5000 units TABS, Take 1 tablet by mouth daily, Disp: 100 tablet, Rfl: 0    clobetasol (TEMOVATE) 0 05 % cream, Apply 1 application topically 2 (two) times a day as needed Apply sparingly to affected areas, Disp: , Rfl:     Coenzyme Q10 (COQ10) 200 MG CAPS, Take 1 capsule by mouth daily, Disp: , Rfl:     colesevelam (WELCHOL) 625 mg tablet, Take 2 tablets (1,250 mg total) by mouth 2 (two) times a day with meals, Disp: 360 tablet, Rfl: 1    diltiazem (CARDIZEM) 30 mg tablet, Take 1 tablet (30 mg total) by mouth 3 (three) times a day as needed (afib), Disp: 30 tablet, Rfl: 3    DULoxetine (CYMBALTA) 30 mg delayed release capsule, Take 1 capsule (30 mg total) by mouth daily, Disp: 30 capsule, Rfl: 2    Glucosamine-Chondroit-Vit C-Mn (GLUCOSAMINE CHONDR 1500 COMPLX PO), Take 1 tablet by mouth 2 (two) times a day , Disp: , Rfl:     hyoscyamine (ANASPAZ) 0 125 mg, Take 0 125 mg by mouth 2 (two) times a day , Disp: , Rfl:     ketoconazole (NIZORAL) 2 % cream, Apply topically daily (Patient taking differently: Apply topically daily as needed ), Disp: 15 g, Rfl: 0    MAGNESIUM GLYCINATE PLUS PO, Take 400 mg by mouth 2 (two) times a day , Disp: , Rfl:     montelukast (SINGULAIR) 10 mg tablet, Take 1 tablet (10 mg total) by mouth daily, Disp: 90 tablet, Rfl: 1    multivitamin (THERAGRAN) TABS, Take 1 tablet by mouth 2 (two) times a day  , Disp: , Rfl:     OXcarbazepine (TRILEPTAL) 300 mg tablet, Take 1 tablet (300 mg total) by mouth 2 (two) times a day, Disp: 180 tablet, Rfl: 1    sildenafil (REVATIO) 20 mg tablet, Take up to 5, 20 mg tablets (100 mg) one hour prior to intercourse as needed, Disp: 30 tablet, Rfl: 3    tiZANidine (ZANAFLEX) 2 mg tablet, Take 1 tablet (2 mg total) by mouth 2 (two) times a day as needed for muscle spasms, Disp: 60 tablet, Rfl: 0  Allergies   Allergen Reactions    Ezetimibe Fatigue and GI Intolerance     Category: Adverse Reaction;     Molds & Smuts Allergic Rhinitis     Category: Allergy;     Monascus Purpureus Went Yeast Fatigue     Category: Adverse Reaction;     Niacin Fatigue     Category: Adverse Reaction;     Pollen Extract Allergic Rhinitis     Category: Allergy;     Pregabalin Fatigue     Category: Adverse Reaction;     Statins Fatigue and GI Intolerance     Category: Adverse Reaction;     Atorvastatin GI Intolerance and Fatigue     Other reaction(s): Muscle pain, constipation, sleepiness  Category: Adverse Reaction; Other reaction(s): Muscle pain, constipation, sleepiness     Past Surgical History:   Procedure Laterality Date    OTHER SURGICAL HISTORY  2003    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    PATENT FORAMEN OVALE CLOSURE           Review of Systems   Constitutional: Negative for activity change, appetite change, fatigue and fever  HENT: Negative  Eyes: Negative  Respiratory: Negative for chest tightness and shortness of breath  Cardiovascular: Negative for chest pain and palpitations  Gastrointestinal: Negative  Endocrine: Negative  Genitourinary: Negative  Musculoskeletal: Positive for back pain and neck pain  Negative for gait problem  Skin: Negative  Neurological: Negative  Hematological: Negative  Psychiatric/Behavioral: Negative  Objective:      /66 (BP Location: Left arm, Patient Position: Sitting, Cuff Size: Large)   Pulse 61   Temp 98 7 °F (37 1 °C) (Temporal)   Ht 5' 7" (1 702 m)   Wt 95 kg (209 lb 6 4 oz)   SpO2 97% Comment: ra  BMI 32 80 kg/m²          Physical Exam  Vitals signs reviewed  Constitutional:       General: He is not in acute distress  Appearance: Normal appearance  He is not ill-appearing, toxic-appearing or diaphoretic  HENT:      Head: Normocephalic and atraumatic  Right Ear: External ear normal       Left Ear: External ear normal       Nose: Nose normal    Eyes:      General: No scleral icterus  Conjunctiva/sclera: Conjunctivae normal       Pupils: Pupils are equal, round, and reactive to light  Neck:      Musculoskeletal: Neck supple  Cardiovascular:      Rate and Rhythm: Normal rate and regular rhythm  Heart sounds: Normal heart sounds  No murmur  Pulmonary:      Effort: Pulmonary effort is normal  No respiratory distress  Breath sounds: Normal breath sounds  Abdominal:      General: Abdomen is flat  There is no distension  Tenderness: There is no abdominal tenderness  Musculoskeletal:         General: No swelling or tenderness  Right lower leg: No edema  Left lower leg: No edema  Skin:     Coloration: Skin is not jaundiced  Findings: No bruising, erythema or rash  Neurological:      General: No focal deficit present  Mental Status: He is alert and oriented to person, place, and time  Mental status is at baseline     Psychiatric:         Mood and Affect: Mood normal          Behavior: Behavior normal

## 2021-04-15 NOTE — ASSESSMENT & PLAN NOTE
Intolerant to stains  Cholesterol remains elevated but improved with Cosevelam  Discussed the addition of ezetimibe in the future  Patient would prefer to wait a few more months with diet and cosevelam before deciding on additional therapy  Consideration of Repatha if adequate coverage by insurance

## 2021-04-15 NOTE — ASSESSMENT & PLAN NOTE
Chronic and recurrent lumbar back pain for which patient has been following with pain management  Has had multiple injections with varying degrees of relief would like to pursue other avenues of treatment/therapy

## 2021-04-16 ENCOUNTER — IMMUNIZATIONS (OUTPATIENT)
Dept: FAMILY MEDICINE CLINIC | Facility: HOSPITAL | Age: 66
End: 2021-04-16

## 2021-04-16 DIAGNOSIS — Z23 ENCOUNTER FOR IMMUNIZATION: Primary | ICD-10-CM

## 2021-04-16 PROCEDURE — 0012A SARS-COV-2 / COVID-19 MRNA VACCINE (MODERNA) 100 MCG: CPT

## 2021-04-16 PROCEDURE — 91301 SARS-COV-2 / COVID-19 MRNA VACCINE (MODERNA) 100 MCG: CPT

## 2021-04-22 ENCOUNTER — TELEPHONE (OUTPATIENT)
Dept: INTERNAL MEDICINE CLINIC | Facility: CLINIC | Age: 66
End: 2021-04-22

## 2021-04-22 DIAGNOSIS — E78.00 HYPERCHOLESTEROLEMIA: ICD-10-CM

## 2021-04-22 DIAGNOSIS — I25.10 ATHEROSCLEROSIS OF NATIVE CORONARY ARTERY OF NATIVE HEART, UNSPECIFIED WHETHER ANGINA PRESENT: Primary | ICD-10-CM

## 2021-04-22 RX ORDER — EZETIMIBE 10 MG/1
10 TABLET ORAL DAILY
Qty: 30 TABLET | Refills: 5 | Status: SHIPPED | OUTPATIENT
Start: 2021-04-22 | End: 2021-04-23 | Stop reason: SDUPTHER

## 2021-04-22 NOTE — TELEPHONE ENCOUNTER
Patient would like a new zetia prescription that is covered by his insurance to be sent to [facility] on [address]  He would like a #30 to try

## 2021-04-23 DIAGNOSIS — E78.00 HYPERCHOLESTEROLEMIA: ICD-10-CM

## 2021-04-23 DIAGNOSIS — I25.10 ATHEROSCLEROSIS OF NATIVE CORONARY ARTERY OF NATIVE HEART, UNSPECIFIED WHETHER ANGINA PRESENT: ICD-10-CM

## 2021-04-23 RX ORDER — EZETIMIBE 10 MG/1
10 TABLET ORAL DAILY
Qty: 30 TABLET | Refills: 5 | Status: SHIPPED | OUTPATIENT
Start: 2021-04-23 | End: 2021-10-25 | Stop reason: SINTOL

## 2021-05-07 ENCOUNTER — OFFICE VISIT (OUTPATIENT)
Dept: CARDIOLOGY CLINIC | Facility: CLINIC | Age: 66
End: 2021-05-07
Payer: COMMERCIAL

## 2021-05-07 VITALS
HEIGHT: 67 IN | SYSTOLIC BLOOD PRESSURE: 118 MMHG | DIASTOLIC BLOOD PRESSURE: 72 MMHG | WEIGHT: 210.1 LBS | BODY MASS INDEX: 32.98 KG/M2 | HEART RATE: 65 BPM

## 2021-05-07 DIAGNOSIS — I48.0 PAROXYSMAL ATRIAL FIBRILLATION (HCC): Primary | ICD-10-CM

## 2021-05-07 PROCEDURE — 99214 OFFICE O/P EST MOD 30 MIN: CPT | Performed by: PHYSICIAN ASSISTANT

## 2021-05-07 PROCEDURE — 93000 ELECTROCARDIOGRAM COMPLETE: CPT | Performed by: PHYSICIAN ASSISTANT

## 2021-05-07 PROCEDURE — 1160F RVW MEDS BY RX/DR IN RCRD: CPT | Performed by: PHYSICIAN ASSISTANT

## 2021-05-07 PROCEDURE — 3008F BODY MASS INDEX DOCD: CPT | Performed by: PHYSICIAN ASSISTANT

## 2021-05-07 NOTE — PROGRESS NOTES
Electrophysiology Office Note    Duane Bell  1955  [id number]  HEART & VASCULAR [facility] [place]  [address]        Assessment/Plan     1  Paroxysmal atrial fibrillation (HCC)  POCT ECG   1  Paroxysmal atrial fibrillation, symptomatic    * patient presents to B EP office today under direction of Dr Carolyn Babinski for routine 1 year follow up    * today ECG showing NSR   * burden of AF seems to be very low, having 2-3 hour episodes maybe twice [id number] with symptoms being self limiting, big trigger for him is dehydration which he keeps up with well   * he has been taking eliquis without any recurrence of his rectal bleeding, understands his CVA risk given his [id number] of 5 (age, CAD, CVA hx)    * not on any AAD's only on CCB PRN    * no hx of ablation for AF or AFL in the past    * EF in 2017 was 66% with normal LA size    * we did discuss modifiable risk factors today, BMI is 32 91 but this may be more muscle related than fat, he does have a small protuberant abdomen, he has been exercising diligently, feel his BMI will improve over time  Other than this his modifiable risk factors for AF are nill  * of note was on propafenone in the past and amiodarone, no AAD since 2015    2  Hx CVA related to PFO s/p PFO closure (2006)  3  Hx non obstructive CAD w/ hx of PCI to LAD     F/u in 1 year with Dr Carolyn Babinski or sooner should symptoms arise           Rhythm History:   Atrial fibrillation:     Atrial flutter:     SVT:     VT/VF:     Device history:   Pacemaker:    Defibrillator:    BIV PPM:    BIV ICD:    ILR:      Cardiac Testing:     ECHO:   Results for orders placed during the hospital encounter of 03/09/17   Echo complete with contrast if indicated    Narrative [address]  [facility] [place] [address] [facility]  [phone]    Transthoracic Echocardiogram  2D, M-mode, Doppler, and Color Doppler    Study date: 09-Mar-2017    Patient: Darshana Shin  MR number: [id number]  Account number: [de-identified]  : 1955  Age: 59 years  Gender: Male  Status: Outpatient  Location: Echo lab  Height: 68 in  Weight: 194 7 lb  BP: 122/ 70 mmHg    Indications: Cardiac murmur; Alpha 1 antitrypsin deficiency    Diagnoses: R01 1 - Cardiac murmur, unspecified    Sonographer:  FRANCES Angelo, RDCS  Primary Physician:  Angelica Albarran MD  Referring Physician:  Janeth Naqvi MD  Group:  [facility]  Interpreting Physician:  Junior Sol MD    SUMMARY    LEFT VENTRICLE:  Systolic function was normal  Ejection fraction was estimated to be 66 %  There were no regional wall motion abnormalities  ATRIAL SEPTUM:  There was a closure device in place  MITRAL VALVE:  There was trace regurgitation  TRICUSPID VALVE:  There was trace regurgitation  PULMONIC VALVE:  There was trace regurgitation  HISTORY: PRIOR HISTORY: Angina; Atrial fibrillation; Coronary artery disease; Chest pain; Shortness of breath; PFO closure with cardioseal    PROCEDURE: The procedure was performed in the echo lab  This was a routine study  The transthoracic approach was used  The study included complete 2D imaging, M-mode, complete spectral Doppler, and color Doppler  The heart rate was 66 bpm,  at the start of the study  Images were obtained from the parasternal, apical, subcostal, and suprasternal notch acoustic windows  Image quality was adequate  LEFT VENTRICLE: Size was normal  Systolic function was normal  Ejection fraction was estimated to be 66 %  There were no regional wall motion abnormalities  Wall thickness was normal  There was no evidence of concentric hypertrophy    DOPPLER: Left ventricular diastolic function parameters were normal     RIGHT VENTRICLE: The size was normal  Systolic function was normal  Wall thickness was normal     LEFT ATRIUM: Size was normal     ATRIAL SEPTUM: There was a closure device in place  RIGHT ATRIUM: Size was normal     MITRAL VALVE: Valve structure was normal  There was normal leaflet separation  DOPPLER: The transmitral velocity was within the normal range  There was no evidence for stenosis  There was trace regurgitation  AORTIC VALVE: The valve was trileaflet  Leaflets exhibited normal thickness and normal cuspal separation  DOPPLER: Transaortic velocity was within the normal range  There was no evidence for stenosis  There was no regurgitation  TRICUSPID VALVE: The valve structure was normal  There was normal leaflet separation  DOPPLER: The transtricuspid velocity was within the normal range  There was no evidence for stenosis  There was trace regurgitation  Pulmonary artery  systolic pressure was within the normal range  PULMONIC VALVE: Leaflets exhibited normal thickness, no calcification, and normal cuspal separation  DOPPLER: The transpulmonic velocity was within the normal range  There was trace regurgitation  PERICARDIUM: There was no pericardial effusion  The pericardium was normal in appearance  AORTA: The root exhibited normal size      SYSTEM MEASUREMENT TABLES    2D  %FS: 32 64 %  Ao Diam: 3 26 cm  EDV(Teich): 63 83 ml  EF(Cube): 69 43 %  EF(Teich): 61 74 %  ESV(Cube): 17 42 ml  ESV(Teich): 24 42 ml  IVSd: 1 29 cm  LA Area: 14 5 cm2  LA Diam: 2 97 cm  LVEDV MOD A4C: 74 15 ml  LVEF MOD A4C: 76 28 %  LVESV MOD A4C: 17 59 ml  LVIDd: 3 85 cm  LVIDs: 2 59 cm  LVLd A4C: 8 41 cm  LVLs A4C: 6 28 cm  LVPWd: 1 06 cm  RA Area: 15 47 cm2  SI(Cube): 19 58 ml/m2  SI(Teich): 19 51 ml/m2  SV MOD A4C: 56 57 ml  SV(Cube): 39 55 ml  SV(Teich): 39 41 ml  rv diam: 3 12 cm    CW  TR Vmax: 2 36 m/s  TR maxP 32 mmHg    MM  TAPSE: 3 1 cm    PW  E': 0 08 m/s  E/E': 7 1  MV A Marcio: 0 59 m/s  MV Dec Irion: 2 22 m/s2  MV DecT: 270 24 ms  MV E Marcio: 0 6 m/s  MV E/A Ratio: 1 02    [facility]    Prepared and electronically signed by    Latrell Mosley MD  Signed 09-Mar-2017 19:10:14         CATH/STRESS TEST:   CORONARY VESSELS:   --  The coronary circulation is right dominant  --  Left main: Normal   --  Proximal LAD: There was a 0 % stenosis at the site of a prior stent  --  Mid LAD: There was a 50 % stenosis at the distal margin of the stented segment  --  Circumflex: Angiography showed minor luminal irregularities  --  RCA: Angiography showed minor luminal irregularities  EKG:   NSR normal ECG         History of Present Illness     HPI/INTERVAL HISTORY: Lisa Leon is a 77 y o  male with history as above who presents to [facility] EP office today under direction of Dr Lizbeth Mckeon for routine 1 year follow up      5-7-21:    Since last office appointment patient has been doing well  He did have an episode of atrial fibrillation in February of 2021 with the episode prior to this occurring in the summer of 2020  He feels main symptoms in atrial fibrillation of a "fish being in my chest  "  He notices a big trigger for him is dehydration and attempts to keep himself hydrated throughout the day especially when working outside in the summer months  He has been exercising regularly the past few months at least 4 times weekly with weight training and has been able to do so without any limitations of exertional chest discomfort or severe shortness of breath  He is not having any lightheadedness or dizziness  He is taking his Eliquis as prescribed without any bleeding issues  Review of Systems  ROS as noted above, otherwise 12 point review of systems was performed and is negative         Historical Information   Social History     Socioeconomic History    Marital status: /Civil Union     Spouse name: Not on file    Number of children: Not on file    Years of education: Not on file    Highest education level: Not on file   Occupational History    Occupation: massage therapist   Social Needs    Financial resource strain: Not on file    Food insecurity     Worry: Not on file     Inability: Not on file    Transportation needs     Medical: Not on file     Non-medical: Not on file   Tobacco Use    Smoking status: Former Smoker     Years: 10 00     Quit date: 12     Years since quittin 3    Smokeless tobacco: Former User     Types: Chew    Tobacco comment: Cigars    Substance and Sexual Activity    Alcohol use: Yes     Alcohol/week: 1 0 standard drinks     Types: 1 Glasses of wine per week     Frequency: 4 or more times a week     Drinks per session: 1 or 2     Binge frequency: Daily or almost daily     Comment: daily wine 8oz    Drug use: Not Currently     Types: Marijuana     Comment: Medical use (CBD)    Sexual activity: Yes   Lifestyle    Physical activity     Days per week: Not on file     Minutes per session: Not on file    Stress: Not on file   Relationships    Social connections     Talks on phone: Not on file     Gets together: Not on file     Attends [employer] service: Not on file     Active member of club or organization: Not on file     Attends meetings of clubs or organizations: Not on file     Relationship status: Not on file    Intimate partner violence     Fear of current or ex partner: Not on file     Emotionally abused: Not on file     Physically abused: Not on file     Forced sexual activity: Not on file   Other Topics Concern    Not on file   Social History Narrative    Caffeine: drinks 1 cup coffee/ tea a day       Past Medical History:   Diagnosis Date    [facility]fib [facility])     Allergic rhinitis     Benign cyst of kidney     Cat bite 2020    Cerebral vascular disease     Chronic sinusitis     Hyperlipidemia     Melanoma ([facility] [address] )     Pancreas cyst      Past Surgical History:   Procedure Laterality Date    OTHER SURGICAL HISTORY      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    PATENT FORAMEN OVALE CLOSURE       Social History     Substance and Sexual Activity   Alcohol Use Yes    Alcohol/week: 1 0 standard drinks    Types: 1 Glasses of wine per week    Frequency: 4 or more times a week    Drinks per session: 1 or 2    Binge frequency: Daily or almost daily    Comment: daily wine 8oz     Social History     Substance and Sexual Activity   Drug Use Not Currently    Types: Marijuana    Comment: Medical use (CBD)     Social History     Tobacco Use   Smoking Status Former Smoker    Years: 10 00    Quit date: 12    Years since quittin 3   Smokeless Tobacco Former User    Types: Chew   Tobacco Comment    Cigars      Family History   Problem Relation Age of Onset    Diabetes type II Mother     Cancer Father         angioma       Meds/Allergies       Current Outpatient Medications:     apixaban (Eliquis) 5 mg, Take 1 tablet (5 mg total) by mouth 2 (two) times a day, Disp: 180 tablet, Rfl: 3    ascorbic acid (VITAMIN C) 250 mg tablet, Take 250 mg by mouth 2 (two) times a day, Disp: , Rfl:     Cholecalciferol (VITAMIN D-3) 5000 units TABS, Take 1 tablet by mouth daily, Disp: 100 tablet, Rfl: 0    Coenzyme Q10 (COQ10) 200 MG CAPS, Take 1 capsule by mouth daily, Disp: , Rfl:     colesevelam (WELCHOL) 625 mg tablet, Take 2 tablets (1,250 mg total) by mouth 2 (two) times a day with meals, Disp: 360 tablet, Rfl: 1    diltiazem (CARDIZEM) 30 mg tablet, Take 1 tablet (30 mg total) by mouth 3 (three) times a day as needed (afib), Disp: 30 tablet, Rfl: 3    DULoxetine (CYMBALTA) 30 mg delayed release capsule, Take 1 capsule (30 mg total) by mouth daily, Disp: 30 capsule, Rfl: 2    ezetimibe (ZETIA) 10 mg tablet, Take 1 tablet (10 mg total) by mouth daily, Disp: 30 tablet, Rfl: 5    Glucosamine-Chondroit-Vit C-Mn (GLUCOSAMINE CHONDR 1500 COMPLX PO), Take 1 tablet by mouth 2 (two) times a day , Disp: , Rfl:     hyoscyamine (ANASPAZ) 0 125 mg, Take 0 125 mg by mouth 2 (two) times a day , Disp: , Rfl:     MAGNESIUM GLYCINATE PLUS PO, Take 400 mg by mouth 2 (two) times a day , Disp: , Rfl:     montelukast (SINGULAIR) 10 mg tablet, Take 1 tablet (10 mg total) by mouth daily, Disp: 90 tablet, Rfl: 1    multivitamin (THERAGRAN) TABS, Take 1 tablet by mouth 2 (two) times a day  , Disp: , Rfl:     OXcarbazepine (TRILEPTAL) 300 mg tablet, Take 1 tablet (300 mg total) by mouth 2 (two) times a day, Disp: 180 tablet, Rfl: 1    sildenafil (REVATIO) 20 mg tablet, Take up to 5, 20 mg tablets (100 mg) one hour prior to intercourse as needed, Disp: 30 tablet, Rfl: 3    tiZANidine (ZANAFLEX) 2 mg tablet, Take 1 tablet (2 mg total) by mouth 2 (two) times a day as needed for muscle spasms, Disp: 60 tablet, Rfl: 0    clobetasol (TEMOVATE) 0 05 % cream, Apply 1 application topically 2 (two) times a day as needed Apply sparingly to affected areas, Disp: , Rfl:     Allergies   Allergen Reactions    Ezetimibe Fatigue and GI Intolerance     Category: Adverse Reaction;     Molds & Smuts Allergic Rhinitis     Category: Allergy;     Monascus Purpureus Went Yeast Fatigue     Category: Adverse Reaction;     Niacin Fatigue     Category: Adverse Reaction;     Pollen Extract Allergic Rhinitis     Category: Allergy;     Pregabalin Fatigue     Category: Adverse Reaction;     Statins Fatigue and GI Intolerance     Category: Adverse Reaction;     Atorvastatin GI Intolerance and Fatigue     Other reaction(s): Muscle pain, constipation, sleepiness  Category: Adverse Reaction; Other reaction(s): Muscle pain, constipation, sleepiness       Objective   Vitals: Blood pressure 118/72, pulse 65, height 5' 7" (1 702 m), weight 95 3 kg (210 lb 1 6 oz)  Physical Exam  Constitutional:       Appearance: He is well-developed  HENT:      Head: Normocephalic and atraumatic  Eyes:      Pupils: Pupils are equal, round, and reactive to light  Neck:      Musculoskeletal: Normal range of motion and neck supple  Cardiovascular:      Rate and Rhythm: Normal rate and regular rhythm     Pulmonary:      Effort: Pulmonary effort is normal       Breath sounds: Normal breath sounds  Abdominal:      General: Bowel sounds are normal       Palpations: Abdomen is soft  Musculoskeletal: Normal range of motion  Skin:     General: Skin is warm and dry  Neurological:      Mental Status: He is alert and oriented to person, place, and time  Labs:not applicable    Imaging: I have personally reviewed pertinent reports

## 2021-05-25 ENCOUNTER — TELEPHONE (OUTPATIENT)
Dept: INTERNAL MEDICINE CLINIC | Facility: CLINIC | Age: 66
End: 2021-05-25

## 2021-05-25 NOTE — TELEPHONE ENCOUNTER
Patient called and wanted to give you a heads up that he has stopped taking the Zetia but is still taking the [place]  He said it usually happens about a month or so in starts to have joint/muscle pain  He wanted to make you aware

## 2021-06-22 ENCOUNTER — HOSPITAL ENCOUNTER (OUTPATIENT)
Dept: RADIOLOGY | Facility: HOSPITAL | Age: 66
Discharge: HOME | End: 2021-06-22
Attending: ORTHOPAEDIC SURGERY
Payer: COMMERCIAL

## 2021-06-22 ENCOUNTER — OFFICE VISIT (OUTPATIENT)
Dept: OBGYN CLINIC | Facility: HOSPITAL | Age: 66
End: 2021-06-22
Payer: COMMERCIAL

## 2021-06-22 VITALS
WEIGHT: 211 LBS | HEART RATE: 64 BPM | SYSTOLIC BLOOD PRESSURE: 137 MMHG | HEIGHT: 67 IN | BODY MASS INDEX: 33.12 KG/M2 | DIASTOLIC BLOOD PRESSURE: 80 MMHG

## 2021-06-22 DIAGNOSIS — M25.561 RIGHT KNEE PAIN, UNSPECIFIED CHRONICITY: ICD-10-CM

## 2021-06-22 DIAGNOSIS — M17.11 PRIMARY OSTEOARTHRITIS OF RIGHT KNEE: Primary | ICD-10-CM

## 2021-06-22 PROCEDURE — 1160F RVW MEDS BY RX/DR IN RCRD: CPT | Performed by: ORTHOPAEDIC SURGERY

## 2021-06-22 PROCEDURE — 3008F BODY MASS INDEX DOCD: CPT | Performed by: ORTHOPAEDIC SURGERY

## 2021-06-22 PROCEDURE — 1036F TOBACCO NON-USER: CPT | Performed by: ORTHOPAEDIC SURGERY

## 2021-06-22 PROCEDURE — 20610 DRAIN/INJ JOINT/BURSA W/O US: CPT | Performed by: ORTHOPAEDIC SURGERY

## 2021-06-22 PROCEDURE — 73562 X-RAY EXAM OF KNEE 3: CPT

## 2021-06-22 PROCEDURE — 99213 OFFICE O/P EST LOW 20 MIN: CPT | Performed by: ORTHOPAEDIC SURGERY

## 2021-06-22 RX ORDER — BUPIVACAINE HYDROCHLORIDE 2.5 MG/ML
2 INJECTION, SOLUTION INFILTRATION; PERINEURAL
Status: COMPLETED | OUTPATIENT
Start: 2021-06-22 | End: 2021-06-22

## 2021-06-22 RX ORDER — METHYLPREDNISOLONE ACETATE 40 MG/ML
2 INJECTION, SUSPENSION INTRA-ARTICULAR; INTRALESIONAL; INTRAMUSCULAR; SOFT TISSUE
Status: COMPLETED | OUTPATIENT
Start: 2021-06-22 | End: 2021-06-22

## 2021-06-22 RX ADMIN — BUPIVACAINE HYDROCHLORIDE 2 ML: 2.5 INJECTION, SOLUTION INFILTRATION; PERINEURAL at 15:38

## 2021-06-22 RX ADMIN — METHYLPREDNISOLONE ACETATE 2 ML: 40 INJECTION, SUSPENSION INTRA-ARTICULAR; INTRALESIONAL; INTRAMUSCULAR; SOFT TISSUE at 15:38

## 2021-06-22 NOTE — PROGRESS NOTES
Assessment:  1  Primary osteoarthritis of right knee  Large joint arthrocentesis: R knee   2  Right knee pain, unspecified chronicity  XR knee 3 vw right non injury    Large joint arthrocentesis: R knee       Plan:     Patient has mild medial compartment joint space narrowing    Weight bearing as tolerated right lower extermity    patient received a right knee steroid injection in the office today    Provided patient with VMO strengthening exercises   Will contact patient in six weeks for an update   Follow-up in three months        The above stated was discussed in layman's terms and the patient expressed understanding  All questions were answered to the patient's satisfaction  Subjective:   Jamal Edmondson is a 74 y o  male who presents today for right knee pain  He states he has been having right knee pain since  05/30/2021  Patient states that day he was doing some yd work and felt a Baker cyst rupture and his right knee  He states about four five days later he had bruising in the posterior aspect of his right knee  He states he is having pain over the medial and posterior aspect of the right knee  He denies any instability or locking  Pain is worse with walking and working out  Patient has been icing and doing gentle stretching exercises with relief  He dud use  Lidocaine patches for pain with relief  He has history of a right knee arthroscopy meniscal tear in 2011 by Dr Shakila Brown at [facility]  He is taking Cymbalta and Trileptal for his low back         Review of systems negative unless otherwise specified in HPI    Past Medical History:   Diagnosis Date    A-fib [facility])     Allergic rhinitis     Benign cyst of kidney     Cat bite 7/8/2020    Cerebral vascular disease     Chronic sinusitis     Hyperlipidemia     Melanoma ([facility] [address] )     Pancreas cyst        Past Surgical History:   Procedure Laterality Date    OTHER SURGICAL HISTORY  2003    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    PATENT FORAMEN OVALE CLOSURE         Family History   Problem Relation Age of Onset    Diabetes type II Mother     Cancer Father         angioma       Social History     Occupational History    Occupation: massage therapist   Tobacco Use    Smoking status: Former Smoker     Years: 10 00     Quit date:      Years since quittin 4    Smokeless tobacco: Former User     Types: Chew    Tobacco comment: Cigars    Vaping Use    Vaping Use: Never used   Substance and Sexual Activity    Alcohol use:  Yes     Alcohol/week: 1 0 standard drinks     Types: 1 Glasses of wine per week     Comment: daily wine 8oz    Drug use: Not Currently     Types: Marijuana     Comment: Medical use (CBD)    Sexual activity: Yes         Current Outpatient Medications:     apixaban (Eliquis) 5 mg, Take 1 tablet (5 mg total) by mouth 2 (two) times a day, Disp: 180 tablet, Rfl: 3    ascorbic acid (VITAMIN C) 250 mg tablet, Take 250 mg by mouth 2 (two) times a day, Disp: , Rfl:     Cholecalciferol (VITAMIN D-3) 5000 units TABS, Take 1 tablet by mouth daily, Disp: 100 tablet, Rfl: 0    clobetasol (TEMOVATE) 0 05 % cream, Apply 1 application topically 2 (two) times a day as needed Apply sparingly to affected areas, Disp: , Rfl:     Coenzyme Q10 (COQ10) 200 MG CAPS, Take 1 capsule by mouth daily, Disp: , Rfl:     colesevelam (WELCHOL) 625 mg tablet, Take 2 tablets (1,250 mg total) by mouth 2 (two) times a day with meals, Disp: 360 tablet, Rfl: 1    diltiazem (CARDIZEM) 30 mg tablet, Take 1 tablet (30 mg total) by mouth 3 (three) times a day as needed (afib), Disp: 30 tablet, Rfl: 3    DULoxetine (CYMBALTA) 30 mg delayed release capsule, Take 1 capsule (30 mg total) by mouth daily, Disp: 30 capsule, Rfl: 2    Glucosamine-Chondroit-Vit C-Mn (GLUCOSAMINE CHONDR 1500 COMPLX PO), Take 1 tablet by mouth 2 (two) times a day , Disp: , Rfl:     hyoscyamine (ANASPAZ) 0 125 mg, Take 0 125 mg by mouth 2 (two) times a day , Disp: , Rfl:   MAGNESIUM GLYCINATE PLUS PO, Take 400 mg by mouth 2 (two) times a day , Disp: , Rfl:     montelukast (SINGULAIR) 10 mg tablet, Take 1 tablet (10 mg total) by mouth daily, Disp: 90 tablet, Rfl: 1    multivitamin (THERAGRAN) TABS, Take 1 tablet by mouth 2 (two) times a day  , Disp: , Rfl:     OXcarbazepine (TRILEPTAL) 300 mg tablet, Take 1 tablet (300 mg total) by mouth 2 (two) times a day, Disp: 180 tablet, Rfl: 1    sildenafil (REVATIO) 20 mg tablet, Take up to 5, 20 mg tablets (100 mg) one hour prior to intercourse as needed, Disp: 30 tablet, Rfl: 3    tiZANidine (ZANAFLEX) 2 mg tablet, Take 1 tablet (2 mg total) by mouth 2 (two) times a day as needed for muscle spasms, Disp: 60 tablet, Rfl: 0    ezetimibe (ZETIA) 10 mg tablet, Take 1 tablet (10 mg total) by mouth daily (Patient not taking: Reported on 6/22/2021), Disp: 30 tablet, Rfl: 5    Allergies   Allergen Reactions    Ezetimibe Fatigue and GI Intolerance     Category: Adverse Reaction;     Molds & Smuts Allergic Rhinitis     Category: Allergy;     Monascus Purpureus Went Yeast Fatigue     Category: Adverse Reaction;     Niacin Fatigue     Category: Adverse Reaction;     Pollen Extract Allergic Rhinitis     Category: Allergy;     Pregabalin Fatigue     Category: Adverse Reaction;     Statins Fatigue and GI Intolerance     Category: Adverse Reaction;     Atorvastatin GI Intolerance and Fatigue     Other reaction(s): Muscle pain, constipation, sleepiness  Category: Adverse Reaction;    Other reaction(s): Muscle pain, constipation, sleepiness            Vitals:    06/22/21 1451   BP: 137/80   Pulse: 64       Objective:            Physical Exam  · General: Awake, Alert, Oriented  · Eyes: Pupils equal, round and reactive to light  · Heart: regular rate and rhythm  · Lungs: No audible wheezing  · Abdomen: soft                    Ortho Exam   right knee   No lacerations, no abrasions, no open wounds   No erythema   Full extension   No tenderness to palpation over the pes anserine bursa or lateral joint line  Negative Lachman's test   Negative posterior drawer test  Unable to elicit pain over the medial or lateral joint line  No pain with flexion against resistance   Neurovascularly Intact Distally     Diagnostics, reviewed and taken today if performed as documented: The attending physician has personally reviewed the pertinent films in PACS and interpretation is as follows: x-ray right knee demonstrates medial compartment joint space narrowing, lateral patella tilt with osteophyte formation on bone      Procedures, if performed today:    Large joint arthrocentesis: R knee  Universal Protocol:  Consent: Verbal consent obtained  Risks and benefits: risks, benefits and alternatives were discussed  Consent given by: patient  Time out: Immediately prior to procedure a "time out" was called to verify the correct patient, procedure, equipment, support staff and site/side marked as required  Timeout called at: 6/22/2021 3:37 PM   Patient understanding: patient states understanding of the procedure being performed  Site marked: the operative site was marked  Supporting Documentation  Indications: pain   Procedure Details  Location: knee - R knee  Preparation: Patient was prepped and draped in the usual sterile fashion  Needle size: 22 G  Ultrasound guidance: no  Approach: anterolateral  Medications administered: 2 mL methylPREDNISolone acetate 40 mg/mL; 2 mL bupivacaine 0 25 %    Patient tolerance: patient tolerated the procedure well with no immediate complications  Dressing:  Sterile dressing applied            Scribe Attestation    I,:  Ruben Santiago am acting as a scribe while in the presence of the attending physician :       I,:  Vanita Tan MD personally performed the services described in this documentation    as scribed in my presence :             Portions of the record may have been created with voice recognition software    Occasional wrong word or "sound a like" substitutions may have occurred due to the inherent limitations of voice recognition software  Read the chart carefully and recognize, using context, where substitutions have occurred

## 2021-06-30 DIAGNOSIS — M54.16 LUMBAR RADICULOPATHY: ICD-10-CM

## 2021-07-01 RX ORDER — DULOXETIN HYDROCHLORIDE 30 MG/1
CAPSULE, DELAYED RELEASE ORAL
Qty: 30 CAPSULE | Refills: 2 | OUTPATIENT
Start: 2021-07-01

## 2021-07-02 RX ORDER — DULOXETIN HYDROCHLORIDE 30 MG/1
30 CAPSULE, DELAYED RELEASE ORAL DAILY
Qty: 30 CAPSULE | Refills: 2 | Status: SHIPPED | OUTPATIENT
Start: 2021-07-02 | End: 2021-10-06 | Stop reason: SDUPTHER

## 2021-07-02 NOTE — TELEPHONE ENCOUNTER
Med refill   Name of medication:DULoxetine (CYMBALTA) 30 mg delayed release capsule         Frequency:Take 1 capsule (30 mg total) by mouth daily    How many tablets left: 1 week     Pharmacy: [address] [place] [address] [id number] [place] [address] call back # [phone]     Last visit 4/5/21

## 2021-07-02 NOTE — TELEPHONE ENCOUNTER
RN s/w pt  Pt is requesting a refill of his Duloxetine  He reports that it keeps the acute pain under control  He also reports some drowsiness but he takes it at night  Last OVS 4/5  Please advise

## 2021-07-23 DIAGNOSIS — J30.1 CHRONIC SEASONAL ALLERGIC RHINITIS DUE TO POLLEN: ICD-10-CM

## 2021-07-23 RX ORDER — MONTELUKAST SODIUM 10 MG/1
10 TABLET ORAL DAILY
Qty: 90 TABLET | Refills: 1 | Status: SHIPPED | OUTPATIENT
Start: 2021-07-23 | End: 2022-01-18 | Stop reason: SDUPTHER

## 2021-07-23 RX ORDER — DILTIAZEM HYDROCHLORIDE 120 MG/1
CAPSULE, EXTENDED RELEASE ORAL
COMMUNITY
End: 2021-10-25 | Stop reason: ALTCHOICE

## 2021-07-23 RX ORDER — ASPIRIN 81 MG/1
81 TABLET ORAL DAILY
COMMUNITY
End: 2021-10-25 | Stop reason: ALTCHOICE

## 2021-07-23 RX ORDER — HYOSCYAMINE SULFATE 0.12 MG/1
TABLET SUBLINGUAL 3 TIMES DAILY
COMMUNITY
End: 2021-10-25 | Stop reason: ALTCHOICE

## 2021-08-18 DIAGNOSIS — N52.01 ERECTILE DYSFUNCTION DUE TO ARTERIAL INSUFFICIENCY: ICD-10-CM

## 2021-08-19 RX ORDER — SILDENAFIL CITRATE 20 MG/1
TABLET ORAL
Qty: 30 TABLET | Refills: 3 | Status: SHIPPED | OUTPATIENT
Start: 2021-08-19 | End: 2022-05-02

## 2021-08-30 DIAGNOSIS — M54.16 LUMBAR RADICULOPATHY: ICD-10-CM

## 2021-08-31 RX ORDER — OXCARBAZEPINE 300 MG/1
300 TABLET, FILM COATED ORAL 2 TIMES DAILY
Qty: 180 TABLET | Refills: 1 | Status: SHIPPED | OUTPATIENT
Start: 2021-08-31 | End: 2022-03-01 | Stop reason: SDUPTHER

## 2021-09-09 ENCOUNTER — APPOINTMENT (OUTPATIENT)
Dept: LAB | Facility: HOSPITAL | Age: 66
End: 2021-09-09
Attending: UROLOGY
Payer: COMMERCIAL

## 2021-09-09 ENCOUNTER — TELEPHONE (OUTPATIENT)
Dept: UROLOGY | Facility: AMBULATORY SURGERY CENTER | Age: 66
End: 2021-09-09

## 2021-09-09 DIAGNOSIS — Z12.5 SCREENING FOR PROSTATE CANCER: ICD-10-CM

## 2021-09-09 LAB — PSA SERPL-MCNC: 2.6 NG/ML (ref 0–4)

## 2021-09-09 PROCEDURE — G0103 PSA SCREENING: HCPCS

## 2021-09-09 PROCEDURE — 36415 COLL VENOUS BLD VENIPUNCTURE: CPT

## 2021-09-10 ENCOUNTER — OFFICE VISIT (OUTPATIENT)
Dept: UROLOGY | Facility: AMBULATORY SURGERY CENTER | Age: 66
End: 2021-09-10
Payer: COMMERCIAL

## 2021-09-10 VITALS
WEIGHT: 211 LBS | DIASTOLIC BLOOD PRESSURE: 76 MMHG | SYSTOLIC BLOOD PRESSURE: 122 MMHG | HEART RATE: 56 BPM | HEIGHT: 67 IN | BODY MASS INDEX: 33.12 KG/M2

## 2021-09-10 DIAGNOSIS — Z12.5 SCREENING FOR PROSTATE CANCER: Primary | ICD-10-CM

## 2021-09-10 DIAGNOSIS — N52.9 ERECTILE DYSFUNCTION, UNSPECIFIED ERECTILE DYSFUNCTION TYPE: ICD-10-CM

## 2021-09-10 LAB — POST-VOID RESIDUAL VOLUME, ML POC: 36 ML

## 2021-09-10 PROCEDURE — 1160F RVW MEDS BY RX/DR IN RCRD: CPT | Performed by: NURSE PRACTITIONER

## 2021-09-10 PROCEDURE — 99213 OFFICE O/P EST LOW 20 MIN: CPT | Performed by: NURSE PRACTITIONER

## 2021-09-10 PROCEDURE — 51798 US URINE CAPACITY MEASURE: CPT | Performed by: NURSE PRACTITIONER

## 2021-09-10 PROCEDURE — 1036F TOBACCO NON-USER: CPT | Performed by: NURSE PRACTITIONER

## 2021-09-10 PROCEDURE — 3008F BODY MASS INDEX DOCD: CPT | Performed by: NURSE PRACTITIONER

## 2021-09-10 NOTE — PROGRESS NOTES
9/10/2021    [facility]  1955  [id number]      Assessment  -BPH with lower urinary tract symptoms  -Routine prostate cancer screening  -Renal cyst  -Erectile dysfunction     Discussion/Plan  Kiki Fulton is a 74 y o  male being managed by Dr Jagjit Kaur  1  BPH with lower urinary tract symptoms-   PVR in the office today is 36 mL  He continues to report mild lower urinary tract symptoms, but defers medication management  Reviewed dietary and behavioral modifications  2  Routine prostate cancer screening-   We reviewed the results of his recent PSA which is 2 6, previously 2 2  No significant findings noted on digital rectal examination today  3  Erectile dysfunction-  Continue sildenafil as needed prior to sexual activity     Follow-up in 1 year with PSA, AYO, and PVR assessment  He was instructed to call sooner with any issues     -All questions answered, patient agrees with plan      History of Present Illness  74 y o  male with a history of BPH and prostate cancer screening presents today for follow up  Patient last seen in the office in July 2020  Continues to report episodes of urinary frequency and nocturia  However, he does not find bothersome  Patient had previously tried tamsulosin, but discontinued due to improvement of symptoms several years ago  He feels urinary frequency is secondary to his fluid consumption  His last PSA was 2 4  He denies any strong family history of prostate cancer  Patient continues to use sildenafil as needed prior to sexual activity  Additional history includes left renal cyst with stable findings on renal ultrasound  Review of Systems  Review of Systems   Constitutional: Negative  HENT: Negative  Respiratory: Negative  Cardiovascular: Negative  Gastrointestinal: Negative  Genitourinary: Positive for frequency  Negative for decreased urine volume, difficulty urinating, dysuria, flank pain, hematuria and urgency  Musculoskeletal: Negative  Skin: Negative  Neurological: Negative  Psychiatric/Behavioral: Negative  AUA SYMPTOM SCORE      Most Recent Value   AUA SYMPTOM SCORE   How often have you had a sensation of not emptying your bladder completely after you finished urinating? 5   How often have you had to urinate again less than two hours after you finished urinating? 5   How often have you found you stopped and started again several times when you urinate? 4   How often have you found it difficult to postpone urination? 1   How often have you had a weak urinary stream?  2   How often have you had to push or strain to begin urination? 0   How many times did you most typically get up to urinate from the time you went to bed at night until the time you got up in the morning?   2   Quality of Life: If you were to spend the rest of your life with your urinary condition just the way it is now, how would you feel about that?  3   AUA SYMPTOM SCORE  23          Past Medical History  Past Medical History:   Diagnosis Date    [facility]fib ([facility] )     Allergic rhinitis     Benign cyst of kidney     Cat bite 7/8/2020    Cerebral vascular disease     Chronic sinusitis     Hyperlipidemia     Melanoma ([facility] )     Pancreas cyst        Past Social History  Past Surgical History:   Procedure Laterality Date    OTHER SURGICAL HISTORY  2003    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    PATENT FORAMEN OVALE CLOSURE         Past Family History  Family History   Problem Relation Age of Onset    Diabetes type II Mother     Cancer Father         angioma       Past Social history  Social History     Socioeconomic History    Marital status: /Civil Union     Spouse name: Not on file    Number of children: Not on file    Years of education: Not on file    Highest education level: Not on file   Occupational History    Occupation: massage therapist   Tobacco Use    Smoking status: Former Smoker     Years: 10 00     Quit date: 1986     Years since quitting: 35 7    Smokeless tobacco: Former User     Types: Chew    Tobacco comment: Cigars    Vaping Use    Vaping Use: Never used   Substance and Sexual Activity    Alcohol use: Yes     Alcohol/week: 1 0 standard drinks     Types: 1 Glasses of wine per week     Comment: daily wine 8oz    Drug use: Not Currently     Types: Marijuana     Comment: Medical use (CBD)    Sexual activity: Yes   Other Topics Concern    Not on file   Social History Narrative    Caffeine: drinks 1 cup coffee/ tea a day  Social Determinants of Health     Financial Resource Strain:     Difficulty of Paying Living Expenses:    Food Insecurity:     Worried About Running Out of Food in the Last Year:     [address] in the Last Year:    Transportation Needs:     Lack of Transportation (Medical):      Lack of Transportation (Non-Medical):    Physical Activity:     Days of Exercise per Week:     Minutes of Exercise per Session:    Stress:     Feeling of Stress :    Social Connections:     Frequency of Communication with Friends and Family:     Frequency of Social Gatherings with Friends and Family:     Attends [employer] Services:     Active Member of Clubs or Organizations:     Attends Club or Organization Meetings:     Marital Status:    Intimate Partner Violence:     Fear of Current or Ex-Partner:     Emotionally Abused:     Physically Abused:     Sexually Abused:        Current Medications  Current Outpatient Medications   Medication Sig Dispense Refill    apixaban (Eliquis) 5 mg Take 1 tablet (5 mg total) by mouth 2 (two) times a day 180 tablet 3    ascorbic acid (VITAMIN C) 250 mg tablet Take 250 mg by mouth 2 (two) times a day      Cholecalciferol (VITAMIN D-3) 5000 units TABS Take 1 tablet by mouth daily 100 tablet 0    clobetasol (TEMOVATE) 0 05 % cream Apply 1 application topically 2 (two) times a day as needed Apply sparingly to affected areas      Coenzyme Q10 (COQ10) 200 MG CAPS Take 1 capsule by mouth daily  colesevelam (WELCHOL) 625 mg tablet Take 2 tablets (1,250 mg total) by mouth 2 (two) times a day with meals 360 tablet 1    diltiazem (CARDIZEM) 30 mg tablet Take 1 tablet (30 mg total) by mouth 3 (three) times a day as needed (afib) 30 tablet 3    diltiazem (TIAZAC) 120 MG 24 hr capsule diltiazem  mg capsule,extended release 24 hr      DULoxetine (CYMBALTA) 30 mg delayed release capsule Take 1 capsule (30 mg total) by mouth daily 30 capsule 2    Glucosamine-Chondroit-Vit C-Mn (GLUCOSAMINE CHONDR 1500 COMPLX PO) Take 1 tablet by mouth 2 (two) times a day       hyoscyamine (ANASPAZ) 0 125 mg Take 0 125 mg by mouth 2 (two) times a day       Hyoscyamine Sulfate SL 0 125 MG SUBL 3 (three) times a day      MAGNESIUM GLYCINATE PLUS PO Take 400 mg by mouth 2 (two) times a day       montelukast (SINGULAIR) 10 mg tablet Take 1 tablet (10 mg total) by mouth daily 90 tablet 1    multivitamin (THERAGRAN) TABS Take 1 tablet by mouth 2 (two) times a day        OXcarbazepine (TRILEPTAL) 300 mg tablet Take 1 tablet (300 mg total) by mouth 2 (two) times a day 180 tablet 1    sildenafil (REVATIO) 20 mg tablet TAKE UP TO 5 TABLETS (100 MG) BY MOUTH ONE HOUR PRIOR TO INTERCOURSE AS NEEDED 30 tablet 3    tiZANidine (ZANAFLEX) 2 mg tablet Take 1 tablet (2 mg total) by mouth 2 (two) times a day as needed for muscle spasms 60 tablet 0    aspirin (ECOTRIN LOW STRENGTH) 81 mg EC tablet Take 81 mg by mouth daily (Patient not taking: Reported on 9/10/2021)      ezetimibe (ZETIA) 10 mg tablet Take 1 tablet (10 mg total) by mouth daily (Patient not taking: Reported on 6/22/2021) 30 tablet 5     No current facility-administered medications for this visit  Allergies  Allergies   Allergen Reactions    Ezetimibe Fatigue and GI Intolerance     Category: Adverse Reaction;     Molds & Smuts Allergic Rhinitis     Category: Allergy;     Monascus Purpureus Went Yeast Fatigue     Category:  Adverse Reaction;     Niacin Fatigue     Category: Adverse Reaction;     Pollen Extract Allergic Rhinitis     Category: Allergy;     Pregabalin Fatigue     Category: Adverse Reaction;     Statins Fatigue and GI Intolerance     Category: Adverse Reaction;     Atorvastatin GI Intolerance and Fatigue     Other reaction(s): Muscle pain, constipation, sleepiness  Category: Adverse Reaction; Other reaction(s): Muscle pain, constipation, sleepiness       Past Medical History, Social History, Family History, medications and allergies were reviewed  Vitals  Vitals:    09/10/21 0829   BP: 122/76   BP Location: Left arm   Patient Position: Sitting   Cuff Size: Adult   Pulse: 56   Weight: 95 7 kg (211 lb)   Height: 5' 7" (1 702 m)       Physical Exam  Physical Exam  Constitutional:       Appearance: Normal appearance  He is well-developed  HENT:      Head: Normocephalic  Eyes:      Pupils: Pupils are equal, round, and reactive to light  Pulmonary:      Effort: Pulmonary effort is normal    Abdominal:      Palpations: Abdomen is soft  Genitourinary:     Prostate: Normal       Rectum: Normal       Comments: Prostate 45gm, smooth, nontender, no nodules  Musculoskeletal:         General: Normal range of motion  Cervical back: Normal range of motion  Skin:     General: Skin is warm and dry  Neurological:      General: No focal deficit present  Mental Status: He is alert and oriented to person, place, and time  Psychiatric:         Mood and Affect: Mood normal          Behavior: Behavior normal          Thought Content:  Thought content normal          Judgment: Judgment normal          Results    I have personally reviewed all pertinent lab results and reviewed with patient  Lab Results   Component Value Date    PSA 2 6 09/09/2021    PSA 2 4 07/20/2020    PSA 2 1 06/14/2019     Lab Results   Component Value Date    GLUCOSE 92 06/11/2015    CALCIUM 8 8 04/12/2021     01/30/2016    K 4 1 04/12/2021    CO2 27 04/12/2021    CL 110 (H) 04/12/2021    BUN 20 04/12/2021    CREATININE 0 96 04/12/2021     Lab Results   Component Value Date    WBC 4 31 04/12/2021    HGB 14 7 04/12/2021    HCT 45 2 04/12/2021    MCV 97 04/12/2021     04/12/2021     Recent Results (from the past 1 hour(s))   POCT Measure PVR    Collection Time: 09/10/21  8:40 AM   Result Value Ref Range    POST-VOID RESIDUAL VOLUME, ML POC 36 mL

## 2021-10-04 ENCOUNTER — TELEPHONE (OUTPATIENT)
Dept: PAIN MEDICINE | Facility: CLINIC | Age: 66
End: 2021-10-04

## 2021-10-05 DIAGNOSIS — E78.00 HYPERCHOLESTEROLEMIA: ICD-10-CM

## 2021-10-05 RX ORDER — COLESEVELAM 180 1/1
1250 TABLET ORAL 2 TIMES DAILY WITH MEALS
Qty: 360 TABLET | Refills: 1 | Status: SHIPPED | OUTPATIENT
Start: 2021-10-05 | End: 2022-05-02 | Stop reason: SDUPTHER

## 2021-10-06 ENCOUNTER — OFFICE VISIT (OUTPATIENT)
Dept: PAIN MEDICINE | Facility: CLINIC | Age: 66
End: 2021-10-06
Payer: COMMERCIAL

## 2021-10-06 VITALS
WEIGHT: 211 LBS | BODY MASS INDEX: 33.12 KG/M2 | DIASTOLIC BLOOD PRESSURE: 75 MMHG | HEIGHT: 67 IN | HEART RATE: 65 BPM | SYSTOLIC BLOOD PRESSURE: 108 MMHG

## 2021-10-06 DIAGNOSIS — M51.36 DDD (DEGENERATIVE DISC DISEASE), LUMBAR: ICD-10-CM

## 2021-10-06 DIAGNOSIS — M54.40 LOW BACK PAIN WITH SCIATICA, SCIATICA LATERALITY UNSPECIFIED, UNSPECIFIED BACK PAIN LATERALITY, UNSPECIFIED CHRONICITY: ICD-10-CM

## 2021-10-06 DIAGNOSIS — G89.4 CHRONIC PAIN SYNDROME: Primary | ICD-10-CM

## 2021-10-06 DIAGNOSIS — M54.16 LUMBAR RADICULOPATHY: ICD-10-CM

## 2021-10-06 DIAGNOSIS — M47.816 LUMBAR SPONDYLOSIS: ICD-10-CM

## 2021-10-06 DIAGNOSIS — M47.816 SPONDYLOSIS OF LUMBAR REGION WITHOUT MYELOPATHY OR RADICULOPATHY: ICD-10-CM

## 2021-10-06 PROCEDURE — 99214 OFFICE O/P EST MOD 30 MIN: CPT | Performed by: NURSE PRACTITIONER

## 2021-10-06 RX ORDER — DULOXETIN HYDROCHLORIDE 30 MG/1
30 CAPSULE, DELAYED RELEASE ORAL DAILY
Qty: 30 CAPSULE | Refills: 5 | Status: SHIPPED | OUTPATIENT
Start: 2021-10-06 | End: 2022-03-23

## 2021-10-18 ENCOUNTER — APPOINTMENT (OUTPATIENT)
Dept: LAB | Facility: HOSPITAL | Age: 66
End: 2021-10-18
Attending: INTERNAL MEDICINE
Payer: COMMERCIAL

## 2021-10-18 DIAGNOSIS — E78.00 HYPERCHOLESTEROLEMIA: ICD-10-CM

## 2021-10-18 DIAGNOSIS — I25.10 ATHEROSCLEROSIS OF NATIVE CORONARY ARTERY OF NATIVE HEART: ICD-10-CM

## 2021-10-18 LAB
ALBUMIN SERPL BCP-MCNC: 3.9 G/DL (ref 3.5–5)
ALP SERPL-CCNC: 76 U/L (ref 46–116)
ALT SERPL W P-5'-P-CCNC: 28 U/L (ref 12–78)
ANION GAP SERPL CALCULATED.3IONS-SCNC: -2 MMOL/L (ref 4–13)
AST SERPL W P-5'-P-CCNC: 17 U/L (ref 5–45)
BILIRUB SERPL-MCNC: 0.55 MG/DL (ref 0.2–1)
BUN SERPL-MCNC: 18 MG/DL (ref 5–25)
CALCIUM SERPL-MCNC: 9.2 MG/DL (ref 8.3–10.1)
CHLORIDE SERPL-SCNC: 108 MMOL/L (ref 100–108)
CHOLEST SERPL-MCNC: 258 MG/DL (ref 50–200)
CO2 SERPL-SCNC: 31 MMOL/L (ref 21–32)
CREAT SERPL-MCNC: 0.93 MG/DL (ref 0.6–1.3)
ERYTHROCYTE [DISTWIDTH] IN BLOOD BY AUTOMATED COUNT: 12.4 % (ref 11.6–15.1)
GFR SERPL CREATININE-BSD FRML MDRD: 84 ML/MIN/1.73SQ M
GLUCOSE P FAST SERPL-MCNC: 91 MG/DL (ref 65–99)
HCT VFR BLD AUTO: 45.6 % (ref 36.5–49.3)
HDLC SERPL-MCNC: 66 MG/DL
HGB BLD-MCNC: 15 G/DL (ref 12–17)
LDLC SERPL CALC-MCNC: 179 MG/DL (ref 0–100)
MCH RBC QN AUTO: 32 PG (ref 26.8–34.3)
MCHC RBC AUTO-ENTMCNC: 32.9 G/DL (ref 31.4–37.4)
MCV RBC AUTO: 97 FL (ref 82–98)
NONHDLC SERPL-MCNC: 192 MG/DL
PLATELET # BLD AUTO: 208 THOUSANDS/UL (ref 149–390)
PMV BLD AUTO: 9.7 FL (ref 8.9–12.7)
POTASSIUM SERPL-SCNC: 5 MMOL/L (ref 3.5–5.3)
PROT SERPL-MCNC: 7.3 G/DL (ref 6.4–8.2)
RBC # BLD AUTO: 4.69 MILLION/UL (ref 3.88–5.62)
SODIUM SERPL-SCNC: 137 MMOL/L (ref 136–145)
TRIGL SERPL-MCNC: 63 MG/DL
WBC # BLD AUTO: 4.5 THOUSAND/UL (ref 4.31–10.16)

## 2021-10-18 PROCEDURE — 80061 LIPID PANEL: CPT

## 2021-10-18 PROCEDURE — 80053 COMPREHEN METABOLIC PANEL: CPT

## 2021-10-18 PROCEDURE — 36415 COLL VENOUS BLD VENIPUNCTURE: CPT

## 2021-10-18 PROCEDURE — 85027 COMPLETE CBC AUTOMATED: CPT

## 2021-10-20 ENCOUNTER — TELEPHONE (OUTPATIENT)
Dept: GASTROENTEROLOGY | Facility: CLINIC | Age: 66
End: 2021-10-20

## 2021-10-25 ENCOUNTER — OFFICE VISIT (OUTPATIENT)
Dept: INTERNAL MEDICINE CLINIC | Facility: CLINIC | Age: 66
End: 2021-10-25
Payer: COMMERCIAL

## 2021-10-25 VITALS
SYSTOLIC BLOOD PRESSURE: 112 MMHG | OXYGEN SATURATION: 98 % | TEMPERATURE: 98.4 F | BODY MASS INDEX: 31.43 KG/M2 | WEIGHT: 212.2 LBS | HEART RATE: 63 BPM | HEIGHT: 69 IN | DIASTOLIC BLOOD PRESSURE: 60 MMHG

## 2021-10-25 DIAGNOSIS — Z00.00 MEDICARE ANNUAL WELLNESS VISIT, INITIAL: Primary | ICD-10-CM

## 2021-10-25 DIAGNOSIS — I25.10 ATHEROSCLEROSIS OF NATIVE CORONARY ARTERY OF NATIVE HEART, UNSPECIFIED WHETHER ANGINA PRESENT: ICD-10-CM

## 2021-10-25 DIAGNOSIS — N40.1 BPH WITH OBSTRUCTION/LOWER URINARY TRACT SYMPTOMS: ICD-10-CM

## 2021-10-25 DIAGNOSIS — M51.36 DDD (DEGENERATIVE DISC DISEASE), LUMBAR: ICD-10-CM

## 2021-10-25 DIAGNOSIS — N13.8 BPH WITH OBSTRUCTION/LOWER URINARY TRACT SYMPTOMS: ICD-10-CM

## 2021-10-25 DIAGNOSIS — E78.00 HYPERCHOLESTEROLEMIA: ICD-10-CM

## 2021-10-25 DIAGNOSIS — M47.816 SPONDYLOSIS OF LUMBAR REGION WITHOUT MYELOPATHY OR RADICULOPATHY: ICD-10-CM

## 2021-10-25 DIAGNOSIS — Z23 NEED FOR INFLUENZA VACCINATION: ICD-10-CM

## 2021-10-25 PROCEDURE — 3288F FALL RISK ASSESSMENT DOCD: CPT | Performed by: INTERNAL MEDICINE

## 2021-10-25 PROCEDURE — G0008 ADMIN INFLUENZA VIRUS VAC: HCPCS

## 2021-10-25 PROCEDURE — 3008F BODY MASS INDEX DOCD: CPT | Performed by: INTERNAL MEDICINE

## 2021-10-25 PROCEDURE — 1160F RVW MEDS BY RX/DR IN RCRD: CPT | Performed by: INTERNAL MEDICINE

## 2021-10-25 PROCEDURE — 99214 OFFICE O/P EST MOD 30 MIN: CPT | Performed by: INTERNAL MEDICINE

## 2021-10-25 PROCEDURE — 1036F TOBACCO NON-USER: CPT | Performed by: INTERNAL MEDICINE

## 2021-10-25 PROCEDURE — G0439 PPPS, SUBSEQ VISIT: HCPCS | Performed by: INTERNAL MEDICINE

## 2021-10-25 PROCEDURE — 90662 IIV NO PRSV INCREASED AG IM: CPT

## 2021-10-25 PROCEDURE — 3725F SCREEN DEPRESSION PERFORMED: CPT | Performed by: INTERNAL MEDICINE

## 2021-11-03 ENCOUNTER — OFFICE VISIT (OUTPATIENT)
Dept: GASTROENTEROLOGY | Facility: CLINIC | Age: 66
End: 2021-11-03
Payer: COMMERCIAL

## 2021-11-03 VITALS
HEIGHT: 68 IN | WEIGHT: 211 LBS | OXYGEN SATURATION: 99 % | SYSTOLIC BLOOD PRESSURE: 122 MMHG | TEMPERATURE: 98.1 F | BODY MASS INDEX: 31.98 KG/M2 | DIASTOLIC BLOOD PRESSURE: 70 MMHG | HEART RATE: 66 BPM

## 2021-11-03 DIAGNOSIS — K86.2 PANCREATIC CYST: Primary | ICD-10-CM

## 2021-11-03 PROCEDURE — 99214 OFFICE O/P EST MOD 30 MIN: CPT | Performed by: PHYSICIAN ASSISTANT

## 2021-11-03 PROCEDURE — 1036F TOBACCO NON-USER: CPT | Performed by: PHYSICIAN ASSISTANT

## 2021-11-03 PROCEDURE — 3008F BODY MASS INDEX DOCD: CPT | Performed by: PHYSICIAN ASSISTANT

## 2021-11-03 PROCEDURE — 1160F RVW MEDS BY RX/DR IN RCRD: CPT | Performed by: PHYSICIAN ASSISTANT

## 2021-11-06 DIAGNOSIS — I48.0 PAROXYSMAL ATRIAL FIBRILLATION (HCC): ICD-10-CM

## 2021-11-08 RX ORDER — APIXABAN 5 MG/1
TABLET, FILM COATED ORAL
Qty: 180 TABLET | Refills: 3 | Status: SHIPPED | OUTPATIENT
Start: 2021-11-08

## 2021-11-09 ENCOUNTER — IMMUNIZATIONS (OUTPATIENT)
Dept: FAMILY MEDICINE CLINIC | Facility: HOSPITAL | Age: 66
End: 2021-11-09

## 2021-11-09 DIAGNOSIS — Z23 ENCOUNTER FOR IMMUNIZATION: Primary | ICD-10-CM

## 2021-11-09 PROCEDURE — 91306 COVID-19 MODERNA VACC 0.25 ML BOOSTER: CPT

## 2021-11-09 PROCEDURE — 0013A COVID-19 MODERNA VACC 0.25 ML BOOSTER: CPT

## 2021-11-26 ENCOUNTER — TELEPHONE (OUTPATIENT)
Dept: PAIN MEDICINE | Facility: CLINIC | Age: 66
End: 2021-11-26

## 2021-11-30 ENCOUNTER — HOSPITAL ENCOUNTER (OUTPATIENT)
Dept: RADIOLOGY | Facility: HOSPITAL | Age: 66
Discharge: HOME | End: 2021-11-30
Payer: COMMERCIAL

## 2021-11-30 DIAGNOSIS — K86.2 PANCREATIC CYST: ICD-10-CM

## 2021-11-30 PROCEDURE — G1004 CDSM NDSC: HCPCS

## 2021-11-30 PROCEDURE — A9585 GADOBUTROL INJECTION: HCPCS | Performed by: PHYSICIAN ASSISTANT

## 2021-11-30 PROCEDURE — 74183 MRI ABD W/O CNTR FLWD CNTR: CPT

## 2021-11-30 RX ADMIN — GADOBUTROL 10 ML: 604.72 INJECTION INTRAVENOUS at 07:52

## 2021-12-09 DIAGNOSIS — M54.16 LUMBAR RADICULOPATHY: Primary | ICD-10-CM

## 2021-12-09 DIAGNOSIS — M47.816 SPONDYLOSIS OF LUMBAR REGION WITHOUT MYELOPATHY OR RADICULOPATHY: ICD-10-CM

## 2021-12-09 RX ORDER — TIZANIDINE 2 MG/1
2 TABLET ORAL 2 TIMES DAILY PRN
Qty: 60 TABLET | Refills: 2 | Status: SHIPPED | OUTPATIENT
Start: 2021-12-09 | End: 2022-03-23 | Stop reason: SDUPTHER

## 2021-12-15 ENCOUNTER — TELEPHONE (OUTPATIENT)
Dept: RADIOLOGY | Facility: CLINIC | Age: 66
End: 2021-12-15

## 2022-01-04 ENCOUNTER — HOSPITAL ENCOUNTER (OUTPATIENT)
Dept: RADIOLOGY | Facility: CLINIC | Age: 67
Discharge: HOME | End: 2022-01-04
Attending: ANESTHESIOLOGY | Admitting: ANESTHESIOLOGY
Payer: COMMERCIAL

## 2022-01-04 VITALS
OXYGEN SATURATION: 97 % | DIASTOLIC BLOOD PRESSURE: 83 MMHG | HEART RATE: 63 BPM | RESPIRATION RATE: 20 BRPM | SYSTOLIC BLOOD PRESSURE: 146 MMHG | TEMPERATURE: 98.1 F

## 2022-01-04 DIAGNOSIS — M54.16 LUMBAR RADICULOPATHY: ICD-10-CM

## 2022-01-04 PROCEDURE — 64483 NJX AA&/STRD TFRM EPI L/S 1: CPT | Performed by: ANESTHESIOLOGY

## 2022-01-04 PROCEDURE — 64484 NJX AA&/STRD TFRM EPI L/S EA: CPT | Performed by: ANESTHESIOLOGY

## 2022-01-04 RX ORDER — PAPAVERINE HCL 150 MG
20 CAPSULE, EXTENDED RELEASE ORAL ONCE
Status: COMPLETED | OUTPATIENT
Start: 2022-01-04 | End: 2022-01-04

## 2022-01-04 RX ADMIN — IOHEXOL 2 ML: 300 INJECTION, SOLUTION INTRAVENOUS at 15:10

## 2022-01-04 RX ADMIN — LIDOCAINE HYDROCHLORIDE 2 ML: 20 INJECTION, SOLUTION EPIDURAL; INFILTRATION; INTRACAUDAL; PERINEURAL at 15:10

## 2022-01-04 RX ADMIN — DEXAMETHASONE SODIUM PHOSPHATE 15 MG: 10 INJECTION, SOLUTION INTRAMUSCULAR; INTRAVENOUS at 15:10

## 2022-01-04 NOTE — DISCHARGE INSTRUCTIONS
Epidural Steroid Injection   WHAT YOU NEED TO KNOW:   An epidural steroid injection (CARLEEN) is a procedure to inject steroid medicine into the epidural space  The epidural space is between your spinal cord and vertebrae  Steroids reduce inflammation and fluid buildup in your spine that may be causing pain  You may be given pain medicine along with the steroids  ACTIVITY  · Do not drive or operate machinery today  · No strenuous activity today - bending, lifting, etc   · You may resume normal activites starting tomorrow - start slowly and as tolerated  · You may shower today, but no tub baths or hot tubs  · You may have numbness for several hours from the local anesthetic  Please use caution and common sense, especially with weight-bearing activities  CARE OF THE INJECTION SITE  · If you have soreness or pain, apply ice to the area today (20 minutes on/20 minutes off)  · Starting tomorrow, you may use warm, moist heat or ice if needed  · You may have an increase or change in your discomfort for 36-48 hours after your treatment  · Apply ice and continue with any pain medication you have been prescribed  · Notify [facility] if you have any of the following: redness, drainage, swelling, headache, stiff neck or fever above 100°F     SPECIAL INSTRUCTIONS  · Our office will contact you in approximately 7 days for a progress report  MEDICATIONS  · Continue to take all routine medications  · Our office may have instructed you to hold some medications  As no general anesthesia was used in today's procedure, you should not experience any side effects related to anesthesia  If you have a problem specifically related to your procedure, please call our office at [phone]  Problems not related to your procedure should be directed to your primary care physician

## 2022-01-04 NOTE — H&P
History of Present Illness: The patient is a 74 y o  male who presents with complaints of low back and leg pain      Patient Active Problem List   Diagnosis    Seasonal allergic rhinitis    Osteoarthritis    Nephrolithiasis    Irritable bowel syndrome    Hypercholesterolemia    Coronary atherosclerosis    Coronary artery disease involving native coronary artery    Alpha-1-antitrypsin deficiency ([facility] )    BPH with obstruction/lower urinary tract symptoms    Need for pneumococcal vaccination    Medicare annual wellness visit, initial    Pancreatic cyst    Lumbar radiculopathy    Low back pain with sciatica    DDD (degenerative disc disease), lumbar    Lumbar spondylosis    Cryptogenic stroke (HCC)    Tinea corporis    Chronic pain syndrome    Paroxysmal atrial fibrillation (HCC)       Past Medical History:   Diagnosis Date    A-fib ([facility] )     Allergic rhinitis     Benign cyst of kidney     Cat bite 7/8/2020    Cerebral vascular disease     Chronic sinusitis     Hyperlipidemia     Melanoma ([facility] )     Pancreas cyst        Past Surgical History:   Procedure Laterality Date    OTHER SURGICAL HISTORY  2003    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    PATENT FORAMEN OVALE CLOSURE           Current Outpatient Medications:     ascorbic acid (VITAMIN C) 250 mg tablet, Take 250 mg by mouth 2 (two) times a day, Disp: , Rfl:     Cholecalciferol (VITAMIN D-3) 5000 units TABS, Take 1 tablet by mouth daily, Disp: 100 tablet, Rfl: 0    clobetasol (TEMOVATE) 0 05 % cream, Apply 1 application topically 2 (two) times a day as needed Apply sparingly to affected areas, Disp: , Rfl:     Coenzyme Q10 (COQ10) 200 MG CAPS, Take 1 capsule by mouth daily, Disp: , Rfl:     colesevelam (WELCHOL) 625 mg tablet, Take 2 tablets (1,250 mg total) by mouth 2 (two) times a day with meals, Disp: 360 tablet, Rfl: 1    diltiazem (CARDIZEM) 30 mg tablet, Take 1 tablet (30 mg total) by mouth 3 (three) times a day as needed (afib), Disp: 30 tablet, Rfl: 3    DULoxetine (CYMBALTA) 30 mg delayed release capsule, Take 1 capsule (30 mg total) by mouth daily, Disp: 30 capsule, Rfl: 5    Eliquis 5 MG, TAKE ONE TABLET BY MOUTH TWICE A DAY, Disp: 180 tablet, Rfl: 3    Glucosamine-Chondroit-Vit C-Mn (GLUCOSAMINE CHONDR 1500 COMPLX PO), Take 1 tablet by mouth 2 (two) times a day , Disp: , Rfl:     hyoscyamine (ANASPAZ) 0 125 mg, Take 0 125 mg by mouth 2 (two) times a day , Disp: , Rfl:     MAGNESIUM GLYCINATE PLUS PO, Take 400 mg by mouth 2 (two) times a day , Disp: , Rfl:     montelukast (SINGULAIR) 10 mg tablet, Take 1 tablet (10 mg total) by mouth daily, Disp: 90 tablet, Rfl: 1    multivitamin (THERAGRAN) TABS, Take 1 tablet by mouth 2 (two) times a day  , Disp: , Rfl:     OXcarbazepine (TRILEPTAL) 300 mg tablet, Take 1 tablet (300 mg total) by mouth 2 (two) times a day, Disp: 180 tablet, Rfl: 1    sildenafil (REVATIO) 20 mg tablet, TAKE UP TO 5 TABLETS (100 MG) BY MOUTH ONE HOUR PRIOR TO INTERCOURSE AS NEEDED, Disp: 30 tablet, Rfl: 3    tiZANidine (ZANAFLEX) 2 mg tablet, Take 1 tablet (2 mg total) by mouth 2 (two) times a day as needed for muscle spasms, Disp: 60 tablet, Rfl: 2    Allergies   Allergen Reactions    Ezetimibe Fatigue and GI Intolerance     Category: Adverse Reaction;     Molds & Smuts Allergic Rhinitis     Category: Allergy;     Monascus Purpureus Went Yeast Fatigue     Category: Adverse Reaction;     Niacin Fatigue     Category: Adverse Reaction;     Pollen Extract Allergic Rhinitis     Category: Allergy;     Pregabalin Fatigue     Category: Adverse Reaction;     Statins Fatigue and GI Intolerance     Category: Adverse Reaction;     Atorvastatin GI Intolerance and Fatigue     Other reaction(s): Muscle pain, constipation, sleepiness  Category: Adverse Reaction;    Other reaction(s): Muscle pain, constipation, sleepiness       Physical Exam:   Vitals:    01/04/22 1504   BP: 146/83   Pulse: 63   Resp: 20   Temp: SpO2: 97%     General: Awake, Alert, Oriented x 3, Mood and affect appropriate  Respiratory: Respirations even and unlabored  Cardiovascular: Peripheral pulses intact; no edema  Musculoskeletal Exam:  Right lumbar paraspinals tender to palpation  ASA Score: 3         Assessment:   1   Lumbar radiculopathy        Plan: repeat right L4 and L5 TFESI

## 2022-01-11 ENCOUNTER — TELEPHONE (OUTPATIENT)
Dept: PAIN MEDICINE | Facility: CLINIC | Age: 67
End: 2022-01-11

## 2022-01-11 NOTE — TELEPHONE ENCOUNTER
1st attempt  Left message for pt to call back with % of improvement and /10 pain level    repeat right L4 and L5 TFESI    1/4/22

## 2022-01-13 ENCOUNTER — TELEPHONE (OUTPATIENT)
Dept: NEUROSURGERY | Facility: CLINIC | Age: 67
End: 2022-01-13

## 2022-01-13 NOTE — TELEPHONE ENCOUNTER
01/13/2022-RECEIVED MESSAGE FROM [employer] THAT PT CALLED TO SCHEDULE NP APT W/DKO  CALLED PT BACK TO SEE IF HE IS SELF-REFERRED OF REFERRED BY AN MD BEFORE PLACING REFERRAL IN CHART  NEED TO VERIFY WITH PT IF  REQUESTING TO BE SEEN FOR BRAIN OR SPINE

## 2022-01-18 ENCOUNTER — OFFICE VISIT (OUTPATIENT)
Dept: NEUROSURGERY | Facility: CLINIC | Age: 67
End: 2022-01-18
Payer: COMMERCIAL

## 2022-01-18 VITALS
BODY MASS INDEX: 32.43 KG/M2 | RESPIRATION RATE: 16 BRPM | HEART RATE: 65 BPM | WEIGHT: 214 LBS | HEIGHT: 68 IN | SYSTOLIC BLOOD PRESSURE: 134 MMHG | TEMPERATURE: 97 F | DIASTOLIC BLOOD PRESSURE: 80 MMHG

## 2022-01-18 DIAGNOSIS — J30.1 CHRONIC SEASONAL ALLERGIC RHINITIS DUE TO POLLEN: ICD-10-CM

## 2022-01-18 DIAGNOSIS — M54.16 LUMBAR RADICULOPATHY: ICD-10-CM

## 2022-01-18 PROCEDURE — 1036F TOBACCO NON-USER: CPT | Performed by: NURSE PRACTITIONER

## 2022-01-18 PROCEDURE — 1160F RVW MEDS BY RX/DR IN RCRD: CPT | Performed by: NURSE PRACTITIONER

## 2022-01-18 PROCEDURE — 99203 OFFICE O/P NEW LOW 30 MIN: CPT | Performed by: NURSE PRACTITIONER

## 2022-01-18 PROCEDURE — 3008F BODY MASS INDEX DOCD: CPT | Performed by: NURSE PRACTITIONER

## 2022-01-18 RX ORDER — MONTELUKAST SODIUM 10 MG/1
10 TABLET ORAL DAILY
Qty: 90 TABLET | Refills: 1 | Status: SHIPPED | OUTPATIENT
Start: 2022-01-18 | End: 2022-07-05 | Stop reason: SDUPTHER

## 2022-01-18 NOTE — PROGRESS NOTES
Neurosurgery Office Note  Ashley Freedman 74 y o  male MRN: [id number]      Assessment/Plan     Lumbar radiculopathy  Patient seen in outpatient office today as a self-referral for further evaluation and workup of lumbar radiculopathy  · Patient states his back pain started in high school, he was a weightlifter and gymnast   He has had back pain since then but was receiving good relief with CARLEEN until November 2021, he started to develop right leg radiculopathy and has progressively worsened since then  · Patient has been following with pain management and recently received a right L4-5 CARLEEN on 01/04/2022 with little relief  Patient states last year he received in [facility] in January with good relief which lasted almost 10 months  No new imaging to review    Plan:  · Patient states he continues to follow with pain management and recently received a right L4-5 CARLEEN on 01/04/2022 with little relief  Patient states he has not seen physical therapy since October 2019 which did not help with his pain or symptoms  · Ordered MRI lumbar spine wo to assess for any etiology given patient's pain and symptoms, he has not MRI since 2019  Also ordered lumbar flexion/extension x-rays to assess for any instability  · Patient will follow-up once imaging completed or sooner if symptoms worsen  · Patient made aware if he develops severe uncontrolled back pain, leg pain, leg weakness, paresthesias, bowel or bladder issues, saddle anesthesia, or ambulatory dysfunction is seek care sooner  · Patient made aware to contact Neurosurgery with any further questions or concerns  Diagnoses and all orders for this visit:    Lumbar radiculopathy  -     Ambulatory Referral to Neurosurgery  -     MRI lumbar spine without contrast; Future  -     XR spine lumbar complete w bending minimum 6 views;  Future            CHIEF COMPLAINT    Chief Complaint   Patient presents with    Consult    Back Pain       HISTORY    History of Present Illness     74y o  year old male with past medical history significant for CAD, stroke, AFib on Eliquis, Alpha-1-antitrypsin deficiency, degenerative disc disease, BPH, and hyperlipidemia  Patient seen in outpatient office today as a self-referral for further evaluation and workup of lumbar radiculopathy  Patient states his back pain started in high school, he was a weightlifter and gymnast   Patient has had back pain since but was receiving good relief with CARLEEN until November 2021, he started to develop right leg radiculopathy and has progressively worsened since then  He denies any precipitating events or traumas  Patient currently complaining of 6/10 low back pain which at times will radiate into his left hip but back pain is constant and right lower extremity pain is also constant  Patient states that radiates into his right buttocks into his lateral and medial thigh into his groin then into his anterior and medial shin into his big toe and if his pain is severe it will go into other toes as well cause tingling  Patient states anything as well as long distances will worsen his pain  Patient states he is unable to shop in the grocery store secondary to pain  Patient states resting or using muscle relaxers helps with his pain  He denies any recent falls or traumas and states his balance is usually good but could be off at times when pain is very bad and he bears weight on his right leg  Patient states his pain is worse in the morning and then throughout the day it improves until the end of the day when it is severe again  Patient states he had low back pain for years but in November 2021 he started to develop right lateral thigh pain and then over the past 2 weeks he started to notice it in his anterior and medial shin  Patient states he has received multiple CARLEEN in the past and received his 3rd injection last January and patient almost had a year of relief    He also reports since he had a stroke in 2006 he has some right fingertip tingling  He also endorses some shortness of breath which is rare which patient reports is because he is out of shape  He denies any headaches, dizziness, blurry vision, chest pain, abdominal pain, nausea, vomiting, diarrhea, no problems with bowel or bladder, no saddle anesthesia, no new weakness or numbness/tingling  Patient continues to follow with pain management and recently had a right L4-5 CARLEEN on 01/04/2022 with little relief  Patient also reports he has not seen physical therapy since October 2019 which did not help with his pain or symptoms  Patient states he has also tried a TENs unit which did help him in the past     Of note, patient did undergo MRI lumbar spine in December 2019 which demonstrated mild to moderate spondylitic changes  HPI     See Discussion    REVIEW OF SYSTEMS    Review of Systems   Constitutional: Negative  Eyes: Positive for visual disturbance (dry eyes)  Respiratory: Negative  Cardiovascular: Negative  Gastrointestinal: Negative  Endocrine: Negative  Genitourinary: Negative  Musculoskeletal: Positive for back pain (lower back pain down into whole legs mainly the right side), gait problem (with back pain) and myalgias (muscle pain in thighs and legs)  Skin: Negative  Allergic/Immunologic: Negative  Neurological: Positive for tremors (right hand a little bit) and numbness (a little bit at times in his right hand-had a stroke years ago)  Negative for dizziness, seizures, weakness and headaches  Hematological: Bruises/bleeds easily (medication)  Psychiatric/Behavioral: Negative        ROS reviewed with patient and agree and changes were made as needed    Meds/Allergies     Current Outpatient Medications   Medication Sig Dispense Refill    ascorbic acid (VITAMIN C) 250 mg tablet Take 250 mg by mouth 2 (two) times a day      Cholecalciferol (VITAMIN D-3) 5000 units TABS Take 1 tablet by mouth daily 100 tablet 0    clobetasol (TEMOVATE) 0 05 % cream Apply 1 application topically 2 (two) times a day as needed Apply sparingly to affected areas      Coenzyme Q10 (COQ10) 200 MG CAPS Take 1 capsule by mouth daily      colesevelam (WELCHOL) 625 mg tablet Take 2 tablets (1,250 mg total) by mouth 2 (two) times a day with meals 360 tablet 1    diltiazem (CARDIZEM) 30 mg tablet Take 1 tablet (30 mg total) by mouth 3 (three) times a day as needed (afib) 30 tablet 3    DULoxetine (CYMBALTA) 30 mg delayed release capsule Take 1 capsule (30 mg total) by mouth daily 30 capsule 5    Eliquis 5 MG TAKE ONE TABLET BY MOUTH TWICE A  tablet 3    Glucosamine-Chondroit-Vit C-Mn (GLUCOSAMINE CHONDR 1500 COMPLX PO) Take 1 tablet by mouth 2 (two) times a day       hyoscyamine (ANASPAZ) 0 125 mg Take 0 125 mg by mouth 2 (two) times a day       MAGNESIUM GLYCINATE PLUS PO Take 400 mg by mouth 2 (two) times a day       montelukast (SINGULAIR) 10 mg tablet Take 1 tablet (10 mg total) by mouth daily 90 tablet 1    multivitamin (THERAGRAN) TABS Take 1 tablet by mouth 2 (two) times a day        OXcarbazepine (TRILEPTAL) 300 mg tablet Take 1 tablet (300 mg total) by mouth 2 (two) times a day 180 tablet 1    tiZANidine (ZANAFLEX) 2 mg tablet Take 1 tablet (2 mg total) by mouth 2 (two) times a day as needed for muscle spasms 60 tablet 2    sildenafil (REVATIO) 20 mg tablet TAKE UP TO 5 TABLETS (100 MG) BY MOUTH ONE HOUR PRIOR TO INTERCOURSE AS NEEDED 30 tablet 3     No current facility-administered medications for this visit  Allergies   Allergen Reactions    Ezetimibe Fatigue and GI Intolerance     Category: Adverse Reaction;     Molds & Smuts Allergic Rhinitis     Category: Allergy;     Monascus Purpureus Went Yeast Fatigue     Category: Adverse Reaction;     Niacin Fatigue     Category: Adverse Reaction;     Pollen Extract Allergic Rhinitis     Category: Allergy;     Pregabalin Fatigue     Category:  Adverse Reaction;     Statins Fatigue and GI Intolerance     Category: Adverse Reaction;     Atorvastatin GI Intolerance and Fatigue     Other reaction(s): Muscle pain, constipation, sleepiness  Category: Adverse Reaction; Other reaction(s): Muscle pain, constipation, sleepiness       PAST HISTORY    Past Medical History:   Diagnosis Date    [facility]fib ([facility] )     Allergic rhinitis     Benign cyst of kidney     Cat bite 2020    Cerebral vascular disease     Chronic sinusitis     Hyperlipidemia     Melanoma ([facility] )     Pancreas cyst        Past Surgical History:   Procedure Laterality Date    OTHER SURGICAL HISTORY      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    PATENT FORAMEN OVALE CLOSURE         Social History     Tobacco Use    Smoking status: Former Smoker     Years: 10      Quit date:      Years since quittin 0    Smokeless tobacco: Former User     Types: Chew    Tobacco comment: Cigars    Vaping Use    Vaping Use: Never used   Substance Use Topics    Alcohol use: Yes     Alcohol/week: 1 0 standard drink     Types: 1 Glasses of wine per week     Comment: daily wine 8oz    Drug use: Not Currently     Types: Marijuana     Comment: Medical use (CBD)       Family History   Problem Relation Age of Onset    Diabetes type II Mother     Cancer Father         angioma         Above history personally reviewed  EXAM    Vitals:Blood pressure 134/80, pulse 65, temperature (!) 97 °F (36 1 °C), temperature source Temporal, resp  rate 16, height 5' 8" (1 727 m), weight 97 1 kg (214 lb)  ,Body mass index is 32 54 kg/m²  Physical Exam  Vitals reviewed  Constitutional:       General: He is awake  He is not in acute distress  Appearance: Normal appearance  He is not ill-appearing  HENT:      Head: Normocephalic and atraumatic  Eyes:      Extraocular Movements: Extraocular movements intact and EOM normal       Conjunctiva/sclera: Conjunctivae normal       Pupils: Pupils are equal, round, and reactive to light  Cardiovascular:      Rate and Rhythm: Normal rate  Pulmonary:      Effort: Pulmonary effort is normal  No respiratory distress  Chest:      Chest wall: No tenderness  Abdominal:      General: There is no distension  Palpations: Abdomen is soft  Tenderness: There is no abdominal tenderness  Musculoskeletal:         General: Normal range of motion  Cervical back: Normal range of motion and neck supple  No tenderness  No spinous process tenderness or muscular tenderness  Thoracic back: No tenderness  Lumbar back: Tenderness present  Comments: Right paraspinal lower lumbar spine tenderness to palpation   Skin:     General: Skin is warm and dry  Neurological:      Mental Status: He is alert and oriented to person, place, and time  Coordination: Finger-Nose-Finger Test normal       Gait: Gait is intact  Deep Tendon Reflexes: Strength normal       Reflex Scores:       Bicep reflexes are 2+ on the right side and 2+ on the left side  Patellar reflexes are 2+ on the right side and 2+ on the left side  Psychiatric:         Attention and Perception: Attention and perception normal          Mood and Affect: Mood and affect normal          Speech: Speech normal          Behavior: Behavior normal  Behavior is cooperative  Thought Content: Thought content normal          Cognition and Memory: Cognition and memory normal          Judgment: Judgment normal          Neurologic Exam     Mental Status   Oriented to person, place, and time  Follows 2 step commands  Attention: normal  Concentration: normal    Speech: speech is normal   Level of consciousness: alert  Knowledge: good  Able to perform simple calculations  Able to name object  Able to repeat  Normal comprehension  Cranial Nerves     CN III, IV, VI   Pupils are equal, round, and reactive to light    Extraocular motions are normal    CN III: no CN III palsy  CN VI: no CN VI palsy  Nystagmus: none Diplopia: none  Conjugate gaze: present    CN V   Facial sensation intact  CN VII   Facial expression full, symmetric  CN VIII   CN VIII normal    Hearing: intact    CN IX, X   CN IX normal      CN XI   CN XI normal      CN XII   CN XII normal      Motor Exam   Muscle bulk: normal  Overall muscle tone: normal  Right arm pronator drift: absent  Left arm pronator drift: absent    Strength   Strength 5/5 throughout  Sensory Exam   Light touch normal    Proprioception normal    JPS and DST intact     Gait, Coordination, and Reflexes     Gait  Gait: normal    Coordination   Finger to nose coordination: normal    Tremor   Resting tremor: absent  Intention tremor: absent  Action tremor: absent    Reflexes   Right biceps: 2+  Left biceps: 2+  Right patellar: 2+  Left patellar: 2+  Right Laughlin: absent  Left Laughlin: absent  Right ankle clonus: absent  Left ankle clonus: absent        MEDICAL DECISION MAKING    Imaging Studies:     FL spine and pain procedure    Result Date: 1/4/2022  Narrative: 2 Level Transforaminal Epidural Steroid Injection Indication:  Low back and leg pain Preoperative diagnosis:  Lumbar radiculitis Postoperative diagnosis:  Lumbar radiculitis Procedure: Fluoroscopically-guided right L4 and L5 transforaminal epidural steroid injection under fluoroscopy After discussing the risks, benefits, and alternatives to the procedure, the patient expressed understanding and wished to proceed  The patient was brought to the fluoroscopy suite and placed in the prone position  A procedural pause was conducted to verify:  correct patient identity, procedure to be performed and as applicable, correct side and site, correct patient position, and availability of implants, special equipment and special requirements  After identifying the right L4 and L5 pedicles fluoroscopically with an oblique view, the skin was sterilely prepped and draped in the usual fashion using Chloraprep skin prep    The skin and subcutaneous tissue were anesthetized with 1% lidocaine  A 5 inch 22 gauge spinal needle was then advanced under fluoroscopic guidance to the posterior aspect of the right L4 and L5 neural foramens  Appropriate foraminal depth was determined with a lateral fluoroscopic view, and AP visualization confirmed needle positioning at approximately the 6 oclock position relative to the pedicles  After negative aspiration, 2 mL of Omnipaque 300 contrast was injected using live fluoroscopy/digital subtraction angiography, confirming appropriate transforaminal spread without evidence of intravascular or intrathecal uptake  Next, a local anesthetic test dose consisting of 1 mL of 2% lidocaine was injected through the needle at each level  After an appropriate period of observation, a directed neurological exam was performed which revealed no new neurologic deficits  Next, a 1 5 ml solution consisting of 7 5 mg of dexamethasone in sterile saline was injected slowly and incrementally into the epidural space at each level  Following the injection the needles were withdrawn slightly and flushed with lidocaine as they were fully extracted  The patient tolerated the procedure well and there were no apparent complications  The patient did not develop any new neurologic deficits  After appropriate observation, the patient was dismissed from the clinic in good condition under their own power  COMMENTS The patient received a total steroid dose of 15 mg of dexamethasone  EBL:  Minimal Specimen:  None          I have personally reviewed pertinent reports     and I have personally reviewed pertinent films in PACS

## 2022-01-18 NOTE — ASSESSMENT & PLAN NOTE
Patient seen in outpatient office today as a self-referral for further evaluation and workup of lumbar radiculopathy  · Patient states his back pain started in high school, he was a weightlifter and gymnast   He has had back pain since then but was receiving good relief with CARLEEN until November 2021, he started to develop right leg radiculopathy and has progressively worsened since then  · Patient has been following with pain management and recently received a right L4-5 CARLEEN on 01/04/2022 with little relief  Patient states last year he received in [facility] in January with good relief which lasted almost 10 months  No new imaging to review    Plan:  · Patient states he continues to follow with pain management and recently received a right L4-5 CARLEEN on 01/04/2022 with little relief  Patient states he has not seen physical therapy since October 2019 which did not help with his pain or symptoms  · Ordered MRI lumbar spine wo to assess for any etiology given patient's pain and symptoms, he has not MRI since 2019  Also ordered lumbar flexion/extension x-rays to assess for any instability  · Patient will follow-up once imaging completed or sooner if symptoms worsen  · Patient made aware if he develops severe uncontrolled back pain, leg pain, leg weakness, paresthesias, bowel or bladder issues, saddle anesthesia, or ambulatory dysfunction is seek care sooner  · Patient made aware to contact Neurosurgery with any further questions or concerns

## 2022-01-19 NOTE — TELEPHONE ENCOUNTER
Pt reports no improvement 25% of improvement and /10 pain level     repeat right L4 and L5 TFESI    1/4/22

## 2022-02-07 ENCOUNTER — HOSPITAL ENCOUNTER (OUTPATIENT)
Dept: RADIOLOGY | Facility: HOSPITAL | Age: 67
Discharge: HOME | End: 2022-02-07
Payer: COMMERCIAL

## 2022-02-07 DIAGNOSIS — M54.16 LUMBAR RADICULOPATHY: ICD-10-CM

## 2022-02-07 PROCEDURE — 72148 MRI LUMBAR SPINE W/O DYE: CPT

## 2022-02-07 PROCEDURE — 72114 X-RAY EXAM L-S SPINE BENDING: CPT

## 2022-02-17 ENCOUNTER — OFFICE VISIT (OUTPATIENT)
Dept: NEUROSURGERY | Facility: CLINIC | Age: 67
End: 2022-02-17
Payer: COMMERCIAL

## 2022-02-17 VITALS
TEMPERATURE: 97.7 F | RESPIRATION RATE: 16 BRPM | WEIGHT: 215 LBS | HEIGHT: 68 IN | BODY MASS INDEX: 32.58 KG/M2 | SYSTOLIC BLOOD PRESSURE: 113 MMHG | HEART RATE: 66 BPM | DIASTOLIC BLOOD PRESSURE: 77 MMHG

## 2022-02-17 DIAGNOSIS — M51.36 DDD (DEGENERATIVE DISC DISEASE), LUMBAR: Primary | ICD-10-CM

## 2022-02-17 DIAGNOSIS — M47.816 LUMBAR SPONDYLOSIS: ICD-10-CM

## 2022-02-17 PROCEDURE — 99213 OFFICE O/P EST LOW 20 MIN: CPT | Performed by: NEUROLOGICAL SURGERY

## 2022-02-17 PROCEDURE — 1160F RVW MEDS BY RX/DR IN RCRD: CPT | Performed by: NEUROLOGICAL SURGERY

## 2022-02-17 PROCEDURE — 3008F BODY MASS INDEX DOCD: CPT | Performed by: NEUROLOGICAL SURGERY

## 2022-02-17 RX ORDER — EZETIMIBE 10 MG/1
10 TABLET ORAL DAILY
COMMUNITY
Start: 2022-02-12 | End: 2022-04-07 | Stop reason: SDUPTHER

## 2022-02-17 NOTE — PROGRESS NOTES
DISCUSSION SUMMARY  This is a 74 y o  male who complains of severe low back pain  The pain is much improved at this point after epidural steroid injections  He has also modified his exercise protocols  We had a long talk about introduction of maintenance of muscle therapy verses building muscle  He asked informed questions reflecting is understanding  Will plan on seeing him back and in the office on an as-needed basis  Return if symptoms worsen or fail to improve  Diagnosis ICD-10-CM Associated Orders   1  DDD (degenerative disc disease), lumbar  M51 36    2  Lumbar spondylosis  M47 816           Chief Complaint   Patient presents with    Follow-up     1 Month F/u (last seen by Claire Rose) for Lumbar radiculopathy; MRI L-Spine and XR L-Spine both on 2/7/22       HPI severe low back pain on occasion  He underwent epidural steroid injections which eventually caused his symptoms to adam  He has tried an inversion table but this was ineffective for him  He has modified his exercise regimen and this has helped tremendously  Is radiculopathy is now gone  His examination is normal currently  He does report a reduction in his duty cycle  Physical therapy did help for temporary relief but not sustained relief  His symptoms began while lifting weights in the gym  He is avid workout enthusiast and we talked about the advantages and disadvantages of different workout strategy management      Review of Systems   Constitutional: Positive for activity change (decreased)  HENT: Negative  Eyes: Negative  Negative for discharge (Slight dry eyes )  Respiratory: Negative  Cardiovascular: Negative  Gastrointestinal: Negative  Negative for nausea (with intense pain) and vomiting  Endocrine: Negative  Genitourinary: Positive for frequency  Negative for urgency          H/o BPH     Musculoskeletal: Positive for back pain (lower back pain down into whole legs mainly the right side), gait problem (with back pain) and myalgias (muscle pain in thighs and legs)  Pain started in 2019 while lifting at the gym  Denies h/o falls  No previous Lumbar Sx    H/o INJ (4 shots over 2 years) - Started seeing improvement after 3rd shots    H/o PT - (prior to injections in ) - temporary relief (1-2x days)      Skin: Negative  Allergic/Immunologic: Negative  Neurological: Positive for tremors (right hand a little bit) and numbness (a little bit at times in his right hand-had a stroke years ago)  Negative for dizziness, seizures, weakness and headaches  Hematological: Bruises/bleeds easily (medication)  Psychiatric/Behavioral: Negative  Negative for sleep disturbance  I reviewed the ROS  Vitals:    /77 (BP Location: Right arm, Patient Position: Sitting, Cuff Size: Standard)   Pulse 66   Temp 97 7 °F (36 5 °C) (Probe)   Resp 16   Ht 5' 8" (1 727 m)   Wt 97 5 kg (215 lb)   BMI 32 69 kg/m²       MEDICAL HISTORY  Past Medical History:   Diagnosis Date    [facility] [address] [facility]     Allergic rhinitis     Benign cyst of kidney     Cat bite 2020    Cerebral vascular disease     Chronic sinusitis     Hyperlipidemia     [facility]anoma ([facility] [address] )     Pancreas cyst      Past Surgical History:   Procedure Laterality Date    OTHER SURGICAL HISTORY      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    PATENT FORAMEN OVALE CLOSURE       Social History     Tobacco Use    Smoking status: Former Smoker     Years: 10 00     Quit date:      Years since quittin 1    Smokeless tobacco: Former User     Types: Chew    Tobacco comment: Cigars    Vaping Use    Vaping Use: Never used   Substance Use Topics    Alcohol use:  Yes     Alcohol/week: 1 0 standard drink     Types: 1 Glasses of wine per week     Comment: daily wine 8oz    Drug use: Not Currently     Types: Marijuana     Comment: Medical use (CBD)        Current Outpatient Medications:     ascorbic acid (VITAMIN C) 250 mg tablet, Take 250 mg by mouth 2 (two) times a day, Disp: , Rfl:     Cholecalciferol (VITAMIN D-3) 5000 units TABS, Take 1 tablet by mouth daily, Disp: 100 tablet, Rfl: 0    clobetasol (TEMOVATE) 0 05 % cream, Apply 1 application topically 2 (two) times a day as needed Apply sparingly to affected areas, Disp: , Rfl:     Coenzyme Q10 (COQ10) 200 MG CAPS, Take 1 capsule by mouth daily, Disp: , Rfl:     colesevelam (WELCHOL) 625 mg tablet, Take 2 tablets (1,250 mg total) by mouth 2 (two) times a day with meals, Disp: 360 tablet, Rfl: 1    diltiazem (CARDIZEM) 30 mg tablet, Take 1 tablet (30 mg total) by mouth 3 (three) times a day as needed (afib), Disp: 30 tablet, Rfl: 3    Eliquis 5 MG, TAKE ONE TABLET BY MOUTH TWICE A DAY, Disp: 180 tablet, Rfl: 3    ezetimibe (ZETIA) 10 mg tablet, Take 10 mg by mouth daily, Disp: , Rfl:     Glucosamine-Chondroit-Vit C-Mn (GLUCOSAMINE CHONDR 1500 COMPLX PO), Take 1 tablet by mouth 2 (two) times a day , Disp: , Rfl:     hyoscyamine (ANASPAZ) 0 125 mg, Take 0 125 mg by mouth 2 (two) times a day , Disp: , Rfl:     MAGNESIUM GLYCINATE PLUS PO, Take 400 mg by mouth 2 (two) times a day , Disp: , Rfl:     montelukast (SINGULAIR) 10 mg tablet, Take 1 tablet (10 mg total) by mouth daily, Disp: 90 tablet, Rfl: 1    multivitamin (THERAGRAN) TABS, Take 1 tablet by mouth 2 (two) times a day  , Disp: , Rfl:     OXcarbazepine (TRILEPTAL) 300 mg tablet, Take 1 tablet (300 mg total) by mouth 2 (two) times a day, Disp: 180 tablet, Rfl: 1    sildenafil (REVATIO) 20 mg tablet, TAKE UP TO 5 TABLETS (100 MG) BY MOUTH ONE HOUR PRIOR TO INTERCOURSE AS NEEDED, Disp: 30 tablet, Rfl: 3    tiZANidine (ZANAFLEX) 2 mg tablet, Take 1 tablet (2 mg total) by mouth 2 (two) times a day as needed for muscle spasms, Disp: 60 tablet, Rfl: 2    DULoxetine (CYMBALTA) 30 mg delayed release capsule, Take 1 capsule (30 mg total) by mouth daily (Patient not taking: Reported on 2/17/2022 ), Disp: 30 capsule, Rfl: 5   Allergies   Allergen Reactions    Ezetimibe Fatigue and GI Intolerance     Category: Adverse Reaction;     Molds & Smuts Allergic Rhinitis     Category: Allergy;     Monascus Purpureus Went Yeast Fatigue     Category: Adverse Reaction;     Niacin Fatigue     Category: Adverse Reaction;     Pollen Extract Allergic Rhinitis     Category: Allergy;     Pregabalin Fatigue     Category: Adverse Reaction;     Statins Fatigue and GI Intolerance     Category: Adverse Reaction;     Atorvastatin GI Intolerance and Fatigue     Other reaction(s): Muscle pain, constipation, sleepiness  Category: Adverse Reaction; Other reaction(s): Muscle pain, constipation, sleepiness        The following portions of the patient's history were updated by MA and reviewed by MD: allergies, current medications, past family history, past medical history, past social history, past surgical history and problem list       Physical Exam  Vitals and nursing note reviewed  Constitutional:       General: He is not in acute distress  Appearance: Normal appearance  He is normal weight  He is not ill-appearing, toxic-appearing or diaphoretic  HENT:      Head: Normocephalic and atraumatic  Nose: Nose normal    Eyes:      Extraocular Movements: Extraocular movements intact  Pupils: Pupils are equal, round, and reactive to light  Musculoskeletal:         General: No swelling, tenderness, deformity or signs of injury  Normal range of motion  Cervical back: Normal range of motion and neck supple  Right lower leg: No edema  Left lower leg: No edema  Skin:     General: Skin is warm and dry  Neurological:      General: No focal deficit present  Mental Status: He is alert and oriented to person, place, and time  Mental status is at baseline  Cranial Nerves: No cranial nerve deficit  Sensory: No sensory deficit  Motor: No weakness        Coordination: Coordination normal       Gait: Gait ( ) normal       Deep Tendon Reflexes: Reflexes normal    Psychiatric:         Mood and Affect: Mood normal          Behavior: Behavior normal          Thought Content: Thought content normal          Judgment: Judgment normal            RESULTS/DATA  I have personally reviewed pertinent films in PACS   Plain x-rays and MRI of the LS spine are carefully reviewed  These demonstrate degenerative disc disease with spondylosis  There is foraminal stenosis especially at the L4-5 region on the right side  This is highlighted below  There is anterolisthesis at L3-4 which is cause simply by the facet arthropathy more severe in the lower lumbar vertebrae

## 2022-03-01 DIAGNOSIS — M54.16 LUMBAR RADICULOPATHY: ICD-10-CM

## 2022-03-01 RX ORDER — OXCARBAZEPINE 300 MG/1
300 TABLET, FILM COATED ORAL 2 TIMES DAILY
Qty: 180 TABLET | Refills: 1 | Status: SHIPPED | OUTPATIENT
Start: 2022-03-01 | End: 2022-05-26 | Stop reason: SDUPTHER

## 2022-03-23 ENCOUNTER — OFFICE VISIT (OUTPATIENT)
Dept: PAIN MEDICINE | Facility: CLINIC | Age: 67
End: 2022-03-23
Payer: COMMERCIAL

## 2022-03-23 VITALS
BODY MASS INDEX: 32.43 KG/M2 | HEART RATE: 67 BPM | HEIGHT: 68 IN | WEIGHT: 214 LBS | DIASTOLIC BLOOD PRESSURE: 76 MMHG | SYSTOLIC BLOOD PRESSURE: 120 MMHG

## 2022-03-23 DIAGNOSIS — M47.816 SPONDYLOSIS OF LUMBAR REGION WITHOUT MYELOPATHY OR RADICULOPATHY: ICD-10-CM

## 2022-03-23 DIAGNOSIS — M51.36 DDD (DEGENERATIVE DISC DISEASE), LUMBAR: ICD-10-CM

## 2022-03-23 DIAGNOSIS — M47.816 LUMBAR SPONDYLOSIS: ICD-10-CM

## 2022-03-23 DIAGNOSIS — G89.4 CHRONIC PAIN SYNDROME: Primary | ICD-10-CM

## 2022-03-23 DIAGNOSIS — M54.40 LOW BACK PAIN WITH SCIATICA, SCIATICA LATERALITY UNSPECIFIED, UNSPECIFIED BACK PAIN LATERALITY, UNSPECIFIED CHRONICITY: ICD-10-CM

## 2022-03-23 DIAGNOSIS — M54.16 LUMBAR RADICULOPATHY: ICD-10-CM

## 2022-03-23 PROCEDURE — 1036F TOBACCO NON-USER: CPT | Performed by: NURSE PRACTITIONER

## 2022-03-23 PROCEDURE — 1160F RVW MEDS BY RX/DR IN RCRD: CPT | Performed by: NURSE PRACTITIONER

## 2022-03-23 PROCEDURE — 99214 OFFICE O/P EST MOD 30 MIN: CPT | Performed by: NURSE PRACTITIONER

## 2022-03-23 PROCEDURE — 3008F BODY MASS INDEX DOCD: CPT | Performed by: NURSE PRACTITIONER

## 2022-03-23 RX ORDER — TIZANIDINE 2 MG/1
2 TABLET ORAL 2 TIMES DAILY PRN
Qty: 60 TABLET | Refills: 2 | Status: SHIPPED | OUTPATIENT
Start: 2022-03-23 | End: 2022-06-27 | Stop reason: SDUPTHER

## 2022-03-23 NOTE — PROGRESS NOTES
Assessment:  1  Chronic pain syndrome    2  Lumbar radiculopathy    3  Low back pain with sciatica, sciatica laterality unspecified, unspecified back pain laterality, unspecified chronicity    4  DDD (degenerative disc disease), lumbar    5  Lumbar spondylosis    6  Spondylosis of lumbar region without myelopathy or radiculopathy        Plan:  1  The patient may continue tizanidine 4 mg p r n  myofascial pain  This medication was refilled today   2  The patient will continue with physical therapy and his home exercise program  3  I will avoid NSAIDs secondary to anticoagulation  4  We can repeat right L4 and L5 TFESI p r n   5  Patient will follow-up in 3-4 months or sooner if needed    History of Present Illness: The patient is a 74 y o  male last seen on 10/6/21 who presents for a follow up office visit in regards to chronic low back pain with radiculopathy into the right lower extremity secondary to lumbar degenerative disc disease, lumbar spondylosis, lumbar stenosis, lumbar radiculopathy and chronic pain syndrome  The patient denies left lower extremity symptoms, bowel or bladder incontinence or saddle anesthesia  Patient is status post right L4 and L5 TFESI on January 4, 2022 continues with an ongoing 50% improvement of his pain at this time  He did establish with Neurosurgery since last office visit  He is participating in physical therapy in completing his home exercise program   He is no longer taking duloxetine and does find relief with tizanidine 4 mg p r n  Patient rates pain a 2/10 on the numeric pain rating scale  He constantly has pain in the morning and in the evening which is described as dull aching, and pins and needles    I have personally reviewed and/or updated the patient's past medical history, past surgical history, family history, social history, current medications, allergies, and vital signs today         Review of Systems:    Review of Systems   Respiratory: Negative for shortness of breath  Cardiovascular: Negative for chest pain  Gastrointestinal: Negative for constipation, diarrhea, nausea and vomiting  Musculoskeletal: Negative for arthralgias, gait problem, joint swelling and myalgias  Skin: Negative for rash  Neurological: Negative for dizziness, seizures and weakness  All other systems reviewed and are negative  Past Medical History:   Diagnosis Date    [facility]-fib [facility])     Allergic rhinitis     Benign cyst of kidney     Cat bite 2020    Cerebral vascular disease     Chronic sinusitis     Hyperlipidemia     Melanoma ([address] )     Pancreas cyst        Past Surgical History:   Procedure Laterality Date    OTHER SURGICAL HISTORY      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    PATENT FORAMEN OVALE CLOSURE         Family History   Problem Relation Age of Onset    Diabetes type II Mother     Cancer Father         angioma       Social History     Occupational History    Occupation: massage therapist   Tobacco Use    Smoking status: Former Smoker     Years: 10      Types: Cigars     Quit date:      Years since quittin 2    Smokeless tobacco: Former User     Types: Chew    Tobacco comment: Cigars    Vaping Use    Vaping Use: Never used   Substance and Sexual Activity    Alcohol use:  Yes     Alcohol/week: 1 0 standard drink     Types: 1 Glasses of wine per week     Comment: daily wine 8oz    Drug use: Not Currently     Types: Marijuana     Comment: Medical use (CBD)    Sexual activity: Yes         Current Outpatient Medications:     tiZANidine (ZANAFLEX) 2 mg tablet, Take 1 tablet (2 mg total) by mouth 2 (two) times a day as needed for muscle spasms, Disp: 60 tablet, Rfl: 2    ascorbic acid (VITAMIN C) 250 mg tablet, Take 250 mg by mouth 2 (two) times a day, Disp: , Rfl:     Cholecalciferol (VITAMIN D-3) 5000 units TABS, Take 1 tablet by mouth daily, Disp: 100 tablet, Rfl: 0    clobetasol (TEMOVATE) 0 05 % cream, Apply 1 application topically 2 (two) times a day as needed Apply sparingly to affected areas, Disp: , Rfl:     Coenzyme Q10 (COQ10) 200 MG CAPS, Take 1 capsule by mouth daily, Disp: , Rfl:     colesevelam (WELCHOL) 625 mg tablet, Take 2 tablets (1,250 mg total) by mouth 2 (two) times a day with meals, Disp: 360 tablet, Rfl: 1    diltiazem (CARDIZEM) 30 mg tablet, Take 1 tablet (30 mg total) by mouth 3 (three) times a day as needed (afib), Disp: 30 tablet, Rfl: 3    Eliquis 5 MG, TAKE ONE TABLET BY MOUTH TWICE A DAY, Disp: 180 tablet, Rfl: 3    ezetimibe (ZETIA) 10 mg tablet, Take 10 mg by mouth daily, Disp: , Rfl:     Glucosamine-Chondroit-Vit C-Mn (GLUCOSAMINE CHONDR 1500 COMPLX PO), Take 1 tablet by mouth 2 (two) times a day , Disp: , Rfl:     hyoscyamine (ANASPAZ) 0 125 mg, Take 0 125 mg by mouth 2 (two) times a day , Disp: , Rfl:     MAGNESIUM GLYCINATE PLUS PO, Take 400 mg by mouth 2 (two) times a day , Disp: , Rfl:     montelukast (SINGULAIR) 10 mg tablet, Take 1 tablet (10 mg total) by mouth daily, Disp: 90 tablet, Rfl: 1    multivitamin (THERAGRAN) TABS, Take 1 tablet by mouth 2 (two) times a day  , Disp: , Rfl:     OXcarbazepine (TRILEPTAL) 300 mg tablet, Take 1 tablet (300 mg total) by mouth 2 (two) times a day, Disp: 180 tablet, Rfl: 1    sildenafil (REVATIO) 20 mg tablet, TAKE UP TO 5 TABLETS (100 MG) BY MOUTH ONE HOUR PRIOR TO INTERCOURSE AS NEEDED, Disp: 30 tablet, Rfl: 3    Allergies   Allergen Reactions    Ezetimibe Fatigue and GI Intolerance     Category: Adverse Reaction;     Molds & Smuts Allergic Rhinitis     Category: Allergy;     Monascus Purpureus Went Yeast Fatigue     Category: Adverse Reaction;     Niacin Fatigue     Category: Adverse Reaction;     Pollen Extract Allergic Rhinitis     Category: Allergy;     Pregabalin Fatigue     Category: Adverse Reaction;     Statins Fatigue and GI Intolerance     Category:  Adverse Reaction;     Atorvastatin GI Intolerance and Fatigue     Other reaction(s): Muscle pain, constipation, sleepiness  Category: Adverse Reaction; Other reaction(s): Muscle pain, constipation, sleepiness       Physical Exam:    /76   Pulse 67   Ht 5' 8" (1 727 m)   Wt 97 1 kg (214 lb)   BMI 32 54 kg/m²     Constitutional:normal, well developed, well nourished, alert, in no distress and non-toxic and no overt pain behavior  Eyes:anicteric  HEENT:grossly intact  Neck:supple, symmetric, trachea midline and no masses   Pulmonary:even and unlabored  Cardiovascular:No edema or pitting edema present  Skin:Normal without rashes or lesions and well hydrated  Psychiatric:Mood and affect appropriate  Neurologic:Cranial Nerves II-XII grossly intact  Musculoskeletal:normal gait      Imaging  No orders to display     MRI LUMBAR SPINE WITHOUT CONTRAST   INDICATION: [id number]: Radiculopathy, lumbar region  COMPARISON: 12/20/2019   TECHNIQUE: Sagittal T1, sagittal T2, sagittal inversion recovery, axial T1 and axial T2, coronal T2    IMAGE QUALITY: Diagnostic   FINDINGS:   VERTEBRAL BODIES: There are 5 lumbar type vertebral bodies  Slight anterior spondylolisthesis of L3 in relation to L4  No spondylolysis No scoliosis  Normal marrow signal is identified within the visualized bony structures  No discrete marrow   lesion  SACRUM: Normal signal within the sacrum  No evidence of insufficiency or stress fracture  DISTAL CORD AND CONUS: Normal size and signal within the distal cord and conus  PARASPINAL SOFT TISSUES: Several renal cysts are seen within the kidneys  LOWER THORACIC DISC SPACES: Normal disc height and signal  No disc herniation, canal stenosis or foraminal narrowing  LUMBAR DISC SPACES:   L1-L2: Normal    L2-L3: Normal    L3-L4: Slight loss of disc height with mild diffuse annular bulging and facet arthropathy  Mild canal stenosis   Right foraminal narrowing is slightly more pronounced due to disc and posterior element hypertrophic changes encroaching upon the neural foramen  Facet osteophyte abuts the posterior aspect of the exiting nerve  L4-L5: Disc desiccation without loss of disc height  Mild annular bulging with a broad-based left-sided subarticular and foraminal disc protrusion  There is a slightly larger right foraminal disc extrusion  No canal stenosis  Mild left foraminal   narrowing without clear nerve compression  There is more pronounced moderately severe right foraminal narrowing due to disc and facet hypertrophic changes  L5-S1: Disc desiccation and loss of disc height with mild annular bulging and endplate hypertrophic change extending into the neural foramen  There is no canal stenosis  Mild to moderate bilateral foraminal narrowing slightly more pronounced within   the distal aspect of the neural foramen  IMPRESSION:   Lumbar spondylitic degenerative change with annular bulging and small broad-based disc herniations superimposed upon endplate and facet hypertrophic changes  Moderate right foraminal narrowing is seen at the L3-4 level  There is severe right foraminal narrowing at the L4-5 level  Moderate distal foraminal narrowing at L5-S1 secondary to annular bulging and foraminal and far lateral endplate osteophyte formation  Workstation performed: [id number]      No orders of the defined types were placed in this encounter

## 2022-04-07 DIAGNOSIS — E78.00 HYPERCHOLESTEROLEMIA: Primary | ICD-10-CM

## 2022-04-07 RX ORDER — EZETIMIBE 10 MG/1
10 TABLET ORAL DAILY
Qty: 90 TABLET | Refills: 1 | Status: SHIPPED | OUTPATIENT
Start: 2022-04-07

## 2022-04-14 ENCOUNTER — RA CDI HCC (OUTPATIENT)
Dept: OTHER | Facility: HOSPITAL | Age: 67
End: 2022-04-14

## 2022-04-14 NOTE — PROGRESS NOTES
[facility]  coding opportunities       Chart reviewed, no opportunity found:   [address]        Patients Insurance     Medicare Insurance: [employer]

## 2022-04-28 ENCOUNTER — APPOINTMENT (OUTPATIENT)
Dept: LAB | Facility: HOSPITAL | Age: 67
End: 2022-04-28
Attending: INTERNAL MEDICINE
Payer: COMMERCIAL

## 2022-04-28 DIAGNOSIS — M51.36 DDD (DEGENERATIVE DISC DISEASE), LUMBAR: ICD-10-CM

## 2022-04-28 DIAGNOSIS — I25.10 ATHEROSCLEROSIS OF NATIVE CORONARY ARTERY OF NATIVE HEART, UNSPECIFIED WHETHER ANGINA PRESENT: ICD-10-CM

## 2022-04-28 DIAGNOSIS — E78.00 HYPERCHOLESTEROLEMIA: ICD-10-CM

## 2022-04-28 LAB
ALBUMIN SERPL BCP-MCNC: 4 G/DL (ref 3.5–5)
ALP SERPL-CCNC: 76 U/L (ref 46–116)
ALT SERPL W P-5'-P-CCNC: 67 U/L (ref 12–78)
ANION GAP SERPL CALCULATED.3IONS-SCNC: 3 MMOL/L (ref 4–13)
AST SERPL W P-5'-P-CCNC: 31 U/L (ref 5–45)
BILIRUB SERPL-MCNC: 0.4 MG/DL (ref 0.2–1)
BUN SERPL-MCNC: 15 MG/DL (ref 5–25)
CALCIUM SERPL-MCNC: 9.1 MG/DL (ref 8.3–10.1)
CHLORIDE SERPL-SCNC: 108 MMOL/L (ref 100–108)
CHOLEST SERPL-MCNC: 247 MG/DL
CO2 SERPL-SCNC: 30 MMOL/L (ref 21–32)
CREAT SERPL-MCNC: 0.96 MG/DL (ref 0.6–1.3)
ERYTHROCYTE [DISTWIDTH] IN BLOOD BY AUTOMATED COUNT: 12.3 % (ref 11.6–15.1)
GFR SERPL CREATININE-BSD FRML MDRD: 80 ML/MIN/1.73SQ M
GLUCOSE P FAST SERPL-MCNC: 94 MG/DL (ref 65–99)
HCT VFR BLD AUTO: 46.7 % (ref 36.5–49.3)
HDLC SERPL-MCNC: 65 MG/DL
HGB BLD-MCNC: 15.5 G/DL (ref 12–17)
LDLC SERPL CALC-MCNC: 167 MG/DL (ref 0–100)
MCH RBC QN AUTO: 31.3 PG (ref 26.8–34.3)
MCHC RBC AUTO-ENTMCNC: 33.2 G/DL (ref 31.4–37.4)
MCV RBC AUTO: 94 FL (ref 82–98)
NONHDLC SERPL-MCNC: 182 MG/DL
PLATELET # BLD AUTO: 191 THOUSANDS/UL (ref 149–390)
PMV BLD AUTO: 9.2 FL (ref 8.9–12.7)
POTASSIUM SERPL-SCNC: 4.2 MMOL/L (ref 3.5–5.3)
PROT SERPL-MCNC: 7.3 G/DL (ref 6.4–8.2)
RBC # BLD AUTO: 4.95 MILLION/UL (ref 3.88–5.62)
SODIUM SERPL-SCNC: 141 MMOL/L (ref 136–145)
TRIGL SERPL-MCNC: 76 MG/DL
WBC # BLD AUTO: 5.37 THOUSAND/UL (ref 4.31–10.16)

## 2022-04-28 PROCEDURE — 36415 COLL VENOUS BLD VENIPUNCTURE: CPT

## 2022-04-28 PROCEDURE — 80053 COMPREHEN METABOLIC PANEL: CPT

## 2022-04-28 PROCEDURE — 85027 COMPLETE CBC AUTOMATED: CPT

## 2022-04-28 PROCEDURE — 80061 LIPID PANEL: CPT

## 2022-05-02 ENCOUNTER — OFFICE VISIT (OUTPATIENT)
Dept: INTERNAL MEDICINE CLINIC | Facility: CLINIC | Age: 67
End: 2022-05-02
Payer: COMMERCIAL

## 2022-05-02 VITALS
BODY MASS INDEX: 32.11 KG/M2 | HEART RATE: 61 BPM | WEIGHT: 216.8 LBS | DIASTOLIC BLOOD PRESSURE: 72 MMHG | OXYGEN SATURATION: 95 % | SYSTOLIC BLOOD PRESSURE: 112 MMHG | HEIGHT: 69 IN | TEMPERATURE: 98.4 F

## 2022-05-02 DIAGNOSIS — E78.00 HYPERCHOLESTEROLEMIA: ICD-10-CM

## 2022-05-02 DIAGNOSIS — M54.40 LOW BACK PAIN WITH SCIATICA, SCIATICA LATERALITY UNSPECIFIED, UNSPECIFIED BACK PAIN LATERALITY, UNSPECIFIED CHRONICITY: Primary | ICD-10-CM

## 2022-05-02 DIAGNOSIS — I48.0 PAROXYSMAL ATRIAL FIBRILLATION (HCC): ICD-10-CM

## 2022-05-02 PROCEDURE — 99213 OFFICE O/P EST LOW 20 MIN: CPT | Performed by: INTERNAL MEDICINE

## 2022-05-02 PROCEDURE — 3008F BODY MASS INDEX DOCD: CPT | Performed by: INTERNAL MEDICINE

## 2022-05-02 PROCEDURE — 1036F TOBACCO NON-USER: CPT | Performed by: INTERNAL MEDICINE

## 2022-05-02 PROCEDURE — 3725F SCREEN DEPRESSION PERFORMED: CPT | Performed by: INTERNAL MEDICINE

## 2022-05-02 PROCEDURE — 1160F RVW MEDS BY RX/DR IN RCRD: CPT | Performed by: INTERNAL MEDICINE

## 2022-05-02 RX ORDER — COLESEVELAM 180 1/1
1250 TABLET ORAL 2 TIMES DAILY WITH MEALS
Qty: 360 TABLET | Refills: 1 | Status: SHIPPED | OUTPATIENT
Start: 2022-05-02 | End: 2022-10-29

## 2022-05-02 NOTE — ASSESSMENT & PLAN NOTE
Patient has intermittently received epidural steroid injections  He has never been treated with a course of oral steroids  Should he experience another episode of acute sciatica we will consider a trial of oral steroids before contacting neurosurgery for EDS    We also discussed strategies for weight loss that might help to improve his tolerance to activity

## 2022-05-02 NOTE — ASSESSMENT & PLAN NOTE
Intermittently  Patient remains on chronic anticoagulation    Presently clinical findings are consistent with normal sinus rhythm

## 2022-05-02 NOTE — PROGRESS NOTES
Assessment/Plan:    Hypercholesterolemia  Minimal improvement on combination of Zetia and Welchol    Low back pain with sciatica  Patient has intermittently received epidural steroid injections  He has never been treated with a course of oral steroids  Should he experience another episode of acute sciatica we will consider a trial of oral steroids before contacting neurosurgery for EDS  We also discussed strategies for weight loss that might help to improve his tolerance to activity    Paroxysmal atrial fibrillation (HCC)  Intermittently  Patient remains on chronic anticoagulation  Presently clinical findings are consistent with normal sinus rhythm       Diagnoses and all orders for this visit:    Low back pain with sciatica, sciatica laterality unspecified, unspecified back pain laterality, unspecified chronicity    Hypercholesterolemia  -     colesevelam (WELCHOL) 625 mg tablet; Take 2 tablets (1,250 mg total) by mouth 2 (two) times a day with meals  -     CBC and Platelet; Future  -     Comprehensive metabolic panel; Future  -     Lipid panel; Future    Paroxysmal atrial fibrillation (HCC)  -     CBC and Platelet; Future  -     Comprehensive metabolic panel; Future          Subjective:      Patient ID: Adalberto Leigh is a 74 y o  male  Presents for follow-up visit  In general doing well  He was out of his Welchol for few days but had his labs done previously  There is a very mild improvement in his total cholesterol and LDL cholesterol levels  He has been intolerant to statin medications previously  He has been dealing with intermittent flares of right-sided sciatica  He recently received an epidural steroid injection which provided him with significant relief  Interestingly, the patient states that he has never been offered oral steroids during a flare of his sciatica        Family History   Problem Relation Age of Onset    Diabetes type II Mother     Cancer Father         angioma     Social History     Socioeconomic History    Marital status: /Civil Union     Spouse name: Not on file    Number of children: Not on file    Years of education: Not on file    Highest education level: Not on file   Occupational History    Occupation: massage therapist   Tobacco Use    Smoking status: Former Smoker     Years: 10 00     Types: Cigars     Quit date:      Years since quittin 3    Smokeless tobacco: Former User     Types: Chew    Tobacco comment: Cigars    Vaping Use    Vaping Use: Never used   Substance and Sexual Activity    Alcohol use: Yes     Alcohol/week: 7 0 standard drinks     Types: 7 Glasses of wine per week     Comment: daily wine 8oz    Drug use: Not Currently     Types: Marijuana     Comment: Medical use (CBD)    Sexual activity: Yes   Other Topics Concern    Not on file   Social History Narrative    Caffeine: drinks 1 cup coffee/ tea a day       Social Determinants of Health     Financial Resource Strain: Not on file   Food Insecurity: Not on file   Transportation Needs: Not on file   Physical Activity: Not on file   Stress: Not on file   Social Connections: Not on file   Intimate Partner Violence: Not on file   Housing Stability: Not on file     Past Medical History:   Diagnosis Date    A[facility]fib [facility])     Allergic rhinitis     Benign cyst of kidney     Cat bite 2020    Cerebral vascular disease     Chronic sinusitis     Hyperlipidemia     Melanoma ([facility] [address] )     Pancreas cyst        Current Outpatient Medications:     ascorbic acid (VITAMIN C) 250 mg tablet, Take 250 mg by mouth 2 (two) times a day, Disp: , Rfl:     Cholecalciferol (VITAMIN D-3) 5000 units TABS, Take 1 tablet by mouth daily, Disp: 100 tablet, Rfl: 0    clobetasol (TEMOVATE) 0 05 % cream, Apply 1 application topically 2 (two) times a day as needed Apply sparingly to affected areas, Disp: , Rfl:     Coenzyme Q10 (COQ10) 200 MG CAPS, Take 1 capsule by mouth daily, Disp: , Rfl:     colesevelam [facility]) 625 mg tablet, Take 2 tablets (1,250 mg total) by mouth 2 (two) times a day with meals, Disp: 360 tablet, Rfl: 1    diltiazem (CARDIZEM) 30 mg tablet, Take 1 tablet (30 mg total) by mouth 3 (three) times a day as needed (afib), Disp: 30 tablet, Rfl: 3    Eliquis 5 MG, TAKE ONE TABLET BY MOUTH TWICE A DAY, Disp: 180 tablet, Rfl: 3    ezetimibe (ZETIA) 10 mg tablet, Take 1 tablet (10 mg total) by mouth daily, Disp: 90 tablet, Rfl: 1    Glucosamine-Chondroit-Vit C-Mn (GLUCOSAMINE CHONDR 1500 COMPLX PO), Take 1 tablet by mouth 2 (two) times a day , Disp: , Rfl:     hyoscyamine (ANASPAZ) 0 125 mg, Take 0 125 mg by mouth 2 (two) times a day , Disp: , Rfl:     MAGNESIUM GLYCINATE PLUS PO, Take 400 mg by mouth 2 (two) times a day , Disp: , Rfl:     montelukast (SINGULAIR) 10 mg tablet, Take 1 tablet (10 mg total) by mouth daily, Disp: 90 tablet, Rfl: 1    multivitamin (THERAGRAN) TABS, Take 1 tablet by mouth 2 (two) times a day  , Disp: , Rfl:     OXcarbazepine (TRILEPTAL) 300 mg tablet, Take 1 tablet (300 mg total) by mouth 2 (two) times a day, Disp: 180 tablet, Rfl: 1    tiZANidine (ZANAFLEX) 2 mg tablet, Take 1 tablet (2 mg total) by mouth 2 (two) times a day as needed for muscle spasms, Disp: 60 tablet, Rfl: 2  Allergies   Allergen Reactions    Ezetimibe Fatigue and GI Intolerance     Category: Adverse Reaction;     Molds & Smuts Allergic Rhinitis     Category: Allergy;     Monascus Purpureus Went Yeast Fatigue     Category: Adverse Reaction;     Niacin Fatigue     Category: Adverse Reaction;     Pollen Extract Allergic Rhinitis     Category: Allergy;     Pregabalin Fatigue     Category: Adverse Reaction;     Atorvastatin GI Intolerance and Fatigue     Other reaction(s): Muscle pain, constipation, sleepiness  Category: Adverse Reaction;    Other reaction(s): Muscle pain, constipation, sleepiness     Past Surgical History:   Procedure Laterality Date    OTHER SURGICAL HISTORY  2003    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    PATENT FORAMEN OVALE CLOSURE           Review of Systems   Constitutional: Negative  Negative for activity change, appetite change, chills, diaphoresis, fatigue, fever and unexpected weight change  HENT: Negative  Eyes: Negative  Respiratory: Negative  Cardiovascular: Negative  Gastrointestinal: Negative  Endocrine: Negative  Genitourinary: Negative  Musculoskeletal: Positive for back pain  Skin: Negative  Neurological: Negative  Hematological: Negative  Psychiatric/Behavioral: The patient is not nervous/anxious  Objective:      /72 (BP Location: Left arm, Patient Position: Sitting, Cuff Size: Large)   Pulse 61   Temp 98 4 °F (36 9 °C) (Tympanic)   Ht 5' 8 62" (1 743 m)   Wt 98 3 kg (216 lb 12 8 oz)   SpO2 95%   BMI 32 37 kg/m²          Physical Exam  Vitals reviewed  Constitutional:       General: He is not in acute distress  Appearance: Normal appearance  He is normal weight  He is not ill-appearing, toxic-appearing or diaphoretic  HENT:      Head: Normocephalic and atraumatic  Right Ear: External ear normal       Left Ear: External ear normal       Nose: Nose normal    Eyes:      General: No scleral icterus  Conjunctiva/sclera: Conjunctivae normal       Pupils: Pupils are equal, round, and reactive to light  Neck:      Vascular: No carotid bruit or JVD  Trachea: No tracheal deviation  Cardiovascular:      Rate and Rhythm: Normal rate and regular rhythm  Pulses: Normal pulses  Heart sounds: Normal heart sounds  No murmur heard  Pulmonary:      Effort: Pulmonary effort is normal  No respiratory distress  Breath sounds: Normal breath sounds  No rales  Abdominal:      General: Abdomen is flat  There is no distension  Musculoskeletal:         General: No swelling  Cervical back: Neck supple  Right lower leg: No edema  Left lower leg: No edema     Skin:     General: Skin is warm       Coloration: Skin is not jaundiced  Findings: No bruising, erythema or rash  Neurological:      General: No focal deficit present  Mental Status: He is alert and oriented to person, place, and time  Mental status is at baseline     Psychiatric:         Mood and Affect: Mood normal          Behavior: Behavior normal

## 2022-05-03 ENCOUNTER — TELEPHONE (OUTPATIENT)
Dept: INTERNAL MEDICINE CLINIC | Facility: CLINIC | Age: 67
End: 2022-05-03

## 2022-05-26 DIAGNOSIS — M54.16 LUMBAR RADICULOPATHY: ICD-10-CM

## 2022-05-26 RX ORDER — OXCARBAZEPINE 300 MG/1
300 TABLET, FILM COATED ORAL 2 TIMES DAILY
Qty: 20 TABLET | Refills: 0 | Status: SHIPPED | OUTPATIENT
Start: 2022-05-26

## 2022-06-10 ENCOUNTER — OFFICE VISIT (OUTPATIENT)
Dept: INTERNAL MEDICINE CLINIC | Facility: CLINIC | Age: 67
End: 2022-06-10
Payer: COMMERCIAL

## 2022-06-10 VITALS
DIASTOLIC BLOOD PRESSURE: 64 MMHG | SYSTOLIC BLOOD PRESSURE: 112 MMHG | TEMPERATURE: 99.1 F | HEIGHT: 68 IN | OXYGEN SATURATION: 98 % | HEART RATE: 61 BPM | WEIGHT: 214 LBS | BODY MASS INDEX: 32.43 KG/M2

## 2022-06-10 DIAGNOSIS — J01.40 ACUTE NON-RECURRENT PANSINUSITIS: ICD-10-CM

## 2022-06-10 DIAGNOSIS — J30.2 SEASONAL ALLERGIC RHINITIS, UNSPECIFIED TRIGGER: Primary | ICD-10-CM

## 2022-06-10 PROBLEM — I63.9 CRYPTOGENIC STROKE (HCC): Status: RESOLVED | Noted: 2020-03-02 | Resolved: 2022-06-10

## 2022-06-10 PROCEDURE — 99213 OFFICE O/P EST LOW 20 MIN: CPT | Performed by: INTERNAL MEDICINE

## 2022-06-10 RX ORDER — CHLORPHENIRAMINE MALEATE 4 MG/1
4 TABLET ORAL EVERY 6 HOURS PRN
Qty: 30 TABLET | Refills: 0 | Status: SHIPPED | OUTPATIENT
Start: 2022-06-10

## 2022-06-10 RX ORDER — AMOXICILLIN AND CLAVULANATE POTASSIUM 875; 125 MG/1; MG/1
1 TABLET, FILM COATED ORAL EVERY 12 HOURS SCHEDULED
Qty: 20 TABLET | Refills: 0 | Status: SHIPPED | OUTPATIENT
Start: 2022-06-10 | End: 2022-06-20

## 2022-06-10 NOTE — PROGRESS NOTES
Assessment/Plan:    Acute pansinusitis  Augmentin 875 twice daily for 10 days along with chlorpheniramine 4 mg every 6 to 8 hours as needed    Seasonal allergic rhinitis  Continue inhaled nasal steroids twice daily       Diagnoses and all orders for this visit:    Seasonal allergic rhinitis, unspecified trigger  -     amoxicillin-clavulanate (AUGMENTIN) 875-125 mg per tablet; Take 1 tablet by mouth every 12 (twelve) hours for 10 days  -     chlorpheniramine (CHLOR-TRIMETON) 4 MG tablet; Take 1 tablet (4 mg total) by mouth every 6 (six) hours as needed for rhinitis    Acute non-recurrent pansinusitis  -     amoxicillin-clavulanate (AUGMENTIN) 875-125 mg per tablet; Take 1 tablet by mouth every 12 (twelve) hours for 10 days  -     chlorpheniramine (CHLOR-TRIMETON) 4 MG tablet; Take 1 tablet (4 mg total) by mouth every 6 (six) hours as needed for rhinitis          Subjective:      Patient ID: Luz Maria Barrientos is a 69 y o  male  Patient presents to the office for evaluation of sinus pressure and discomfort  He has been ill with these symptoms for approximately 1 month  He had a fever last Wednesday  He has been having difficult amounts of sinus drainage and postnasal drip along with sinus tenderness and month fullness despite various over-the-counter to consistent intranasal steroids  He complains of a mild fullness when holding his head down  He denies any cough other than that produced by as postnasal drip  He denies any visual disturbances  No hearing related issues  Mild sore throat, no hoarseness    No earache      Family History   Problem Relation Age of Onset    Diabetes type II Mother     Cancer Father         angioma     Social History     Socioeconomic History    Marital status: /Civil Union     Spouse name: Not on file    Number of children: Not on file    Years of education: Not on file    Highest education level: Not on file   Occupational History    Occupation: massage therapist Tobacco Use    Smoking status: Former Smoker     Years: 10      Types: Cigars     Quit date:      Years since quittin 4    Smokeless tobacco: Former User     Types: Chew    Tobacco comment: Cigars    Vaping Use    Vaping Use: Never used   Substance and Sexual Activity    Alcohol use: Yes     Alcohol/week: 7 0 standard drinks     Types: 7 Glasses of wine per week     Comment: daily wine 8oz    Drug use: Not Currently     Types: Marijuana     Comment: Medical use (CBD)    Sexual activity: Yes   Other Topics Concern    Not on file   Social History Narrative    Caffeine: drinks 1 cup coffee/ tea a day       Social Determinants of Health     Financial Resource Strain: Not on file   Food Insecurity: Not on file   Transportation Needs: Not on file   Physical Activity: Not on file   Stress: Not on file   Social Connections: Not on file   Intimate Partner Violence: Not on file   Housing Stability: Not on file     Past Medical History:   Diagnosis Date    [facility]fib [facility])     Allergic rhinitis     Benign cyst of kidney     Cat bite 2020    Cerebral vascular disease     Chronic sinusitis     Cryptogenic stroke ([facility] [address] ) 3/2/2020    Hyperlipidemia     Melanoma ([facility] )     Pancreas cyst        Current Outpatient Medications:     amoxicillin-clavulanate (AUGMENTIN) 875-125 mg per tablet, Take 1 tablet by mouth every 12 (twelve) hours for 10 days, Disp: 20 tablet, Rfl: 0    ascorbic acid (VITAMIN C) 250 mg tablet, Take 250 mg by mouth 2 (two) times a day, Disp: , Rfl:     chlorpheniramine (CHLOR-TRIMETON) 4 MG tablet, Take 1 tablet (4 mg total) by mouth every 6 (six) hours as needed for rhinitis, Disp: 30 tablet, Rfl: 0    Cholecalciferol (VITAMIN D-3) 5000 units TABS, Take 1 tablet by mouth daily, Disp: 100 tablet, Rfl: 0    clobetasol (TEMOVATE) 0 05 % cream, Apply 1 application topically 2 (two) times a day as needed Apply sparingly to affected areas, Disp: , Rfl:     Coenzyme Q10 (COQ10) 200 MG CAPS, Take 1 capsule by mouth daily, Disp: , Rfl:     colesevelam (WELCHOL) 625 mg tablet, Take 2 tablets (1,250 mg total) by mouth 2 (two) times a day with meals, Disp: 360 tablet, Rfl: 1    diltiazem (CARDIZEM) 30 mg tablet, Take 1 tablet (30 mg total) by mouth 3 (three) times a day as needed (afib), Disp: 30 tablet, Rfl: 3    Eliquis 5 MG, TAKE ONE TABLET BY MOUTH TWICE A DAY, Disp: 180 tablet, Rfl: 3    ezetimibe (ZETIA) 10 mg tablet, Take 1 tablet (10 mg total) by mouth daily, Disp: 90 tablet, Rfl: 1    Glucosamine-Chondroit-Vit C-Mn (GLUCOSAMINE CHONDR 1500 COMPLX PO), Take 1 tablet by mouth 2 (two) times a day , Disp: , Rfl:     hyoscyamine (ANASPAZ) 0 125 mg, Take 0 125 mg by mouth 2 (two) times a day , Disp: , Rfl:     MAGNESIUM GLYCINATE PLUS PO, Take 400 mg by mouth 2 (two) times a day , Disp: , Rfl:     montelukast (SINGULAIR) 10 mg tablet, Take 1 tablet (10 mg total) by mouth daily, Disp: 90 tablet, Rfl: 1    multivitamin (THERAGRAN) TABS, Take 1 tablet by mouth 2 (two) times a day  , Disp: , Rfl:     OXcarbazepine (TRILEPTAL) 300 mg tablet, Take 1 tablet (300 mg total) by mouth 2 (two) times a day, Disp: 20 tablet, Rfl: 0    tiZANidine (ZANAFLEX) 2 mg tablet, Take 1 tablet (2 mg total) by mouth 2 (two) times a day as needed for muscle spasms, Disp: 60 tablet, Rfl: 2  Allergies   Allergen Reactions    Ezetimibe Fatigue and GI Intolerance     Category: Adverse Reaction;     Molds & Smuts Allergic Rhinitis     Category: Allergy;     Monascus Purpureus Went Yeast Fatigue     Category: Adverse Reaction;     Niacin Fatigue     Category: Adverse Reaction;     Pollen Extract Allergic Rhinitis     Category: Allergy;     Pregabalin Fatigue     Category: Adverse Reaction;     Atorvastatin GI Intolerance and Fatigue     Other reaction(s): Muscle pain, constipation, sleepiness  Category: Adverse Reaction;    Other reaction(s): Muscle pain, constipation, sleepiness     Past Surgical History: Procedure Laterality Date    OTHER SURGICAL HISTORY  2003    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    PATENT FORAMEN OVALE CLOSURE           Review of Systems   Constitutional: Positive for fever (Low-grade last Wednesday)  HENT: Positive for congestion, postnasal drip, rhinorrhea, sinus pressure and sore throat  Negative for ear discharge, ear pain, facial swelling, mouth sores and nosebleeds  Eyes: Negative  Respiratory: Negative  Cardiovascular: Negative  Gastrointestinal: Negative  Endocrine: Negative  Genitourinary: Negative  Musculoskeletal: Negative  Skin: Negative  Allergic/Immunologic: Positive for environmental allergies  Neurological: Negative  Hematological: Negative  Psychiatric/Behavioral: Negative  Objective:      /64 (BP Location: Left arm, Patient Position: Sitting, Cuff Size: Large)   Pulse 61   Temp 99 1 °F (37 3 °C) (Tympanic)   Ht 5' 7 64" (1 718 m)   Wt 97 1 kg (214 lb)   SpO2 98%   BMI 32 89 kg/m²          Physical Exam  Vitals reviewed  Constitutional:       General: He is not in acute distress  Appearance: Normal appearance  He is obese  He is not ill-appearing, toxic-appearing or diaphoretic  HENT:      Head: Normocephalic and atraumatic  Right Ear: Tympanic membrane, ear canal and external ear normal       Left Ear: Tympanic membrane, ear canal and external ear normal       Nose: Congestion and rhinorrhea present  No nasal deformity or mucosal edema  Right Nostril: No occlusion  Left Nostril: No occlusion  Right Turbinates: Enlarged  Not swollen  Left Turbinates: Enlarged  Not swollen  Right Sinus: Maxillary sinus tenderness and frontal sinus tenderness present  Left Sinus: Maxillary sinus tenderness and frontal sinus tenderness present  Mouth/Throat:      Mouth: Mucous membranes are moist       Pharynx: Oropharynx is clear  Eyes:      General: No scleral icterus  Conjunctiva/sclera: Conjunctivae normal       Pupils: Pupils are equal, round, and reactive to light  Cardiovascular:      Rate and Rhythm: Normal rate  Heart sounds: Normal heart sounds  No murmur heard  Pulmonary:      Effort: Pulmonary effort is normal  No respiratory distress  Breath sounds: Normal breath sounds  Abdominal:      General: Abdomen is flat  There is no distension  Musculoskeletal:         General: Swelling and deformity present  Cervical back: Neck supple  Lymphadenopathy:      Cervical: No cervical adenopathy  Skin:     General: Skin is warm and dry  Coloration: Skin is not jaundiced  Neurological:      General: No focal deficit present  Mental Status: He is alert and oriented to person, place, and time     Psychiatric:         Mood and Affect: Mood normal          Behavior: Behavior normal

## 2022-06-18 ENCOUNTER — NURSE TRIAGE (OUTPATIENT)
Dept: OTHER | Facility: OTHER | Age: 67
End: 2022-06-18

## 2022-06-18 NOTE — TELEPHONE ENCOUNTER
Regarding: Urticaria (amoxicillin)  ----- Message from Izabela Olmedo sent at 6/18/2022 12:20 PM EDT -----  "I started taking amoxicillin last Friday  It was a 10 day series  I first noticed that I started getting hives 2-3 days ago  I am itching all over my lower extremities and forearms   I'm also starting to scratch on my scalp "

## 2022-06-18 NOTE — TELEPHONE ENCOUNTER
Reason for Disposition   Taking new prescription antibiotic (Exception: finished taking new prescription antibiotic)    Answer Assessment - Initial Assessment Questions  1  APPEARANCE of RASH: "Describe the rash " (e g , spots, blisters, raised areas, skin peeling, scaly)      Looks like hives- all different sizes  2  SIZE: "How big are the spots?" (e g , tip of pen, eraser, coin; inches, centimeters)      All different sizes  3  LOCATION: "Where is the rash located?"      Forearms, legs, upper thighs & hips, scalp    4  COLOR: "What color is the rash?" (Note: It is difficult to assess rash color in people with darker-colored skin  When this situation occurs, simply ask the caller to describe what they see )      Red    5  ONSET: "When did the rash begin?"      Started on Wednesday afternoon  6  FEVER: "Do you have a fever?" If Yes, ask: "What is your temperature, how was it measured, and when did it start?"      Denies fever  7  ITCHING: "Does the rash itch?" If Yes, ask: "How bad is the itch?" (Scale 1-10; or mild, moderate, severe)      Is very itchy  8  CAUSE: "What do you think is causing the rash?"      Concerned that he's developed a reaction to the Augmentin  9  NEW MEDICATION: "What new medication are you taking?" (e g , name of antibiotic) "When did you start taking this medication?"  Augmentin- currently on day 8/10- sinus infection symptoms have resolved  10  OTHER SYMPTOMS: "Do you have any other symptoms?" (e g , sore throat, fever, joint pain)        No other symptoms  No issues with breathing      Protocols used: RASH - WIDESPREAD ON DRUGS-ADULT-

## 2022-06-27 ENCOUNTER — OFFICE VISIT (OUTPATIENT)
Dept: PAIN MEDICINE | Facility: CLINIC | Age: 67
End: 2022-06-27
Payer: COMMERCIAL

## 2022-06-27 VITALS
WEIGHT: 214 LBS | BODY MASS INDEX: 31.7 KG/M2 | HEIGHT: 69 IN | DIASTOLIC BLOOD PRESSURE: 68 MMHG | SYSTOLIC BLOOD PRESSURE: 103 MMHG | HEART RATE: 71 BPM

## 2022-06-27 DIAGNOSIS — M47.816 SPONDYLOSIS OF LUMBAR REGION WITHOUT MYELOPATHY OR RADICULOPATHY: ICD-10-CM

## 2022-06-27 DIAGNOSIS — M47.816 LUMBAR SPONDYLOSIS: ICD-10-CM

## 2022-06-27 DIAGNOSIS — G89.4 CHRONIC PAIN SYNDROME: Primary | ICD-10-CM

## 2022-06-27 DIAGNOSIS — M54.16 LUMBAR RADICULOPATHY: ICD-10-CM

## 2022-06-27 DIAGNOSIS — M51.36 DDD (DEGENERATIVE DISC DISEASE), LUMBAR: ICD-10-CM

## 2022-06-27 DIAGNOSIS — M54.40 LOW BACK PAIN WITH SCIATICA, SCIATICA LATERALITY UNSPECIFIED, UNSPECIFIED BACK PAIN LATERALITY, UNSPECIFIED CHRONICITY: ICD-10-CM

## 2022-06-27 PROCEDURE — 99214 OFFICE O/P EST MOD 30 MIN: CPT | Performed by: NURSE PRACTITIONER

## 2022-06-27 PROCEDURE — 1036F TOBACCO NON-USER: CPT | Performed by: NURSE PRACTITIONER

## 2022-06-27 PROCEDURE — 1160F RVW MEDS BY RX/DR IN RCRD: CPT | Performed by: NURSE PRACTITIONER

## 2022-06-27 PROCEDURE — 3008F BODY MASS INDEX DOCD: CPT | Performed by: NURSE PRACTITIONER

## 2022-06-27 RX ORDER — TIZANIDINE 2 MG/1
2 TABLET ORAL 2 TIMES DAILY PRN
Qty: 60 TABLET | Refills: 2 | Status: SHIPPED | OUTPATIENT
Start: 2022-06-27

## 2022-06-27 NOTE — PROGRESS NOTES
Assessment:  1  Chronic pain syndrome    2  Lumbar radiculopathy    3  Low back pain with sciatica, sciatica laterality unspecified, unspecified back pain laterality, unspecified chronicity    4  DDD (degenerative disc disease), lumbar    5  Lumbar spondylosis    6  Spondylosis of lumbar region without myelopathy or radiculopathy        Plan:  1  Patient may continue tizanidine 4 mg p r n  myofascial pain as prescribed  This medication was refilled today  2  Patient will continue with physical therapy and his home exercise program  3  We can repeat right L4 and L5 TFESI p r n    4  I will avoid NSAIDs secondary to anticoagulation  5  Patient will follow up on a p r n  basis at this time      [employer] (now [employer]) software was used to dictate this note  It may contain errors with dictating incorrect words or incorrect spelling  Please contact the provider directly with any questions  History of Present Illness: The patient is a 69 y o  male last seen on 3/23/22 who presents for a follow up office visit in regards to chronic low back pain that radiates into the right lower extremity secondary to lumbar degenerative disc disease, lumbar spondylosis, lumbar stenosis, lumbar radiculopathy and chronic pain syndrome  The patient denies left lower extremity symptoms, bowel or bladder incontinence or saddle anesthesia  Patient does get good relief with right L4 and L5 TFESI  He does not feel his pain warrants a repeat injection at this time  He rates his pain a 3/10 on the numeric pain rating scale  He constantly has pain in the morning and at night which is described as dull aching and pins and needles  He takes tizanidine 4 mg p r n  with a 25-30% improvement of his pain with some drowsiness  I have personally reviewed and/or updated the patient's past medical history, past surgical history, family history, social history, current medications, allergies, and vital signs today         Review of Systems:    Review of Systems   Respiratory: Negative for shortness of breath  Cardiovascular: Negative for chest pain  Gastrointestinal: Negative for constipation, diarrhea, nausea and vomiting  Musculoskeletal: Negative for arthralgias, gait problem, joint swelling and myalgias  Skin: Negative for rash  Neurological: Negative for dizziness, seizures and weakness  All other systems reviewed and are negative  Past Medical History:   Diagnosis Date    A-fib [facility])     Allergic rhinitis     Benign cyst of kidney     Cat bite 2020    Cerebral vascular disease     Chronic sinusitis     [facility] 3/[facility]/2020    Hyperlipidemia     [facility]an[facility] )     Pancreas cyst        Past Surgical History:   Procedure Laterality Date    OTHER SURGICAL HISTORY      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    PATENT FORAMEN OVALE CLOSURE         Family History   Problem Relation Age of Onset    Diabetes type II Mother     Cancer Father         angioma       Social History     Occupational History    Occupation: massage therapist   Tobacco Use    Smoking status: Former Smoker     Years: 10      Types: Cigars     Quit date:      Years since quittin 5    Smokeless tobacco: Former User     Types: Chew    Tobacco comment: Cigars    Vaping Use    Vaping Use: Never used   Substance and Sexual Activity    Alcohol use:  Yes     Alcohol/week: 7 0 standard drinks     Types: 7 Glasses of wine per week     Comment: daily wine 8oz    Drug use: Not Currently     Types: Marijuana     Comment: Medical use (CBD)    Sexual activity: Yes         Current Outpatient Medications:     tiZANidine (ZANAFLEX) 2 mg tablet, Take 1 tablet (2 mg total) by mouth 2 (two) times a day as needed for muscle spasms, Disp: 60 tablet, Rfl: 2    ascorbic acid (VITAMIN C) 250 mg tablet, Take 250 mg by mouth 2 (two) times a day, Disp: , Rfl:     chlorpheniramine (CHLOR-TRIMETON) 4 MG tablet, Take 1 tablet (4 mg total) by mouth every 6 (six) hours as needed for rhinitis, Disp: 30 tablet, Rfl: 0    Cholecalciferol (VITAMIN D-3) 5000 units TABS, Take 1 tablet by mouth daily, Disp: 100 tablet, Rfl: 0    clobetasol (TEMOVATE) 0 05 % cream, Apply 1 application topically 2 (two) times a day as needed Apply sparingly to affected areas, Disp: , Rfl:     Coenzyme Q10 (COQ10) 200 MG CAPS, Take 1 capsule by mouth daily, Disp: , Rfl:     colesevelam (WELCHOL) 625 mg tablet, Take 2 tablets (1,250 mg total) by mouth 2 (two) times a day with meals, Disp: 360 tablet, Rfl: 1    diltiazem (CARDIZEM) 30 mg tablet, Take 1 tablet (30 mg total) by mouth 3 (three) times a day as needed (afib), Disp: 30 tablet, Rfl: 3    Eliquis 5 MG, TAKE ONE TABLET BY MOUTH TWICE A DAY, Disp: 180 tablet, Rfl: 3    ezetimibe (ZETIA) 10 mg tablet, Take 1 tablet (10 mg total) by mouth daily, Disp: 90 tablet, Rfl: 1    Glucosamine-Chondroit-Vit C-Mn (GLUCOSAMINE CHONDR 1500 COMPLX PO), Take 1 tablet by mouth 2 (two) times a day , Disp: , Rfl:     hyoscyamine (ANASPAZ) 0 125 mg, Take 0 125 mg by mouth 2 (two) times a day , Disp: , Rfl:     MAGNESIUM GLYCINATE PLUS PO, Take 400 mg by mouth 2 (two) times a day , Disp: , Rfl:     montelukast (SINGULAIR) 10 mg tablet, Take 1 tablet (10 mg total) by mouth daily, Disp: 90 tablet, Rfl: 1    multivitamin (THERAGRAN) TABS, Take 1 tablet by mouth 2 (two) times a day  , Disp: , Rfl:     OXcarbazepine (TRILEPTAL) 300 mg tablet, Take 1 tablet (300 mg total) by mouth 2 (two) times a day, Disp: 20 tablet, Rfl: 0    Allergies   Allergen Reactions    Ezetimibe Fatigue and GI Intolerance     Category: Adverse Reaction;     Molds & Smuts Allergic Rhinitis     Category: Allergy;     Monascus Purpureus Went Yeast Fatigue     Category: Adverse Reaction;     Niacin Fatigue     Category: Adverse Reaction;     Pollen Extract Allergic Rhinitis     Category: Allergy;     Pregabalin Fatigue     Category:  Adverse Reaction;     Atorvastatin GI Intolerance and Fatigue     Other reaction(s): Muscle pain, constipation, sleepiness  Category: Adverse Reaction; Other reaction(s): Muscle pain, constipation, sleepiness       Physical Exam:    /68   Pulse 71   Ht 5' 8 62" (1 743 m)   Wt 97 1 kg (214 lb)   BMI 31 95 kg/m²     Constitutional:normal, well developed, well nourished, alert, in no distress and non-toxic and no overt pain behavior  Eyes:anicteric  HEENT:grossly intact  Neck:supple, symmetric, trachea midline and no masses   Pulmonary:even and unlabored  Cardiovascular:No edema or pitting edema present  Skin:Normal without rashes or lesions and well hydrated  Psychiatric:Mood and affect appropriate  Neurologic:Cranial Nerves II-XII grossly intact  Musculoskeletal:normal gait      Imaging  No orders to display   MRI LUMBAR SPINE WITHOUT CONTRAST   INDICATION: [id number]: Radiculopathy, lumbar region  COMPARISON: 12/20/2019   TECHNIQUE: Sagittal T1, sagittal T2, sagittal inversion recovery, axial T1 and axial T2, coronal T2    IMAGE QUALITY: Diagnostic   FINDINGS:   VERTEBRAL BODIES: There are 5 lumbar type vertebral bodies  Slight anterior spondylolisthesis of L3 in relation to L4  No spondylolysis No scoliosis  Normal marrow signal is identified within the visualized bony structures  No discrete marrow   lesion  SACRUM: Normal signal within the sacrum  No evidence of insufficiency or stress fracture  DISTAL CORD AND CONUS: Normal size and signal within the distal cord and conus  PARASPINAL SOFT TISSUES: Several renal cysts are seen within the kidneys  LOWER THORACIC DISC SPACES: Normal disc height and signal  No disc herniation, canal stenosis or foraminal narrowing  LUMBAR DISC SPACES:   L1-L2: Normal    L2-L3: Normal    L3-L4: Slight loss of disc height with mild diffuse annular bulging and facet arthropathy  Mild canal stenosis   Right foraminal narrowing is slightly more pronounced due to disc and posterior element hypertrophic changes encroaching upon the neural   foramen  Facet osteophyte abuts the posterior aspect of the exiting nerve  L4-L5: Disc desiccation without loss of disc height  Mild annular bulging with a broad-based left-sided subarticular and foraminal disc protrusion  There is a slightly larger right foraminal disc extrusion  No canal stenosis  Mild left foraminal   narrowing without clear nerve compression  There is more pronounced moderately severe right foraminal narrowing due to disc and facet hypertrophic changes  L5-S1: Disc desiccation and loss of disc height with mild annular bulging and endplate hypertrophic change extending into the neural foramen  There is no canal stenosis  Mild to moderate bilateral foraminal narrowing slightly more pronounced within   the distal aspect of the neural foramen  IMPRESSION:   Lumbar spondylitic degenerative change with annular bulging and small broad-based disc herniations superimposed upon endplate and facet hypertrophic changes  Moderate right foraminal narrowing is seen at the L3-4 level  There is severe right foraminal narrowing at the L4-5 level  Moderate distal foraminal narrowing at L5-S1 secondary to annular bulging and foraminal and far lateral endplate osteophyte formation  No orders of the defined types were placed in this encounter

## 2022-07-05 DIAGNOSIS — J30.1 CHRONIC SEASONAL ALLERGIC RHINITIS DUE TO POLLEN: ICD-10-CM

## 2022-07-05 RX ORDER — MONTELUKAST SODIUM 10 MG/1
10 TABLET ORAL DAILY
Qty: 90 TABLET | Refills: 1 | Status: SHIPPED | OUTPATIENT
Start: 2022-07-05

## 2022-08-22 ENCOUNTER — TELEPHONE (OUTPATIENT)
Dept: PAIN MEDICINE | Facility: CLINIC | Age: 67
End: 2022-08-22

## 2022-08-22 NOTE — TELEPHONE ENCOUNTER
Hold rqst sent to Dr Hi Galarza for Mi @ [phone]      Please call pt when hold is received, as per pt rqst

## 2022-08-22 NOTE — TELEPHONE ENCOUNTER
Patient   [phone]  SHAKEEL Hook     Pt is calling in stating that he would like to have another injection   On his rt lumbar, please follow up

## 2022-08-22 NOTE — TELEPHONE ENCOUNTER
S/w pt having same pain as previous prior to L4-5 TFESI inj 1/4/22 by Ángel Benites  Pt states having (R) LB/LE pain and would like repeat inj  Pt states having 70% relief from prior inj until "past few months" pain has been gradually increas  Pt on Eliquis      Ok to repeat inj?

## 2022-08-24 ENCOUNTER — TELEPHONE (OUTPATIENT)
Dept: CARDIOLOGY CLINIC | Facility: CLINIC | Age: 67
End: 2022-08-24

## 2022-08-24 NOTE — TELEPHONE ENCOUNTER
P/c'd to see if risk assessment form was received  Called him back and LMOM that the office is waiting for the doctor to sign off on clearance

## 2022-08-25 ENCOUNTER — TELEPHONE (OUTPATIENT)
Dept: CARDIOLOGY CLINIC | Facility: CLINIC | Age: 67
End: 2022-08-25

## 2022-08-25 NOTE — TELEPHONE ENCOUNTER
Pt called would like us to fax cardiac clearance letter to pain and spine, advised it was sent yesterday, per pt they never received it   Refaxed

## 2022-08-25 NOTE — TELEPHONE ENCOUNTER
Pt is calling in stating that he received a note on his [employer] roxie from Dr Nam Larson office  He said that it is ok for him to have the 3 day hold  So he would like to be scheduled asap, before he goes away

## 2022-08-25 NOTE — TELEPHONE ENCOUNTER
Eliquis hold approval rec'd and scanned into [employer]    Pt scheduled for 9/12 at 2pm  Pt advised to hold Eliquis 9/9-9/12  Pt will be advised to resume after injection  Pre procedure instructions provided:  Pt will need a , wear loose comfortable clothing, NPO 1 hour prior to procedure, no vaccines 2 weeks before or after procedure, if pt is sick or on abx pt will call to reschedule

## 2022-08-25 NOTE — TELEPHONE ENCOUNTER
PRE OP INSTRUCTIONS:     -If you are on prescription blood thinners, you may have to hold the medication for several days before the procedure  Please call the office to    discuss medication holds at [phone]   -Do not eat or drink ONE HOUR prior to your procedure  If you are diabetic, may follow regular breakfast/lunch schedule and take usual    diabetic medications   -Lumbar( low back) procedure, please wear comfortable slacks/pants   -Cervical (neck) procedure, please wear a shirt/blouse that is easy to remove   -A  is required to take you home form your procedure   -Continue all to take prescribed medication the day of your procedure, including blood pressure medications   -If you are prescribe antibiotics, have an active infection or have an open wound, please contact the office at [phone]   -Please refrain from any vaccinations two weeks before and two weeks after injection   -Insurance authorization received in not a guarantee of payment per your insurance company's authorization disclaimer and it is    your responsibility to verify your benefits  -If you have any questions about the instructions, please call me at [phone]    Hold medication  x _ full days prior, last dose on _  Patient advised to hold medication from Date till their appointment at that time instructions to restart will be given  Patient stated verbal understanding  Aware that nursing will call her to review hold dates as well

## 2022-08-25 NOTE — TELEPHONE ENCOUNTER
S/w pt  Advised no hold rec'd from Dr Leanne Zapata  Pt will call Cardiology to request approval be sent

## 2022-08-26 DIAGNOSIS — M54.16 LUMBAR RADICULOPATHY: ICD-10-CM

## 2022-08-26 RX ORDER — OXCARBAZEPINE 300 MG/1
300 TABLET, FILM COATED ORAL 2 TIMES DAILY
Qty: 20 TABLET | Refills: 0 | Status: SHIPPED | OUTPATIENT
Start: 2022-08-26 | End: 2022-09-01 | Stop reason: SDUPTHER

## 2022-09-01 ENCOUNTER — TELEPHONE (OUTPATIENT)
Dept: INTERNAL MEDICINE CLINIC | Facility: CLINIC | Age: 67
End: 2022-09-01

## 2022-09-01 DIAGNOSIS — M54.16 LUMBAR RADICULOPATHY: ICD-10-CM

## 2022-09-01 RX ORDER — OXCARBAZEPINE 300 MG/1
300 TABLET, FILM COATED ORAL 2 TIMES DAILY
Qty: 180 TABLET | Refills: 1 | Status: SHIPPED | OUTPATIENT
Start: 2022-09-01

## 2022-09-01 NOTE — TELEPHONE ENCOUNTER
Rx refill for OXcarbazepine (TRILEPTAL) 300 mg tablet  Patient would like a 3 month supply      Send to [address] [place] [address] - [facility] Sandeep Harris 86- 6/10/22  NOV- 11/8/22

## 2022-09-12 ENCOUNTER — HOSPITAL ENCOUNTER (OUTPATIENT)
Dept: RADIOLOGY | Facility: CLINIC | Age: 67
Discharge: HOME | End: 2022-09-12
Payer: COMMERCIAL

## 2022-09-12 VITALS
TEMPERATURE: 98.4 F | HEART RATE: 56 BPM | OXYGEN SATURATION: 96 % | SYSTOLIC BLOOD PRESSURE: 150 MMHG | RESPIRATION RATE: 20 BRPM | DIASTOLIC BLOOD PRESSURE: 81 MMHG

## 2022-09-12 DIAGNOSIS — M54.16 LUMBAR RADICULOPATHY: ICD-10-CM

## 2022-09-12 PROCEDURE — 64483 NJX AA&/STRD TFRM EPI L/S 1: CPT | Performed by: ANESTHESIOLOGY

## 2022-09-12 PROCEDURE — 64484 NJX AA&/STRD TFRM EPI L/S EA: CPT | Performed by: ANESTHESIOLOGY

## 2022-09-12 RX ORDER — PAPAVERINE HCL 150 MG
20 CAPSULE, EXTENDED RELEASE ORAL ONCE
Status: COMPLETED | OUTPATIENT
Start: 2022-09-12 | End: 2022-09-12

## 2022-09-12 RX ADMIN — IOHEXOL 1 ML: 300 INJECTION, SOLUTION INTRAVENOUS at 14:01

## 2022-09-12 RX ADMIN — DEXAMETHASONE SODIUM PHOSPHATE 15 MG: 10 INJECTION, SOLUTION INTRAMUSCULAR; INTRAVENOUS at 14:01

## 2022-09-12 NOTE — DISCHARGE INSTRUCTIONS
Epidural Steroid Injection   WHAT YOU NEED TO KNOW:   An epidural steroid injection (CARLEEN) is a procedure to inject steroid medicine into the epidural space  The epidural space is between your spinal cord and vertebrae  Steroids reduce inflammation and fluid buildup in your spine that may be causing pain  You may be given pain medicine along with the steroids  ACTIVITY  Do not drive or operate machinery today  No strenuous activity today - bending, lifting, etc   You may resume normal activites starting tomorrow - start slowly and as tolerated  You may shower today, but no tub baths or hot tubs  You may have numbness for several hours from the local anesthetic  Please use caution and common sense, especially with weight-bearing activities  CARE OF THE INJECTION SITE  If you have soreness or pain, apply ice to the area today (20 minutes on/20 minutes off)  Starting tomorrow, you may use warm, moist heat or ice if needed  You may have an increase or change in your discomfort for 36-48 hours after your treatment  Apply ice and continue with any pain medication you have been prescribed  Notify the [facility] if you have any of the following: redness, drainage, swelling, headache, stiff neck or fever above 100°F     SPECIAL INSTRUCTIONS  Our office will contact you in approximately 7 days for a progress report  MEDICATIONS  Continue to take all routine medications  Our office may have instructed you to hold some medications  Resume Eliquis 9/13/22    As no general anesthesia was used in today's procedure, you should not experience any side effects related to anesthesia  If you are diabetic, the steroids used in today's injection may temporarily increase your blood sugar levels after the first few days after your injection   Please keep a close eye on your sugars and alert the doctor who manages your diabetes if your sugars are significantly high from your baseline or you are symptomatic  If you have a problem specifically related to your procedure, please call our office at [phone]  Problems not related to your procedure should be directed to your primary care physician

## 2022-09-12 NOTE — H&P
History of Present Illness: The patient is a 69 y o  male who presents with complaints of low back and leg pain      Patient Active Problem List   Diagnosis    Seasonal allergic rhinitis    Osteoarthritis    Nephrolithiasis    Irritable bowel syndrome    Hypercholesterolemia    Coronary atherosclerosis    Coronary artery disease involving native coronary artery    Alpha-[facility]-antitrypsin deficiency ([facility] )    BPH with obstruction/lower urinary tract symptoms    Need for pneumococcal vaccination    Medicare annual wellness visit, initial    Pancreatic cyst    Lumbar radiculopathy    Low back pain with sciatica    DDD (degenerative disc disease), lumbar    Lumbar spondylosis    Tinea corporis    Chronic pain syndrome    Paroxysmal atrial fibrillation (HCC)    Acute pansinusitis       Past Medical History:   Diagnosis Date    A-fib ([place] [address] )     Allergic rhinitis     Benign cyst of kidney     Cat bite 7/8/2020    Cerebral vascular disease     Chronic sinusitis     Cryptogenic stroke ([place] [address] ) 3/2/2020    Hyperlipidemia     Melanoma ([place] [address] )     Pancreas cyst        Past Surgical History:   Procedure Laterality Date    OTHER SURGICAL HISTORY  2003    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    PATENT FORAMEN OVALE CLOSURE           Current Outpatient Medications:     ascorbic acid (VITAMIN C) 250 mg tablet, Take 250 mg by mouth 2 (two) times a day, Disp: , Rfl:     chlorpheniramine (CHLOR-TRIMETON) 4 MG tablet, Take 1 tablet (4 mg total) by mouth every 6 (six) hours as needed for rhinitis, Disp: 30 tablet, Rfl: 0    Cholecalciferol (VITAMIN D-3) 5000 units TABS, Take 1 tablet by mouth daily, Disp: 100 tablet, Rfl: 0    clobetasol (TEMOVATE) 0 05 % cream, Apply 1 application topically 2 (two) times a day as needed Apply sparingly to affected areas, Disp: , Rfl:     Coenzyme Q10 (COQ10) 200 MG CAPS, Take 1 capsule by mouth daily, Disp: , Rfl:     colesevelam (WELCHOL) 625 mg tablet, Take 2 tablets (1,250 mg total) by mouth 2 (two) times a day with meals, Disp: 360 tablet, Rfl: 1    diltiazem (CARDIZEM) 30 mg tablet, Take 1 tablet (30 mg total) by mouth 3 (three) times a day as needed (afib), Disp: 30 tablet, Rfl: 3    Eliquis 5 MG, TAKE ONE TABLET BY MOUTH TWICE A DAY, Disp: 180 tablet, Rfl: 3    ezetimibe (ZETIA) 10 mg tablet, Take 1 tablet (10 mg total) by mouth daily, Disp: 90 tablet, Rfl: 1    Glucosamine-Chondroit-Vit C-Mn (GLUCOSAMINE CHONDR 1500 COMPLX PO), Take 1 tablet by mouth 2 (two) times a day , Disp: , Rfl:     hyoscyamine (ANASPAZ) 0 125 mg, Take 0 125 mg by mouth 2 (two) times a day , Disp: , Rfl:     MAGNESIUM GLYCINATE PLUS PO, Take 400 mg by mouth 2 (two) times a day , Disp: , Rfl:     montelukast (SINGULAIR) 10 mg tablet, Take 1 tablet (10 mg total) by mouth daily, Disp: 90 tablet, Rfl: 1    multivitamin (THERAGRAN) TABS, Take 1 tablet by mouth 2 (two) times a day  , Disp: , Rfl:     OXcarbazepine (TRILEPTAL) 300 mg tablet, Take 1 tablet (300 mg total) by mouth 2 (two) times a day, Disp: 180 tablet, Rfl: 1    tiZANidine (ZANAFLEX) 2 mg tablet, Take 1 tablet (2 mg total) by mouth 2 (two) times a day as needed for muscle spasms, Disp: 60 tablet, Rfl: 2    Allergies   Allergen Reactions    Ezetimibe Fatigue and GI Intolerance     Category: Adverse Reaction;     Molds & Smuts Allergic Rhinitis     Category: Allergy;     Monascus Purpureus Went Yeast Fatigue     Category: Adverse Reaction;     Niacin Fatigue     Category: Adverse Reaction;     Pollen Extract Allergic Rhinitis     Category: Allergy;     Pregabalin Fatigue     Category: Adverse Reaction;     Atorvastatin GI Intolerance and Fatigue     Other reaction(s): Muscle pain, constipation, sleepiness  Category: Adverse Reaction;    Other reaction(s): Muscle pain, constipation, sleepiness       Physical Exam:   Vitals:    09/12/22 1345   BP: 132/78   Pulse: 67   Resp: 20   Temp: 98 4 °F (36 9 °C)   SpO2: 96%     General: Awake, Alert, Oriented x 3, Mood and affect appropriate  Respiratory: Respirations even and unlabored  Cardiovascular: Peripheral pulses intact; no edema  Musculoskeletal Exam:  Right lumbar paraspinals tender to palpation    ASA Score: 3    Patient/Chart Verification  Patient ID Verified: Verbal  ID Band Applied: No  Consents Confirmed: Procedural, To be obtained in the Pre-Procedure area  Interval H&P(within 24 hr) Complete (required for Outpatients and Surgery Admit only): To be obtained in the Pre-Procedure area  Allergies Reviewed: Yes  Anticoag/NSAID held?: Yes ( 9/8 22 Eliquis)  Currently on antibiotics?: No    Assessment:   1   Lumbar radiculopathy        Plan: Right L4 and L5 TFESI

## 2022-09-14 ENCOUNTER — VBI (OUTPATIENT)
Dept: ADMINISTRATIVE | Facility: OTHER | Age: 67
End: 2022-09-14

## 2022-09-19 ENCOUNTER — TELEPHONE (OUTPATIENT)
Dept: PAIN MEDICINE | Facility: CLINIC | Age: 67
End: 2022-09-19

## 2022-09-26 ENCOUNTER — TELEPHONE (OUTPATIENT)
Dept: UROLOGY | Facility: AMBULATORY SURGERY CENTER | Age: 67
End: 2022-09-26

## 2022-09-26 DIAGNOSIS — Z12.5 SCREENING FOR PROSTATE CANCER: Primary | ICD-10-CM

## 2022-09-26 NOTE — PROGRESS NOTES
9/27/2022    Bernice Do  1955  [id number]      Assessment  -BPH with mild lower urinary tract symptoms  -Routine prostate cancer screening  -History of negative prostate biopsy   -History of nephrolithiasis     Discussion/Plan  Constantino Kirk is a 69 y o  male being managed by Dr Jemal Cassidy  1  BPH with mild lower urinary tract symptoms- PVR in the office today is 61 mL  We discussed his increased episodes of urinary frequency  Unfortunately, he was unable to tolerate alpha blockers due to side effect of hypotension  We discussed proceeding with flexible cystoscopy to further evaluate bladder outlet  He is amenable with this plan  Informed consent was provided  2  Routine prostate cancer screening- unfortunately, patient did not obtain PSA prior to today's visit  Order placed  He will obtain in the next 1-2 weeks we will call with his results  No significant findings noted on digital rectal examination today  Follow-up with MD for cystoscopy to further evaluate bladder outlet  He was advised to call sooner with any questions or issues       -All questions answered, patient agrees with plan      History of Present Illness  69 y o  male with a history of BPH, and prostate cancer screening presents today for follow up  Patient last seen in the office in September 2021  He reports increased episodes of daytime urinary frequency  Patient had previously been on tamsulosin, but he states he discontinue medication due to side effects of hypotension  Patient had previously noted improvement of his urinary symptoms  He denies any additional lower urinary tract symptoms, gross hematuria, or dysuria  He states he underwent microwave therapy for treatment of BPH many years ago by Dr Linh Perez  No records available for review  His last PSA was 2 6  He underwent negative prostate biopsy remotely by Dr Gagan Trejo for abnormal findings noted on digital rectal examination    He denies any strong family history of prostate malignancy  Patient discontinued sildenafil as he states erectile dysfunction improved after he stopped taking Cymbalta  Review of Systems  Review of Systems   Constitutional: Negative  HENT: Negative  Respiratory: Negative  Cardiovascular: Negative  Gastrointestinal: Negative  Genitourinary: Positive for frequency  Negative for decreased urine volume, difficulty urinating, dysuria, flank pain, hematuria and urgency  Musculoskeletal: Negative  Skin: Negative  Neurological: Negative  Psychiatric/Behavioral: Negative        AUA SYMPTOM SCORE    Flowsheet Row Most Recent Value   AUA SYMPTOM SCORE    How often have you had a sensation of not emptying your bladder completely after you finished urinating? 5 (P)     How often have you had to urinate again less than two hours after you finished urinating? 3 (P)     How often have you found you stopped and started again several times when you urinate? 5 (P)     How often have you found it difficult to postpone urination? 0 (P)     How often have you had a weak urinary stream? 5 (P)     How often have you had to push or strain to begin urination? 0 (P)     How many times did you most typically get up to urinate from the time you went to bed at night until the time you got up in the morning? 5 (P)     Quality of Life: If you were to spend the rest of your life with your urinary condition just the way it is now, how would you feel about that? 4 (P)     AUA SYMPTOM SCORE 23 (P)           Past Medical History  Past Medical History:   Diagnosis Date    A-fib ([facility] )     Allergic rhinitis     Benign cyst of kidney     Cat bite 7/8/2020    Cerebral vascular disease     Chronic sinusitis     Cryptogenic stroke ([facility] ) 3/2/2020    Hyperlipidemia     Melanoma ([facility] )     Pancreas cyst        Past Social History  Past Surgical History:   Procedure Laterality Date    COLONOSCOPY      OTHER SURGICAL HISTORY  2003    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    PATENT FORAMEN OVALE CLOSURE         Past Family History  Family History   Problem Relation Age of Onset    Diabetes type II Mother     Cancer Father         angioma       Past Social history  Social History     Socioeconomic History    Marital status: /Civil Union     Spouse name: Not on file    Number of children: Not on file    Years of education: Not on file    Highest education level: Not on file   Occupational History    Occupation: massage therapist   Tobacco Use    Smoking status: Former Smoker     Years: 10 00     Types: Cigars     Quit date:      Years since quittin 7    Smokeless tobacco: Former User     Types: Chew    Tobacco comment: Cigars    Vaping Use    Vaping Use: Never used   Substance and Sexual Activity    Alcohol use: Yes     Alcohol/week: 7 0 standard drinks     Types: 7 Glasses of wine per week     Comment: daily wine 8oz    Drug use: Not Currently     Types: Marijuana     Comment: Medical use (CBD)    Sexual activity: Yes   Other Topics Concern    Not on file   Social History Narrative    Caffeine: drinks 1 cup coffee/ tea a day       Social Determinants of Health     Financial Resource Strain: Not on file   Food Insecurity: Not on file   Transportation Needs: Not on file   Physical Activity: Not on file   Stress: Not on file   Social Connections: Not on file   Intimate Partner Violence: Not on file   Housing Stability: Not on file       Current Medications  Current Outpatient Medications   Medication Sig Dispense Refill    ascorbic acid (VITAMIN C) 250 mg tablet Take 250 mg by mouth 2 (two) times a day      chlorpheniramine (CHLOR-TRIMETON) 4 MG tablet Take 1 tablet (4 mg total) by mouth every 6 (six) hours as needed for rhinitis 30 tablet 0    Cholecalciferol (VITAMIN D-3) 5000 units TABS Take 1 tablet by mouth daily 100 tablet 0    clobetasol (TEMOVATE) 0 05 % cream Apply 1 application topically 2 (two) times a day as needed Apply sparingly to affected areas      Coenzyme Q10 (COQ10) 200 MG CAPS Take 1 capsule by mouth daily      colesevelam (WELCHOL) 625 mg tablet Take 2 tablets (1,250 mg total) by mouth 2 (two) times a day with meals 360 tablet 1    diltiazem (CARDIZEM) 30 mg tablet Take 1 tablet (30 mg total) by mouth 3 (three) times a day as needed (afib) 30 tablet 3    Eliquis 5 MG TAKE ONE TABLET BY MOUTH TWICE A  tablet 3    ezetimibe (ZETIA) 10 mg tablet Take 1 tablet (10 mg total) by mouth daily 90 tablet 1    Glucosamine-Chondroit-Vit C-Mn (GLUCOSAMINE CHONDR 1500 COMPLX PO) Take 1 tablet by mouth 2 (two) times a day       hyoscyamine (ANASPAZ) 0 125 mg Take 0 125 mg by mouth 2 (two) times a day       MAGNESIUM GLYCINATE PLUS PO Take 400 mg by mouth 2 (two) times a day       montelukast (SINGULAIR) 10 mg tablet Take 1 tablet (10 mg total) by mouth daily 90 tablet 1    multivitamin (THERAGRAN) TABS Take 1 tablet by mouth 2 (two) times a day        OXcarbazepine (TRILEPTAL) 300 mg tablet Take 1 tablet (300 mg total) by mouth 2 (two) times a day 180 tablet 1    tiZANidine (ZANAFLEX) 2 mg tablet Take 1 tablet (2 mg total) by mouth 2 (two) times a day as needed for muscle spasms 60 tablet 2     No current facility-administered medications for this visit  Allergies  Allergies   Allergen Reactions    Augmentin [Amoxicillin-Pot Clavulanate] Hives and Itching    Ezetimibe Fatigue and GI Intolerance     Category: Adverse Reaction;     Molds & Smuts Allergic Rhinitis     Category: Allergy;     Monascus Purpureus Went Yeast Fatigue     Category: Adverse Reaction;     Niacin Fatigue     Category: Adverse Reaction;     Pollen Extract Allergic Rhinitis     Category: Allergy;     Pregabalin Fatigue     Category: Adverse Reaction;     Atorvastatin GI Intolerance and Fatigue     Other reaction(s): Muscle pain, constipation, sleepiness  Category: Adverse Reaction;    Other reaction(s): Muscle pain, constipation, sleepiness       Past Medical History, Social History, Family History, medications and allergies were reviewed  Vitals  Vitals:    09/27/22 1103   BP: 142/84   Pulse: 69   SpO2: 97%   Weight: 95 7 kg (211 lb)   Height: 5' 8" (1 727 m)       Physical Exam  Physical Exam  Constitutional:       Appearance: Normal appearance  He is well-developed  HENT:      Head: Normocephalic  Eyes:      Pupils: Pupils are equal, round, and reactive to light  Pulmonary:      Effort: Pulmonary effort is normal    Abdominal:      Palpations: Abdomen is soft  Genitourinary:     Prostate: Normal       Rectum: Normal       Comments: Prostate 45 g, smooth, nontender, no nodules  Musculoskeletal:         General: Normal range of motion  Cervical back: Normal range of motion  Skin:     General: Skin is warm and dry  Neurological:      General: No focal deficit present  Mental Status: He is alert and oriented to person, place, and time  Psychiatric:         Mood and Affect: Mood normal          Behavior: Behavior normal          Thought Content:  Thought content normal          Judgment: Judgment normal          Results    I have personally reviewed all pertinent lab results and reviewed with patient  Lab Results   Component Value Date    PSA 2 6 09/09/2021    PSA 2 4 07/20/2020    PSA 2 1 06/14/2019     Lab Results   Component Value Date    GLUCOSE 92 06/11/2015    CALCIUM 9 1 04/28/2022     01/30/2016    K 4 2 04/28/2022    CO2 30 04/28/2022     04/28/2022    BUN 15 04/28/2022    CREATININE 0 96 04/28/2022     Lab Results   Component Value Date    WBC 5 37 04/28/2022    HGB 15 5 04/28/2022    HCT 46 7 04/28/2022    MCV 94 04/28/2022     04/28/2022     Recent Results (from the past 1 hour(s))   POCT Measure PVR    Collection Time: 09/27/22 11:12 AM   Result Value Ref Range    POST-VOID RESIDUAL VOLUME, ML POC 61 mL

## 2022-09-27 ENCOUNTER — OFFICE VISIT (OUTPATIENT)
Dept: UROLOGY | Facility: AMBULATORY SURGERY CENTER | Age: 67
End: 2022-09-27
Payer: COMMERCIAL

## 2022-09-27 VITALS
BODY MASS INDEX: 31.98 KG/M2 | OXYGEN SATURATION: 97 % | WEIGHT: 211 LBS | HEART RATE: 69 BPM | DIASTOLIC BLOOD PRESSURE: 84 MMHG | SYSTOLIC BLOOD PRESSURE: 142 MMHG | HEIGHT: 68 IN

## 2022-09-27 DIAGNOSIS — N40.1 BENIGN PROSTATIC HYPERPLASIA WITH LOWER URINARY TRACT SYMPTOMS, SYMPTOM DETAILS UNSPECIFIED: Primary | ICD-10-CM

## 2022-09-27 DIAGNOSIS — Z12.5 SCREENING FOR PROSTATE CANCER: ICD-10-CM

## 2022-09-27 LAB — POST-VOID RESIDUAL VOLUME, ML POC: 61 ML

## 2022-09-27 PROCEDURE — 51798 US URINE CAPACITY MEASURE: CPT | Performed by: NURSE PRACTITIONER

## 2022-09-27 PROCEDURE — 99213 OFFICE O/P EST LOW 20 MIN: CPT | Performed by: NURSE PRACTITIONER

## 2022-09-27 NOTE — PATIENT INSTRUCTIONS
Cystoscopy   AMBULATORY CARE:   A cystoscopy  is a procedure to look inside your urethra and bladder using a cystoscope  A cystoscope is a small tube with a light and magnifying camera on the end  The procedure is used to diagnose and treat conditions of the bladder, urethra, or prostate  Your healthcare provider may also do a ureteroscopy during a cystoscopy  A ureteroscopy is a procedure to look inside your ureters and kidneys  Prepare for your cystoscopy: Your healthcare provider will talk to you about how to prepare for your procedure  He or she will tell you what medicines to take and not to take on the day of your procedure  You may need to stop taking medicines such as anticoagulants, aspirin, and ibuprofen several days before your procedure  Your provider may tell you stop eating after midnight the night before your procedure  You may be asked to drink a large amount of liquids before your procedure  Arrange for a ride home after your procedure  You will not be allowed to drive yourself home  During your cystoscopy: You may be given general anesthesia to keep you asleep and pain free during your procedure  Your healthcare provider may instead use local anesthesia  It is put into your urethra and bladder  You will not feel pain, but you may be able to feel some pressure during your procedure  With local anesthesia, you may feel burning or need to urinate when the cystoscope is put in and removed  If you are female, you will be placed on your back and your feet may be placed in stirrups  If you are male, you will be placed on your back or in a sitting position  The cystoscope will be placed through your urethra and into your bladder  The urologist will look at the walls of your urethra as the scope goes through to your bladder  Your bladder will be filled with liquid so your urologist can see the inside of your bladder more clearly   Tools may be inserted through the cystoscope to treat any problems in your urethra or bladder  Your provider may also take a sample of tissue and send it to a lab for tests  After your cystoscopy:  After you are fully awake, you will go home  You may see small amounts of blood in your urine  This is normal  It is also normal to have an increased need to urinate  You may also have burning or mild discomfort in your bladder or kidney area when you urinate  If you had general anesthesia, it may take at least 24 hours before you feel like your usual self  Risks of a cystoscopy: You may bleed more than expected or develop an infection  Your urethra, bladder, or ureters may get damaged during your procedure  You may have abdominal pain  Swelling caused by the cystoscopy may cause a blockage or slow urine flow  Seek care immediately if:   Your urine turns from pink to red, or you have clots in your urine  You cannot urinate and your bladder feels full  You have severe pain  Contact your healthcare provider or urologist if:   Your pain or burning during urination becomes worse or lasts longer than 1 day  Your urine stays pink for longer than 1 day  You have a fever and chills  You urinate less than usual, or still feel like you have to urinate after you use the bathroom  You have questions or concerns about your condition or care  Medicines: You may  be given any of the following:  Antibiotics  help treat or prevent a bacterial infection  Acetaminophen  decreases pain and fever  It is available without a doctor's order  Ask how much to take and how often to take it  Follow directions  Read the labels of all other medicines you are using to see if they also contain acetaminophen, or ask your doctor or pharmacist  Acetaminophen can cause liver damage if not taken correctly  Do not use more than 4 grams (4,000 milligrams) total of acetaminophen in one day  Take your medicine as directed    Contact your healthcare provider if you think your medicine is not helping or if you have side effects  Tell him or her if you are allergic to any medicine  Keep a list of the medicines, vitamins, and herbs you take  Include the amounts, and when and why you take them  Bring the list or the pill bottles to follow-up visits  Carry your medicine list with you in case of an emergency  Self-care:   Drink liquids as directed  Your healthcare provider may recommend that you drink 6 to 8 eight-ounce cups of water every day for 2 days after your procedure  Apply a warm, damp washcloth over your urethral opening  This may help to relieve discomfort  Ask when you can return to regular daily activities  Your healthcare provider may recommend that you rest after your procedure  Do not have sex  until your healthcare provider tells you it is okay  Sex may increase your risk for a urinary tract infection  Follow up with your healthcare provider as directed:  Write down your questions so you remember to ask them during your visits  © Copyright [employer] 2022 Information is for End User's use only and may not be sold, redistributed or otherwise used for commercial purposes  All illustrations and images included in CareNotes® are the copyrighted property of [employer]  or [employer]   The above information is an  only  It is not intended as medical advice for individual conditions or treatments  Talk to your doctor, nurse or pharmacist before following any medical regimen to see if it is safe and effective for you

## 2022-09-29 ENCOUNTER — OFFICE VISIT (OUTPATIENT)
Dept: NEUROSURGERY | Facility: CLINIC | Age: 67
End: 2022-09-29
Payer: COMMERCIAL

## 2022-09-29 VITALS
HEIGHT: 68 IN | DIASTOLIC BLOOD PRESSURE: 60 MMHG | RESPIRATION RATE: 16 BRPM | TEMPERATURE: 97.5 F | BODY MASS INDEX: 31.37 KG/M2 | WEIGHT: 207 LBS | SYSTOLIC BLOOD PRESSURE: 120 MMHG | HEART RATE: 65 BPM

## 2022-09-29 DIAGNOSIS — M47.816 LUMBAR SPONDYLOSIS: ICD-10-CM

## 2022-09-29 DIAGNOSIS — M54.16 LUMBAR RADICULOPATHY: Primary | ICD-10-CM

## 2022-09-29 PROCEDURE — 99213 OFFICE O/P EST LOW 20 MIN: CPT | Performed by: PHYSICIAN ASSISTANT

## 2022-09-29 PROCEDURE — 1160F RVW MEDS BY RX/DR IN RCRD: CPT | Performed by: PHYSICIAN ASSISTANT

## 2022-09-29 RX ORDER — FEXOFENADINE HYDROCHLORIDE 60 MG/1
60 TABLET, FILM COATED ORAL DAILY
COMMUNITY

## 2022-09-29 NOTE — PROGRESS NOTES
Neurosurgery Office Note  Bernice Do 69 y o  male MRN: [id number]      Assessment/Plan     Lumbar radiculopathy  Patient seen in outpatient office today for further evaluation and workup of lumbar radiculopathy  · Patient states his back pain started in high school, he was a weightlifter and gymnast   He has had back pain since then but was receiving good relief with CARLEEN until November 2021, he started to develop right leg radiculopathy and has progressively worsened since then  · Patient has been following with pain management  His most recent CARLEEN 3 weeks give little relief  · Last saw Dr Terra Adam in February 2022; no surgery recommended as exercises and therapy were helping his pain  Now with worsening RLE radiculopathy despite conservative measures  · On exam, no weakness or sensory deficits noted  Reflexes WNL  Imaging:  · No new imaging to review  · MRI lumbar spine w/o, 2/7/22: Lumbar spondylitic degenerative change with annular bulging and small broad-based disc herniations superimposed upon endplate and facet hypertrophic changes  Moderate right foraminal narrowing is seen at the L3-4 level  There is severe right foraminal narrowing at the L4-5 level  Moderate distal foraminal narrowing at L5-S1 secondary to annular bulging and foraminal and far lateral endplate osteophyte formation  Plan:  · Given worsening symptoms despite conservative treatment, will order updated MRI lumbar spine to determine if there is new pathology contributing to worsening RLE radicular pain in L4-5 distrubution  · Continue home therapies and oral medications as tolerated  · I had a lengthy discussion with the patient regarding surgical options  I reiterated that surgery is not a good treatment option for muscle spasms and pain  In addition, it does not always help with back pain    If he does have a compressive nerve correlating with his right lower extremity symptoms, we could consider surgery for that particular etiology  Ultimately, he understands that surgery may not be the best treatment option for him  · I will have the patient follow-up with Dr Bo Landrum upon completion of MRI per his request        Diagnoses and all orders for this visit:    Lumbar radiculopathy  -     MRI lumbar spine without contrast; Future    Lumbar spondylosis  -     MRI lumbar spine without contrast; Future    Other orders  -     fexofenadine (ALLEGRA) 60 MG tablet; Take 60 mg by mouth daily          I spent 25 minutes with the patient today in which >50% of the time was spent counseling/coordination of care regarding diagnosis, imaging review, symptoms and treatment plan  CHIEF COMPLAINT    Chief Complaint   Patient presents with    Follow-up    Back Pain       HISTORY    This is a 67-year-old male with a past medical history significant for atrial fibrillation, stroke, melanoma who is here today for evaluation of low back pain and worsening RLE pain  He was last seen by our office in February 2022 after MRI showed mild degenerative changes  He follows with pain management and has had multiple injections  Unfortunately, the most recent injection only gave him 30% relief  He has not done formal PT as he feels he does a more thorough workout on his own  He stretches and exercises multiple times a week  His pain is largely located in his right low back and radiates up towards his shoulder  He has pain, numbness, and tingling that radiate does through his groin, right testicle, medial thigh and calf, and entire foot  He denies difficulty with ambulation or falls  No left leg symptoms  No bowel/bladder incontinence  See Discussion    REVIEW OF SYSTEMS    Review of Systems   HENT: Negative for tinnitus  Eyes: Positive for visual disturbance (blurry from allergies )  Respiratory: Negative for shortness of breath and wheezing  Cardiovascular: Negative for chest pain  Gastrointestinal: Negative      Genitourinary: Positive for frequency  Musculoskeletal: Positive for back pain (lower right sided back pain radaites into his right hip/groin down into the leg and foot ), gait problem (from medication he take that makes him drowzy) and myalgias (right leg/foot , muscle tightness in right lower back )  Had CARLEEN injection in lower back with no significant relief  States about 30% but as soon as he does something pain comes back  Neurological: Positive for weakness (range of motion is weak in right leg due to muscle tightness) and numbness (right foot )       ROS was personally reviewed and changes made as needed     Meds/Allergies     Current Outpatient Medications   Medication Sig Dispense Refill    ascorbic acid (VITAMIN C) 250 mg tablet Take 250 mg by mouth 2 (two) times a day      Cholecalciferol (VITAMIN D-3) 5000 units TABS Take 1 tablet by mouth daily (Patient taking differently: Take 3,000 mg by mouth daily) 100 tablet 0    clobetasol (TEMOVATE) 0 05 % cream Apply 1 application topically 2 (two) times a day as needed Apply sparingly to affected areas      Coenzyme Q10 (COQ10) 200 MG CAPS Take 1 capsule by mouth daily      colesevelam (WELCHOL) 625 mg tablet Take 2 tablets (1,250 mg total) by mouth 2 (two) times a day with meals 360 tablet 1    diltiazem (CARDIZEM) 30 mg tablet Take 1 tablet (30 mg total) by mouth 3 (three) times a day as needed (afib) 30 tablet 3    Eliquis 5 MG TAKE ONE TABLET BY MOUTH TWICE A  tablet 3    ezetimibe (ZETIA) 10 mg tablet Take 1 tablet (10 mg total) by mouth daily 90 tablet 1    fexofenadine (ALLEGRA) 60 MG tablet Take 60 mg by mouth daily      Glucosamine-Chondroit-Vit C-Mn (GLUCOSAMINE CHONDR 1500 COMPLX PO) Take 1 tablet by mouth 2 (two) times a day       hyoscyamine (ANASPAZ) 0 125 mg Take 0 125 mg by mouth 2 (two) times a day       MAGNESIUM GLYCINATE PLUS PO Take 400 mg by mouth 2 (two) times a day       montelukast (SINGULAIR) 10 mg tablet Take 1 tablet (10 mg total) by mouth daily 90 tablet 1    multivitamin (THERAGRAN) TABS Take 1 tablet by mouth 2 (two) times a day        OXcarbazepine (TRILEPTAL) 300 mg tablet Take 1 tablet (300 mg total) by mouth 2 (two) times a day 180 tablet 1    tiZANidine (ZANAFLEX) 2 mg tablet Take 1 tablet (2 mg total) by mouth 2 (two) times a day as needed for muscle spasms 60 tablet 2    chlorpheniramine (CHLOR-TRIMETON) 4 MG tablet Take 1 tablet (4 mg total) by mouth every 6 (six) hours as needed for rhinitis (Patient not taking: Reported on 2022) 30 tablet 0     No current facility-administered medications for this visit  Allergies   Allergen Reactions    Augmentin [Amoxicillin-Pot Clavulanate] Hives and Itching    Ezetimibe Fatigue and GI Intolerance     Category: Adverse Reaction;     Molds & Smuts Allergic Rhinitis     Category: Allergy;     Monascus Purpureus Went Yeast Fatigue     Category: Adverse Reaction;     Niacin Fatigue     Category: Adverse Reaction;     Pollen Extract Allergic Rhinitis     Category: Allergy;     Pregabalin Fatigue     Category: Adverse Reaction;     Atorvastatin GI Intolerance and Fatigue     Other reaction(s): Muscle pain, constipation, sleepiness  Category: Adverse Reaction;    Other reaction(s): Muscle pain, constipation, sleepiness       PAST HISTORY    Past Medical History:   Diagnosis Date    A[facility]fib ([facility] )     Allergic rhinitis     Benign cyst of kidney     Cat bite 2020    Cerebral vascular disease     Chronic sinusitis     [facility] 3/2/2020    Hyperlipidemia     Melanoma ([facility] )     Pancreas cyst        Past Surgical History:   Procedure Laterality Date    COLONOSCOPY      OTHER SURGICAL HISTORY      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    PATENT FORAMEN OVALE CLOSURE         Social History     Tobacco Use    Smoking status: Former Smoker     Years: 10      Types: Cigars     Quit date:      Years since quittin 7    Smokeless tobacco: Former User     Types: Chew    Tobacco comment: Cigars    Vaping Use    Vaping Use: Never used   Substance Use Topics    Alcohol use: Yes     Alcohol/week: 7 0 standard drinks     Types: 7 Glasses of wine per week     Comment: daily wine 8oz    Drug use: Not Currently     Types: Marijuana     Comment: Medical use (CBD)       Family History   Problem Relation Age of Onset    Diabetes type II Mother     Cancer Father         angioma         Above history personally reviewed  EXAM    Vitals:Blood pressure 120/60, pulse 65, temperature 97 5 °F (36 4 °C), temperature source Temporal, resp  rate 16, height 5' 8" (1 727 m), weight 93 9 kg (207 lb)  ,Body mass index is 31 47 kg/m²  Physical Exam  Vitals and nursing note reviewed  Constitutional:       Appearance: Normal appearance  He is well-developed and normal weight  HENT:      Head: Normocephalic and atraumatic  Eyes:      Extraocular Movements: Extraocular movements intact  Pupils: Pupils are equal, round, and reactive to light  Cardiovascular:      Rate and Rhythm: Normal rate  Pulmonary:      Effort: Pulmonary effort is normal  No respiratory distress  Abdominal:      Palpations: Abdomen is soft  Musculoskeletal:         General: Normal range of motion  Cervical back: Normal range of motion  Skin:     General: Skin is warm and dry  Neurological:      General: No focal deficit present  Mental Status: He is alert and oriented to person, place, and time  Gait: Gait is intact  Deep Tendon Reflexes: Strength normal       Reflex Scores:       Patellar reflexes are 2+ on the right side and 2+ on the left side  Psychiatric:         Mood and Affect: Mood normal          Speech: Speech normal          Behavior: Behavior normal          Thought Content: Thought content normal          Judgment: Judgment normal          Neurologic Exam     Mental Status   Oriented to person, place, and time     Follows 2 step commands  Attention: normal  Concentration: normal    Speech: speech is normal   Level of consciousness: alert  Knowledge: good  Able to repeat  Normal comprehension  Cranial Nerves   Cranial nerves II through XII intact  CN III, IV, VI   Pupils are equal, round, and reactive to light  Motor Exam   Muscle bulk: normal  Overall muscle tone: normal    Strength   Strength 5/5 throughout  Sensory Exam   Light touch normal      Gait, Coordination, and Reflexes     Gait  Gait: normal    Tremor   Resting tremor: absent    Reflexes   Right patellar: 2+  Left patellar: 2+        MEDICAL DECISION MAKING    Imaging Studies:     FL spine and pain procedure    Result Date: 9/12/2022  Narrative: 2 Level Transforaminal Epidural Steroid Injection Indication:  Low back and leg pain Preoperative diagnosis:  Lumbar radiculitis Postoperative diagnosis:  Lumbar radiculitis Procedure: Fluoroscopically-guided right L4 and L5 transforaminal epidural steroid injection under fluoroscopy After discussing the risks, benefits, and alternatives to the procedure, the patient expressed understanding and wished to proceed  The patient was brought to the fluoroscopy suite and placed in the prone position  A procedural pause was conducted to verify:  correct patient identity, procedure to be performed and as applicable, correct side and site, correct patient position, and availability of implants, special equipment and special requirements  After identifying the right L4 and L5 pedicles fluoroscopically with an oblique view, the skin was sterilely prepped and draped in the usual fashion using Chloraprep skin prep  The skin and subcutaneous tissue were anesthetized with 1% lidocaine  A 5 inch 22 gauge spinal needle was then advanced under fluoroscopic guidance to the posterior aspect of the right L4 and L5 neural foramens    Appropriate foraminal depth was determined with a lateral fluoroscopic view, and AP visualization confirmed needle positioning at approximately the 6 oclock position relative to the pedicles  After negative aspiration, 2 mL of Omnipaque 300 contrast was injected using live fluoroscopy/digital subtraction angiography, confirming appropriate transforaminal spread without evidence of intravascular or intrathecal uptake  Next, a 1 5 ml solution consisting of 7 5 mg of dexamethasone in sterile saline was injected slowly and incrementally into the epidural space at each level  Following the injection the needles were withdrawn slightly and flushed with lidocaine as they were fully extracted  The patient tolerated the procedure well and there were no apparent complications  The patient did not develop any new neurologic deficits  After appropriate observation, the patient was dismissed from the clinic in good condition under their own power  COMMENTS The patient received a total steroid dose of 15 mg of dexamethasone  EBL:  Minimal Specimen:  None          I have personally reviewed pertinent reports     and I have personally reviewed pertinent films in PACS

## 2022-09-29 NOTE — ASSESSMENT & PLAN NOTE
Patient seen in outpatient office today for further evaluation and workup of lumbar radiculopathy  · Patient states his back pain started in high school, he was a weightlifter and gymnast   He has had back pain since then but was receiving good relief with CARLEEN until November 2021, he started to develop right leg radiculopathy and has progressively worsened since then  · Patient has been following with pain management  His most recent CARLEEN 3 weeks give little relief  · Last saw Dr Maribel Blankenship in February 2022; no surgery recommended as exercises and therapy were helping his pain  Now with worsening RLE radiculopathy despite conservative measures  · On exam, no weakness or sensory deficits noted  Reflexes WNL  Imaging:  · No new imaging to review  · MRI lumbar spine w/o, 2/7/22: Lumbar spondylitic degenerative change with annular bulging and small broad-based disc herniations superimposed upon endplate and facet hypertrophic changes  Moderate right foraminal narrowing is seen at the L3-4 level  There is severe right foraminal narrowing at the L4-5 level  Moderate distal foraminal narrowing at L5-S1 secondary to annular bulging and foraminal and far lateral endplate osteophyte formation  Plan:  · Given worsening symptoms despite conservative treatment, will order updated MRI lumbar spine to determine if there is new pathology contributing to worsening RLE radicular pain in L4-5 distrubution  · Continue home therapies and oral medications as tolerated  · I had a lengthy discussion with the patient regarding surgical options  I reiterated that surgery is not a good treatment option for muscle spasms and pain  In addition, it does not always help with back pain  If he does have a compressive nerve correlating with his right lower extremity symptoms, we could consider surgery for that particular etiology  Ultimately, he understands that surgery may not be the best treatment option for him    · I will have the patient follow-up with Dr Vasquez Ellis upon completion of MRI per his request

## 2022-10-04 DIAGNOSIS — E78.00 HYPERCHOLESTEROLEMIA: ICD-10-CM

## 2022-10-04 RX ORDER — EZETIMIBE 10 MG/1
10 TABLET ORAL DAILY
Qty: 90 TABLET | Refills: 1 | Status: SHIPPED | OUTPATIENT
Start: 2022-10-04

## 2022-10-11 PROBLEM — J01.40 ACUTE PANSINUSITIS: Status: RESOLVED | Noted: 2022-06-10 | Resolved: 2022-10-11

## 2022-10-13 ENCOUNTER — APPOINTMENT (OUTPATIENT)
Dept: LAB | Facility: CLINIC | Age: 67
End: 2022-10-13
Payer: COMMERCIAL

## 2022-10-13 DIAGNOSIS — E78.00 HYPERCHOLESTEROLEMIA: ICD-10-CM

## 2022-10-13 DIAGNOSIS — Z12.5 SCREENING FOR PROSTATE CANCER: ICD-10-CM

## 2022-10-13 DIAGNOSIS — I48.0 PAROXYSMAL ATRIAL FIBRILLATION (HCC): ICD-10-CM

## 2022-10-13 LAB
ALBUMIN SERPL BCP-MCNC: 3.8 G/DL (ref 3.5–5)
ALP SERPL-CCNC: 73 U/L (ref 46–116)
ALT SERPL W P-5'-P-CCNC: 53 U/L (ref 12–78)
ANION GAP SERPL CALCULATED.3IONS-SCNC: 6 MMOL/L (ref 4–13)
AST SERPL W P-5'-P-CCNC: 23 U/L (ref 5–45)
BILIRUB SERPL-MCNC: 0.32 MG/DL (ref 0.2–1)
BUN SERPL-MCNC: 23 MG/DL (ref 5–25)
CALCIUM SERPL-MCNC: 9.5 MG/DL (ref 8.3–10.1)
CHLORIDE SERPL-SCNC: 109 MMOL/L (ref 96–108)
CHOLEST SERPL-MCNC: 231 MG/DL
CO2 SERPL-SCNC: 24 MMOL/L (ref 21–32)
CREAT SERPL-MCNC: 0.97 MG/DL (ref 0.6–1.3)
ERYTHROCYTE [DISTWIDTH] IN BLOOD BY AUTOMATED COUNT: 12.4 % (ref 11.6–15.1)
GFR SERPL CREATININE-BSD FRML MDRD: 79 ML/MIN/1.73SQ M
GLUCOSE P FAST SERPL-MCNC: 95 MG/DL (ref 65–99)
HCT VFR BLD AUTO: 44.5 % (ref 36.5–49.3)
HDLC SERPL-MCNC: 61 MG/DL
HGB BLD-MCNC: 14.7 G/DL (ref 12–17)
LDLC SERPL CALC-MCNC: 155 MG/DL (ref 0–100)
MCH RBC QN AUTO: 32 PG (ref 26.8–34.3)
MCHC RBC AUTO-ENTMCNC: 33 G/DL (ref 31.4–37.4)
MCV RBC AUTO: 97 FL (ref 82–98)
NONHDLC SERPL-MCNC: 170 MG/DL
PLATELET # BLD AUTO: 192 THOUSANDS/UL (ref 149–390)
PMV BLD AUTO: 9.4 FL (ref 8.9–12.7)
POTASSIUM SERPL-SCNC: 4.4 MMOL/L (ref 3.5–5.3)
PROT SERPL-MCNC: 7.1 G/DL (ref 6.4–8.4)
PSA SERPL-MCNC: 2.4 NG/ML (ref 0–4)
RBC # BLD AUTO: 4.6 MILLION/UL (ref 3.88–5.62)
SODIUM SERPL-SCNC: 139 MMOL/L (ref 135–147)
TRIGL SERPL-MCNC: 77 MG/DL
WBC # BLD AUTO: 5.15 THOUSAND/UL (ref 4.31–10.16)

## 2022-10-13 PROCEDURE — 36415 COLL VENOUS BLD VENIPUNCTURE: CPT

## 2022-10-13 PROCEDURE — 85027 COMPLETE CBC AUTOMATED: CPT

## 2022-10-13 PROCEDURE — 80061 LIPID PANEL: CPT

## 2022-10-13 PROCEDURE — G0103 PSA SCREENING: HCPCS

## 2022-10-13 PROCEDURE — 80053 COMPREHEN METABOLIC PANEL: CPT

## 2022-10-17 DIAGNOSIS — E78.00 HYPERCHOLESTEROLEMIA: ICD-10-CM

## 2022-10-17 RX ORDER — COLESEVELAM 180 1/1
1250 TABLET ORAL 2 TIMES DAILY WITH MEALS
Qty: 360 TABLET | Refills: 1 | Status: SHIPPED | OUTPATIENT
Start: 2022-10-17 | End: 2023-04-15

## 2022-10-28 DIAGNOSIS — I48.0 PAROXYSMAL ATRIAL FIBRILLATION (HCC): ICD-10-CM

## 2022-10-28 RX ORDER — APIXABAN 5 MG/1
TABLET, FILM COATED ORAL
Qty: 180 TABLET | Refills: 3 | Status: SHIPPED | OUTPATIENT
Start: 2022-10-28

## 2022-11-08 ENCOUNTER — OFFICE VISIT (OUTPATIENT)
Dept: INTERNAL MEDICINE CLINIC | Facility: CLINIC | Age: 67
End: 2022-11-08

## 2022-11-08 ENCOUNTER — PROCEDURE VISIT (OUTPATIENT)
Dept: UROLOGY | Facility: AMBULATORY SURGERY CENTER | Age: 67
End: 2022-11-08

## 2022-11-08 VITALS
OXYGEN SATURATION: 98 % | BODY MASS INDEX: 32.86 KG/M2 | DIASTOLIC BLOOD PRESSURE: 68 MMHG | WEIGHT: 216.8 LBS | TEMPERATURE: 98.7 F | SYSTOLIC BLOOD PRESSURE: 122 MMHG | HEIGHT: 68 IN | HEART RATE: 55 BPM

## 2022-11-08 VITALS
SYSTOLIC BLOOD PRESSURE: 132 MMHG | WEIGHT: 216 LBS | HEART RATE: 64 BPM | DIASTOLIC BLOOD PRESSURE: 70 MMHG | BODY MASS INDEX: 32.84 KG/M2 | OXYGEN SATURATION: 99 %

## 2022-11-08 DIAGNOSIS — N13.8 BPH WITH OBSTRUCTION/LOWER URINARY TRACT SYMPTOMS: ICD-10-CM

## 2022-11-08 DIAGNOSIS — E78.00 HYPERCHOLESTEROLEMIA: ICD-10-CM

## 2022-11-08 DIAGNOSIS — Z00.00 MEDICARE ANNUAL WELLNESS VISIT, INITIAL: Primary | ICD-10-CM

## 2022-11-08 DIAGNOSIS — N40.1 BENIGN PROSTATIC HYPERPLASIA WITH LOWER URINARY TRACT SYMPTOMS, SYMPTOM DETAILS UNSPECIFIED: Primary | ICD-10-CM

## 2022-11-08 DIAGNOSIS — R39.15 URGENCY OF MICTURITION: ICD-10-CM

## 2022-11-08 DIAGNOSIS — N40.1 BPH WITH OBSTRUCTION/LOWER URINARY TRACT SYMPTOMS: ICD-10-CM

## 2022-11-08 DIAGNOSIS — K86.2 PANCREATIC CYST: ICD-10-CM

## 2022-11-08 LAB
SL AMB  POCT GLUCOSE, UA: NORMAL
SL AMB LEUKOCYTE ESTERASE,UA: NORMAL
SL AMB POCT BILIRUBIN,UA: NORMAL
SL AMB POCT BLOOD,UA: NORMAL
SL AMB POCT CLARITY,UA: CLEAR
SL AMB POCT COLOR,UA: NORMAL
SL AMB POCT KETONES,UA: NORMAL
SL AMB POCT NITRITE,UA: NORMAL
SL AMB POCT PH,UA: 5
SL AMB POCT SPECIFIC GRAVITY,UA: 1.03
SL AMB POCT URINE PROTEIN: NORMAL
SL AMB POCT UROBILINOGEN: 0.2

## 2022-11-08 RX ORDER — OXYBUTYNIN CHLORIDE 10 MG/1
10 TABLET, EXTENDED RELEASE ORAL
Qty: 90 TABLET | Refills: 3 | Status: SHIPPED | OUTPATIENT
Start: 2022-11-08

## 2022-11-08 NOTE — ASSESSMENT & PLAN NOTE
Patient is up-to-date with age-appropriate immunizations and screenings    He just received his COVID booster so he is waiting 2 to 4 weeks before getting his flu shot

## 2022-11-08 NOTE — ASSESSMENT & PLAN NOTE
Due for follow-up MRI of abdomen in November of 2023   Currently asymptomatic This is completed and closed.    Thank you,  Molly

## 2022-11-08 NOTE — PROGRESS NOTES
Cystoscopy     Date/Time 11/8/2022 8:24 AM     Performed by  Dutch Mattson MD     Authorized by Dutch Mattson MD          Ashok Hashimoto is a 68-year-old male with history of simple bilateral renal cysts  He was recently evaluated by the advanced practitioner in September of 2022 for his BPH and lower urinary tract symptoms  He has been unable to tolerate alpha blockade secondary to orthostatic hypotension  He presents today for cystoscopy to assess his bladder outlet to see if he would be a candidate for a bladder outlet procedure  Male cystoscopy procedure note:    Risk and benefits of flexible cystoscopy were discussed  Informed consent was obtained  The patient was placed in the supine position  His genitalia was prepped and draped in a sterile fashion  Viscous lidocaine jelly was instilled into the urethra and flexible cystoscopy was then performed  The urethra, prostatic urethra, and bladder were all thoroughly inspected there was no evidence of mucosal abnormalities or lesions  Mild lateral lobe coaptation the prostate was appreciated  Both ureteral orifices were visualized with clear efflux of urine  Retroflexion revealed very mild intravesical protrusion of the prostate with some hyperemic blood vessels at the bladder neck  Overall this was a negative cystoscopy for urothelial carcinoma  Impression:  Mild BPH, urgency of urination    Plan: We discussed that his main lower urinary tract symptom is urgency and frequency of urination  We reviewed that his prostatic urethral channel is not significantly included  Prior to considering a bladder outlet procedure I would trial an anticholinergic such as Ditropan 10 mg XL daily  Side effect profile including dry mouth and constipation discussed and reviewed  He will return in the next 2 months with a postvoid residual assessment and uroflow evaluation to assess the efficacy of the Ditropan  Nikole Press

## 2022-11-08 NOTE — PROGRESS NOTES
Assessment and Plan:     Problem List Items Addressed This Visit        Digestive    Pancreatic cyst     Due for follow-up MRI of abdomen in November of 2023  Currently asymptomatic         Relevant Orders    CBC and differential    Comprehensive metabolic panel    Lipid panel       Genitourinary    BPH with obstruction/lower urinary tract symptoms     Recently initiated on oxybutynin  PVR was 62            Other    Hypercholesterolemia     Modest improvement to cholesterol levels  Patient remains on Welchol  Relevant Orders    CBC and differential    Comprehensive metabolic panel    Lipid panel    Medicare annual wellness visit, initial - Primary     Patient is up-to-date with age-appropriate immunizations and screenings  He just received his COVID booster so he is waiting 2 to 4 weeks before getting his flu shot                Preventive health issues were discussed with patient, and age appropriate screening tests were ordered as noted in patient's After Visit Summary  Personalized health advice and appropriate referrals for health education or preventive services given if needed, as noted in patient's After Visit Summary  History of Present Illness:     Patient presents for a Medicare Wellness Visit    Patient presents to the office for follow-up visit along with Medicare wellness visit  He has doing well he has no particular complaints  He was seen by Urology earlier this morning  He was initiated on oxybutynin therapy  He has no other major issues  His back pain is not been problematic  Labs are reviewed  Cholesterol is slightly improved although not quite at goal levels  Continues on the Welchol without any issues or difficulty  PSA was normal   CBC was within normal limits as was his metabolic panel       Patient Care Team:  Dany Worthy MD as PCP - General  MD Micky Martin MD Eugene Barcelona, MD (Urology)  Shukri James MD (Gastroenterology)     Review of Systems:     Review of Systems   Constitutional: Negative  HENT: Negative  Eyes: Negative  Respiratory: Negative  Cardiovascular: Negative  Gastrointestinal: Negative  Genitourinary: Positive for difficulty urinating and urgency  Musculoskeletal: Positive for back pain (Mild)  Skin: Negative  Allergic/Immunologic: Negative  Neurological: Negative  Hematological: Negative  Psychiatric/Behavioral: Negative           Problem List:     Patient Active Problem List   Diagnosis   • Seasonal allergic rhinitis   • Osteoarthritis   • Nephrolithiasis   • Irritable bowel syndrome   • Hypercholesterolemia   • Coronary atherosclerosis   • Coronary artery disease involving native coronary artery   • Alpha-1-antitrypsin deficiency (HCC)   • BPH with obstruction/lower urinary tract symptoms   • Medicare annual wellness visit, initial   • Pancreatic cyst   • Lumbar radiculopathy   • Low back pain with sciatica   • DDD (degenerative disc disease), lumbar   • Lumbar spondylosis   • Tinea corporis   • Chronic pain syndrome   • Paroxysmal atrial fibrillation (HCC)      Past Medical and Surgical History:     Past Medical History:   Diagnosis Date   • A-fib ([facility] )    • Allergic rhinitis    • Benign cyst of kidney    • Cat bite 7/8/2020   • Cerebral vascular disease    • Chronic sinusitis    • Cryptogenic stroke ([facility] ) 3/2/2020   • Hyperlipidemia    • Melanoma ([facility] )    • Pancreas cyst      Past Surgical History:   Procedure Laterality Date   • COLONOSCOPY     • OTHER SURGICAL HISTORY  2003    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   • PATENT FORAMEN OVALE CLOSURE        Family History:     Family History   Problem Relation Age of Onset   • Diabetes type II Mother    • Cancer Father         angioma      Social History:     Social History     Socioeconomic History   • Marital status: /Civil Union     Spouse name: None   • Number of children: None   • Years of education: None   • Highest education level: None   Occupational History   • Occupation: massage therapist   Tobacco Use   • Smoking status: Former Smoker     Years: 10 00     Types: Cigars     Start date:      Quit date:      Years since quittin 8   • Smokeless tobacco: Former User     Types: Chew   • Tobacco comment: Cigars    Vaping Use   • Vaping Use: Never used   Substance and Sexual Activity   • Alcohol use: Yes     Alcohol/week: 7 0 standard drinks     Types: 7 Glasses of wine per week     Comment: daily wine 8oz   • Drug use: Not Currently     Types: Marijuana     Comment: Medical use (CBD)   • Sexual activity: Yes     Partners: Female   Other Topics Concern   • None   Social History Narrative    Caffeine: drinks 1 cup coffee/ tea a day  Social Determinants of Health     Financial Resource Strain: Low Risk    • Difficulty of Paying Living Expenses: Not hard at all   Food Insecurity: Not on file   Transportation Needs: No Transportation Needs   • Lack of Transportation (Medical): No   • Lack of Transportation (Non-Medical):  No   Physical Activity: Not on file   Stress: Not on file   Social Connections: Not on file   Intimate Partner Violence: Not on file   Housing Stability: Not on file      Medications and Allergies:     Current Outpatient Medications   Medication Sig Dispense Refill   • ascorbic acid (VITAMIN C) 250 mg tablet Take 250 mg by mouth 2 (two) times a day     • chlorpheniramine (CHLOR-TRIMETON) 4 MG tablet Take 1 tablet (4 mg total) by mouth every 6 (six) hours as needed for rhinitis 30 tablet 0   • clobetasol (TEMOVATE) 0 05 % cream Apply 1 application topically 2 (two) times a day as needed Apply sparingly to affected areas     • Coenzyme Q10 (COQ10) 200 MG CAPS Take 1 capsule by mouth daily     • colesevelam (WELCHOL) 625 mg tablet Take 2 tablets (1,250 mg total) by mouth 2 (two) times a day with meals 360 tablet 1   • diltiazem (CARDIZEM) 30 mg tablet Take 1 tablet (30 mg total) by mouth 3 (three) times a day as needed (afib) 30 tablet 3   • Eliquis 5 MG TAKE ONE TABLET BY MOUTH TWICE A  tablet 3   • ezetimibe (ZETIA) 10 mg tablet Take 1 tablet (10 mg total) by mouth daily 90 tablet 1   • Glucosamine-Chondroit-Vit C-Mn (GLUCOSAMINE CHONDR 1500 COMPLX PO) Take 1 tablet by mouth 2 (two) times a day      • hyoscyamine (ANASPAZ) 0 125 mg Take 0 125 mg by mouth 2 (two) times a day      • MAGNESIUM GLYCINATE PLUS PO Take 400 mg by mouth 2 (two) times a day      • montelukast (SINGULAIR) 10 mg tablet Take 1 tablet (10 mg total) by mouth daily 90 tablet 1   • multivitamin (THERAGRAN) TABS Take 1 tablet by mouth 2 (two) times a day       • OXcarbazepine (TRILEPTAL) 300 mg tablet Take 1 tablet (300 mg total) by mouth 2 (two) times a day 180 tablet 1   • oxybutynin (DITROPAN-XL) 10 MG 24 hr tablet Take 1 tablet (10 mg total) by mouth daily at bedtime 90 tablet 3   • tiZANidine (ZANAFLEX) 2 mg tablet Take 1 tablet (2 mg total) by mouth 2 (two) times a day as needed for muscle spasms 60 tablet 2     No current facility-administered medications for this visit  Allergies   Allergen Reactions   • Augmentin [Amoxicillin-Pot Clavulanate] Hives and Itching   • Molds & Smuts Allergic Rhinitis     Category: Allergy;    • Monascus Purpureus Went Yeast Fatigue     Category: Adverse Reaction;    • Niacin Fatigue     Category: Adverse Reaction;    • Pollen Extract Allergic Rhinitis     Category: Allergy;    • Pregabalin Fatigue     Category: Adverse Reaction;    • Atorvastatin GI Intolerance and Fatigue     Other reaction(s): Muscle pain, constipation, sleepiness  Category: Adverse Reaction;    Other reaction(s): Muscle pain, constipation, sleepiness      Immunizations:     Immunization History   Administered Date(s) Administered   • COVID-19 MODERNA VACC 0 25 ML IM BOOSTER 11/09/2021   • COVID-19 MODERNA VACC 0 5 ML IM 03/17/2021, 04/16/2021   • Influenza Quadrivalent, 6-35 Months IM 11/12/2015, 01/05/2017   • Influenza, high dose seasonal 0 7 mL 10/12/2020, 10/25/2021   • Influenza, seasonal, injectable 11/03/2014   • Pneumococcal Conjugate 13-Valent 08/28/2019   • Pneumococcal Polysaccharide PPV23 10/12/2020   • Tdap 07/08/2020   • Zoster Vaccine Recombinant 12/01/2019      Health Maintenance:         Topic Date Due   • Colorectal Cancer Screening  01/24/2030   • Hepatitis C Screening  Completed         Topic Date Due   • COVID-19 Vaccine (4 - Booster for Moderna series) 03/09/2022   • Influenza Vaccine (1) 09/01/2022      Medicare Screening Tests and Risk Assessments:     Brian Churchill is here for his Subsequent Wellness visit  Health Risk Assessment:   Patient rates overall health as good  Patient feels that their physical health rating is same  Patient is satisfied with their life  Eyesight was rated as same  Hearing was rated as same  Patient feels that their emotional and mental health rating is same  Patients states they are never, rarely angry  Patient states they are sometimes unusually tired/fatigued  Pain experienced in the last 7 days has been some  Patient's pain rating has been 3/10  Patient states that he has experienced no weight loss or gain in last 6 months  Depression Screening:   PHQ-2 Score: 0      Fall Risk Screening: In the past year, patient has experienced: no history of falling in past year      Home Safety:  Patient does not have trouble with stairs inside or outside of their home  Patient has working smoke alarms and has working carbon monoxide detector  Home safety hazards include: none  Nutrition:   Current diet is Regular  Medications:   Patient is currently taking over-the-counter supplements  OTC medications include: see medication list  Patient is able to manage medications       Activities of Daily Living (ADLs)/Instrumental Activities of Daily Living (IADLs):   Walk and transfer into and out of bed and chair?: Yes  Dress and groom yourself?: Yes    Bathe or shower yourself?: Yes    Feed yourself? Yes  Do your laundry/housekeeping?: Yes  Manage your money, pay your bills and track your expenses?: Yes  Make your own meals?: Yes    Do your own shopping?: Yes    Durable Medical Equipment Suppliers  no    Previous Hospitalizations:   Any hospitalizations or ED visits within the last 12 months?: No      Advance Care Planning:   Living will: Yes    Durable POA for healthcare: Yes    Advanced directive: Yes    Advanced directive counseling given: No    Five wishes given: No    Patient declined ACP directive: Yes    End of Life Decisions reviewed with patient: Yes    Provider agrees with end of life decisions: Yes      Cognitive Screening:   Provider or family/friend/caregiver concerned regarding cognition?: No    PREVENTIVE SCREENINGS      Cardiovascular Screening:    General: History Lipid Disorder, Screening Current and Screening Not Indicated      Diabetes Screening:     General: Screening Current      Colorectal Cancer Screening:     General: Screening Current      Prostate Cancer Screening:    General: Screening Current      Osteoporosis Screening:    General: Screening Not Indicated      Abdominal Aortic Aneurysm (AAA) Screening:    Risk factors include: age between 73-67 yo and tobacco use        General: Screening Not Indicated and Screening Current      Lung Cancer Screening:     General: Screening Not Indicated      Hepatitis C Screening:    General: Screening Current    Screening, Brief Intervention, and Referral to Treatment (SBIRT)    Screening  Typical number of drinks in a day: 1  Typical number of drinks in a week: 7  Interpretation: Low risk drinking behavior      Single Item Drug Screening:  How often have you used an illegal drug (including marijuana) or a prescription medication for non-medical reasons in the past year? never    Single Item Drug Screen Score: 0  Interpretation: Negative screen for possible drug use disorder    No exam data present     Physical Exam:     BP 122/68 (BP Location: Left arm, Patient Position: Sitting, Cuff Size: Large)   Pulse 55   Temp 98 7 °F (37 1 °C) (Tympanic)   Ht 5' 7 52" (1 715 m)   Wt 98 3 kg (216 lb 12 8 oz)   SpO2 98%   BMI 33 43 kg/m²     Physical Exam  Vitals reviewed  Constitutional:       General: He is not in acute distress  Appearance: Normal appearance  He is well-developed and normal weight  He is not ill-appearing, toxic-appearing or diaphoretic  HENT:      Head: Normocephalic and atraumatic  Right Ear: Ear canal and external ear normal  There is no impacted cerumen  Left Ear: External ear normal  There is no impacted cerumen  Eyes:      General: No scleral icterus  Conjunctiva/sclera: Conjunctivae normal       Pupils: Pupils are equal, round, and reactive to light  Cardiovascular:      Rate and Rhythm: Normal rate and regular rhythm  Heart sounds: Normal heart sounds  No murmur heard  Pulmonary:      Effort: Pulmonary effort is normal  No respiratory distress  Breath sounds: Normal breath sounds  Abdominal:      General: Abdomen is flat  There is no distension  Palpations: Abdomen is soft  Musculoskeletal:         General: No swelling, tenderness or deformity  Normal range of motion  Cervical back: Neck supple  No rigidity  Right lower leg: No edema  Left lower leg: No edema  Skin:     General: Skin is warm and dry  Coloration: Skin is not jaundiced  Findings: No bruising, erythema or rash  Neurological:      General: No focal deficit present  Mental Status: He is alert and oriented to person, place, and time  Mental status is at baseline  Psychiatric:         Mood and Affect: Mood normal          Behavior: Behavior normal          Thought Content:  Thought content normal          Judgment: Judgment normal           Donnie Zhu MD

## 2022-12-16 ENCOUNTER — TELEPHONE (OUTPATIENT)
Dept: OTHER | Facility: OTHER | Age: 67
End: 2022-12-16

## 2022-12-16 DIAGNOSIS — N32.81 OAB (OVERACTIVE BLADDER): Primary | ICD-10-CM

## 2022-12-16 NOTE — TELEPHONE ENCOUNTER
Called and spoke with patient  Advised on AP's note  He is hesitant to switch and stated he will look the med up online  Rescheduled follow up appointment

## 2022-12-16 NOTE — TELEPHONE ENCOUNTER
Patient stated that Dr Jemal Cassidy prescribed him Ditropan and since starting this medication, the patient states he has been experiencing dry mouth, dry eyes, urinary urgency, and has gone into a-fib twice in one week  He is requesting a call back to discuss these side effects and get some additional advice  English

## 2022-12-16 NOTE — TELEPHONE ENCOUNTER
Would recommend patient discontinue Ditropan due to side effects, and can send alternative Myrbetriq to his pharmacy  If he is starting a new medication, would recommend rescheduling next office visit for 3 months for reevaluation and PVR assessment

## 2022-12-22 ENCOUNTER — TELEPHONE (OUTPATIENT)
Dept: OTHER | Facility: OTHER | Age: 67
End: 2022-12-22

## 2022-12-22 NOTE — TELEPHONE ENCOUNTER
If patient is not taking any medications for overactive bladder, would recommend follow up in 6 months with PVR to reassess his symptoms

## 2022-12-22 NOTE — TELEPHONE ENCOUNTER
Patient last seen by Dr Katherine Rendon for a Cysto for BPH w/ Luts (bladderoutlet procedure candidate) He was unable to take Ditropan due to side effects and Myrbetriq was sent to pharmacy  - he was to be scheduled for PVR for reevaluation  He would like to cancel and r/s for annual f/u -would this be just a f/u with PSA ? And around 10/23?  Please advise

## 2022-12-22 NOTE — TELEPHONE ENCOUNTER
Per patient's phone call, he will not be taking the Mirabegron  He would like a call back to reschedule his 03/2023 appt to when he is due for his annual f/u appt

## 2022-12-27 NOTE — TELEPHONE ENCOUNTER
Patient verbalized he is not taking any OAB medication due to side effects and other medications he takes with antihistimines  He is voiding fine and with no urinary complaints but wants clarification from the clinical team:can he wait until 10/2023 since he is doing fine? Please inform patient of your decision

## 2022-12-27 NOTE — TELEPHONE ENCOUNTER
Should at least have pvr  Looks like he is scheduled for march already  Initial recommendation was 6 months   Can be march or 6 months

## 2022-12-28 NOTE — TELEPHONE ENCOUNTER
Patient is not taking medicaion so doesn't see the need in coming in for the March appointment - r/s in 6 months for BPH and urgency

## 2023-01-04 ENCOUNTER — TELEPHONE (OUTPATIENT)
Dept: PAIN MEDICINE | Facility: CLINIC | Age: 68
End: 2023-01-04

## 2023-01-04 DIAGNOSIS — M47.816 SPONDYLOSIS OF LUMBAR REGION WITHOUT MYELOPATHY OR RADICULOPATHY: ICD-10-CM

## 2023-01-04 DIAGNOSIS — J30.1 CHRONIC SEASONAL ALLERGIC RHINITIS DUE TO POLLEN: ICD-10-CM

## 2023-01-04 RX ORDER — MONTELUKAST SODIUM 10 MG/1
10 TABLET ORAL DAILY
Qty: 90 TABLET | Refills: 1 | Status: SHIPPED | OUTPATIENT
Start: 2023-01-04

## 2023-01-04 RX ORDER — TIZANIDINE 2 MG/1
2 TABLET ORAL 2 TIMES DAILY PRN
Qty: 60 TABLET | Refills: 2 | Status: SHIPPED | OUTPATIENT
Start: 2023-01-04

## 2023-01-04 NOTE — TELEPHONE ENCOUNTER
Pt contacted Call Center requested refill of their medication  Medication Name: tiZANidine (ZANAFLEX) 2 mg tablet        Frequency of Med: once a day       Remaining Medication: 20      Pharmacy and Location: [address] [place] [address]   Phone:  [phone]  Fax:  [phone]         Pt  Preferred Callback Phone Number: [phone]      Thank you      Going away next week

## 2023-01-04 NOTE — TELEPHONE ENCOUNTER
Refill sent to pharmacy for 3 months    Patient will then need to schedule a follow-up for further refills or he can request refills from PCP

## 2023-01-06 DIAGNOSIS — I48.91 A-FIB (HCC): ICD-10-CM

## 2023-01-25 ENCOUNTER — TELEPHONE (OUTPATIENT)
Dept: PAIN MEDICINE | Facility: CLINIC | Age: 68
End: 2023-01-25

## 2023-01-25 NOTE — TELEPHONE ENCOUNTER
Caller: catherine    Doctor: Ana Mcfadden    Reason for call: Pt is calling in stating that he would like to come in for an injection on the lumbar  He is now having elliott lumbar pain   Pain level  2-8 depending on what he is doing    Call back#: [phone]

## 2023-01-25 NOTE — TELEPHONE ENCOUNTER
Marco Agudelo  Doctor: Kusum Werner     Reason for callPatient returning nurses call-transferred to clinical

## 2023-01-27 ENCOUNTER — OFFICE VISIT (OUTPATIENT)
Dept: CARDIOLOGY CLINIC | Facility: CLINIC | Age: 68
End: 2023-01-27

## 2023-01-27 VITALS
HEART RATE: 71 BPM | DIASTOLIC BLOOD PRESSURE: 66 MMHG | BODY MASS INDEX: 33.71 KG/M2 | SYSTOLIC BLOOD PRESSURE: 98 MMHG | HEIGHT: 67 IN | WEIGHT: 214.8 LBS

## 2023-01-27 DIAGNOSIS — I48.0 PAROXYSMAL ATRIAL FIBRILLATION (HCC): Primary | ICD-10-CM

## 2023-01-27 NOTE — PROGRESS NOTES
Electrophysiology Office Note    Chintan Plata  1955  [id number]  HEART [facility] [phone]        Assessment/Plan     Primary diagnosis:   1  Paroxysmal atrial fibrillation, symptomatic               * patient presents to [facility] EP office today under direction of Dr Kel Walker for routine follow up              * today ECG showing NSR              * burden of AF seems to be very low,had 4 episodes of palpitations not even sure if afib in the past 2 years  * he has been taking eliquis without any recurrence of his rectal bleeding, understands his CVA risk given his [id number] of 11 (age, CAD, CVA hx)               * not on any AAD's only on CCB PRN               * no hx of ablation for AF or AFL in the past               * EF in 2017 was 66% with normal LA size               * we did discuss modifiable risk factors today, BMI is 33 64 he cannot exercise due to low back pain, he really would like to lose some weight  Other than this his modifiable risk factors for AF are nill  * of note was on propafenone in the past and amiodarone, no AAD since      2  Hx CVA related to PFO s/p PFO closure ()  3  Hx non obstructive CAD w/ hx of PCI to LAD     F/u in 2 years or sooner should symptoms arise               Rhythm History:   Atrial fibrillation:     Atrial flutter:     SVT:     VT/VF/PVC:     Device history:   Pacemaker:    Defibrillator:    BIV PPM:    BIV ICD:    ILR:      Cardiac Testing:     ECHO: Results for orders placed during the hospital encounter of 17    Echo complete with contrast if indicated    Charley Holden [address]  [address]  [phone]    Transthoracic Echocardiogram  2D, M-mode, Doppler, and Color Doppler    Study date:  09-Mar-2017    Patient: Drew Rob  MR number: [id number]  Account number: [de-identified]  : 1955  Age: 59 years  Gender: Male  Status: Outpatient  Location: Echo lab  Height: 68 in  Weight: 194 7 lb  BP: 122/ 70 mmHg    Indications: Cardiac murmur; Alpha 1 antitrypsin deficiency    Diagnoses: R01 1 - Cardiac murmur, unspecified    Sonographer:  FRANCES Zuniga, RDCS  Primary Physician:  Lizbeth Silva MD  Referring Physician:  Jolanta Moore MD  Group:  [facility]  Interpreting Physician:  Loren Mendoza MD    SUMMARY    LEFT VENTRICLE:  Systolic function was normal  Ejection fraction was estimated to be 66 %  There were no regional wall motion abnormalities  ATRIAL SEPTUM:  There was a closure device in place  MITRAL VALVE:  There was trace regurgitation  TRICUSPID VALVE:  There was trace regurgitation  PULMONIC VALVE:  There was trace regurgitation  HISTORY: PRIOR HISTORY: Angina; Atrial fibrillation; Coronary artery disease; Chest pain; Shortness of breath; PFO closure with cardioseal    PROCEDURE: The procedure was performed in the echo lab  This was a routine study  The transthoracic approach was used  The study included complete 2D imaging, M-mode, complete spectral Doppler, and color Doppler  The heart rate was 66 bpm,  at the start of the study  Images were obtained from the parasternal, apical, subcostal, and suprasternal notch acoustic windows  Image quality was adequate  LEFT VENTRICLE: Size was normal  Systolic function was normal  Ejection fraction was estimated to be 66 %  There were no regional wall motion abnormalities  Wall thickness was normal  There was no evidence of concentric hypertrophy  DOPPLER: Left ventricular diastolic function parameters were normal     RIGHT VENTRICLE: The size was normal  Systolic function was normal  Wall thickness was normal     LEFT ATRIUM: Size was normal     ATRIAL SEPTUM: There was a closure device in place      RIGHT ATRIUM: Size was normal     MITRAL VALVE: Valve structure was normal  There was normal leaflet separation  DOPPLER: The transmitral velocity was within the normal range  There was no evidence for stenosis  There was trace regurgitation  AORTIC VALVE: The valve was trileaflet  Leaflets exhibited normal thickness and normal cuspal separation  DOPPLER: Transaortic velocity was within the normal range  There was no evidence for stenosis  There was no regurgitation  TRICUSPID VALVE: The valve structure was normal  There was normal leaflet separation  DOPPLER: The transtricuspid velocity was within the normal range  There was no evidence for stenosis  There was trace regurgitation  Pulmonary artery  systolic pressure was within the normal range  PULMONIC VALVE: Leaflets exhibited normal thickness, no calcification, and normal cuspal separation  DOPPLER: The transpulmonic velocity was within the normal range  There was trace regurgitation  PERICARDIUM: There was no pericardial effusion  The pericardium was normal in appearance  AORTA: The root exhibited normal size      SYSTEM MEASUREMENT TABLES    2D  %FS: 32 64 %  Ao Diam: 3 26 cm  EDV(Teich): 63 83 ml  EF(Cube): 69 43 %  EF(Teich): 61 74 %  ESV(Cube): 17 42 ml  ESV(Teich): 24 42 ml  IVSd: 1 29 cm  LA Area: 14 5 cm2  LA Diam: 2 97 cm  LVEDV MOD A4C: 74 15 ml  LVEF MOD A4C: 76 28 %  LVESV MOD A4C: 17 59 ml  LVIDd: 3 85 cm  LVIDs: 2 59 cm  LVLd A4C: 8 41 cm  LVLs A4C: 6 28 cm  LVPWd: 1 06 cm  RA Area: 15 47 cm2  SI(Cube): 19 58 ml/m2  SI(Teich): 19 51 ml/m2  SV MOD A4C: 56 57 ml  SV(Cube): 39 55 ml  SV(Teich): 39 41 ml  rv diam: 3 12 cm    CW  TR Vmax: 2 36 m/s  TR maxP 32 mmHg    MM  TAPSE: 3 1 cm    PW  E': 0 08 m/s  E/E': 7 1  MV A Marcio: 0 59 m/s  MV Dec Price: 2 22 m/s2  MV DecT: 270 24 ms  MV E Marcio: 0 6 m/s  MV E/A Ratio: 1 02    [employer] Accredited Echocardiography Laboratory    Prepared and electronically signed by    Lacy Castellano MD  Signed 09-Mar-2017 19:10:14        History of Present Illness HPI/INTERVAL HISTORY:  Since last OV he has had 4 episodes of brief palpitations in the past two years  Major main concern is with lumbar back pain  He cannot exercise without taking muscle relaxer or even strecth without taking one  He has been receiving epidural steroid injections  Review of Systems  ROS as noted above, otherwise 12 point review of systems was performed and is negative  Historical Information   Social History     Socioeconomic History   • Marital status: /Civil Union     Spouse name: Not on file   • Number of children: Not on file   • Years of education: Not on file   • Highest education level: Not on file   Occupational History   • Occupation: massage therapist   Tobacco Use   • Smoking status: Former     Types: Cigars     Start date:      Quit date:      Years since quittin 0   • Smokeless tobacco: Former     Types: Chew   • Tobacco comments:     Cigars    Vaping Use   • Vaping Use: Never used   Substance and Sexual Activity   • Alcohol use: Yes     Alcohol/week: 7 0 standard drinks     Types: 7 Glasses of wine per week     Comment: daily wine 8oz   • Drug use: Not Currently     Types: Marijuana     Comment: Medical use (CBD)   • Sexual activity: Yes     Partners: Female   Other Topics Concern   • Not on file   Social History Narrative    Caffeine: drinks 1 cup coffee/ tea a day  Social Determinants of Health     Financial Resource Strain: Low Risk    • Difficulty of Paying Living Expenses: Not hard at all   Food Insecurity: Not on file   Transportation Needs: No Transportation Needs   • Lack of Transportation (Medical): No   • Lack of Transportation (Non-Medical):  No   Physical Activity: Not on file   Stress: Not on file   Social Connections: Not on file   Intimate Partner Violence: Not on file   Housing Stability: Not on file     Past Medical History:   Diagnosis Date   • A-fib [facility])    • Allergic rhinitis    • Benign cyst of kidney    • Cat bite 2020   • Cerebral vascular disease    • Chronic sinusitis    • Cryptogenic stroke ([facility] [address] ) 3/2/2020   • Hyperlipidemia    [facility]    • Pancreas cyst      Past Surgical History:   Procedure Laterality Date   • COLONOSCOPY     • OTHER SURGICAL HISTORY      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   • PATENT FORAMEN OVALE CLOSURE       Social History     Substance and Sexual Activity   Alcohol Use Yes   • Alcohol/week: 7 0 standard drinks   • Types: 7 Glasses of wine per week    Comment: daily wine 8oz     Social History     Substance and Sexual Activity   Drug Use Not Currently   • Types: Marijuana    Comment: Medical use (CBD)     Social History     Tobacco Use   Smoking Status Former   • Types: Cigars   • Start date:    • Quit date:    • Years since quittin 0   Smokeless Tobacco Former   • Types: Chew   Tobacco Comments    Cigars      Family History   Problem Relation Age of Onset   • Diabetes type II Mother    • Cancer Father         angioma       Meds/Allergies       Current Outpatient Medications:   •  ascorbic acid (VITAMIN C) 250 mg tablet, Take 250 mg by mouth 2 (two) times a day, Disp: , Rfl:   •  chlorpheniramine (CHLOR-TRIMETON) 4 MG tablet, Take 1 tablet (4 mg total) by mouth every 6 (six) hours as needed for rhinitis, Disp: 30 tablet, Rfl: 0  •  clobetasol (TEMOVATE) 0 05 % cream, Apply 1 application topically 2 (two) times a day as needed Apply sparingly to affected areas, Disp: , Rfl:   •  Coenzyme Q10 (COQ10) 200 MG CAPS, Take 1 capsule by mouth daily, Disp: , Rfl:   •  colesevelam (WELCHOL) 625 mg tablet, Take 2 tablets (1,250 mg total) by mouth 2 (two) times a day with meals, Disp: 360 tablet, Rfl: 1  •  diltiazem (CARDIZEM) 30 mg tablet, Take 1 tablet (30 mg total) by mouth if needed (afib), Disp: 15 tablet, Rfl: 0  •  Eliquis 5 MG, TAKE ONE TABLET BY MOUTH TWICE A DAY, Disp: 180 tablet, Rfl: 3  •  ezetimibe (ZETIA) 10 mg tablet, Take 1 tablet (10 mg total) by mouth daily, Disp: 90 tablet, Rfl: 1  •  Glucosamine-Chondroit-Vit C-Mn (GLUCOSAMINE CHONDR 1500 COMPLX PO), Take 1 tablet by mouth 2 (two) times a day , Disp: , Rfl:   •  hyoscyamine (ANASPAZ) 0 125 mg, Take 0 125 mg by mouth 2 (two) times a day , Disp: , Rfl:   •  MAGNESIUM GLYCINATE PLUS PO, Take 400 mg by mouth 2 (two) times a day , Disp: , Rfl:   •  Mirabegron ER 25 MG TB24, Take 25 mg by mouth in the morning, Disp: 30 tablet, Rfl: 3  •  montelukast (SINGULAIR) 10 mg tablet, Take 1 tablet (10 mg total) by mouth daily, Disp: 90 tablet, Rfl: 1  •  multivitamin (THERAGRAN) TABS, Take 1 tablet by mouth 2 (two) times a day  , Disp: , Rfl:   •  OXcarbazepine (TRILEPTAL) 300 mg tablet, Take 1 tablet (300 mg total) by mouth 2 (two) times a day, Disp: 180 tablet, Rfl: 1  •  tiZANidine (ZANAFLEX) 2 mg tablet, Take 1 tablet (2 mg total) by mouth 2 (two) times a day as needed for muscle spasms, Disp: 60 tablet, Rfl: 2    Allergies   Allergen Reactions   • Augmentin [Amoxicillin-Pot Clavulanate] Hives and Itching   • Molds & Smuts Allergic Rhinitis     Category: Allergy;    • Monascus Purpureus Went Yeast Fatigue     Category: Adverse Reaction;    • Niacin Fatigue     Category: Adverse Reaction;    • Pollen Extract Allergic Rhinitis     Category: Allergy;    • Pregabalin Fatigue     Category: Adverse Reaction;    • Atorvastatin GI Intolerance and Fatigue     Other reaction(s): Muscle pain, constipation, sleepiness  Category: Adverse Reaction; Other reaction(s): Muscle pain, constipation, sleepiness       Objective   Vitals: Blood pressure 98/66, pulse 71, height 5' 7" (1 702 m), weight 97 4 kg (214 lb 12 8 oz)  Physical Exam  Constitutional:       Appearance: He is well-developed  HENT:      Head: Normocephalic and atraumatic  Eyes:      Pupils: Pupils are equal, round, and reactive to light  Cardiovascular:      Rate and Rhythm: Normal rate and regular rhythm     Pulmonary:      Effort: Pulmonary effort is normal       Breath sounds: Normal breath sounds  Abdominal:      General: Bowel sounds are normal       Palpations: Abdomen is soft  Musculoskeletal:         General: Normal range of motion  Cervical back: Normal range of motion and neck supple  Skin:     General: Skin is warm and dry  Neurological:      Mental Status: He is alert and oriented to person, place, and time  Labs:  No visits with results within 2 Month(s) from this visit  Latest known visit with results is:   Procedure visit on 11/08/2022   Component Date Value   • LEUKOCYTE ESTERASE,UA 11/08/2022 -    • Patrici Sober 11/08/2022 -    • SL AMB POCT UROBILINOGEN 11/08/2022 0 2    • POCT URINE PROTEIN 11/08/2022 -    •  PH,UA 11/08/2022 5 0    • BLOOD,UA 11/08/2022 -    • SPECIFIC GRAVITY,UA 11/08/2022 1 030    • KETONES,UA 11/08/2022 -    • BILIRUBIN,UA 11/08/2022 -    • GLUCOSE, UA 11/08/2022 -    •  COLOR,UA 11/08/2022 arnulfo    • CLARITY,UA 11/08/2022 clear        Imaging: I have personally reviewed pertinent reports

## 2023-01-30 ENCOUNTER — OFFICE VISIT (OUTPATIENT)
Dept: PAIN MEDICINE | Facility: CLINIC | Age: 68
End: 2023-01-30

## 2023-01-30 VITALS
SYSTOLIC BLOOD PRESSURE: 121 MMHG | DIASTOLIC BLOOD PRESSURE: 70 MMHG | HEIGHT: 67 IN | BODY MASS INDEX: 32.96 KG/M2 | WEIGHT: 210 LBS | HEART RATE: 67 BPM

## 2023-01-30 DIAGNOSIS — M51.36 DDD (DEGENERATIVE DISC DISEASE), LUMBAR: ICD-10-CM

## 2023-01-30 DIAGNOSIS — G89.4 CHRONIC PAIN SYNDROME: Primary | ICD-10-CM

## 2023-01-30 DIAGNOSIS — M54.16 LUMBAR RADICULOPATHY: ICD-10-CM

## 2023-01-30 DIAGNOSIS — M47.816 LUMBAR SPONDYLOSIS: ICD-10-CM

## 2023-01-30 NOTE — PROGRESS NOTES
Assessment:  1  Chronic pain syndrome    2  Lumbar radiculopathy    3  Lumbar spondylosis    4  DDD (degenerative disc disease), lumbar        Plan:  1  Should the patient's left lower extremity symptoms return, would be a candidate for a bilateral L5 TFESI  Otherwise for her right lower extremity symptoms, we can repeat right L4 and L5 TFESI as needed  2  Patient may continue tizanidine 2 mg as needed as prescribed  He does not require refills today  3  We will avoid NSAIDs secondary to anticoagulation  4  Continue with home exercise program  5  Follow-up on a as needed basis at this time    History of Present Illness: The patient is a 69 y o  male last seen on 6/27/22 who presents for a follow up office visit in regards to chronic low back pain with radiculopathy into the anterolateral aspect of the right lower extremity secondary to lumbar degenerative disc disease, lumbar spondylosis, lumbar stenosis, lumbar radiculopathy and chronic pain syndrome  The patientdenies bowel or bladder incontinence or saddle anesthesia  Patient reports that he recently went on vacation and due to increased activity, he started experiencing pain radiate into the anterolateral aspect of the left lower extremity to the calf  He states he is started his home exercise program and took tizanidine 2 mg as needed which dissipated the pain  He is no longer complaining of this pain at this time  He rates his pain a 2 out of 10 on the numeric pain rating scale  He intermittently has pain in the morning and at night which is described as dull aching, and numbness    I have personally reviewed and/or updated the patient's past medical history, past surgical history, family history, social history, current medications, allergies, and vital signs today  Review of Systems:    Review of Systems   Constitutional: Negative for fever and unexpected weight change  HENT: Negative for trouble swallowing      Eyes: Negative for visual disturbance  Respiratory: Negative for shortness of breath and wheezing  Cardiovascular: Negative for chest pain and palpitations  Gastrointestinal: Negative for constipation, diarrhea, nausea and vomiting  Endocrine: Negative for cold intolerance, heat intolerance and polydipsia  Genitourinary: Negative for difficulty urinating and frequency  Musculoskeletal: Negative for arthralgias, gait problem, joint swelling and myalgias  Skin: Negative for rash  Neurological: Negative for dizziness, seizures, syncope, weakness and headaches  Hematological: Does not bruise/bleed easily  Psychiatric/Behavioral: Negative for dysphoric mood  All other systems reviewed and are negative  Past Medical History:   Diagnosis Date   • A-fib [facility])    • Allergic rhinitis    • Benign cyst of kidney    • Cat bite 2020   • Cerebral vascular disease    • Chronic sinusitis    • Cryptogenic stroke ([facility] [address] ) 3/2/2020   • Hyperlipidemia    • Melanoma ([facility] )    • Pancreas cyst        Past Surgical History:   Procedure Laterality Date   • COLONOSCOPY     • OTHER SURGICAL HISTORY      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   • PATENT FORAMEN OVALE CLOSURE         Family History   Problem Relation Age of Onset   • Diabetes type II Mother    • Cancer Father         angioma       Social History     Occupational History   • Occupation: massage therapist   Tobacco Use   • Smoking status: Former     Types: Cigars     Start date:      Quit date:      Years since quittin 1   • Smokeless tobacco: Former     Types: Chew   • Tobacco comments:     Cigars    Vaping Use   • Vaping Use: Never used   Substance and Sexual Activity   • Alcohol use:  Yes     Alcohol/week: 7 0 standard drinks     Types: 7 Glasses of wine per week     Comment: daily wine 8oz   • Drug use: Not Currently     Types: Marijuana     Comment: Medical use (CBD)   • Sexual activity: Yes     Partners: Female         Current Outpatient Medications:   • ascorbic acid (VITAMIN C) 250 mg tablet, Take 250 mg by mouth 2 (two) times a day, Disp: , Rfl:   •  chlorpheniramine (CHLOR-TRIMETON) 4 MG tablet, Take 1 tablet (4 mg total) by mouth every 6 (six) hours as needed for rhinitis, Disp: 30 tablet, Rfl: 0  •  clobetasol (TEMOVATE) 0 05 % cream, Apply 1 application topically 2 (two) times a day as needed Apply sparingly to affected areas, Disp: , Rfl:   •  Coenzyme Q10 (COQ10) 200 MG CAPS, Take 1 capsule by mouth daily, Disp: , Rfl:   •  colesevelam (WELCHOL) 625 mg tablet, Take 2 tablets (1,250 mg total) by mouth 2 (two) times a day with meals, Disp: 360 tablet, Rfl: 1  •  diltiazem (CARDIZEM) 30 mg tablet, Take 1 tablet (30 mg total) by mouth if needed (afib), Disp: 15 tablet, Rfl: 0  •  Eliquis 5 MG, TAKE ONE TABLET BY MOUTH TWICE A DAY, Disp: 180 tablet, Rfl: 3  •  ezetimibe (ZETIA) 10 mg tablet, Take 1 tablet (10 mg total) by mouth daily, Disp: 90 tablet, Rfl: 1  •  Glucosamine-Chondroit-Vit C-Mn (GLUCOSAMINE CHONDR 1500 COMPLX PO), Take 1 tablet by mouth 2 (two) times a day , Disp: , Rfl:   •  hyoscyamine (ANASPAZ) 0 125 mg, Take 0 125 mg by mouth 2 (two) times a day , Disp: , Rfl:   •  MAGNESIUM GLYCINATE PLUS PO, Take 400 mg by mouth 2 (two) times a day , Disp: , Rfl:   •  Mirabegron ER 25 MG TB24, Take 25 mg by mouth in the morning, Disp: 30 tablet, Rfl: 3  •  montelukast (SINGULAIR) 10 mg tablet, Take 1 tablet (10 mg total) by mouth daily, Disp: 90 tablet, Rfl: 1  •  multivitamin (THERAGRAN) TABS, Take 1 tablet by mouth 2 (two) times a day  , Disp: , Rfl:   •  OXcarbazepine (TRILEPTAL) 300 mg tablet, Take 1 tablet (300 mg total) by mouth 2 (two) times a day, Disp: 180 tablet, Rfl: 1  •  tiZANidine (ZANAFLEX) 2 mg tablet, Take 1 tablet (2 mg total) by mouth 2 (two) times a day as needed for muscle spasms, Disp: 60 tablet, Rfl: 2    Allergies   Allergen Reactions   • Augmentin [Amoxicillin-Pot Clavulanate] Hives and Itching   • Molds & Smuts Allergic Rhinitis Category: Allergy;    • Monascus Purpureus Went Yeast Fatigue     Category: Adverse Reaction;    • Niacin Fatigue     Category: Adverse Reaction;    • Pollen Extract Allergic Rhinitis     Category: Allergy;    • Pregabalin Fatigue     Category: Adverse Reaction;    • Atorvastatin GI Intolerance and Fatigue     Other reaction(s): Muscle pain, constipation, sleepiness  Category: Adverse Reaction; Other reaction(s): Muscle pain, constipation, sleepiness       Physical Exam:    /70   Pulse 67   Ht 5' 7" (1 702 m)   Wt 95 3 kg (210 lb)   BMI 32 89 kg/m²     Constitutional:normal, well developed, well nourished, alert, in no distress and non-toxic and no overt pain behavior  Eyes:anicteric  HEENT:grossly intact  Neck:supple, symmetric, trachea midline and no masses   Pulmonary:even and unlabored  Cardiovascular:No edema or pitting edema present  Skin:Normal without rashes or lesions and well hydrated  Psychiatric:Mood and affect appropriate  Neurologic:Cranial Nerves II-XII grossly intact  Musculoskeletal:normal gait      Imaging  No orders to display         No orders of the defined types were placed in this encounter

## 2023-02-03 ENCOUNTER — OFFICE VISIT (OUTPATIENT)
Dept: INTERNAL MEDICINE CLINIC | Age: 68
End: 2023-02-03

## 2023-02-03 VITALS
DIASTOLIC BLOOD PRESSURE: 80 MMHG | SYSTOLIC BLOOD PRESSURE: 128 MMHG | HEART RATE: 66 BPM | HEIGHT: 67 IN | BODY MASS INDEX: 34.17 KG/M2 | WEIGHT: 217.7 LBS | OXYGEN SATURATION: 97 % | TEMPERATURE: 98.8 F

## 2023-02-03 DIAGNOSIS — L98.9 SKIN LESION: Primary | ICD-10-CM

## 2023-02-03 DIAGNOSIS — E88.01 ALPHA-1-ANTITRYPSIN DEFICIENCY (HCC): ICD-10-CM

## 2023-02-03 DIAGNOSIS — I48.0 PAROXYSMAL ATRIAL FIBRILLATION (HCC): ICD-10-CM

## 2023-02-03 RX ORDER — GINSENG 100 MG
1 CAPSULE ORAL 2 TIMES DAILY
Qty: 15 G | Refills: 1 | Status: SHIPPED | OUTPATIENT
Start: 2023-02-03

## 2023-02-03 NOTE — PROGRESS NOTES
Assessment/Plan:    Skin lesion  -Very surrounding erythema with some skin abrasion but no obvious cellulitis  -We will start patient on bacitracin topical ointment and he was counseled that he may continue to cover the lesion with a Band-Aid to avoid further chaffing  -We will order an ultrasound of the extremity soft tissue to properly characterize the lesion   -We will refer patient to dermatology    Alpha-1 antitrypsin deficiency  -Stable without emphysema but patient does have chronic seasonal allergic rhinitis  -We will continue to monitor patient clinically    Paroxysmal atrial fibrillation  -Rate controlled and in normal sinus rhythm at this time  -Continue with diltiazem and Eliquis     Diagnoses and all orders for this visit:    Skin lesion  -     Ambulatory Referral to Dermatology; Future  -     bacitracin topical ointment 500 units/g topical ointment; Apply 1 large application topically 2 (two) times a day  -     US extremity soft tissue; Future    Alpha-1-antitrypsin deficiency (HCC)    Paroxysmal atrial fibrillation (HCC)    BMI 34 0-34 9,adult        BMI Counseling: Body mass index is 34 1 kg/m²  The BMI is above normal  Nutrition recommendations include consuming healthier snacks, limiting drinks that contain sugar, reducing intake of saturated and trans fat and reducing intake of cholesterol  Exercise recommendations include moderate physical activity 150 minutes/week  No pharmacotherapy was ordered  Patient referred to PCP  Rationale for BMI follow-up plan is due to patient being overweight or obese  Subjective:      Patient ID: Timothy Kearney is a 69 y o  male  HPI  Pt presents with his wife with complaints of a swelling on his left upper thigh  Has been present for the past 2 days   He first noticed a chaffing in the groin and looked and saw the lesion  He states that he has a hx of varicose veins and then noticed a swelling about a cm in diameter, close to the varicose veins    + pain from the chaffing, pain is a 7/10   Has never had anything like that in the past only has a chaffing in the past   No hx of hernia   No hx of a cyst  He denies fever, chills, night  Headache and dizziness, chest pain, shortness of breath or palpitations, nausea, vomiting, abdominal pain, diarrhea, constipation  The following portions of the patient's history were reviewed and updated as appropriate:   He  has a past medical history of A-fib ([facility] [address] ), Allergic rhinitis, Benign cyst of kidney, Cat bite (7/8/2020), Cerebral vascular disease, Chronic sinusitis, Cryptogenic stroke ([address] ) (3/2/2020), Hyperlipidemia, Melanoma ([address] ), and Pancreas cyst   He   Patient Active Problem List    Diagnosis Date Noted   • Skin lesion 02/03/2023   • BMI 34 0-34 9,adult 02/03/2023   • Paroxysmal atrial fibrillation ([address] ) 04/14/2021   • Chronic pain syndrome 04/05/2021   • Tinea corporis 10/12/2020   • Lumbar radiculopathy 10/11/2019   • Low back pain with sciatica 10/11/2019   • DDD (degenerative disc disease), lumbar 10/11/2019   • Lumbar spondylosis 10/11/2019   • Pancreatic cyst 09/25/2019   • Medicare annual wellness visit, initial 08/28/2019   • BPH with obstruction/lower urinary tract symptoms 06/12/2018   • Alpha-1-antitrypsin deficiency ([address] ) 02/16/2017   • Coronary artery disease involving native coronary artery 02/02/2017   • Hypercholesterolemia 12/19/2014   • Seasonal allergic rhinitis 05/20/2014   • Nephrolithiasis 05/08/2014   • Osteoarthritis 03/04/2014   • Irritable bowel syndrome 03/04/2014   • Coronary atherosclerosis 03/21/2013     He  has a past surgical history that includes Patent foramen ovale closure; Other surgical history (2003); and Colonoscopy  His family history includes Cancer in his father; Diabetes type II in his mother  He  reports that he quit smoking about 37 years ago  His smoking use included cigars  He started smoking about 47 years ago   He has quit using smokeless tobacco   His smokeless tobacco use included chew  He reports current alcohol use of about 7 0 standard drinks per week  He reports that he does not currently use drugs after having used the following drugs: Marijuana    Current Outpatient Medications   Medication Sig Dispense Refill   • ascorbic acid (VITAMIN C) 250 mg tablet Take 250 mg by mouth 2 (two) times a day     • bacitracin topical ointment 500 units/g topical ointment Apply 1 large application topically 2 (two) times a day 15 g 1   • chlorpheniramine (CHLOR-TRIMETON) 4 MG tablet Take 1 tablet (4 mg total) by mouth every 6 (six) hours as needed for rhinitis 30 tablet 0   • clobetasol (TEMOVATE) 0 05 % cream Apply 1 application topically 2 (two) times a day as needed Apply sparingly to affected areas     • Coenzyme Q10 (COQ10) 200 MG CAPS Take 1 capsule by mouth daily     • colesevelam (WELCHOL) 625 mg tablet Take 2 tablets (1,250 mg total) by mouth 2 (two) times a day with meals 360 tablet 1   • diltiazem (CARDIZEM) 30 mg tablet Take 1 tablet (30 mg total) by mouth if needed (afib) 15 tablet 0   • Eliquis 5 MG TAKE ONE TABLET BY MOUTH TWICE A  tablet 3   • ezetimibe (ZETIA) 10 mg tablet Take 1 tablet (10 mg total) by mouth daily 90 tablet 1   • Glucosamine-Chondroit-Vit C-Mn (GLUCOSAMINE CHONDR 1500 COMPLX PO) Take 1 tablet by mouth 2 (two) times a day      • hyoscyamine (ANASPAZ) 0 125 mg Take 0 125 mg by mouth 2 (two) times a day      • MAGNESIUM GLYCINATE PLUS PO Take 400 mg by mouth 2 (two) times a day      • montelukast (SINGULAIR) 10 mg tablet Take 1 tablet (10 mg total) by mouth daily 90 tablet 1   • multivitamin (THERAGRAN) TABS Take 1 tablet by mouth 2 (two) times a day       • OXcarbazepine (TRILEPTAL) 300 mg tablet Take 1 tablet (300 mg total) by mouth 2 (two) times a day 180 tablet 1   • tiZANidine (ZANAFLEX) 2 mg tablet Take 1 tablet (2 mg total) by mouth 2 (two) times a day as needed for muscle spasms 60 tablet 2   • Mirabegron ER 25 MG TB24 Take 25 mg by mouth in the morning (Patient not taking: Reported on 2/3/2023) 30 tablet 3     No current facility-administered medications for this visit  Current Outpatient Medications on File Prior to Visit   Medication Sig   • ascorbic acid (VITAMIN C) 250 mg tablet Take 250 mg by mouth 2 (two) times a day   • chlorpheniramine (CHLOR-TRIMETON) 4 MG tablet Take 1 tablet (4 mg total) by mouth every 6 (six) hours as needed for rhinitis   • clobetasol (TEMOVATE) 0 05 % cream Apply 1 application topically 2 (two) times a day as needed Apply sparingly to affected areas   • Coenzyme Q10 (COQ10) 200 MG CAPS Take 1 capsule by mouth daily   • colesevelam (WELCHOL) 625 mg tablet Take 2 tablets (1,250 mg total) by mouth 2 (two) times a day with meals   • diltiazem (CARDIZEM) 30 mg tablet Take 1 tablet (30 mg total) by mouth if needed (afib)   • Eliquis 5 MG TAKE ONE TABLET BY MOUTH TWICE A DAY   • ezetimibe (ZETIA) 10 mg tablet Take 1 tablet (10 mg total) by mouth daily   • Glucosamine-Chondroit-Vit C-Mn (GLUCOSAMINE CHONDR 1500 COMPLX PO) Take 1 tablet by mouth 2 (two) times a day    • hyoscyamine (ANASPAZ) 0 125 mg Take 0 125 mg by mouth 2 (two) times a day    • MAGNESIUM GLYCINATE PLUS PO Take 400 mg by mouth 2 (two) times a day    • montelukast (SINGULAIR) 10 mg tablet Take 1 tablet (10 mg total) by mouth daily   • multivitamin (THERAGRAN) TABS Take 1 tablet by mouth 2 (two) times a day     • OXcarbazepine (TRILEPTAL) 300 mg tablet Take 1 tablet (300 mg total) by mouth 2 (two) times a day   • tiZANidine (ZANAFLEX) 2 mg tablet Take 1 tablet (2 mg total) by mouth 2 (two) times a day as needed for muscle spasms   • Mirabegron ER 25 MG TB24 Take 25 mg by mouth in the morning (Patient not taking: Reported on 2/3/2023)     No current facility-administered medications on file prior to visit       He is allergic to augmentin [amoxicillin-pot clavulanate], molds & smuts, monascus purpureus went yeast, niacin, pollen extract, pregabalin, and atorvastatin       Review of Systems   Constitutional: Negative for activity change, chills, fatigue, fever and unexpected weight change  HENT: Negative for ear pain, postnasal drip, rhinorrhea, sinus pressure and sore throat  Eyes: Negative for pain  Respiratory: Negative for cough, choking, chest tightness, shortness of breath and wheezing  Cardiovascular: Negative for chest pain, palpitations and leg swelling  Gastrointestinal: Negative for abdominal pain, constipation, diarrhea, nausea and vomiting  Genitourinary: Negative for dysuria and hematuria  Musculoskeletal: Negative for arthralgias, back pain, gait problem, joint swelling, myalgias and neck stiffness  Skin: Positive for color change (skin lesion on the left thigh with erythema)  Negative for pallor and rash  Neurological: Negative for dizziness, tremors, seizures, syncope, light-headedness and headaches  Hematological: Negative for adenopathy  Psychiatric/Behavioral: Negative for behavioral problems  Objective:      /80 (BP Location: Left arm, Patient Position: Sitting, Cuff Size: Large)   Pulse 66   Temp 98 8 °F (37 1 °C) (Temporal)   Ht 5' 7" (1 702 m)   Wt 98 7 kg (217 lb 11 2 oz)   SpO2 97%   BMI 34 10 kg/m²          Physical Exam  Constitutional:       General: He is not in acute distress  Appearance: He is well-developed  He is not diaphoretic  HENT:      Head: Normocephalic and atraumatic  Right Ear: External ear normal       Left Ear: External ear normal       Nose: Nose normal       Mouth/Throat:      Mouth: Mucous membranes are dry  Pharynx: Posterior oropharyngeal erythema present  No oropharyngeal exudate  Eyes:      General: No scleral icterus  Right eye: No discharge  Left eye: No discharge  Conjunctiva/sclera: Conjunctivae normal       Pupils: Pupils are equal, round, and reactive to light  Neck:      Thyroid: No thyromegaly  Vascular: No JVD  Trachea: No tracheal deviation  Cardiovascular:      Rate and Rhythm: Normal rate and regular rhythm  Heart sounds: Normal heart sounds  No murmur heard  No friction rub  No gallop  Pulmonary:      Effort: Pulmonary effort is normal  No respiratory distress  Breath sounds: Normal breath sounds  No wheezing or rales  Chest:      Chest wall: No tenderness  Abdominal:      General: Bowel sounds are normal  There is no distension  Palpations: Abdomen is soft  There is no mass  Tenderness: There is no abdominal tenderness  There is no guarding or rebound  Musculoskeletal:         General: No tenderness or deformity  Normal range of motion  Cervical back: Normal range of motion and neck supple  Left lower le+ Pitting Edema present  Legs:    Lymphadenopathy:      Cervical: No cervical adenopathy  Skin:     General: Skin is warm and dry  Coloration: Skin is not pale  Findings: Lesion (nodular skin lesion on the medial aspect of the left upper thigh, 1 cm in diameter, with surrounding erythema with extensive varicose veins on the left lower ext) present  No erythema, signs of injury or rash  Neurological:      Mental Status: He is alert and oriented to person, place, and time  Cranial Nerves: No cranial nerve deficit  Motor: No abnormal muscle tone  Coordination: Coordination normal       Deep Tendon Reflexes: Reflexes are normal and symmetric     Psychiatric:         Behavior: Behavior normal            Procedure visit on 2022   Component Date Value Ref Range Status   • LEUKOCYTE ESTERASE,UA 2022 -   Final   • Joselin Herrlich 2022 -   Final   • SL AMB POCT UROBILINOGEN 2022 0 2   Final   • POCT URINE PROTEIN 2022 -   Final   •  PH,UA 2022 5 0   Final   • BLOOD,UA 2022 -   Final   • SPECIFIC GRAVITY,UA 2022 1 030   Final   • KETONES,UA 2022 -   Final   • BILIRUBIN,UA 2022 -   Final   • GLUCOSE, UA 11/08/2022 -   Final   •  COLOR,UA 11/08/2022 arnulfo   Final   • CLARITY,UA 11/08/2022 clear   Final   Appointment on 10/13/2022   Component Date Value Ref Range Status   • WBC 10/13/2022 5 15  4 31 - 10 16 Thousand/uL Final   • RBC 10/13/2022 4 60  3 88 - 5 62 Million/uL Final   • Hemoglobin 10/13/2022 14 7  12 0 - 17 0 g/dL Final   • Hematocrit 10/13/2022 44 5  36 5 - 49 3 % Final   • MCV 10/13/2022 97  82 - 98 fL Final   • MCH 10/13/2022 32 0  26 8 - 34 3 pg Final   • MCHC 10/13/2022 33 0  31 4 - 37 4 g/dL Final   • RDW 10/13/2022 12 4  11 6 - 15 1 % Final   • Platelets 77/66/1562 192  149 - 390 Thousands/uL Final   • MPV 10/13/2022 9 4  8 9 - 12 7 fL Final   • Sodium 10/13/2022 139  135 - 147 mmol/L Final   • Potassium 10/13/2022 4 4  3 5 - 5 3 mmol/L Final   • Chloride 10/13/2022 109 (H)  96 - 108 mmol/L Final   • CO2 10/13/2022 24  21 - 32 mmol/L Final   • ANION GAP 10/13/2022 6  4 - 13 mmol/L Final   • BUN 10/13/2022 23  5 - 25 mg/dL Final   • Creatinine 10/13/2022 0 97  0 60 - 1 30 mg/dL Final    Standardized to IDMS reference method   • Glucose, Fasting 10/13/2022 95  65 - 99 mg/dL Final    Specimen collection should occur prior to Sulfasalazine administration due to the potential for falsely depressed results  Specimen collection should occur prior to Sulfapyridine administration due to the potential for falsely elevated results  • Calcium 10/13/2022 9 5  8 3 - 10 1 mg/dL Final   • AST 10/13/2022 23  5 - 45 U/L Final    Specimen collection should occur prior to Sulfasalazine administration due to the potential for falsely depressed results  • ALT 10/13/2022 53  12 - 78 U/L Final    Specimen collection should occur prior to Sulfasalazine and/or Sulfapyridine administration due to the potential for falsely depressed results      • Alkaline Phosphatase 10/13/2022 73  46 - 116 U/L Final   • Total Protein 10/13/2022 7 1  6 4 - 8 4 g/dL Final   • Albumin 10/13/2022 3 8  3 5 - 5 0 g/dL Final   • Total Bilirubin 10/13/2022 0 32  0 20 - 1 00 mg/dL Final    Use of this assay is not recommended for patients undergoing treatment with eltrombopag due to the potential for falsely elevated results  • eGFR 10/13/2022 79  ml/min/1 73sq m Final   • Cholesterol 10/13/2022 231 (H)  See Comment mg/dL Final    Cholesterol:         Pediatric <18 Years        Desirable          <170 mg/dL      Borderline High    170-199 mg/dL      High               >=200 mg/dL        Adult >=18 Years            Desirable         <200 mg/dL      Borderline High   200-239 mg/dL      High              >239 mg/dL     • Triglycerides 10/13/2022 77  See Comment mg/dL Final    Triglyceride:     0-9Y            <75mg/dL     10Y-17Y         <90 mg/dL       >=18Y     Normal          <150 mg/dL     Borderline High 150-199 mg/dL     High            200-499 mg/dL        Very High       >499 mg/dL    Specimen collection should occur prior to N-Acetylcysteine or Metamizole administration due to the potential for falsely depressed results  • HDL, Direct 10/13/2022 61  >=40 mg/dL Final    Specimen collection should occur prior to Metamizole administration due to the potential for falsley depressed results  • LDL Calculated 10/13/2022 155 (H)  0 - 100 mg/dL Final    LDL Cholesterol:     Optimal           <100 mg/dl     Near Optimal      100-129 mg/dl     Above Optimal       Borderline High 130-159 mg/dl       High            160-189 mg/dl       Very High       >189 mg/dl         This screening LDL is a calculated result  It does not have the accuracy of the Direct Measured LDL in the monitoring of patients with hyperlipidemia and/or statin therapy  Direct Measure LDL (RFM413) must be ordered separately in these patients     • Non-HDL-Chol (CHOL-HDL) 10/13/2022 170  mg/dl Final   • PSA 10/13/2022 2 4  0 0 - 4 0 ng/mL Final    American Urological Association Guidelines define biochemical recurrence of prostate cancer as a detectable or rising PSA value post-radical prostatectomy that is greater than or equal to 0 2 ng/mL with a second confirmatory level of greater than or equal to 0 2 ng/mL  Office Visit on 09/27/2022   Component Date Value Ref Range Status   • POST-VOID RESIDUAL VOLUME, ML POC 09/27/2022 61  mL Final   Appointment on 04/28/2022   Component Date Value Ref Range Status   • WBC 04/28/2022 5 37  4 31 - 10 16 Thousand/uL Final   • RBC 04/28/2022 4 95  3 88 - 5 62 Million/uL Final   • Hemoglobin 04/28/2022 15 5  12 0 - 17 0 g/dL Final   • Hematocrit 04/28/2022 46 7  36 5 - 49 3 % Final   • MCV 04/28/2022 94  82 - 98 fL Final   • MCH 04/28/2022 31 3  26 8 - 34 3 pg Final   • MCHC 04/28/2022 33 2  31 4 - 37 4 g/dL Final   • RDW 04/28/2022 12 3  11 6 - 15 1 % Final   • Platelets 39/83/1028 191  149 - 390 Thousands/uL Final   • MPV 04/28/2022 9 2  8 9 - 12 7 fL Final   • Sodium 04/28/2022 141  136 - 145 mmol/L Final   • Potassium 04/28/2022 4 2  3 5 - 5 3 mmol/L Final   • Chloride 04/28/2022 108  100 - 108 mmol/L Final   • CO2 04/28/2022 30  21 - 32 mmol/L Final   • ANION GAP 04/28/2022 3 (L)  4 - 13 mmol/L Final   • BUN 04/28/2022 15  5 - 25 mg/dL Final   • Creatinine 04/28/2022 0 96  0 60 - 1 30 mg/dL Final    Standardized to IDMS reference method   • Glucose, Fasting 04/28/2022 94  65 - 99 mg/dL Final    Specimen collection should occur prior to Sulfasalazine administration due to the potential for falsely depressed results  Specimen collection should occur prior to Sulfapyridine administration due to the potential for falsely elevated results  • Calcium 04/28/2022 9 1  8 3 - 10 1 mg/dL Final   • AST 04/28/2022 31  5 - 45 U/L Final    Specimen collection should occur prior to Sulfasalazine administration due to the potential for falsely depressed results  • ALT 04/28/2022 67  12 - 78 U/L Final    Specimen collection should occur prior to Sulfasalazine administration due to the potential for falsely depressed results      • Alkaline Phosphatase 04/28/2022 76  46 - 116 U/L Final   • Total Protein 04/28/2022 7 3  6 4 - 8 2 g/dL Final   • Albumin 04/28/2022 4 0  3 5 - 5 0 g/dL Final   • Total Bilirubin 04/28/2022 0 40  0 20 - 1 00 mg/dL Final    Use of this assay is not recommended for patients undergoing treatment with eltrombopag due to the potential for falsely elevated results  • eGFR 04/28/2022 80  ml/min/1 73sq m Final   • Cholesterol 04/28/2022 247 (H)  See Comment mg/dL Final    Cholesterol:         Pediatric <18 Years        Desirable          <170 mg/dL      Borderline High    170-199 mg/dL      High               >=200 mg/dL        Adult >=18 Years            Desirable         <200 mg/dL      Borderline High   200-239 mg/dL      High              >239 mg/dL     • Triglycerides 04/28/2022 76  See Comment mg/dL Final    Triglyceride:     0-9Y            <75mg/dL     10Y-17Y         <90 mg/dL       >=18Y     Normal          <150 mg/dL     Borderline High 150-199 mg/dL     High            200-499 mg/dL        Very High       >499 mg/dL    Specimen collection should occur prior to N-Acetylcysteine or Metamizole administration due to the potential for falsely depressed results  • HDL, Direct 04/28/2022 65  >=40 mg/dL Final    Specimen collection should occur prior to Metamizole administration due to the potential for falsley depressed results  • LDL Calculated 04/28/2022 167 (H)  0 - 100 mg/dL Final    LDL Cholesterol:     Optimal           <100 mg/dl     Near Optimal      100-129 mg/dl     Above Optimal       Borderline High 130-159 mg/dl       High            160-189 mg/dl       Very High       >189 mg/dl         This screening LDL is a calculated result  It does not have the accuracy of the Direct Measured LDL in the monitoring of patients with hyperlipidemia and/or statin therapy  Direct Measure LDL (IXV463) must be ordered separately in these patients     • Non-HDL-Chol (CHOL-HDL) 04/28/2022 182  mg/dl Final

## 2023-02-09 ENCOUNTER — NURSE TRIAGE (OUTPATIENT)
Dept: OTHER | Facility: OTHER | Age: 68
End: 2023-02-09

## 2023-02-09 ENCOUNTER — HOSPITAL ENCOUNTER (OUTPATIENT)
Dept: RADIOLOGY | Facility: HOSPITAL | Age: 68
Discharge: HOME | End: 2023-02-09
Attending: INTERNAL MEDICINE

## 2023-02-09 DIAGNOSIS — L98.9 SKIN LESION: ICD-10-CM

## 2023-02-09 NOTE — TELEPHONE ENCOUNTER
Patient reports he is in A-fib but he is not sure if he should take his Cardizem because his BP is low  This morning it was 88/64  Most recent /67  Patient would like a call back ASAP to advise  Reason for Disposition  • Taking water pill (i e , diuretic) or heart medication (e g , digoxin)    Answer Assessment - Initial Assessment Questions  1  DESCRIPTION: "Please describe your heart rate or heartbeat that you are having" (e g , fast/slow, regular/irregular, skipped or extra beats, "palpitations")      A-fib, Hypotension  2  ONSET: "When did it start?" (Minutes, hours or days)       This morning   3  DURATION: "How long does it last" (e g , seconds, minutes, hours)    Hours   4  PATTERN "Does it come and go, or has it been constant since it started?"  "Does it get worse with exertion?"   "Are you feeling it now?"      Comes and goes   5  TAP: "Using your hand, can you tap out what you are feeling on a chair or table in front of you, so that I can hear?" (Note: not all patients can do this)        N/A   6  HEART RATE: "Can you tell me your heart rate?" "How many beats in 15 seconds?"  (Note: not all patients can do this)        60-80  7  RECURRENT SYMPTOM: "Have you ever had this before?" If Yes, ask: "When was the last time?" and "What happened that time?"       Yes   8  CAUSE: "What do you think is causing the palpitations?"      A-fib   9  CARDIAC HISTORY: "Do you have any history of heart disease?" (e g , heart attack, angina, bypass surgery, angioplasty, arrhythmia)       A-fib   10  OTHER SYMPTOMS: "Do you have any other symptoms?" (e g , dizziness, chest pain, sweating, difficulty breathing)        Respirations off, sweaty/clammy   11   PREGNANCY: "Is there any chance you are pregnant?" "When was your last menstrual period?"        N/A    Protocols used: HEART RATE AND HEARTBEAT QUESTIONS-ADULT-[place]

## 2023-02-09 NOTE — TELEPHONE ENCOUNTER
Regarding: BP 88/64, in Afib, lightheaded, respirations are "off"  ----- Message from July Hammer sent at 2/9/2023  8:02 AM EST -----  "I am in a-fib and my last blood pressure was taken at 0745 and is reading 88/64, my respirations are off and I am a little wobbly in the head "

## 2023-02-14 ENCOUNTER — OFFICE VISIT (OUTPATIENT)
Dept: VASCULAR SURGERY | Facility: CLINIC | Age: 68
End: 2023-02-14

## 2023-02-14 VITALS — HEART RATE: 66 BPM | DIASTOLIC BLOOD PRESSURE: 80 MMHG | SYSTOLIC BLOOD PRESSURE: 124 MMHG

## 2023-02-14 DIAGNOSIS — I83.893 SYMPTOMATIC VARICOSE VEINS OF BOTH LOWER EXTREMITIES: Primary | ICD-10-CM

## 2023-02-14 NOTE — ASSESSMENT & PLAN NOTE
76-year-old male with HTN, HLD, CAD, paroxysmal A-fib on Eliquis, hx stroke 2/2 PFO s/p closure, DDD lumbar spine, BLE varicose veins w/ remote history of L GSV EVLT by Dr Iam Lara in 2004, recurrent BLE varicosities  Patient presents to the office for evaluation of veins     -L>R BLE varicosities  -Venous symptoms and swelling well controlled with compression   -Most recently developed folliculitis at left medial thigh secondary to chaffing/skin to skin friction  Area of concern nearly resolved  Soft tissue u/s 2/9 not readily available at time of visit   -Concerned about adjacent varicosities   Veins are soft, non phlebitic   -Will try barrier Skin-Prep and/or chaffing stick to avoid skin to skin friction at the inner thigh    -We discussed treatment of venous disease with further evaluation of venous reflux study and follow-up office visit with the surgeon to discuss surgical treatment options versus continued conservative measures with compression, exercise and periodic leg elevation  -Opted for continued conservative measures  -Follow-up in the office in 6 months to recheck veins

## 2023-02-14 NOTE — PROGRESS NOTES
Assessment/Plan:    Symptomatic varicose veins of both lower extremities  68-year-old male with HTN, HLD, CAD, paroxysmal A-fib on Eliquis, hx stroke 2/2 PFO s/p closure, DDD lumbar spine, BLE varicose veins w/ remote history of L GSV EVLT by Dr Светлана Cardoza in 2004, recurrent BLE varicosities  Patient presents to the office for evaluation of veins     -L>R BLE varicosities  -Venous symptoms and swelling well controlled with compression   -Most recently developed folliculitis at left medial thigh secondary to chaffing/skin to skin friction  Area of concern nearly resolved  Soft tissue u/s 2/9 not readily available at time of visit   -Concerned about adjacent varicosities  Veins are soft, non phlebitic   -Will try barrier Skin-Prep and/or chaffing stick to avoid skin to skin friction at the inner thigh    -We discussed treatment of venous disease with further evaluation of venous reflux study and follow-up office visit with the surgeon to discuss surgical treatment options versus continued conservative measures with compression, exercise and periodic leg elevation  -Opted for continued conservative measures  -Follow-up in the office in 6 months to recheck veins       Diagnoses and all orders for this visit:    Symptomatic varicose veins of both lower extremities          Subjective:      Patient ID: Robbi Beckford is a 69 y o  male  Patient is new to our practice referred by Robinson Kuo MD   Patient present to discuss bulging of Left leg veins  Patient wears compression socks  No testing and Takes Eliquis  HPI  68-year-old male with HTN, HLD, CAD, paroxysmal A-fib on Eliquis, hx stroke 2/2 PFO s/p closure, DDD lumbar spine, BLE varicose veins w/ remote history of L GSV EVLT by Dr Светлана Cardoza in 2004, recurrent BLE varicosities  Patient presents to the office for evaluation of veins   Last week developed irritation at left medial thigh secondary to skin to skin friction   Patient saw PCP and given topical ABX and sent for u/s  U/s not readily available at present  Area is flared down by time of visit   He is wearing graded compression hose on a daily basis with no significant heaviness, throbbing, aching discomfort  With flareup of medial thigh folliculitis he has been wearing compression shorts  The following portions of the patient's history were reviewed and updated as appropriate: allergies, current medications, past family history, past medical history, past social history, past surgical history and problem list   ROS reviewed     Review of Systems   Constitutional: Negative  HENT: Negative  Eyes: Negative  Respiratory: Negative  Cardiovascular: Negative  Gastrointestinal: Negative  Endocrine: Negative  Genitourinary: Negative  Musculoskeletal: Negative  Skin: Negative  Allergic/Immunologic: Negative  Neurological: Negative  Hematological: Negative  Psychiatric/Behavioral: Negative  Objective:  I have reviewed and made appropriate changes to the review of systems input by the medical assistant      Vitals:    02/14/23 1008   BP: 124/80   BP Location: Left arm   Patient Position: Sitting   Cuff Size: Adult   Pulse: 66       Patient Active Problem List   Diagnosis   • Seasonal allergic rhinitis   • Osteoarthritis   • Nephrolithiasis   • Irritable bowel syndrome   • Hypercholesterolemia   • Coronary atherosclerosis   • Coronary artery disease involving native coronary artery   • Alpha-1-antitrypsin deficiency (HCC)   • BPH with obstruction/lower urinary tract symptoms   • Medicare annual wellness visit, initial   • Pancreatic cyst   • Lumbar radiculopathy   • Low back pain with sciatica   • DDD (degenerative disc disease), lumbar   • Lumbar spondylosis   • Tinea corporis   • Chronic pain syndrome   • Paroxysmal atrial fibrillation (HCC)   • Skin lesion   • BMI 34 0-34 9,adult   • Symptomatic varicose veins of both lower extremities       Past Surgical History:   Procedure Laterality Date   • COLONOSCOPY     • OTHER SURGICAL HISTORY      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   • PATENT FORAMEN OVALE CLOSURE         Family History   Problem Relation Age of Onset   • Diabetes type II Mother    • Cancer Father         angioma       Social History     Socioeconomic History   • Marital status: /Civil Union     Spouse name: Not on file   • Number of children: Not on file   • Years of education: Not on file   • Highest education level: Not on file   Occupational History   • Occupation: massage therapist   Tobacco Use   • Smoking status: Former     Types: Cigars     Start date:      Quit date:      Years since quittin 1   • Smokeless tobacco: Former     Types: Chew   • Tobacco comments:     Cigars    Vaping Use   • Vaping Use: Never used   Substance and Sexual Activity   • Alcohol use: Yes     Alcohol/week: 7 0 standard drinks     Types: 7 Glasses of wine per week     Comment: daily wine 8oz   • Drug use: Not Currently     Types: Marijuana     Comment: Medical use (CBD)   • Sexual activity: Yes     Partners: Female   Other Topics Concern   • Not on file   Social History Narrative    Caffeine: drinks 1 cup coffee/ tea a day  Social Determinants of Health     Financial Resource Strain: Low Risk    • Difficulty of Paying Living Expenses: Not hard at all   Food Insecurity: Not on file   Transportation Needs: No Transportation Needs   • Lack of Transportation (Medical): No   • Lack of Transportation (Non-Medical): No   Physical Activity: Not on file   Stress: Not on file   Social Connections: Not on file   Intimate Partner Violence: Not on file   Housing Stability: Not on file       Allergies   Allergen Reactions   • Augmentin [Amoxicillin-Pot Clavulanate] Hives and Itching   • Molds & Smuts Allergic Rhinitis     Category: Allergy;    • Monascus Purpureus Went Yeast Fatigue     Category: Adverse Reaction;    • Niacin Fatigue     Category:  Adverse Reaction;    • Pollen Extract Allergic Rhinitis     Category: Allergy;    • Pregabalin Fatigue     Category: Adverse Reaction;    • Atorvastatin GI Intolerance and Fatigue     Other reaction(s): Muscle pain, constipation, sleepiness  Category: Adverse Reaction;    Other reaction(s): Muscle pain, constipation, sleepiness         Current Outpatient Medications:   •  ascorbic acid (VITAMIN C) 250 mg tablet, Take 250 mg by mouth 2 (two) times a day, Disp: , Rfl:   •  bacitracin topical ointment 500 units/g topical ointment, Apply 1 large application topically 2 (two) times a day, Disp: 15 g, Rfl: 1  •  chlorpheniramine (CHLOR-TRIMETON) 4 MG tablet, Take 1 tablet (4 mg total) by mouth every 6 (six) hours as needed for rhinitis, Disp: 30 tablet, Rfl: 0  •  clobetasol (TEMOVATE) 0 05 % cream, Apply 1 application topically 2 (two) times a day as needed Apply sparingly to affected areas, Disp: , Rfl:   •  Coenzyme Q10 (COQ10) 200 MG CAPS, Take 1 capsule by mouth daily, Disp: , Rfl:   •  colesevelam (WELCHOL) 625 mg tablet, Take 2 tablets (1,250 mg total) by mouth 2 (two) times a day with meals, Disp: 360 tablet, Rfl: 1  •  diltiazem (CARDIZEM) 30 mg tablet, Take 1 tablet (30 mg total) by mouth if needed (afib), Disp: 15 tablet, Rfl: 0  •  Eliquis 5 MG, TAKE ONE TABLET BY MOUTH TWICE A DAY, Disp: 180 tablet, Rfl: 3  •  ezetimibe (ZETIA) 10 mg tablet, Take 1 tablet (10 mg total) by mouth daily, Disp: 90 tablet, Rfl: 1  •  Glucosamine-Chondroit-Vit C-Mn (GLUCOSAMINE CHONDR 1500 COMPLX PO), Take 1 tablet by mouth 2 (two) times a day , Disp: , Rfl:   •  hyoscyamine (ANASPAZ) 0 125 mg, Take 0 125 mg by mouth 2 (two) times a day , Disp: , Rfl:   •  MAGNESIUM GLYCINATE PLUS PO, Take 400 mg by mouth 2 (two) times a day , Disp: , Rfl:   •  montelukast (SINGULAIR) 10 mg tablet, Take 1 tablet (10 mg total) by mouth daily, Disp: 90 tablet, Rfl: 1  •  multivitamin (THERAGRAN) TABS, Take 1 tablet by mouth 2 (two) times a day  , Disp: , Rfl: •  OXcarbazepine (TRILEPTAL) 300 mg tablet, Take 1 tablet (300 mg total) by mouth 2 (two) times a day, Disp: 180 tablet, Rfl: 1  •  tiZANidine (ZANAFLEX) 2 mg tablet, Take 1 tablet (2 mg total) by mouth 2 (two) times a day as needed for muscle spasms, Disp: 60 tablet, Rfl: 2  •  Mirabegron ER 25 MG TB24, Take 25 mg by mouth in the morning (Patient not taking: Reported on 2/3/2023), Disp: 30 tablet, Rfl: 3      /80 (BP Location: Left arm, Patient Position: Sitting, Cuff Size: Adult)   Pulse 66          Physical Exam  Vitals and nursing note reviewed  Constitutional:       Appearance: Normal appearance  HENT:      Head: Normocephalic and atraumatic  Eyes:      Extraocular Movements: Extraocular movements intact  Cardiovascular:      Pulses: Normal pulses  Heart sounds: Normal heart sounds  Pulmonary:      Effort: Pulmonary effort is normal       Breath sounds: Normal breath sounds  Musculoskeletal:         General: Normal range of motion  Comments: Left medial thigh truncal varicosities groin to proximal calf and right medial proximal calf reticular vein    Skin:     Comments: Small 3mm area of fading discoloration at medial proximal thigh no surrounding erythema, no warmth    Neurological:      General: No focal deficit present  Mental Status: He is alert and oriented to person, place, and time     Psychiatric:         Mood and Affect: Mood normal          Behavior: Behavior normal

## 2023-03-09 ENCOUNTER — TELEPHONE (OUTPATIENT)
Dept: PAIN MEDICINE | Facility: CLINIC | Age: 68
End: 2023-03-09

## 2023-03-09 DIAGNOSIS — M54.16 LUMBAR RADICULOPATHY: ICD-10-CM

## 2023-03-09 DIAGNOSIS — E78.00 HYPERCHOLESTEROLEMIA: ICD-10-CM

## 2023-03-09 RX ORDER — OXCARBAZEPINE 300 MG/1
300 TABLET, FILM COATED ORAL 2 TIMES DAILY
Qty: 180 TABLET | Refills: 1 | Status: SHIPPED | OUTPATIENT
Start: 2023-03-09

## 2023-03-09 RX ORDER — COLESEVELAM 180 1/1
1250 TABLET ORAL 2 TIMES DAILY WITH MEALS
Qty: 360 TABLET | Refills: 1 | Status: SHIPPED | OUTPATIENT
Start: 2023-03-09 | End: 2023-09-05

## 2023-03-09 NOTE — TELEPHONE ENCOUNTER
S/w pt who is requesting injection  Pt has right lower back radiating to medial and posterior thigh and left hip pain that is dull and aching  R>L pain  Pt does take Eliquis  Pt advised that Eliquis hold approval would need to be sent to Cardiology for 3 day hold  Ok to schedule?

## 2023-03-09 NOTE — TELEPHONE ENCOUNTER
Caller: Victor Hugo Wilder    Doctor: German France    Reason for call: pt called to schedule procedure per OV on 1/30- pain level 4-7/10    Pt is taking Eliquis    Call back#:[phone]

## 2023-03-13 ENCOUNTER — TELEPHONE (OUTPATIENT)
Dept: CARDIOLOGY CLINIC | Facility: CLINIC | Age: 68
End: 2023-03-13

## 2023-03-13 NOTE — TELEPHONE ENCOUNTER
Hi, this is Franklin Moralez  I'll spell the last name P as in Cordell Richter as in [facility] [place] as in Tylor Longoria as in Cat, Z as in cynthia date of birth [id number]  The I'm in need of a an epidural in the lumbar again and the spine center if they haven't already  We'll be sending a request for a hold on the other quest that I'm using if someone could let me know  Phone call or my chart is fine when the doctor Okays that so I can get back to the spine center and tell them that you sent it  That would be helpful  It seems to get delayed between faxing to you and then faxing back again  Phone number is [phone] or some sort of message on my chart will be fine  Just when the doctor okays it  I'd appreciate it  Thanks   Zechariah

## 2023-03-14 NOTE — TELEPHONE ENCOUNTER
[facility] informing pt pain management had wrong fax number  Asked to refax and correct fax provided

## 2023-03-16 DIAGNOSIS — M54.16 LUMBAR RADICULOPATHY: Primary | ICD-10-CM

## 2023-03-16 NOTE — TELEPHONE ENCOUNTER
Patient was contacted and scheduled for B/L L5 TFESI     All pre procedure instructions were given to patient   Nothing to eat or drink for 1 hour prior  Loose fitting clothing   PATIENT INSTRUCTED TO TAKE LAST DOSE OF ELIQUIS ON 3/31/2023 AND WILL BE INSTRUCTED WHEN TO RESTART AFTER INJ   NSAIDS okay, ASA okay  Denies Antibx   Needs    Patient instructed to continue all routine medications   Patient instructed to contact our office if becomes sick   Refrain from any vaccines 2 weeks before & 2 weeks after  Insurance auth received but is not a guarantee of payment per your insurance company's authorization disclaimer and it is your responsibility to verify your benefits   COVID -19 screening complete

## 2023-03-17 NOTE — TELEPHONE ENCOUNTER
S/w the patient and reviewed that he is scheduled for a bilateral L5 TFESI as per scheduled injection  He just wanted to make sure that both sides will be addressed  He appreciated the CB

## 2023-03-17 NOTE — TELEPHONE ENCOUNTER
Caller: Ang Kelley PT    Doctor: Dr Adriana Rodriguez    Reason for call: Symptoms bilateral on both sides      Call back#: [phone]

## 2023-04-04 ENCOUNTER — HOSPITAL ENCOUNTER (OUTPATIENT)
Dept: RADIOLOGY | Facility: CLINIC | Age: 68
Discharge: HOME | End: 2023-04-04

## 2023-04-04 VITALS
HEART RATE: 65 BPM | SYSTOLIC BLOOD PRESSURE: 135 MMHG | RESPIRATION RATE: 18 BRPM | OXYGEN SATURATION: 95 % | DIASTOLIC BLOOD PRESSURE: 75 MMHG | TEMPERATURE: 97.1 F

## 2023-04-04 DIAGNOSIS — M54.16 LUMBAR RADICULOPATHY: ICD-10-CM

## 2023-04-04 RX ORDER — PAPAVERINE HCL 150 MG
20 CAPSULE, EXTENDED RELEASE ORAL ONCE
Status: COMPLETED | OUTPATIENT
Start: 2023-04-04 | End: 2023-04-04

## 2023-04-04 RX ADMIN — DEXAMETHASONE SODIUM PHOSPHATE 15 MG: 10 INJECTION, SOLUTION INTRAMUSCULAR; INTRAVENOUS at 10:49

## 2023-04-04 RX ADMIN — LIDOCAINE HYDROCHLORIDE 2 ML: 20 INJECTION, SOLUTION EPIDURAL; INFILTRATION; INTRACAUDAL; PERINEURAL at 10:49

## 2023-04-04 RX ADMIN — IOHEXOL 1 ML: 300 INJECTION, SOLUTION INTRAVENOUS at 10:49

## 2023-04-04 NOTE — H&P
History of Present Illness: The patient is a 69 y o  male who presents with complaints of low back and leg pain      Past Medical History:   Diagnosis Date   • A-fib [facility])    • Allergic rhinitis    • Benign cyst of kidney    • Cat bite 7/8/2020   • Cerebral vascular disease    • Chronic sinusitis    • Cryptogenic stroke ([facility] [address] ) 3/2/2020   • Hyperlipidemia    • Melanoma ([facility] [address] )    • Pancreas cyst        Past Surgical History:   Procedure Laterality Date   • COLONOSCOPY     • OTHER SURGICAL HISTORY  2003    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   • PATENT FORAMEN OVALE CLOSURE           Current Outpatient Medications:   •  ascorbic acid (VITAMIN C) 250 mg tablet, Take 250 mg by mouth 2 (two) times a day, Disp: , Rfl:   •  bacitracin topical ointment 500 units/g topical ointment, Apply 1 large application topically 2 (two) times a day, Disp: 15 g, Rfl: 1  •  chlorpheniramine (CHLOR-TRIMETON) 4 MG tablet, Take 1 tablet (4 mg total) by mouth every 6 (six) hours as needed for rhinitis, Disp: 30 tablet, Rfl: 0  •  clobetasol (TEMOVATE) 0 05 % cream, Apply 1 application topically 2 (two) times a day as needed Apply sparingly to affected areas, Disp: , Rfl:   •  Coenzyme Q10 (COQ10) 200 MG CAPS, Take 1 capsule by mouth daily, Disp: , Rfl:   •  colesevelam (WELCHOL) 625 mg tablet, Take 2 tablets (1,250 mg total) by mouth 2 (two) times a day with meals, Disp: 360 tablet, Rfl: 1  •  diltiazem (CARDIZEM) 30 mg tablet, Take 1 tablet (30 mg total) by mouth if needed (afib), Disp: 15 tablet, Rfl: 0  •  Eliquis 5 MG, TAKE ONE TABLET BY MOUTH TWICE A DAY, Disp: 180 tablet, Rfl: 3  •  ezetimibe (ZETIA) 10 mg tablet, Take 1 tablet (10 mg total) by mouth daily, Disp: 90 tablet, Rfl: 1  •  Glucosamine-Chondroit-Vit C-Mn (GLUCOSAMINE CHONDR 1500 COMPLX PO), Take 1 tablet by mouth 2 (two) times a day , Disp: , Rfl:   •  hyoscyamine (ANASPAZ) 0 125 mg, Take 0 125 mg by mouth 2 (two) times a day , Disp: , Rfl:   •  MAGNESIUM GLYCINATE PLUS PO, Take 400 mg by mouth 2 (two) times a day , Disp: , Rfl:   •  Mirabegron ER 25 MG TB24, Take 25 mg by mouth in the morning (Patient not taking: Reported on 2/3/2023), Disp: 30 tablet, Rfl: 3  •  montelukast (SINGULAIR) 10 mg tablet, Take 1 tablet (10 mg total) by mouth daily, Disp: 90 tablet, Rfl: 1  •  multivitamin (THERAGRAN) TABS, Take 1 tablet by mouth 2 (two) times a day  , Disp: , Rfl:   •  OXcarbazepine (TRILEPTAL) 300 mg tablet, Take 1 tablet (300 mg total) by mouth 2 (two) times a day, Disp: 180 tablet, Rfl: 1  •  tiZANidine (ZANAFLEX) 2 mg tablet, Take 1 tablet (2 mg total) by mouth 2 (two) times a day as needed for muscle spasms, Disp: 60 tablet, Rfl: 2    Allergies   Allergen Reactions   • Augmentin [Amoxicillin-Pot Clavulanate] Hives and Itching   • Molds & Smuts Allergic Rhinitis     Category: Allergy;    • Monascus Purpureus Went Yeast Fatigue     Category: Adverse Reaction;    • Niacin Fatigue     Category: Adverse Reaction;    • Pollen Extract Allergic Rhinitis     Category: Allergy;    • Pregabalin Fatigue     Category: Adverse Reaction;    • Atorvastatin GI Intolerance and Fatigue     Other reaction(s): Muscle pain, constipation, sleepiness  Category: Adverse Reaction; Other reaction(s): Muscle pain, constipation, sleepiness       Physical Exam:   Vitals:    04/04/23 1023   BP: 131/75   Pulse: 74   Resp: 18   Temp: (!) 97 1 °F (36 2 °C)   SpO2: 96%     General: Awake, Alert, Oriented x 3, Mood and affect appropriate  Respiratory: Respirations even and unlabored  Cardiovascular: Peripheral pulses intact; no edema  Musculoskeletal Exam: Bilateral lumbar paraspinals tender to palpation    ASA Score: 3         Assessment:   1  Lumbar radiculopathy        Plan: B/L L5 TFESI

## 2023-04-04 NOTE — DISCHARGE INSTRUCTIONS
Epidural Steroid Injection   WHAT YOU NEED TO KNOW:   An epidural steroid injection (CARLEEN) is a procedure to inject steroid medicine into the epidural space  The epidural space is between your spinal cord and vertebrae  Steroids reduce inflammation and fluid buildup in your spine that may be causing pain  You may be given pain medicine along with the steroids  ACTIVITY  Do not drive or operate machinery today  No strenuous activity today - bending, lifting, etc   You may resume normal activites starting tomorrow - start slowly and as tolerated  You may shower today, but no tub baths or hot tubs  You may have numbness for several hours from the local anesthetic  Please use caution and common sense, especially with weight-bearing activities  CARE OF THE INJECTION SITE  If you have soreness or pain, apply ice to the area today (20 minutes on/20 minutes off)  Starting tomorrow, you may use warm, moist heat or ice if needed  You may have an increase or change in your discomfort for 36-48 hours after your treatment  Apply ice and continue with any pain medication you have been prescribed  Notify the [facility] if you have any of the following: redness, drainage, swelling, headache, stiff neck or fever above 100°F     SPECIAL INSTRUCTIONS  Our office will contact you in approximately 7 days for a progress report  MEDICATIONS  Continue to take all routine medications  Resume Eliquis 4/5/2023  Our office may have instructed you to hold some medications  As no general anesthesia was used in today's procedure, you should not experience any side effects related to anesthesia  If you are diabetic, the steroids used in today's injection may temporarily increase your blood sugar levels after the first few days after your injection   Please keep a close eye on your sugars and alert the doctor who manages your diabetes if your sugars are significantly high from your baseline or you are symptomatic  If you have a problem specifically related to your procedure, please call our office at [phone]  Problems not related to your procedure should be directed to your primary care physician

## 2023-05-09 ENCOUNTER — RA CDI HCC (OUTPATIENT)
Dept: OTHER | Facility: HOSPITAL | Age: 68
End: 2023-05-09

## 2023-05-09 NOTE — PROGRESS NOTES
[facility]  coding opportunities       Chart reviewed, no opportunity found:   [address]        Patients Insurance     Medicare Insurance: [employer]

## 2023-05-10 ENCOUNTER — APPOINTMENT (OUTPATIENT)
Dept: LAB | Facility: CLINIC | Age: 68
End: 2023-05-10

## 2023-05-10 DIAGNOSIS — K86.2 PANCREATIC CYST: ICD-10-CM

## 2023-05-10 DIAGNOSIS — E78.00 HYPERCHOLESTEROLEMIA: ICD-10-CM

## 2023-05-10 LAB
ALBUMIN SERPL BCP-MCNC: 4 G/DL (ref 3.5–5)
ALP SERPL-CCNC: 76 U/L (ref 46–116)
ALT SERPL W P-5'-P-CCNC: 40 U/L (ref 12–78)
ANION GAP SERPL CALCULATED.3IONS-SCNC: 0 MMOL/L (ref 4–13)
AST SERPL W P-5'-P-CCNC: 16 U/L (ref 5–45)
BASOPHILS # BLD AUTO: 0.05 THOUSANDS/ÂΜL (ref 0–0.1)
BASOPHILS NFR BLD AUTO: 1 % (ref 0–1)
BILIRUB SERPL-MCNC: 0.46 MG/DL (ref 0.2–1)
BUN SERPL-MCNC: 20 MG/DL (ref 5–25)
CALCIUM SERPL-MCNC: 9.1 MG/DL (ref 8.3–10.1)
CHLORIDE SERPL-SCNC: 110 MMOL/L (ref 96–108)
CHOLEST SERPL-MCNC: 240 MG/DL
CO2 SERPL-SCNC: 28 MMOL/L (ref 21–32)
CREAT SERPL-MCNC: 1.06 MG/DL (ref 0.6–1.3)
EOSINOPHIL # BLD AUTO: 0.18 THOUSAND/ÂΜL (ref 0–0.61)
EOSINOPHIL NFR BLD AUTO: 3 % (ref 0–6)
ERYTHROCYTE [DISTWIDTH] IN BLOOD BY AUTOMATED COUNT: 12.2 % (ref 11.6–15.1)
GFR SERPL CREATININE-BSD FRML MDRD: 71 ML/MIN/1.73SQ M
GLUCOSE P FAST SERPL-MCNC: 101 MG/DL (ref 65–99)
HCT VFR BLD AUTO: 46.2 % (ref 36.5–49.3)
HDLC SERPL-MCNC: 61 MG/DL
HGB BLD-MCNC: 14.8 G/DL (ref 12–17)
IMM GRANULOCYTES # BLD AUTO: 0.01 THOUSAND/UL (ref 0–0.2)
IMM GRANULOCYTES NFR BLD AUTO: 0 % (ref 0–2)
LDLC SERPL CALC-MCNC: 165 MG/DL (ref 0–100)
LYMPHOCYTES # BLD AUTO: 1.78 THOUSANDS/ÂΜL (ref 0.6–4.47)
LYMPHOCYTES NFR BLD AUTO: 27 % (ref 14–44)
MCH RBC QN AUTO: 31 PG (ref 26.8–34.3)
MCHC RBC AUTO-ENTMCNC: 32 G/DL (ref 31.4–37.4)
MCV RBC AUTO: 97 FL (ref 82–98)
MONOCYTES # BLD AUTO: 0.53 THOUSAND/ÂΜL (ref 0.17–1.22)
MONOCYTES NFR BLD AUTO: 8 % (ref 4–12)
NEUTROPHILS # BLD AUTO: 4.01 THOUSANDS/ÂΜL (ref 1.85–7.62)
NEUTS SEG NFR BLD AUTO: 61 % (ref 43–75)
NONHDLC SERPL-MCNC: 179 MG/DL
NRBC BLD AUTO-RTO: 0 /100 WBCS
PLATELET # BLD AUTO: 179 THOUSANDS/UL (ref 149–390)
PMV BLD AUTO: 9.3 FL (ref 8.9–12.7)
POTASSIUM SERPL-SCNC: 4.7 MMOL/L (ref 3.5–5.3)
PROT SERPL-MCNC: 7.2 G/DL (ref 6.4–8.4)
RBC # BLD AUTO: 4.78 MILLION/UL (ref 3.88–5.62)
SODIUM SERPL-SCNC: 138 MMOL/L (ref 135–147)
TRIGL SERPL-MCNC: 69 MG/DL
WBC # BLD AUTO: 6.56 THOUSAND/UL (ref 4.31–10.16)

## 2023-05-16 ENCOUNTER — OFFICE VISIT (OUTPATIENT)
Dept: INTERNAL MEDICINE CLINIC | Facility: CLINIC | Age: 68
End: 2023-05-16

## 2023-05-16 VITALS
SYSTOLIC BLOOD PRESSURE: 118 MMHG | HEIGHT: 67 IN | DIASTOLIC BLOOD PRESSURE: 66 MMHG | HEART RATE: 76 BPM | BODY MASS INDEX: 34.37 KG/M2 | OXYGEN SATURATION: 97 % | TEMPERATURE: 97.7 F | WEIGHT: 219 LBS

## 2023-05-16 DIAGNOSIS — I48.0 PAROXYSMAL ATRIAL FIBRILLATION (HCC): ICD-10-CM

## 2023-05-16 DIAGNOSIS — N13.8 BPH WITH OBSTRUCTION/LOWER URINARY TRACT SYMPTOMS: ICD-10-CM

## 2023-05-16 DIAGNOSIS — I25.10 CORONARY ARTERY DISEASE INVOLVING NATIVE CORONARY ARTERY OF NATIVE HEART WITHOUT ANGINA PECTORIS: ICD-10-CM

## 2023-05-16 DIAGNOSIS — M54.40 LOW BACK PAIN WITH SCIATICA, SCIATICA LATERALITY UNSPECIFIED, UNSPECIFIED BACK PAIN LATERALITY, UNSPECIFIED CHRONICITY: ICD-10-CM

## 2023-05-16 DIAGNOSIS — N40.1 BPH WITH OBSTRUCTION/LOWER URINARY TRACT SYMPTOMS: ICD-10-CM

## 2023-05-16 DIAGNOSIS — E78.00 HYPERCHOLESTEROLEMIA: Primary | ICD-10-CM

## 2023-05-16 NOTE — PROGRESS NOTES
Name: Evette Michael      : 1955      MRN: [id number]  Encounter Provider: Faith Hernandez MD  Encounter Date: 2023   Encounter department: [address] [place] [address]     1  Hypercholesterolemia  Assessment & Plan:  Continue Welchol and ezetimibe  Intolerant to statins  Orders:  -     Comprehensive metabolic panel; Future; Expected date: 10/30/2023  -     Lipid panel; Future; Expected date: 10/30/2023    2  Low back pain with sciatica, sciatica laterality unspecified, unspecified back pain laterality, unspecified chronicity  Assessment & Plan:  Continues with spinal decompression therapy    Orders:  -     CBC and differential; Future; Expected date: 10/30/2023  -     Comprehensive metabolic panel; Future; Expected date: 10/30/2023    3  Paroxysmal atrial fibrillation (HCC)  Assessment & Plan:  Continues on Eliquis  No recent episodes of atrial fibrillation    Orders:  -     CBC and differential; Future; Expected date: 10/30/2023  -     Comprehensive metabolic panel; Future; Expected date: 10/30/2023    4  Coronary artery disease involving native coronary artery of native heart without angina pectoris  Assessment & Plan: Will consider a follow up stress test once his back improves  Orders:  -     CBC and differential; Future; Expected date: 10/30/2023  -     Comprehensive metabolic panel; Future; Expected date: 10/30/2023  -     Lipid panel; Future; Expected date: 10/30/2023    5  BPH with obstruction/lower urinary tract symptoms  Assessment & Plan:  Check a PSA    Orders:  -     PSA, Total Screen; Future; Expected date: 10/30/2023         Subjective      Presents to the office for a follow-up visit  He has been dealing with issues with his lower back and radicular discomfort for several months now    He has been attending spinal decompression therapy with some improvement only to experience some stiffening and persistent back pain approximately 24 hours after treatments  He has not been experiencing any palpitations, chest pain, dyspnea etc   His appetite is stable  He is not currently experiencing any sciatic type symptoms but is experiencing some subacute low back pain  He had some labs drawn prior to today's visit  They were reviewed  Cholesterol remains elevated but improved over previous values  Total cholesterol 240, triglycerides 69, HDL cholesterol 61 and LDL cholesterol 165  CBC is within normal limits  Metabolic panel is unremarkable    Review of Systems   Constitutional: Negative  HENT: Negative  Eyes: Negative  Respiratory: Negative for apnea, cough, choking, chest tightness, shortness of breath, wheezing and stridor  Cardiovascular: Negative for chest pain, palpitations and leg swelling  Gastrointestinal: Negative  Endocrine: Negative  Genitourinary: Negative  Musculoskeletal: Positive for back pain (With right-sided radicular discomfort)  Skin: Negative  Allergic/Immunologic: Negative  Psychiatric/Behavioral: Negative          Current Outpatient Medications on File Prior to Visit   Medication Sig   • ascorbic acid (VITAMIN C) 250 mg tablet Take 250 mg by mouth 2 (two) times a day   • chlorpheniramine (CHLOR-TRIMETON) 4 MG tablet Take 1 tablet (4 mg total) by mouth every 6 (six) hours as needed for rhinitis   • clobetasol (TEMOVATE) 0 05 % cream Apply 1 application topically 2 (two) times a day as needed Apply sparingly to affected areas   • Coenzyme Q10 (COQ10) 200 MG CAPS Take 1 capsule by mouth daily   • colesevelam (WELCHOL) 625 mg tablet Take 2 tablets (1,250 mg total) by mouth 2 (two) times a day with meals   • diltiazem (CARDIZEM) 30 mg tablet Take 1 tablet (30 mg total) by mouth if needed (afib)   • Eliquis 5 MG TAKE ONE TABLET BY MOUTH TWICE A DAY   • ezetimibe (ZETIA) 10 mg tablet Take 1 tablet (10 mg total) by mouth daily   • Glucosamine-Chondroit-Vit C-Mn (GLUCOSAMINE CHONDR 1500 COMPLX PO) "Take 1 tablet by mouth 2 (two) times a day    • hyoscyamine (ANASPAZ) 0 125 mg Take 0 125 mg by mouth 2 (two) times a day    • montelukast (SINGULAIR) 10 mg tablet Take 1 tablet (10 mg total) by mouth daily   • multivitamin (THERAGRAN) TABS Take 1 tablet by mouth 2 (two) times a day     • OXcarbazepine (TRILEPTAL) 300 mg tablet Take 1 tablet (300 mg total) by mouth 2 (two) times a day   • tiZANidine (ZANAFLEX) 2 mg tablet Take 1 tablet (2 mg total) by mouth 2 (two) times a day as needed for muscle spasms   • [DISCONTINUED] bacitracin topical ointment 500 units/g topical ointment Apply 1 large application topically 2 (two) times a day       Objective     /66 (BP Location: Left arm, Patient Position: Sitting, Cuff Size: Standard)   Pulse 76   Temp 97 7 °F (36 5 °C) (Temporal)   Ht 5' 7 4\" (1 712 m)   Wt 99 3 kg (219 lb)   SpO2 97%   BMI 33 89 kg/m²     Physical Exam  Vitals reviewed  Constitutional:       General: He is not in acute distress  Appearance: Normal appearance  He is not ill-appearing, toxic-appearing or diaphoretic  HENT:      Head: Normocephalic and atraumatic  Right Ear: External ear normal       Left Ear: External ear normal    Eyes:      General: No scleral icterus  Conjunctiva/sclera: Conjunctivae normal       Pupils: Pupils are equal, round, and reactive to light  Cardiovascular:      Rate and Rhythm: Normal rate and regular rhythm  Pulmonary:      Effort: Pulmonary effort is normal  No respiratory distress  Breath sounds: Normal breath sounds  Abdominal:      General: Abdomen is flat  There is no distension  Tenderness: There is no abdominal tenderness  Musculoskeletal:         General: No swelling  Normal range of motion  Cervical back: Neck supple  No rigidity  Skin:     General: Skin is warm  Coloration: Skin is not jaundiced  Findings: No bruising, erythema or rash  Neurological:      General: No focal deficit present        " Mental Status: He is alert and oriented to person, place, and time  Mental status is at baseline     Psychiatric:         Mood and Affect: Mood normal          Behavior: Behavior normal        Jose Carlos Buchanan MD

## 2023-06-12 NOTE — PROGRESS NOTES
6/13/2023    Amarilys Heller  1955  [id number]      Assessment  -BPH with lower urinary tract symptoms  -Renal cysts    Discussion/Plan  Tera Walker is a 77 y o  male being managed by Dr Donald Maloney  1  BPH with lower urinary tract symptoms-PVR in the office today is 50 mL  Although he continues to report mild urinary symptoms with urgency and frequency, he is not interested in trialing an anticholinergic as it caused him to go into atrial fibrillation or beta 3 agonist   Recommend conservative management  Reviewed dietary and behavioral modifications  Next PSA due in October 2023 which is ordered by his PCP  Plan to follow-up in 1 year to reevaluate his urinary symptoms, check PVR assessment, and perform AYO  He was advised to call sooner with any questions or issues     -All questions answered, patient agrees with plan      History of Present Illness  77 y o  male with a history of BPH presents today for follow up  Patient last seen in the office in November 2022  He underwent cystoscopy to further evaluate bladder outlet given his persistent urinary symptoms  Patient was not noted to have significant obstruction  Oxybutynin 10 mg daily was prescribed however he discontinued secondary to side effects  Patient states he was taking an antihistamine and became dehydrated putting him into atrial fibrillation  He states he took oxybutynin for 2 weeks and did not notice any improvement in his urinary symptoms  Alternatives such as Myrbetriq was offered, but he deferred  Patient presents today in follow-up  He continues to report episodes of urinary urgency and frequency  Patient denies any gross hematuria or dysuria  Last PSA from 10/13/2022 was 2 4  He denies any strong family history of prostate malignancy  Review of Systems  Review of Systems   Constitutional: Negative  HENT: Negative  Respiratory: Negative  Cardiovascular: Negative  Gastrointestinal: Negative  Genitourinary: Positive for frequency and urgency  Negative for decreased urine volume, difficulty urinating, dysuria, flank pain and hematuria  Musculoskeletal: Negative  Skin: Negative  Neurological: Negative  Psychiatric/Behavioral: Negative        AUA SYMPTOM SCORE    Flowsheet Row Most Recent Value   AUA SYMPTOM SCORE    How often have you had a sensation of not emptying your bladder completely after you finished urinating? 5 (P)     How often have you had to urinate again less than two hours after you finished urinating? 5 (P)     How often have you found you stopped and started again several times when you urinate? 5 (P)     How often have you found it difficult to postpone urination? 1 (P)     How often have you had a weak urinary stream? 4 (P)     How often have you had to push or strain to begin urination? 5 (P)     How many times did you most typically get up to urinate from the time you went to bed at night until the time you got up in the morning? 5 (P)     Quality of Life: If you were to spend the rest of your life with your urinary condition just the way it is now, how would you feel about that? 4 (P)     AUA SYMPTOM SCORE 30 (P)           Past Medical History  Past Medical History:   Diagnosis Date   • A-fib ([facility] )    • Allergic rhinitis    • Benign cyst of kidney    • Cat bite 7/8/2020   • Cerebral vascular disease    • Chronic sinusitis    • Cryptogenic stroke ([facility] ) 3/2/2020   • Hyperlipidemia    • Melanoma ([facility] )    • Pancreas cyst        Past Social History  Past Surgical History:   Procedure Laterality Date   • COLONOSCOPY     • OTHER SURGICAL HISTORY  2003    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   • PATENT FORAMEN OVALE CLOSURE         Past Family History  Family History   Problem Relation Age of Onset   • Diabetes type II Mother    • Cancer Father         angioma       Past Social history  Social History     Socioeconomic History   • Marital status: /Civil Union     Spouse name: Not on file   • Number of children: Not on file   • Years of education: Not on file   • Highest education level: Not on file   Occupational History   • Occupation: massage therapist   Tobacco Use   • Smoking status: Former     Types: Cigars     Start date:      Quit date:      Years since quittin 4   • Smokeless tobacco: Former     Types: Chew     Quit date:    • Tobacco comments:     Cigars    Vaping Use   • Vaping Use: Never used   Substance and Sexual Activity   • Alcohol use: Yes     Alcohol/week: 7 0 standard drinks of alcohol     Types: 7 Glasses of wine per week     Comment: daily wine 8oz   • Drug use: Not Currently     Types: Marijuana     Comment: Medical use (CBD)   • Sexual activity: Yes     Partners: Female   Other Topics Concern   • Not on file   Social History Narrative    Caffeine: drinks 1 cup coffee/ tea a day  Social Determinants of Health     Financial Resource Strain: Low Risk  (2022)    Overall Financial Resource Strain (CARDIA)    • Difficulty of Paying Living Expenses: Not hard at all   Food Insecurity: Not on file   Transportation Needs: No Transportation Needs (2022)    PRAPARE - Transportation    • Lack of Transportation (Medical): No    • Lack of Transportation (Non-Medical):  No   Physical Activity: Not on file   Stress: Not on file   Social Connections: Not on file   Intimate Partner Violence: Not on file   Housing Stability: Not on file       Current Medications  Current Outpatient Medications   Medication Sig Dispense Refill   • ascorbic acid (VITAMIN C) 250 mg tablet Take 250 mg by mouth 2 (two) times a day     • chlorpheniramine (CHLOR-TRIMETON) 4 MG tablet Take 1 tablet (4 mg total) by mouth every 6 (six) hours as needed for rhinitis 30 tablet 0   • clobetasol (TEMOVATE) 0 05 % cream Apply 1 application topically 2 (two) times a day as needed Apply sparingly to affected areas     • Coenzyme Q10 (COQ10) 200 MG CAPS Take 1 capsule by mouth daily     • colesevelam (WELCHOL) 625 mg tablet Take 2 tablets (1,250 mg total) by mouth 2 (two) times a day with meals 360 tablet 1   • diltiazem (CARDIZEM) 30 mg tablet Take 1 tablet (30 mg total) by mouth if needed (afib) 15 tablet 0   • Eliquis 5 MG TAKE ONE TABLET BY MOUTH TWICE A  tablet 3   • ezetimibe (ZETIA) 10 mg tablet Take 1 tablet (10 mg total) by mouth daily 90 tablet 1   • Glucosamine-Chondroit-Vit C-Mn (GLUCOSAMINE CHONDR 1500 COMPLX PO) Take 1 tablet by mouth 2 (two) times a day      • hyoscyamine (ANASPAZ) 0 125 mg Take 0 125 mg by mouth 2 (two) times a day      • montelukast (SINGULAIR) 10 mg tablet Take 1 tablet (10 mg total) by mouth daily 90 tablet 1   • multivitamin (THERAGRAN) TABS Take 1 tablet by mouth 2 (two) times a day       • OXcarbazepine (TRILEPTAL) 300 mg tablet Take 1 tablet (300 mg total) by mouth 2 (two) times a day 180 tablet 1   • tiZANidine (ZANAFLEX) 2 mg tablet Take 1 tablet (2 mg total) by mouth 2 (two) times a day as needed for muscle spasms 60 tablet 2     No current facility-administered medications for this visit  Allergies  Allergies   Allergen Reactions   • Augmentin [Amoxicillin-Pot Clavulanate] Hives and Itching   • Molds & Smuts Allergic Rhinitis     Category: Allergy;    • Monascus Purpureus Went Yeast Fatigue     Category: Adverse Reaction;    • Niacin Fatigue     Category: Adverse Reaction;    • Pollen Extract Allergic Rhinitis     Category: Allergy;    • Pregabalin Fatigue     Category: Adverse Reaction;    • Atorvastatin GI Intolerance and Fatigue     Other reaction(s): Muscle pain, constipation, sleepiness  Category: Adverse Reaction; Other reaction(s): Muscle pain, constipation, sleepiness       Past Medical History, Social History, Family History, medications and allergies were reviewed  Vitals  There were no vitals filed for this visit  Physical Exam  Physical Exam  Constitutional:       Appearance: Normal appearance  He is well-developed     HENT: Head: Normocephalic  Eyes:      Pupils: Pupils are equal, round, and reactive to light  Pulmonary:      Effort: Pulmonary effort is normal    Abdominal:      Palpations: Abdomen is soft  Musculoskeletal:         General: Normal range of motion  Cervical back: Normal range of motion  Skin:     General: Skin is warm and dry  Neurological:      General: No focal deficit present  Mental Status: He is alert and oriented to person, place, and time  Psychiatric:         Mood and Affect: Mood normal          Behavior: Behavior normal          Thought Content: Thought content normal          Judgment: Judgment normal          Results    I have personally reviewed all pertinent lab results and reviewed with patient  Lab Results   Component Value Date    PSA 2 4 10/13/2022    PSA 2 6 09/09/2021    PSA 2 4 07/20/2020     Lab Results   Component Value Date    BUN 20 05/10/2023    CALCIUM 9 1 05/10/2023     (H) 05/10/2023    CO2 28 05/10/2023    CREATININE 1 06 05/10/2023    GLUCOSE 92 06/11/2015    K 4 7 05/10/2023     01/30/2016     Lab Results   Component Value Date    HCT 46 2 05/10/2023    HGB 14 8 05/10/2023    MCV 97 05/10/2023     05/10/2023    WBC 6 56 05/10/2023     No results found for this or any previous visit (from the past 1 hour(s))

## 2023-06-13 ENCOUNTER — OFFICE VISIT (OUTPATIENT)
Dept: UROLOGY | Facility: AMBULATORY SURGERY CENTER | Age: 68
End: 2023-06-13
Payer: COMMERCIAL

## 2023-06-13 VITALS
OXYGEN SATURATION: 97 % | WEIGHT: 219 LBS | SYSTOLIC BLOOD PRESSURE: 132 MMHG | DIASTOLIC BLOOD PRESSURE: 86 MMHG | HEART RATE: 57 BPM | HEIGHT: 67 IN | BODY MASS INDEX: 34.37 KG/M2

## 2023-06-13 DIAGNOSIS — R39.15 URGENCY OF MICTURITION: ICD-10-CM

## 2023-06-13 DIAGNOSIS — N40.1 BENIGN PROSTATIC HYPERPLASIA WITH LOWER URINARY TRACT SYMPTOMS, SYMPTOM DETAILS UNSPECIFIED: Primary | ICD-10-CM

## 2023-06-13 LAB — POST-VOID RESIDUAL VOLUME, ML POC: 50 ML

## 2023-06-13 PROCEDURE — 99213 OFFICE O/P EST LOW 20 MIN: CPT | Performed by: NURSE PRACTITIONER

## 2023-06-13 PROCEDURE — 51798 US URINE CAPACITY MEASURE: CPT | Performed by: NURSE PRACTITIONER

## 2023-06-13 NOTE — PATIENT INSTRUCTIONS
BLADDER HEALTH    WHAT IS CONSIDERED NORMAL? The average bladder can hold about 2 cups of urine before it needs to be emptied  The normal range of voiding urine is 6 to 8 times during a 24 hour period  As we get older, our bladder capacity can get smaller and we may need to pass urine more frequently but usually not more than every 2 hours  Urine should flow easily without discomfort in a good, steady stream until the bladder is empty  No pushing or straining is necessary to empty the bladder  An urge is a signal that you feel as the bladder stretches to fill with urine  Urges can be felt even if the bladder is not full  Urges are not commands to go to the toilet, merely a signal and can be controlled  WHAT ARE GOOD BLADDER HABITS? Take your time when emptying your bladder  Don’t strain or push to empty your bladder  Make sure you empty your bladder completely each time you pass urine  Do not rush the process  Consistently ignoring the urge to go (waiting more than 4 hours between toileting) or urinating too infrequently may be convenient but not healthy for your bladder  Avoid going to the toilet “just in case” or more often than every 2 hours  It is usually not necessary to go when you feel the first urge  Try to go only when your bladder is full  Urgency and frequency of urination can be improved by retraining the bladder and spacing your fluid intake throughout the day  Practice good toilet habits  Don’t let your bladder control your life  TIPS TO MAINTAIN GOOD BLADDER HABITS  Maintain a good fluid intake  Depending on your body size and environment, drink 6 -8 cups (8 oz each) of fluid per day unless otherwise advised by your doctor  Not enough fluid creates a foul odor and dark color of the urine  Limit the amount of caffeine (coffee, cola, chocolate or tea) and citrus foods that you consume as these foods can be associated with increased sensation of urinary urgency and frequency  "    Limit the amount of alcohol you drink  Alcohol increases urine production and makes it difficult for the brain to coordinate bladder control  Avoid constipation by maintaining a balanced diet of dietary fiber  Cigarette smoking is also irritating to the bladder surface and is associated with bladder cancer  In addition, the coughing associated with smoking may lead to increased incontinent episodes because of the increased pressure  HOW DIET CAN AFFECT YOUR BLADDER  Although there is no particular \"diet\" that can cure bladder control, there are certain dietary suggestions you can use to help control the problem  There are 2 points to consider when evaluating how your habits and diet may affect your bladder:    Foods and fluids can irritate the bladder  Some foods and beverages are thought to contribute to bladder leakage and irritability  However their effect on the bladder is not completely understood and you may want to see if eliminating one or all of these items improves your bladder control  If you are unable to give them up completely, it is recommended that you use the following items in moderation:  Acidic beverages and foods (orange juice, grapefruit juice, lemonade etc)  Alcoholic beverages  Vinegar  Coffee (regular and decaf)  Tea (regular and decaf)  Caffeinated beverages  Carbonated beverages          Drinking enough and the right kinds of fluids  Many people with bladder control issues decrease their intake of liquids in hope that they will need to urinate less frequently or have less urinary leakage  You should not restrict fluids to control your bladder  While a decrease in liquid intake does result in a decrease in the volume of urine, the smaller amount of urine may be more highly concentrated  Highly concentrated, dark yellow urine is irritating to the bladder surface and may actually cause you to go to the bathroom more frequently        It also encourages the growth " of bacteria, which may lead to infections resulting in incontinence  Substitutions for Bladder Irritants: water is always the best beverage choice  Grape and apple juice are thirst quenchers are good selections and are not as irritating to the bladder    Low acid fruits:  Pears, apricots, papaya, watermelon  For coffee drinkers: KAVA®, Postum®, Zane®, Kaffree Mary®  For tea drinkers:  non-citrus or herbal and sun brewed tea

## 2023-06-26 DIAGNOSIS — J30.1 CHRONIC SEASONAL ALLERGIC RHINITIS DUE TO POLLEN: ICD-10-CM

## 2023-06-26 RX ORDER — MONTELUKAST SODIUM 10 MG/1
10 TABLET ORAL DAILY
Qty: 90 TABLET | Refills: 1 | Status: SHIPPED | OUTPATIENT
Start: 2023-06-26

## 2023-07-18 ENCOUNTER — OFFICE VISIT (OUTPATIENT)
Dept: NEUROLOGY | Facility: CLINIC | Age: 68
End: 2023-07-18
Payer: COMMERCIAL

## 2023-07-18 VITALS
SYSTOLIC BLOOD PRESSURE: 126 MMHG | HEART RATE: 67 BPM | HEIGHT: 67 IN | BODY MASS INDEX: 34.69 KG/M2 | DIASTOLIC BLOOD PRESSURE: 80 MMHG | WEIGHT: 221 LBS

## 2023-07-18 DIAGNOSIS — R41.3 MEMORY LOSS: Primary | ICD-10-CM

## 2023-07-18 PROCEDURE — 99204 OFFICE O/P NEW MOD 45 MIN: CPT | Performed by: STUDENT IN AN ORGANIZED HEALTH CARE EDUCATION/TRAINING PROGRAM

## 2023-07-18 NOTE — PROGRESS NOTES
Patient ID: Kailey Sancehz is a 77 y.o. male. Assessment/Plan:    Patient is a 49-year-old male who presented to the clinic today for memory loss. As per the wife, the memory loss started about 2 years ago but has been gradually worsening. On [facility] examination, the patient scored a 27 out of 30. On neurological examination, there were no focal deficits appreciated. Given patient's MoCA score, nonfocal examination, and family history of Alzheimer's disease, we will reevaluate the patient in 1 year. To rule out any other causes of memory loss, we will obtain TSH, vitamin B9, and vitamin B12. There is no medication or imaging warranted at this time. Discussed with the patient and his wife if they would like to go for neuropsychology evaluation but they opted to wait until next year after the reassessment to determine that. Diagnoses and all orders for this visit:    Memory loss  -     TSH, 3rd generation with Free T4 reflex; Future  -     Vitamin B12/Folate, Serum Panel; Future      MOCA 7/18: 27/30 - Normal    Plan:  · Labwork: TSH, B9, B12  · Call for any concerns or questions  · No medications/imaging indicated at this time. The patient indicates understanding of these issues and agrees with the plan. Return in about 1 year (around 7/18/2024) for Redo MOCA. This patient case was discussed with Dr. Elisha Roblero. Subjective:    HPI    Patient is a 77year old male who presents to the clinic today for memory loss. Patient has a PMH of IBS, CAD, PAF, Osteoarthritis, low back pain with sciatica, hypercholesterolemia, chronic pain syndrome, and DDD. He is here for evaluation and treatment of cognitive problems. He is accompanied by wife. Patient lives with their spouse. Description of memory trouble  When did the memory difficulties begin?  2 years ago  The memory problems affects changes in short term memory, recalling words, repetition of questions and recall of names  Family has also noticed: None   How did the symptoms start? gradual  Over time the problem has gradually worsening  Who noticed the problem with your memory? patient, wife and in-laws  What type of things do you forget?names and conversations  Have there been accidents, injuries, embarrassments related to the memory problem? no  Do you have any problems operating household appliance such as TV remote, kitchen appliances, computer ? yes - Didn't turn off stove a couple times  Do you have trouble finding the right word while speaking? yes - names and vocabulary   Do you substitute a wrong word ? no  Do you require repeat information or ask the same question repeatedly? yes - what are you making for dinner tonight?, where are you going today?, are you going to be with your mother today? Functional Assessment  He is independent in the following: ambulation, bathing and hygiene, feeding, continence, grooming, toileting and dressing  Requires assistance with the following: None  Have you had any recent accidents, citations or getting lost in familiar places? no  Do you handle your own financial affairs such as balancing your checkbook, paying bills, investments? No - wife handles it since 1993  Do have any difficulties with handling your financial affairs? no  Medication administration: patient self medicates    Associated mood symptoms  Have you or your family noted any change  in your mood or personality? no  Are you currently or have you been treated in the past for depression or anxiety? no    Other factors   Fluctuation in alertness? no  Is there any history of epilepsy? no  Has the patient lost personal awareness, and social awareness? no  Family member with dementia and what type? yes - Mother had AD  Do you have history of myocardial infarction? no  Does patient have history of alcohol abuse? no    There are no other complaints at this time.     The following portions of the patient's history were reviewed and updated as appropriate:   He  has a past medical history of A-fib ([address]), Allergic rhinitis, Benign cyst of kidney, Cat bite (7/8/2020), Cerebral vascular disease, Chronic sinusitis, Cryptogenic stroke ([address]) (3/2/2020), Hyperlipidemia, Melanoma ([address]), and Pancreas cyst.     Patient Active Problem List    Diagnosis Date Noted   • Symptomatic varicose veins of both lower extremities 02/14/2023   • Skin lesion 02/03/2023   • BMI 34.0-34.9,adult 02/03/2023   • Paroxysmal atrial fibrillation ([address]) 04/14/2021   • Chronic pain syndrome 04/05/2021   • Tinea corporis 10/12/2020   • Lumbar radiculopathy 10/11/2019   • Low back pain with sciatica 10/11/2019   • DDD (degenerative disc disease), lumbar 10/11/2019   • Lumbar spondylosis 10/11/2019   • Pancreatic cyst 09/25/2019   • Medicare annual wellness visit, initial 08/28/2019   • BPH with obstruction/lower urinary tract symptoms 06/12/2018   • Alpha-1-antitrypsin deficiency ([address]) 02/16/2017   • Coronary artery disease involving native coronary artery 02/02/2017   • Hypercholesterolemia 12/19/2014   • Seasonal allergic rhinitis 05/20/2014   • Nephrolithiasis 05/08/2014   • Osteoarthritis 03/04/2014   • Irritable bowel syndrome 03/04/2014   • Coronary atherosclerosis 03/21/2013     He  has a past surgical history that includes Patent foramen ovale closure; Other surgical history (2003); and Colonoscopy. His family history includes Cancer in his father; Diabetes type II in his mother. He  reports that he quit smoking about 37 years ago. His smoking use included cigars. He started smoking about 47 years ago. He quit smokeless tobacco use about 37 years ago. His smokeless tobacco use included chew. He reports current alcohol use of about 7.0 standard drinks of alcohol per week. He reports that he does not currently use drugs after having used the following drugs: Marijuana.   Current Outpatient Medications   Medication Sig Dispense Refill   • ascorbic acid (VITAMIN C) 250 mg tablet Take 250 mg by mouth 2 (two) times a day     • chlorpheniramine (CHLOR-TRIMETON) 4 MG tablet Take 1 tablet (4 mg total) by mouth every 6 (six) hours as needed for rhinitis 30 tablet 0   • clobetasol (TEMOVATE) 0.05 % cream Apply 1 application topically 2 (two) times a day as needed Apply sparingly to affected areas     • Coenzyme Q10 (COQ10) 200 MG CAPS Take 1 capsule by mouth daily     • colesevelam (WELCHOL) 625 mg tablet Take 2 tablets (1,250 mg total) by mouth 2 (two) times a day with meals 360 tablet 1   • diltiazem (CARDIZEM) 30 mg tablet Take 1 tablet (30 mg total) by mouth if needed (afib) 15 tablet 0   • Eliquis 5 MG TAKE ONE TABLET BY MOUTH TWICE A  tablet 3   • ezetimibe (ZETIA) 10 mg tablet Take 1 tablet (10 mg total) by mouth daily 90 tablet 1   • Glucosamine-Chondroit-Vit C-Mn (GLUCOSAMINE CHONDR 1500 COMPLX PO) Take 1 tablet by mouth 2 (two) times a day      • hyoscyamine (ANASPAZ) 0.125 mg Take 0.125 mg by mouth 2 (two) times a day      • montelukast (SINGULAIR) 10 mg tablet Take 1 tablet (10 mg total) by mouth daily 90 tablet 1   • multivitamin (THERAGRAN) TABS Take 1 tablet by mouth 2 (two) times a day       • OXcarbazepine (TRILEPTAL) 300 mg tablet Take 1 tablet (300 mg total) by mouth 2 (two) times a day 180 tablet 1   • tiZANidine (ZANAFLEX) 2 mg tablet Take 1 tablet (2 mg total) by mouth 2 (two) times a day as needed for muscle spasms 60 tablet 2     No current facility-administered medications for this visit.      Current Outpatient Medications on File Prior to Visit   Medication Sig   • ascorbic acid (VITAMIN C) 250 mg tablet Take 250 mg by mouth 2 (two) times a day   • chlorpheniramine (CHLOR-TRIMETON) 4 MG tablet Take 1 tablet (4 mg total) by mouth every 6 (six) hours as needed for rhinitis   • clobetasol (TEMOVATE) 0.05 % cream Apply 1 application topically 2 (two) times a day as needed Apply sparingly to affected areas   • Coenzyme Q10 (COQ10) 200 MG CAPS Take 1 capsule by mouth daily   • colesevelam (WELCHOL) 625 mg tablet Take 2 tablets (1,250 mg total) by mouth 2 (two) times a day with meals   • diltiazem (CARDIZEM) 30 mg tablet Take 1 tablet (30 mg total) by mouth if needed (afib)   • Eliquis 5 MG TAKE ONE TABLET BY MOUTH TWICE A DAY   • ezetimibe (ZETIA) 10 mg tablet Take 1 tablet (10 mg total) by mouth daily   • Glucosamine-Chondroit-Vit C-Mn (GLUCOSAMINE CHONDR 1500 COMPLX PO) Take 1 tablet by mouth 2 (two) times a day    • hyoscyamine (ANASPAZ) 0.125 mg Take 0.125 mg by mouth 2 (two) times a day    • montelukast (SINGULAIR) 10 mg tablet Take 1 tablet (10 mg total) by mouth daily   • multivitamin (THERAGRAN) TABS Take 1 tablet by mouth 2 (two) times a day     • OXcarbazepine (TRILEPTAL) 300 mg tablet Take 1 tablet (300 mg total) by mouth 2 (two) times a day   • tiZANidine (ZANAFLEX) 2 mg tablet Take 1 tablet (2 mg total) by mouth 2 (two) times a day as needed for muscle spasms     No current facility-administered medications on file prior to visit. He is allergic to augmentin [amoxicillin-pot clavulanate], molds & smuts, monascus purpureus went yeast, niacin, pollen extract, pregabalin, statins, and atorvastatin. .         Objective:    Blood pressure 126/80, pulse 67, height 5' 7" (1.702 m), weight 100 kg (221 lb). Physical Exam  Vitals reviewed. Constitutional:       Appearance: Normal appearance. HENT:      Head: Normocephalic and atraumatic. Mouth/Throat:      Mouth: Mucous membranes are moist.   Eyes:      General: Lids are normal.      Extraocular Movements: Extraocular movements intact. Conjunctiva/sclera: Conjunctivae normal.      Pupils: Pupils are equal, round, and reactive to light. Cardiovascular:      Rate and Rhythm: Normal rate. Pulmonary:      Effort: Pulmonary effort is normal.   Musculoskeletal:         General: Normal range of motion. Neurological:      Mental Status: He is alert.       Motor: Motor strength is normal.     Coordination: Romberg sign negative. Deep Tendon Reflexes:      Reflex Scores:       Tricep reflexes are 2+ on the right side and 2+ on the left side. Bicep reflexes are 2+ on the right side and 2+ on the left side. Brachioradialis reflexes are 2+ on the right side and 2+ on the left side. Patellar reflexes are 3+ on the right side and 3+ on the left side. Achilles reflexes are 3+ on the right side and 3+ on the left side. Psychiatric:         Mood and Affect: Mood normal.         Speech: Speech normal.         Behavior: Behavior normal.         Thought Content: Thought content normal.         Judgment: Judgment normal.         Neurological Exam  Mental Status  Alert. Oriented to person, place and time. Immediate recall: 5/5. At 3 minutes 4/5. Able to copy figure. Clock drawing is abnormal. Speech is normal. Language is fluent with no aphasia. Unable to perform serial calculations. Able to spell words backwards. Numbers backwards. MOCA 27/30. Cranial Nerves  CN II: Visual acuity is normal. Visual fields full to confrontation. CN III, IV, VI: Extraocular movements intact bilaterally. Normal lids and orbits bilaterally. Pupils equal round and reactive to light bilaterally. CN V: Facial sensation is normal.  CN VII: Full and symmetric facial movement. CN VIII: Hearing is normal.  CN IX, X: Palate elevates symmetrically. Normal gag reflex. CN XI: Shoulder shrug strength is normal.  CN XII: Tongue deviates to the right. Mild deviation seen but patient's wife stated that it was normal for him. .    Motor  Normal muscle bulk throughout. No fasciculations present. Normal muscle tone. No abnormal involuntary movements. Strength is 5/5 throughout all four extremities. Sensory  Light touch is normal in upper and lower extremities. Vibration is normal in upper and lower extremities.      Reflexes                                            Right                      Left  Brachioradialis 2+                         2+  Biceps                                 2+                         2+  Triceps                                2+                         2+  Patellar                                3+                         3+  Achilles                                3+                         3+    Coordination  Right: Finger-to-nose normal. Rapid alternating movement normal.Left: Finger-to-nose normal. Rapid alternating movement normal.    Gait  Casual gait is normal including stance, stride, and arm swing. Normal toe walking. Normal heel walking. Romberg is absent. ROS:    Review of Systems   Neurological: Positive for numbness (L foot, hx of back pain). Negative for dizziness, tremors, seizures, syncope, facial asymmetry, speech difficulty, weakness, light-headedness and headaches.         Memory loss

## 2023-08-31 DIAGNOSIS — E78.00 HYPERCHOLESTEROLEMIA: ICD-10-CM

## 2023-08-31 DIAGNOSIS — M54.16 LUMBAR RADICULOPATHY: ICD-10-CM

## 2023-08-31 RX ORDER — OXCARBAZEPINE 300 MG/1
300 TABLET, FILM COATED ORAL 2 TIMES DAILY
Qty: 180 TABLET | Refills: 1 | Status: SHIPPED | OUTPATIENT
Start: 2023-08-31

## 2023-08-31 RX ORDER — COLESEVELAM 180 1/1
1250 TABLET ORAL 2 TIMES DAILY WITH MEALS
Qty: 360 TABLET | Refills: 1 | Status: SHIPPED | OUTPATIENT
Start: 2023-08-31 | End: 2024-02-27

## 2023-08-31 RX ORDER — EZETIMIBE 10 MG/1
10 TABLET ORAL DAILY
Qty: 90 TABLET | Refills: 1 | Status: SHIPPED | OUTPATIENT
Start: 2023-08-31

## 2023-10-17 DIAGNOSIS — E78.00 HYPERCHOLESTEROLEMIA: ICD-10-CM

## 2023-10-17 DIAGNOSIS — I48.91 A-FIB (HCC): ICD-10-CM

## 2023-10-17 RX ORDER — EZETIMIBE 10 MG/1
10 TABLET ORAL DAILY
Qty: 90 TABLET | Refills: 1 | Status: SHIPPED | OUTPATIENT
Start: 2023-10-17

## 2023-10-23 DIAGNOSIS — I48.0 PAROXYSMAL ATRIAL FIBRILLATION (HCC): ICD-10-CM

## 2023-10-23 RX ORDER — APIXABAN 5 MG/1
TABLET, FILM COATED ORAL
Qty: 180 TABLET | Refills: 3 | Status: SHIPPED | OUTPATIENT
Start: 2023-10-23

## 2023-11-03 ENCOUNTER — APPOINTMENT (OUTPATIENT)
Dept: LAB | Facility: CLINIC | Age: 68
End: 2023-11-03
Payer: COMMERCIAL

## 2023-11-03 DIAGNOSIS — I48.0 PAROXYSMAL ATRIAL FIBRILLATION (HCC): ICD-10-CM

## 2023-11-03 DIAGNOSIS — M54.40 LOW BACK PAIN WITH SCIATICA, SCIATICA LATERALITY UNSPECIFIED, UNSPECIFIED BACK PAIN LATERALITY, UNSPECIFIED CHRONICITY: ICD-10-CM

## 2023-11-03 DIAGNOSIS — R41.3 MEMORY LOSS: ICD-10-CM

## 2023-11-03 DIAGNOSIS — I25.10 CORONARY ARTERY DISEASE INVOLVING NATIVE CORONARY ARTERY OF NATIVE HEART WITHOUT ANGINA PECTORIS: ICD-10-CM

## 2023-11-03 DIAGNOSIS — N40.1 BPH WITH OBSTRUCTION/LOWER URINARY TRACT SYMPTOMS: ICD-10-CM

## 2023-11-03 DIAGNOSIS — N13.8 BPH WITH OBSTRUCTION/LOWER URINARY TRACT SYMPTOMS: ICD-10-CM

## 2023-11-03 DIAGNOSIS — E78.00 HYPERCHOLESTEROLEMIA: ICD-10-CM

## 2023-11-03 LAB
ALBUMIN SERPL BCP-MCNC: 4.3 G/DL (ref 3.5–5)
ALP SERPL-CCNC: 70 U/L (ref 34–104)
ALT SERPL W P-5'-P-CCNC: 28 U/L (ref 7–52)
ANION GAP SERPL CALCULATED.3IONS-SCNC: 7 MMOL/L
AST SERPL W P-5'-P-CCNC: 24 U/L (ref 13–39)
BASOPHILS # BLD AUTO: 0.04 THOUSANDS/ÂΜL (ref 0–0.1)
BASOPHILS NFR BLD AUTO: 1 % (ref 0–1)
BILIRUB SERPL-MCNC: 0.53 MG/DL (ref 0.2–1)
BUN SERPL-MCNC: 17 MG/DL (ref 5–25)
CALCIUM SERPL-MCNC: 9.4 MG/DL (ref 8.4–10.2)
CHLORIDE SERPL-SCNC: 105 MMOL/L (ref 96–108)
CHOLEST SERPL-MCNC: 219 MG/DL
CO2 SERPL-SCNC: 28 MMOL/L (ref 21–32)
CREAT SERPL-MCNC: 0.88 MG/DL (ref 0.6–1.3)
EOSINOPHIL # BLD AUTO: 0.09 THOUSAND/ÂΜL (ref 0–0.61)
EOSINOPHIL NFR BLD AUTO: 2 % (ref 0–6)
ERYTHROCYTE [DISTWIDTH] IN BLOOD BY AUTOMATED COUNT: 12.2 % (ref 11.6–15.1)
GFR SERPL CREATININE-BSD FRML MDRD: 87 ML/MIN/1.73SQ M
GLUCOSE P FAST SERPL-MCNC: 89 MG/DL (ref 65–99)
HCT VFR BLD AUTO: 45.4 % (ref 36.5–49.3)
HDLC SERPL-MCNC: 63 MG/DL
HGB BLD-MCNC: 14.9 G/DL (ref 12–17)
IMM GRANULOCYTES # BLD AUTO: 0.01 THOUSAND/UL (ref 0–0.2)
IMM GRANULOCYTES NFR BLD AUTO: 0 % (ref 0–2)
LDLC SERPL CALC-MCNC: 140 MG/DL (ref 0–100)
LYMPHOCYTES # BLD AUTO: 2.03 THOUSANDS/ÂΜL (ref 0.6–4.47)
LYMPHOCYTES NFR BLD AUTO: 36 % (ref 14–44)
MCH RBC QN AUTO: 31.7 PG (ref 26.8–34.3)
MCHC RBC AUTO-ENTMCNC: 32.8 G/DL (ref 31.4–37.4)
MCV RBC AUTO: 97 FL (ref 82–98)
MONOCYTES # BLD AUTO: 0.5 THOUSAND/ÂΜL (ref 0.17–1.22)
MONOCYTES NFR BLD AUTO: 9 % (ref 4–12)
NEUTROPHILS # BLD AUTO: 2.97 THOUSANDS/ÂΜL (ref 1.85–7.62)
NEUTS SEG NFR BLD AUTO: 52 % (ref 43–75)
NONHDLC SERPL-MCNC: 156 MG/DL
NRBC BLD AUTO-RTO: 0 /100 WBCS
PLATELET # BLD AUTO: 214 THOUSANDS/UL (ref 149–390)
PMV BLD AUTO: 9.5 FL (ref 8.9–12.7)
POTASSIUM SERPL-SCNC: 4.5 MMOL/L (ref 3.5–5.3)
PROT SERPL-MCNC: 7 G/DL (ref 6.4–8.4)
PSA SERPL-MCNC: 3.21 NG/ML (ref 0–4)
RBC # BLD AUTO: 4.7 MILLION/UL (ref 3.88–5.62)
SODIUM SERPL-SCNC: 140 MMOL/L (ref 135–147)
TRIGL SERPL-MCNC: 82 MG/DL
WBC # BLD AUTO: 5.64 THOUSAND/UL (ref 4.31–10.16)

## 2023-11-03 PROCEDURE — G0103 PSA SCREENING: HCPCS

## 2023-11-03 PROCEDURE — 36415 COLL VENOUS BLD VENIPUNCTURE: CPT

## 2023-11-03 PROCEDURE — 80061 LIPID PANEL: CPT

## 2023-11-03 PROCEDURE — 80053 COMPREHEN METABOLIC PANEL: CPT

## 2023-11-03 PROCEDURE — 85025 COMPLETE CBC W/AUTO DIFF WBC: CPT

## 2023-11-06 ENCOUNTER — TELEPHONE (OUTPATIENT)
Dept: NEUROLOGY | Facility: CLINIC | Age: 68
End: 2023-11-06

## 2023-11-06 NOTE — TELEPHONE ENCOUNTER
11/3/23 at 1:07    Hi. this is Bernadette Nicholson. I saw the doctor in July and I have a prescription and it doesn't have a by order date. And I don't remember what we discussed regarding this if I need to do it soon or when I return for a follow up appointment. If someone could check on this and let me know how to proceed, i'd appreciate it. Again, this is Bernadette Nicholson, date of birth 1955. Thanks.

## 2023-11-08 NOTE — TELEPHONE ENCOUNTER
Spoke with patient and let him know lab orders are in for Vit B12 and TSH panel. No imaging studies were ordered. Patient will complete labs.

## 2023-11-15 ENCOUNTER — OFFICE VISIT (OUTPATIENT)
Dept: INTERNAL MEDICINE CLINIC | Age: 68
End: 2023-11-15
Payer: COMMERCIAL

## 2023-11-15 VITALS
TEMPERATURE: 98.4 F | HEART RATE: 80 BPM | OXYGEN SATURATION: 95 % | BODY MASS INDEX: 33.04 KG/M2 | DIASTOLIC BLOOD PRESSURE: 74 MMHG | HEIGHT: 68 IN | WEIGHT: 218 LBS | SYSTOLIC BLOOD PRESSURE: 126 MMHG

## 2023-11-15 DIAGNOSIS — N20.0 NEPHROLITHIASIS: ICD-10-CM

## 2023-11-15 DIAGNOSIS — M47.816 LUMBAR SPONDYLOSIS: ICD-10-CM

## 2023-11-15 DIAGNOSIS — N13.8 BPH WITH OBSTRUCTION/LOWER URINARY TRACT SYMPTOMS: ICD-10-CM

## 2023-11-15 DIAGNOSIS — I48.0 PAROXYSMAL ATRIAL FIBRILLATION (HCC): ICD-10-CM

## 2023-11-15 DIAGNOSIS — N40.1 BPH WITH OBSTRUCTION/LOWER URINARY TRACT SYMPTOMS: ICD-10-CM

## 2023-11-15 DIAGNOSIS — I25.10 CORONARY ARTERY DISEASE INVOLVING NATIVE CORONARY ARTERY OF NATIVE HEART WITHOUT ANGINA PECTORIS: ICD-10-CM

## 2023-11-15 DIAGNOSIS — Z00.00 MEDICARE ANNUAL WELLNESS VISIT, SUBSEQUENT: Primary | ICD-10-CM

## 2023-11-15 DIAGNOSIS — Z23 ENCOUNTER FOR IMMUNIZATION: ICD-10-CM

## 2023-11-15 DIAGNOSIS — Z13.6 SCREENING FOR AAA (ABDOMINAL AORTIC ANEURYSM): ICD-10-CM

## 2023-11-15 DIAGNOSIS — E78.00 HYPERCHOLESTEROLEMIA: ICD-10-CM

## 2023-11-15 DIAGNOSIS — M51.36 DDD (DEGENERATIVE DISC DISEASE), LUMBAR: ICD-10-CM

## 2023-11-15 DIAGNOSIS — Z12.5 SCREENING FOR PROSTATE CANCER: ICD-10-CM

## 2023-11-15 DIAGNOSIS — Z86.010 PERSONAL HISTORY OF COLONIC POLYPS: ICD-10-CM

## 2023-11-15 PROCEDURE — 99214 OFFICE O/P EST MOD 30 MIN: CPT | Performed by: INTERNAL MEDICINE

## 2023-11-15 PROCEDURE — G0439 PPPS, SUBSEQ VISIT: HCPCS | Performed by: INTERNAL MEDICINE

## 2023-11-15 NOTE — PATIENT INSTRUCTIONS
Medicare Preventive Visit Patient Instructions  Thank you for completing your Welcome to Medicare Visit or Medicare Annual Wellness Visit today. Your next wellness visit will be due in one year (11/15/2024). The screening/preventive services that you may require over the next 5-10 years are detailed below. Some tests may not apply to you based off risk factors and/or age. Screening tests ordered at today's visit but not completed yet may show as past due. Also, please note that scanned in results may not display below. Preventive Screenings:  Service Recommendations Previous Testing/Comments   Colorectal Cancer Screening  Colonoscopy    Fecal Occult Blood Test (FOBT)/Fecal Immunochemical Test (FIT)  Fecal DNA/Cologuard Test  Flexible Sigmoidoscopy Age: 43-73 years old   Colonoscopy: every 10 years (May be performed more frequently if at higher risk)  OR  FOBT/FIT: every 1 year  OR  Cologuard: every 3 years  OR  Sigmoidoscopy: every 5 years  Screening may be recommended earlier than age 39 if at higher risk for colorectal cancer. Also, an individualized decision between you and your healthcare provider will decide whether screening between the ages of 77-80 would be appropriate.  Colonoscopy: 01/24/2020  FOBT/FIT: Not on file  Cologuard: Not on file  Sigmoidoscopy: Not on file    Screening Current     Prostate Cancer Screening Individualized decision between patient and health care provider in men between ages of 53-66   Medicare will cover every 12 months beginning on the day after your 50th birthday PSA: 3.21 ng/mL     Screening Current     Hepatitis C Screening Once for adults born between 1945 and 1965  More frequently in patients at high risk for Hepatitis C Hep C Antibody: 08/23/2019    Screening Current   Diabetes Screening 1-2 times per year if you're at risk for diabetes or have pre-diabetes Fasting glucose: 89 mg/dL (11/3/2023)  A1C: No results in last 5 years (No results in last 5 years)  Screening Current   Cholesterol Screening Once every 5 years if you don't have a lipid disorder. May order more often based on risk factors. Lipid panel: 11/03/2023  Screening Not Indicated  History Lipid Disorder      Other Preventive Screenings Covered by Medicare:  Abdominal Aortic Aneurysm (AAA) Screening: covered once if your at risk. You're considered to be at risk if you have a family history of AAA or a male between the age of 70-76 who smoking at least 100 cigarettes in your lifetime. Lung Cancer Screening: covers low dose CT scan once per year if you meet all of the following conditions: (1) Age 48-67; (2) No signs or symptoms of lung cancer; (3) Current smoker or have quit smoking within the last 15 years; (4) You have a tobacco smoking history of at least 20 pack years (packs per day x number of years you smoked); (5) You get a written order from a healthcare provider. Glaucoma Screening: covered annually if you're considered high risk: (1) You have diabetes OR (2) Family history of glaucoma OR (3)  aged 48 and older OR (3)  American aged 72 and older  Osteoporosis Screening: covered every 2 years if you meet one of the following conditions: (1) Have a vertebral abnormality; (2) On glucocorticoid therapy for more than 3 months; (3) Have primary hyperparathyroidism; (4) On osteoporosis medications and need to assess response to drug therapy. HIV Screening: covered annually if you're between the age of 14-79. Also covered annually if you are younger than 13 and older than 72 with risk factors for HIV infection. For pregnant patients, it is covered up to 3 times per pregnancy.     Immunizations:  Immunization Recommendations   Influenza Vaccine Annual influenza vaccination during flu season is recommended for all persons aged >= 6 months who do not have contraindications   Pneumococcal Vaccine   * Pneumococcal conjugate vaccine = PCV13 (Prevnar 13), PCV15 (Vaxneuvance), PCV20 (Prevnar 20)  * Pneumococcal polysaccharide vaccine = PPSV23 (Pneumovax) Adults 12-35 yo with certain risk factors or if 69+ yo  If never received any pneumonia vaccine: recommend Prevnar 20 (PCV20)  Give PCV20 if previously received 1 dose of PCV13 or PPSV23   Hepatitis B Vaccine 3 dose series if at intermediate or high risk (ex: diabetes, end stage renal disease, liver disease)   Respiratory syncytial virus (RSV) Vaccine - COVERED BY MEDICARE PART D  * RSVPreF3 (Arexvy) CDC recommends that adults 61years of age and older may receive a single dose of RSV vaccine using shared clinical decision-making (SCDM)   Tetanus (Td) Vaccine - COST NOT COVERED BY MEDICARE PART B Following completion of primary series, a booster dose should be given every 10 years to maintain immunity against tetanus. Td may also be given as tetanus wound prophylaxis. Tdap Vaccine - COST NOT COVERED BY MEDICARE PART B Recommended at least once for all adults. For pregnant patients, recommended with each pregnancy. Shingles Vaccine (Shingrix) - COST NOT COVERED BY MEDICARE PART B  2 shot series recommended in those 19 years and older who have or will have weakened immune systems or those 50 years and older     Health Maintenance Due:      Topic Date Due   • Colorectal Cancer Screening  01/24/2030   • Hepatitis C Screening  Completed     Immunizations Due:  There are no preventive care reminders to display for this patient. Advance Directives   What are advance directives? Advance directives are legal documents that state your wishes and plans for medical care. These plans are made ahead of time in case you lose your ability to make decisions for yourself. Advance directives can apply to any medical decision, such as the treatments you want, and if you want to donate organs. What are the types of advance directives? There are many types of advance directives, and each state has rules about how to use them.  You may choose a combination of any of the following:  Living will: This is a written record of the treatment you want. You can also choose which treatments you do not want, which to limit, and which to stop at a certain time. This includes surgery, medicine, IV fluid, and tube feedings. Durable power of  for healthcare [facility]): This is a written record that states who you want to make healthcare choices for you when you are unable to make them for yourself. This person, called a proxy, is usually a family member or a friend. You may choose more than 1 proxy. Do not resuscitate (DNR) order:  A DNR order is used in case your heart stops beating or you stop breathing. It is a request not to have certain forms of treatment, such as CPR. A DNR order may be included in other types of advance directives. Medical directive: This covers the care that you want if you are in a coma, near death, or unable to make decisions for yourself. You can list the treatments you want for each condition. Treatment may include pain medicine, surgery, blood transfusions, dialysis, IV or tube feedings, and a ventilator (breathing machine). Values history: This document has questions about your views, beliefs, and how you feel and think about life. This information can help others choose the care that you would choose. Why are advance directives important? An advance directive helps you control your care. Although spoken wishes may be used, it is better to have your wishes written down. Spoken wishes can be misunderstood, or not followed. Treatments may be given even if you do not want them. An advance directive may make it easier for your family to make difficult choices about your care. Weight Management   Why it is important to manage your weight:  Being overweight increases your risk of health conditions such as heart disease, high blood pressure, type 2 diabetes, and certain types of cancer.  It can also increase your risk for osteoarthritis, sleep apnea, and other respiratory problems. Aim for a slow, steady weight loss. Even a small amount of weight loss can lower your risk of health problems. How to lose weight safely:  A safe and healthy way to lose weight is to eat fewer calories and get regular exercise. You can lose up about 1 pound a week by decreasing the number of calories you eat by 500 calories each day. Healthy meal plan for weight management:  A healthy meal plan includes a variety of foods, contains fewer calories, and helps you stay healthy. A healthy meal plan includes the following:  Eat whole-grain foods more often. A healthy meal plan should contain fiber. Fiber is the part of grains, fruits, and vegetables that is not broken down by your body. Whole-grain foods are healthy and provide extra fiber in your diet. Some examples of whole-grain foods are whole-wheat breads and pastas, oatmeal, brown rice, and bulgur. Eat a variety of vegetables every day. Include dark, leafy greens such as spinach, kale, corey greens, and mustard greens. Eat yellow and orange vegetables such as carrots, sweet potatoes, and winter squash. Eat a variety of fruits every day. Choose fresh or canned fruit (canned in its own juice or light syrup) instead of juice. Fruit juice has very little or no fiber. Eat low-fat dairy foods. Drink fat-free (skim) milk or 1% milk. Eat fat-free yogurt and low-fat cottage cheese. Try low-fat cheeses such as mozzarella and other reduced-fat cheeses. Choose meat and other protein foods that are low in fat. Choose beans or other legumes such as split peas or lentils. Choose fish, skinless poultry (chicken or turkey), or lean cuts of red meat (beef or pork). Before you cook meat or poultry, cut off any visible fat. Use less fat and oil. Try baking foods instead of frying them. Add less fat, such as margarine, sour cream, regular salad dressing and mayonnaise to foods. Eat fewer high-fat foods.  Some examples of high-fat foods include french fries, doughnuts, ice cream, and cakes. Eat fewer sweets. Limit foods and drinks that are high in sugar. This includes candy, cookies, regular soda, and sweetened drinks. Exercise:  Exercise at least 30 minutes per day on most days of the week. Some examples of exercise include walking, biking, dancing, and swimming. You can also fit in more physical activity by taking the stairs instead of the elevator or parking farther away from stores. Ask your healthcare provider about the best exercise plan for you. Alcohol Use and Your Health    Drinking too much can harm your health. Excessive alcohol use leads to about 88,000 death in [facility] each year, and shortens the life of those who diet by almost 30 years. Further, excessive drinking cost the economy $249 billion in 2010. Most excessive drinkers are not alcohol dependent. Excessive alcohol use has immediate effects that increase the risk of many harmful health conditions. These are most often the result of binge drinking. Over time, excessive alcohol use can lead to the development of chronic diseases and other series health problems. What is considered a "drink"? Excessive alcohol use includes:  Binge Drinking: For women, 4 or more drinks consumed on one occasion. For men, 5 or more drinks consumed on one occasion. Heavy Drinking: For women, 8 or more drinks per week. For men, 15 or more drinks per week  Any alcohol used by pregnant women  Any alcohol used by those under the age of 21 years    If you choose to drink, do so in moderation:  Do not drink at all if you are under the age of 24, or if you are or may be pregnant, or have health problems that could be made worse by drinking.   For women, up to 1 drink per day  For men, up to 2 drinks a day    No one should begin drinking or drink more frequently based on potential health benefits    Short-Term Health Risks:  Injuries: motor vehicle crashes, falls, drownings, burns  Violence: homicide, suicide, sexual assault, intimate partner violence  Alcohol poisoning  Reproductive health: risky sexual behaviors, unintended prengnacy, sexually transmitted diseases, miscarriage, stillbirth, fetal alcohol syndrome    Long-Term Health Risks:  Chronic diseases: high blood pressure, heart disease, stroke, liver disease, digestive problems  Cancers: breast, mouth and throat, liver, colon  Learning and memory problems: dementia, poor school performance  Mental health: depression, anxiety, insomnia  Social problems: lost productivity, family problems, unemployment  Alcohol dependence    For support and more information:  Substance Abuse and [facility] , [address] [place] [address]  Web Address: https://[employer]/    Alcoholics Anonymous        Web Address: http://[employer] [email or web address].com/    https://www.cdc.gov/alcohol/fact-sheets/alcohol-use.htm     © Copyright [employer] 2018 Information is for End User's use only and may not be sold, redistributed or otherwise used for commercial purposes. All illustrations and images included in CareNotes® are the copyrighted property of [employer] or [facility] Preventive Visit Patient Instructions  Thank you for completing your Welcome to Medicare Visit or Medicare Annual Wellness Visit today. Your next wellness visit will be due in one year (11/15/2024). The screening/preventive services that you may require over the next 5-10 years are detailed below. Some tests may not apply to you based off risk factors and/or age. Screening tests ordered at today's visit but not completed yet may show as past due. Also, please note that scanned in results may not display below.   Preventive Screenings:  Service Recommendations Previous Testing/Comments   Colorectal Cancer Screening  Colonoscopy    Fecal Occult Blood Test (FOBT)/Fecal Immunochemical Test (FIT)  Fecal DNA/Cologuard Test  Flexible Sigmoidoscopy Age: 43-73 years old   Colonoscopy: every 10 years (May be performed more frequently if at higher risk)  OR  FOBT/FIT: every 1 year  OR  Cologuard: every 3 years  OR  Sigmoidoscopy: every 5 years  Screening may be recommended earlier than age 39 if at higher risk for colorectal cancer. Also, an individualized decision between you and your healthcare provider will decide whether screening between the ages of 77-80 would be appropriate. Colonoscopy: 01/24/2020  FOBT/FIT: Not on file  Cologuard: Not on file  Sigmoidoscopy: Not on file    Screening Current     Prostate Cancer Screening Individualized decision between patient and health care provider in men between ages of 53-66   Medicare will cover every 12 months beginning on the day after your 50th birthday PSA: 3.21 ng/mL     Screening Current     Hepatitis C Screening Once for adults born between 1945 and 1965  More frequently in patients at high risk for Hepatitis C Hep C Antibody: 08/23/2019    Screening Current   Diabetes Screening 1-2 times per year if you're at risk for diabetes or have pre-diabetes Fasting glucose: 89 mg/dL (11/3/2023)  A1C: No results in last 5 years (No results in last 5 years)  Screening Current   Cholesterol Screening Once every 5 years if you don't have a lipid disorder. May order more often based on risk factors. Lipid panel: 11/03/2023  Screening Not Indicated  History Lipid Disorder      Other Preventive Screenings Covered by Medicare:  Abdominal Aortic Aneurysm (AAA) Screening: covered once if your at risk. You're considered to be at risk if you have a family history of AAA or a male between the age of 70-76 who smoking at least 100 cigarettes in your lifetime.   Lung Cancer Screening: covers low dose CT scan once per year if you meet all of the following conditions: (1) Age 48-67; (2) No signs or symptoms of lung cancer; (3) Current smoker or have quit smoking within the last 15 years; (4) You have a tobacco smoking history of at least 20 pack years (packs per day x number of years you smoked); (5) You get a written order from a healthcare provider. Glaucoma Screening: covered annually if you're considered high risk: (1) You have diabetes OR (2) Family history of glaucoma OR (3)  aged 48 and older OR (3)  American aged 72 and older  Osteoporosis Screening: covered every 2 years if you meet one of the following conditions: (1) Have a vertebral abnormality; (2) On glucocorticoid therapy for more than 3 months; (3) Have primary hyperparathyroidism; (4) On osteoporosis medications and need to assess response to drug therapy. HIV Screening: covered annually if you're between the age of 14-79. Also covered annually if you are younger than 13 and older than 72 with risk factors for HIV infection. For pregnant patients, it is covered up to 3 times per pregnancy.     Immunizations:  Immunization Recommendations   Influenza Vaccine Annual influenza vaccination during flu season is recommended for all persons aged >= 6 months who do not have contraindications   Pneumococcal Vaccine   * Pneumococcal conjugate vaccine = PCV13 (Prevnar 13), PCV15 (Vaxneuvance), PCV20 (Prevnar 20)  * Pneumococcal polysaccharide vaccine = PPSV23 (Pneumovax) Adults 20-08 yo with certain risk factors or if 69+ yo  If never received any pneumonia vaccine: recommend Prevnar 20 (PCV20)  Give PCV20 if previously received 1 dose of PCV13 or PPSV23   Hepatitis B Vaccine 3 dose series if at intermediate or high risk (ex: diabetes, end stage renal disease, liver disease)   Respiratory syncytial virus (RSV) Vaccine - COVERED BY MEDICARE PART D  * RSVPreF3 (Arexvy) CDC recommends that adults 61years of age and older may receive a single dose of RSV vaccine using shared clinical decision-making (SCDM)   Tetanus (Td) Vaccine - COST NOT COVERED BY MEDICARE PART B Following completion of primary series, a booster dose should be given every 10 years to maintain immunity against tetanus. Td may also be given as tetanus wound prophylaxis. Tdap Vaccine - COST NOT COVERED BY MEDICARE PART B Recommended at least once for all adults. For pregnant patients, recommended with each pregnancy. Shingles Vaccine (Shingrix) - COST NOT COVERED BY MEDICARE PART B  2 shot series recommended in those 19 years and older who have or will have weakened immune systems or those 50 years and older     Health Maintenance Due:      Topic Date Due   • Colorectal Cancer Screening  01/24/2030   • Hepatitis C Screening  Completed     Immunizations Due:  There are no preventive care reminders to display for this patient. Advance Directives   What are advance directives? Advance directives are legal documents that state your wishes and plans for medical care. These plans are made ahead of time in case you lose your ability to make decisions for yourself. Advance directives can apply to any medical decision, such as the treatments you want, and if you want to donate organs. What are the types of advance directives? There are many types of advance directives, and each state has rules about how to use them. You may choose a combination of any of the following:  Living will: This is a written record of the treatment you want. You can also choose which treatments you do not want, which to limit, and which to stop at a certain time. This includes surgery, medicine, IV fluid, and tube feedings. Durable power of  for healthcare [facility]): This is a written record that states who you want to make healthcare choices for you when you are unable to make them for yourself. This person, called a proxy, is usually a family member or a friend. You may choose more than 1 proxy. Do not resuscitate (DNR) order:  A DNR order is used in case your heart stops beating or you stop breathing. It is a request not to have certain forms of treatment, such as CPR.  A DNR order may be included in other types of advance directives. Medical directive: This covers the care that you want if you are in a coma, near death, or unable to make decisions for yourself. You can list the treatments you want for each condition. Treatment may include pain medicine, surgery, blood transfusions, dialysis, IV or tube feedings, and a ventilator (breathing machine). Values history: This document has questions about your views, beliefs, and how you feel and think about life. This information can help others choose the care that you would choose. Why are advance directives important? An advance directive helps you control your care. Although spoken wishes may be used, it is better to have your wishes written down. Spoken wishes can be misunderstood, or not followed. Treatments may be given even if you do not want them. An advance directive may make it easier for your family to make difficult choices about your care. Weight Management   Why it is important to manage your weight:  Being overweight increases your risk of health conditions such as heart disease, high blood pressure, type 2 diabetes, and certain types of cancer. It can also increase your risk for osteoarthritis, sleep apnea, and other respiratory problems. Aim for a slow, steady weight loss. Even a small amount of weight loss can lower your risk of health problems. How to lose weight safely:  A safe and healthy way to lose weight is to eat fewer calories and get regular exercise. You can lose up about 1 pound a week by decreasing the number of calories you eat by 500 calories each day. Healthy meal plan for weight management:  A healthy meal plan includes a variety of foods, contains fewer calories, and helps you stay healthy. A healthy meal plan includes the following:  Eat whole-grain foods more often. A healthy meal plan should contain fiber. Fiber is the part of grains, fruits, and vegetables that is not broken down by your body.  Whole-grain foods are healthy and provide extra fiber in your diet. Some examples of whole-grain foods are whole-wheat breads and pastas, oatmeal, brown rice, and bulgur. Eat a variety of vegetables every day. Include dark, leafy greens such as spinach, kale, corey greens, and mustard greens. Eat yellow and orange vegetables such as carrots, sweet potatoes, and winter squash. Eat a variety of fruits every day. Choose fresh or canned fruit (canned in its own juice or light syrup) instead of juice. Fruit juice has very little or no fiber. Eat low-fat dairy foods. Drink fat-free (skim) milk or 1% milk. Eat fat-free yogurt and low-fat cottage cheese. Try low-fat cheeses such as mozzarella and other reduced-fat cheeses. Choose meat and other protein foods that are low in fat. Choose beans or other legumes such as split peas or lentils. Choose fish, skinless poultry (chicken or turkey), or lean cuts of red meat (beef or pork). Before you cook meat or poultry, cut off any visible fat. Use less fat and oil. Try baking foods instead of frying them. Add less fat, such as margarine, sour cream, regular salad dressing and mayonnaise to foods. Eat fewer high-fat foods. Some examples of high-fat foods include french fries, doughnuts, ice cream, and cakes. Eat fewer sweets. Limit foods and drinks that are high in sugar. This includes candy, cookies, regular soda, and sweetened drinks. Exercise:  Exercise at least 30 minutes per day on most days of the week. Some examples of exercise include walking, biking, dancing, and swimming. You can also fit in more physical activity by taking the stairs instead of the elevator or parking farther away from stores. Ask your healthcare provider about the best exercise plan for you. Alcohol Use and Your Health    Drinking too much can harm your health. Excessive alcohol use leads to about 88,000 death in the [facility] each year, and shortens the life of those who diet by almost 30 years.   Further, excessive drinking cost the economy $249 billion in 2010. Most excessive drinkers are not alcohol dependent. Excessive alcohol use has immediate effects that increase the risk of many harmful health conditions. These are most often the result of binge drinking. Over time, excessive alcohol use can lead to the development of chronic diseases and other series health problems. What is considered a "drink"? Excessive alcohol use includes:  Binge Drinking: For women, 4 or more drinks consumed on one occasion. For men, 5 or more drinks consumed on one occasion. Heavy Drinking: For women, 8 or more drinks per week. For men, 15 or more drinks per week  Any alcohol used by pregnant women  Any alcohol used by those under the age of 21 years    If you choose to drink, do so in moderation:  Do not drink at all if you are under the age of 24, or if you are or may be pregnant, or have health problems that could be made worse by drinking.   For women, up to 1 drink per day  For men, up to 2 drinks a day    No one should begin drinking or drink more frequently based on potential health benefits    Short-Term Health Risks:  Injuries: motor vehicle crashes, falls, drownings, burns  Violence: homicide, suicide, sexual assault, intimate partner violence  Alcohol poisoning  Reproductive health: risky sexual behaviors, unintended prengnacy, sexually transmitted diseases, miscarriage, stillbirth, fetal alcohol syndrome    Long-Term Health Risks:  Chronic diseases: high blood pressure, heart disease, stroke, liver disease, digestive problems  Cancers: breast, mouth and throat, liver, colon  Learning and memory problems: dementia, poor school performance  Mental health: depression, anxiety, insomnia  Social problems: lost productivity, family problems, unemployment  Alcohol dependence    For support and more information:  Substance Abuse and [facility] , [address] [place] [address]  Web Address: [email or web address]    Alcoholics Anonymous        Web Address: http://www.[facility].info/    https://www.cdc.gov/alcohol/fact-sheets/alcohol-use.htm     © Copyright [facility] 2018 Information is for End User's use only and may not be sold, redistributed or otherwise used for commercial purposes.  All illustrations and images included in CareNotes® are the copyrighted property of [employer] or [address] [place] [address]

## 2023-11-15 NOTE — PROGRESS NOTES
Assessment/Plan:    Medicare annual wellness visit, subsequent  -Medicare annual wellness visit done   - last colonoscopy was done in 2020 and showed diverticulosis. Patient will be due for repeat colonoscopy in 2025. - AAA screen was done in 2021 and showed no AAA. -  Lung cancer screen is not indicated because patient quit smoking over 30 years ago. -Patient is up-to-date with 5 COVID vaccines, his flu shot, Tdap, pneumococcal vaccines and shingles vaccine.  -follow-up in 6 months. Hyperlipidemia  -Improving  -Continue with WelChol and Zetia as well as a heart healthy diet and exercise. BPH  -Stable, PSA done on 11/3/2023 was normal at 3.21, up from 2.4  -Continue to follow with urology    Seasonal allergies  -Stable  -Continue with Singulair    Nephrolithiasis  -Well-controlled without any kidney stones in multiple years  -Continue to keep well-hydrated    Degenerative disc disease of the lumbar spine  -Improving  -Continue with Trileptal, tizanidine, range of motion exercises and continue to follow with pain medicine.     Coronary artery disease  -Stable without acute symptoms  -Continue with WelChol, Zetia, diltiazem and a heart healthy diet.  -Continue to follow with cardiology    Paroxysmal atrial fibrillation  -Rate controlled and in normal sinus rhythm  -Continue with diltiazem and Eliquis  -Continue to follow with cardiology     Diagnoses and all orders for this visit:    Medicare annual wellness visit, subsequent    Hypercholesterolemia    BPH with obstruction/lower urinary tract symptoms    Nephrolithiasis    DDD (degenerative disc disease), lumbar    Lumbar spondylosis    Coronary artery disease involving native coronary artery of native heart without angina pectoris    Paroxysmal atrial fibrillation ([address])    Personal history of colonic polyps  Comments:  last colonoscopy was in 2020 and showed diverticulosis and he was told to return for repeat colonoscopy in 5 years - 2025    Screening for prostate cancer  Comments:  last psa was normal this week - 3.21 - admits to nocturia x 2, hesitancy, poor stream    Screening for AAA (abdominal aortic aneurysm)  Comments:  pt smoked up to 100 cigars in his life timeand  had an MRI of the abdomen in 2021 and it showed no AAA    Encounter for immunization  Comments:  pt is up to date with 5 covid vaccines, flu shot, tdap, pneumococcal x 2 and shingles vaccine    Other orders  -     NON FORMULARY; CBD CAPSULE  AND CREAM USE DAILY PRIN          Subjective:      Patient ID: Meño Hsieh is a 77 y.o. male. HPI    Pt presents for a medicare annual wellness visit. Patient also presents for a follow-up visit regarding his paroxysmal atrial fibrillation, coronary artery disease, degenerative disc disease of the lumbar spine, hyperlipidemia BPH. He states that he has been taking his medications as prescribed. Today pt states that he quit smoking cigars in 1986 and smoked minimally but believes that he smoked up to 100 cigars in his life. With regards to his A-fib, he states that has no palpitations and he can usually feel it when he does. He is currently taking Eliquis and diltiazem. Bruises easily but no gingival bleeding, and no hematochezia or hematuria  He states that he takes a combo of supplements that helps him not to bruise. Back pain is under control and he is able to exercise   Last epidural injection was in April 2023  Does a lot of core exercises to help and stretches as well and that helps   Tries to drink at least 100 oz of water daily   Has not had any attack of kidney stones in 10-12 years  He admits to nasal congestion, postnasal drip, rhinorrhea and occasional cough secondary to seasonal allergies as well as occasional shortness of breath on exertion and occasional headache from his allergies and sinuses and from occasional neck pain.   He denies any fever, chills, night sweats, headache, dizziness,  sore throat, ear ache, chest pain, palpitations, nausea, vomiting, diarrhea, constipation, hematochezia, hematuria, melena stools, feelings of anxiety,depression or insomnia. The following portions of the patient's history were reviewed and updated as appropriate: He  has a past medical history of A-fib ([address]), Allergic, Allergic rhinitis, Arthritis, Benign cyst of kidney, Cancer ([address]) (2015), Cat bite (07/08/2020), Cerebral vascular disease, Chronic sinusitis, Coronary artery disease, Cryptogenic stroke ([address]) (03/02/2020), Hyperlipidemia, Kidney stone, Melanoma ([address]), Pancreas cyst, and Stroke ([address]) (2006). He   Patient Active Problem List    Diagnosis Date Noted   • Symptomatic varicose veins of both lower extremities 02/14/2023   • Skin lesion 02/03/2023   • BMI 34.0-34.9,adult 02/03/2023   • Paroxysmal atrial fibrillation ([address]) 04/14/2021   • Chronic pain syndrome 04/05/2021   • Tinea corporis 10/12/2020   • Lumbar radiculopathy 10/11/2019   • Low back pain with sciatica 10/11/2019   • DDD (degenerative disc disease), lumbar 10/11/2019   • Lumbar spondylosis 10/11/2019   • Pancreatic cyst 09/25/2019   • Medicare annual wellness visit, subsequent 08/28/2019   • BPH with obstruction/lower urinary tract symptoms 06/12/2018   • Alpha-1-antitrypsin deficiency ([address]) 02/16/2017   • Coronary artery disease involving native coronary artery 02/02/2017   • Hypercholesterolemia 12/19/2014   • Seasonal allergic rhinitis 05/20/2014   • Nephrolithiasis 05/08/2014   • Osteoarthritis 03/04/2014   • Irritable bowel syndrome 03/04/2014   • Coronary atherosclerosis 03/21/2013     He  has a past surgical history that includes Patent foramen ovale closure; Other surgical history (2003); and Colonoscopy. His family history includes Cancer in his father and sister; Diabetes type II in his mother; Hearing loss in his sister. He  reports that he quit smoking about 37 years ago. His smoking use included cigars. He started smoking about 47 years ago.  He quit smokeless tobacco use about 37 years ago. His smokeless tobacco use included chew. He reports current alcohol use of about 1.0 standard drink of alcohol per week. He reports that he does not currently use drugs after having used the following drugs: Marijuana. Current Outpatient Medications   Medication Sig Dispense Refill   • ascorbic acid (VITAMIN C) 250 mg tablet Take 250 mg by mouth 2 (two) times a day     • clobetasol (TEMOVATE) 0.05 % cream Apply 1 application topically 2 (two) times a day as needed Apply sparingly to affected areas     • Coenzyme Q10 (COQ10) 200 MG CAPS Take 1 capsule by mouth daily     • colesevelam (WELCHOL) 625 mg tablet Take 2 tablets (1,250 mg total) by mouth 2 (two) times a day with meals 360 tablet 1   • diltiazem (CARDIZEM) 30 mg tablet Take 1 tablet (30 mg total) by mouth if needed (afib) 15 tablet 3   • Eliquis 5 MG TAKE ONE TABLET BY MOUTH TWICE A  tablet 3   • ezetimibe (ZETIA) 10 mg tablet Take 1 tablet (10 mg total) by mouth daily 90 tablet 1   • Glucosamine-Chondroit-Vit C-Mn (GLUCOSAMINE CHONDR 1500 COMPLX PO) Take 1 tablet by mouth 2 (two) times a day      • hyoscyamine (ANASPAZ) 0.125 mg Take 0.125 mg by mouth 2 (two) times a day      • montelukast (SINGULAIR) 10 mg tablet Take 1 tablet (10 mg total) by mouth daily 90 tablet 1   • multivitamin (THERAGRAN) TABS Take 1 tablet by mouth 2 (two) times a day       • NON FORMULARY CBD CAPSULE  AND CREAM USE DAILY PRIN     • OXcarbazepine (TRILEPTAL) 300 mg tablet Take 1 tablet (300 mg total) by mouth 2 (two) times a day 180 tablet 1   • tiZANidine (ZANAFLEX) 2 mg tablet Take 1 tablet (2 mg total) by mouth 2 (two) times a day as needed for muscle spasms 60 tablet 2   • chlorpheniramine (CHLOR-TRIMETON) 4 MG tablet Take 1 tablet (4 mg total) by mouth every 6 (six) hours as needed for rhinitis (Patient not taking: Reported on 11/15/2023) 30 tablet 0     No current facility-administered medications for this visit.      Current Outpatient Medications on File Prior to Visit   Medication Sig   • ascorbic acid (VITAMIN C) 250 mg tablet Take 250 mg by mouth 2 (two) times a day   • clobetasol (TEMOVATE) 0.05 % cream Apply 1 application topically 2 (two) times a day as needed Apply sparingly to affected areas   • Coenzyme Q10 (COQ10) 200 MG CAPS Take 1 capsule by mouth daily   • colesevelam (WELCHOL) 625 mg tablet Take 2 tablets (1,250 mg total) by mouth 2 (two) times a day with meals   • diltiazem (CARDIZEM) 30 mg tablet Take 1 tablet (30 mg total) by mouth if needed (afib)   • Eliquis 5 MG TAKE ONE TABLET BY MOUTH TWICE A DAY   • ezetimibe (ZETIA) 10 mg tablet Take 1 tablet (10 mg total) by mouth daily   • Glucosamine-Chondroit-Vit C-Mn (GLUCOSAMINE CHONDR 1500 COMPLX PO) Take 1 tablet by mouth 2 (two) times a day    • hyoscyamine (ANASPAZ) 0.125 mg Take 0.125 mg by mouth 2 (two) times a day    • montelukast (SINGULAIR) 10 mg tablet Take 1 tablet (10 mg total) by mouth daily   • multivitamin (THERAGRAN) TABS Take 1 tablet by mouth 2 (two) times a day     • NON FORMULARY CBD CAPSULE  AND CREAM USE DAILY PRIN   • OXcarbazepine (TRILEPTAL) 300 mg tablet Take 1 tablet (300 mg total) by mouth 2 (two) times a day   • tiZANidine (ZANAFLEX) 2 mg tablet Take 1 tablet (2 mg total) by mouth 2 (two) times a day as needed for muscle spasms   • chlorpheniramine (CHLOR-TRIMETON) 4 MG tablet Take 1 tablet (4 mg total) by mouth every 6 (six) hours as needed for rhinitis (Patient not taking: Reported on 11/15/2023)     No current facility-administered medications on file prior to visit. He is allergic to augmentin [amoxicillin-pot clavulanate], molds & smuts, monascus purpureus went yeast, niacin, pollen extract, pregabalin, statins, and atorvastatin. .    Review of Systems   Constitutional:  Negative for activity change, chills, fatigue, fever and unexpected weight change. HENT:  Positive for congestion, postnasal drip and rhinorrhea.  Negative for ear pain, sinus pressure and sore throat. Eyes:  Negative for pain. Respiratory:  Positive for cough (2/2 pnd) and shortness of breath (mild on exertion). Negative for choking, chest tightness and wheezing. Cardiovascular:  Negative for chest pain, palpitations and leg swelling. Gastrointestinal:  Negative for abdominal pain, constipation, diarrhea, nausea and vomiting. Genitourinary:  Negative for dysuria and hematuria. Musculoskeletal:  Positive for arthralgias (knee pain - 2/2 squats) and back pain. Negative for gait problem, joint swelling, myalgias and neck stiffness. Skin:  Negative for pallor and rash. Neurological:  Positive for headaches (occasionally 2/2 to allergies and cervical pain). Negative for dizziness, tremors, seizures, syncope and light-headedness. Hematological:  Negative for adenopathy. Psychiatric/Behavioral:  Negative for behavioral problems and dysphoric mood. The patient is not nervous/anxious. Objective:      /74 (BP Location: Left arm, Patient Position: Sitting, Cuff Size: Standard)   Pulse 80   Temp 98.4 °F (36.9 °C) (Temporal)   Ht 5' 7.52" (1.715 m) Comment: shoes off  Wt 98.9 kg (218 lb)   SpO2 95%   BMI 33.62 kg/m²          Physical Exam  Constitutional:       General: He is not in acute distress. Appearance: He is well-developed. He is not diaphoretic. HENT:      Head: Normocephalic and atraumatic. Right Ear: External ear normal.      Left Ear: External ear normal.      Nose: Nose normal.      Mouth/Throat:      Mouth: Mucous membranes are dry. Pharynx: Posterior oropharyngeal erythema present. No oropharyngeal exudate. Eyes:      General: No scleral icterus. Right eye: No discharge. Left eye: No discharge. Conjunctiva/sclera: Conjunctivae normal.      Pupils: Pupils are equal, round, and reactive to light. Neck:      Thyroid: No thyromegaly. Vascular: No JVD. Trachea: No tracheal deviation. Cardiovascular:      Rate and Rhythm: Normal rate and regular rhythm. Heart sounds: Normal heart sounds. No murmur heard. No friction rub. No gallop. Pulmonary:      Effort: Pulmonary effort is normal. No respiratory distress. Breath sounds: Normal breath sounds. No wheezing or rales. Chest:      Chest wall: No tenderness. Abdominal:      General: Bowel sounds are normal. There is no distension. Palpations: Abdomen is soft. There is no mass. Tenderness: There is no abdominal tenderness. There is no guarding or rebound. Musculoskeletal:         General: No tenderness or deformity. Normal range of motion. Cervical back: Normal range of motion and neck supple. Left lower leg: Swelling present. Pitting Edema (1 + pitting pedal edema with varicose veins b/l) present. Lymphadenopathy:      Head:      Right side of head: Submandibular adenopathy present. Left side of head: Submandibular adenopathy present. Cervical: No cervical adenopathy. Skin:     General: Skin is warm and dry. Coloration: Skin is not pale. Findings: No erythema or rash. Neurological:      Mental Status: He is alert and oriented to person, place, and time. Cranial Nerves: No cranial nerve deficit. Motor: No abnormal muscle tone. Coordination: Coordination normal.      Deep Tendon Reflexes: Reflexes are normal and symmetric.    Psychiatric:         Behavior: Behavior normal.           Appointment on 11/03/2023   Component Date Value Ref Range Status   • WBC 11/03/2023 5.64  4.31 - 10.16 Thousand/uL Final   • RBC 11/03/2023 4.70  3.88 - 5.62 Million/uL Final   • Hemoglobin 11/03/2023 14.9  12.0 - 17.0 g/dL Final   • Hematocrit 11/03/2023 45.4  36.5 - 49.3 % Final   • MCV 11/03/2023 97  82 - 98 fL Final   • MCH 11/03/2023 31.7  26.8 - 34.3 pg Final   • MCHC 11/03/2023 32.8  31.4 - 37.4 g/dL Final   • RDW 11/03/2023 12.2  11.6 - 15.1 % Final   • MPV 11/03/2023 9.5  8.9 - 12.7 fL Final   • Platelets 82/63/4995 214  149 - 390 Thousands/uL Final   • nRBC 11/03/2023 0  /100 WBCs Final   • Neutrophils Relative 11/03/2023 52  43 - 75 % Final   • Immat GRANS % 11/03/2023 0  0 - 2 % Final   • Lymphocytes Relative 11/03/2023 36  14 - 44 % Final   • Monocytes Relative 11/03/2023 9  4 - 12 % Final   • Eosinophils Relative 11/03/2023 2  0 - 6 % Final   • Basophils Relative 11/03/2023 1  0 - 1 % Final   • Neutrophils Absolute 11/03/2023 2.97  1.85 - 7.62 Thousands/µL Final   • Immature Grans Absolute 11/03/2023 0.01  0.00 - 0.20 Thousand/uL Final   • Lymphocytes Absolute 11/03/2023 2.03  0.60 - 4.47 Thousands/µL Final   • Monocytes Absolute 11/03/2023 0.50  0.17 - 1.22 Thousand/µL Final   • Eosinophils Absolute 11/03/2023 0.09  0.00 - 0.61 Thousand/µL Final   • Basophils Absolute 11/03/2023 0.04  0.00 - 0.10 Thousands/µL Final   • Sodium 11/03/2023 140  135 - 147 mmol/L Final   • Potassium 11/03/2023 4.5  3.5 - 5.3 mmol/L Final   • Chloride 11/03/2023 105  96 - 108 mmol/L Final   • CO2 11/03/2023 28  21 - 32 mmol/L Final   • ANION GAP 11/03/2023 7  mmol/L Final   • BUN 11/03/2023 17  5 - 25 mg/dL Final   • Creatinine 11/03/2023 0.88  0.60 - 1.30 mg/dL Final    Standardized to IDMS reference method   • Glucose, Fasting 11/03/2023 89  65 - 99 mg/dL Final   • Calcium 11/03/2023 9.4  8.4 - 10.2 mg/dL Final   • AST 11/03/2023 24  13 - 39 U/L Final   • ALT 11/03/2023 28  7 - 52 U/L Final    Specimen collection should occur prior to Sulfasalazine administration due to the potential for falsely depressed results. • Alkaline Phosphatase 11/03/2023 70  34 - 104 U/L Final   • Total Protein 11/03/2023 7.0  6.4 - 8.4 g/dL Final   • Albumin 11/03/2023 4.3  3.5 - 5.0 g/dL Final   • Total Bilirubin 11/03/2023 0.53  0.20 - 1.00 mg/dL Final    Use of this assay is not recommended for patients undergoing treatment with eltrombopag due to the potential for falsely elevated results.   N-acetyl-p-benzoquinone imine (metabolite of Acetaminophen) will generate erroneously low results in samples for patients that have taken an overdose of Acetaminophen. • eGFR 11/03/2023 87  ml/min/1.73sq m Final   • Cholesterol 11/03/2023 219 (H)  See Comment mg/dL Final    Cholesterol:         Pediatric <18 Years        Desirable          <170 mg/dL      Borderline High    170-199 mg/dL      High               >=200 mg/dL        Adult >=18 Years            Desirable         <200 mg/dL      Borderline High   200-239 mg/dL      High              >239 mg/dL     • Triglycerides 11/03/2023 82  See Comment mg/dL Final    Triglyceride:     0-9Y            <75mg/dL     10Y-17Y         <90 mg/dL       >=18Y     Normal          <150 mg/dL     Borderline High 150-199 mg/dL     High            200-499 mg/dL        Very High       >499 mg/dL    Specimen collection should occur prior to Metamizole administration due to the potential for falsely depressed results. • HDL, Direct 11/03/2023 63  >=40 mg/dL Final   • LDL Calculated 11/03/2023 140 (H)  0 - 100 mg/dL Final    LDL Cholesterol:     Optimal           <100 mg/dl     Near Optimal      100-129 mg/dl     Above Optimal       Borderline High 130-159 mg/dl       High            160-189 mg/dl       Very High       >189 mg/dl         This screening LDL is a calculated result. It does not have the accuracy of the Direct Measured LDL in the monitoring of patients with hyperlipidemia and/or statin therapy. Direct Measure LDL (BGK353) must be ordered separately in these patients. • Non-HDL-Chol (CHOL-HDL) 11/03/2023 156  mg/dl Final   • PSA 11/03/2023 3.21  0.00 - 4.00 ng/mL Final    [employer] chemiluminescent immunoassay. Confirm baseline values for patients being serially monitored.     Office Visit on 06/13/2023   Component Date Value Ref Range Status   • POST-VOID RESIDUAL VOLUME, ML POC 06/13/2023 50  mL Final   Appointment on 05/10/2023   Component Date Value Ref Range Status   • WBC 05/10/2023 6.56  4.31 - 10.16 Thousand/uL Final   • RBC 05/10/2023 4.78  3.88 - 5.62 Million/uL Final   • Hemoglobin 05/10/2023 14.8  12.0 - 17.0 g/dL Final   • Hematocrit 05/10/2023 46.2  36.5 - 49.3 % Final   • MCV 05/10/2023 97  82 - 98 fL Final   • MCH 05/10/2023 31.0  26.8 - 34.3 pg Final   • MCHC 05/10/2023 32.0  31.4 - 37.4 g/dL Final   • RDW 05/10/2023 12.2  11.6 - 15.1 % Final   • MPV 05/10/2023 9.3  8.9 - 12.7 fL Final   • Platelets 41/69/1286 179  149 - 390 Thousands/uL Final   • nRBC 05/10/2023 0  /100 WBCs Final   • Neutrophils Relative 05/10/2023 61  43 - 75 % Final   • Immat GRANS % 05/10/2023 0  0 - 2 % Final   • Lymphocytes Relative 05/10/2023 27  14 - 44 % Final   • Monocytes Relative 05/10/2023 8  4 - 12 % Final   • Eosinophils Relative 05/10/2023 3  0 - 6 % Final   • Basophils Relative 05/10/2023 1  0 - 1 % Final   • Neutrophils Absolute 05/10/2023 4.01  1.85 - 7.62 Thousands/µL Final   • Immature Grans Absolute 05/10/2023 0.01  0.00 - 0.20 Thousand/uL Final   • Lymphocytes Absolute 05/10/2023 1.78  0.60 - 4.47 Thousands/µL Final   • Monocytes Absolute 05/10/2023 0.53  0.17 - 1.22 Thousand/µL Final   • Eosinophils Absolute 05/10/2023 0.18  0.00 - 0.61 Thousand/µL Final   • Basophils Absolute 05/10/2023 0.05  0.00 - 0.10 Thousands/µL Final   • Sodium 05/10/2023 138  135 - 147 mmol/L Final   • Potassium 05/10/2023 4.7  3.5 - 5.3 mmol/L Final   • Chloride 05/10/2023 110 (H)  96 - 108 mmol/L Final   • CO2 05/10/2023 28  21 - 32 mmol/L Final   • ANION GAP 05/10/2023 0 (L)  4 - 13 mmol/L Final   • BUN 05/10/2023 20  5 - 25 mg/dL Final   • Creatinine 05/10/2023 1.06  0.60 - 1.30 mg/dL Final    Standardized to IDMS reference method   • Glucose, Fasting 05/10/2023 101 (H)  65 - 99 mg/dL Final    Specimen collection should occur prior to Sulfasalazine administration due to the potential for falsely depressed results.  Specimen collection should occur prior to Sulfapyridine administration due to the potential for falsely elevated results. • Calcium 05/10/2023 9.1  8.3 - 10.1 mg/dL Final   • AST 05/10/2023 16  5 - 45 U/L Final    Specimen collection should occur prior to Sulfasalazine administration due to the potential for falsely depressed results. • ALT 05/10/2023 40  12 - 78 U/L Final    Specimen collection should occur prior to Sulfasalazine and/or Sulfapyridine administration due to the potential for falsely depressed results. • Alkaline Phosphatase 05/10/2023 76  46 - 116 U/L Final   • Total Protein 05/10/2023 7.2  6.4 - 8.4 g/dL Final   • Albumin 05/10/2023 4.0  3.5 - 5.0 g/dL Final   • Total Bilirubin 05/10/2023 0.46  0.20 - 1.00 mg/dL Final    Use of this assay is not recommended for patients undergoing treatment with eltrombopag due to the potential for falsely elevated results. • eGFR 05/10/2023 71  ml/min/1.73sq m Final   • Cholesterol 05/10/2023 240 (H)  See Comment mg/dL Final    Cholesterol:         Pediatric <18 Years        Desirable          <170 mg/dL      Borderline High    170-199 mg/dL      High               >=200 mg/dL        Adult >=18 Years            Desirable         <200 mg/dL      Borderline High   200-239 mg/dL      High              >239 mg/dL     • Triglycerides 05/10/2023 69  See Comment mg/dL Final    Triglyceride:     0-9Y            <75mg/dL     10Y-17Y         <90 mg/dL       >=18Y     Normal          <150 mg/dL     Borderline High 150-199 mg/dL     High            200-499 mg/dL        Very High       >499 mg/dL    Specimen collection should occur prior to N-Acetylcysteine or Metamizole administration due to the potential for falsely depressed results. • HDL, Direct 05/10/2023 61  >=40 mg/dL Final    Specimen collection should occur prior to Metamizole administration due to the potential for falsley depressed results.    • LDL Calculated 05/10/2023 165 (H)  0 - 100 mg/dL Final    LDL Cholesterol:     Optimal           <100 mg/dl     Near Optimal      100-129 mg/dl     Above Optimal       Borderline High 130-159 mg/dl       High            160-189 mg/dl       Very High       >189 mg/dl         This screening LDL is a calculated result. It does not have the accuracy of the Direct Measured LDL in the monitoring of patients with hyperlipidemia and/or statin therapy. Direct Measure LDL (MGI128) must be ordered separately in these patients.    • Non-HDL-Chol (CHOL-HDL) 05/10/2023 179  mg/dl Final       Answers submitted by the patient for this visit:  AUDIT (Alcohol Use Disorders Identification Test) Screening (Submitted on 11/11/2023)  How often during the last year have you found that you were not able to stop drinking once you had started?: 0 - never  How often during the last year have you failed to do what was normally expected from you because of drinking?: 0 - never  How often during the last year have you needed a first drink in the morning to get yourself going after a heavy drinking session?: 0 - never  How often during the last year have you had a feeling of guilt or remorse after drinking?: 0 - never  How often during the last year have you been unable to remember what happened the night before because you had been drinking?: 0 - never  Have you or someone else been injured as a result of your drinking?: 0 - no  Has a relative or friend or a doctor or another health worker been concerned about your drinking or suggested you cut down?: 0 - no  Medicare Annual Wellness Visit (Submitted on 11/11/2023)  How would you rate your overall health?: good  Compared to last year, how is your physical health?: slightly better  In general, how satisfied are you with your life?: satisfied  Compared to last year, how is your eyesight?: same  Compared to last year, how is your hearing?: same  Compared to last year, how is your emotional/mental health?: same  How often is anger a problem for you?: never, rarely  How often do you feel unusually tired/fatigued?: sometimes  In the past 7 days, how much pain have you experienced?: some  If you answered "some" or "a lot", please rate the severity of your pain on a scale of 1 to 10 (1 being the least severe pain and 10 being the most intense pain). : 4/10  In the past 6 months, have you lost or gained 10 pounds without trying?: No  One or more falls in the last year: No  Do you have trouble with the stairs inside or outside your home?: No  Does your home have working smoke alarms?: Yes  Does your home have a carbon monoxide monitor?: Yes  Which safety hazards (if any) have you experienced in your home? Please select all that apply.: none  How would you describe your current diet?  Please select all that apply.: Regular  In addition to prescription medications, are you taking any over-the-counter supplements?: Yes  If yes, what supplements are you taking?: See my chart  Can you manage your medications?: Yes  Are you currently taking any opioid medications?: No  Can you walk and transfer into and out of your bed and chair?: Yes  Can you dress and groom yourself?: Yes  Can you bathe or shower yourself?: Yes  Can you feed yourself?: Yes  Can you do your laundry/ housekeeping?: Yes  Can you manage your money, pay your bills, and track your expenses?: Yes  Can you make your own meals?: Yes  Can you do your own shopping?: Yes  Within the last 12 months, have you had any hospitalizations or Emergency Department visits?: No  Do you have a living will?: Yes  Do you have a Durable POA (Power of ) for healthcare decisions?: Yes  Do you have an Advanced Directive for end of life decisions?: Yes  How often have you used an illegal drug (including marijuana) or a prescription medication for non-medical reasons in the past year?: never  What is the typical number of drinks you consume in a day?: 13  What is the typical number of drinks you consume in a week?: 7  How often did you have a drink containing alcohol in the past year?: 4 or more times a week  How many drinks did you have on a typical day  when you were drinking in the past year?: 1 to 2  How often did you have 6 or more drinks on one occasion in the past year?: never

## 2023-11-29 DIAGNOSIS — M47.816 SPONDYLOSIS OF LUMBAR REGION WITHOUT MYELOPATHY OR RADICULOPATHY: ICD-10-CM

## 2023-11-29 RX ORDER — TIZANIDINE 2 MG/1
2 TABLET ORAL 2 TIMES DAILY PRN
Qty: 60 TABLET | Refills: 0 | Status: SHIPPED | OUTPATIENT
Start: 2023-11-29 | End: 2023-12-05 | Stop reason: SDUPTHER

## 2023-11-29 NOTE — TELEPHONE ENCOUNTER
Will provide a 1 month RX. Patient will need a f/u OV for furthe refills or can request from PCP if he does not feel like he needs to continue to follow with our office.

## 2023-11-29 NOTE — TELEPHONE ENCOUNTER
Reason for call:   [x] Refill   [] Prior Auth  [] Other:     Office:   [] PCP/Provider -   [x] Specialty/Provider -ortho     Medication:     tiZANidine (ZANAFLEX)        Dose/Frequency:     2 mg, Oral, 2 times daily PRN       Quantity: 60    Pharmacy:  [address] [place], [place] [address] [address]     Does the patient have enough for 3 days?    [x] Yes   [] No - Send as HP to POD

## 2023-12-04 NOTE — PROGRESS NOTES
Assessment:  1. Lumbar radiculopathy    2. Spondylosis of lumbar region without myelopathy or radiculopathy    3. DDD (degenerative disc disease), lumbar    4. Lumbar spondylosis        Plan:  Patient may continue tizanidine as prescribed. This medication was refilled today  Will avoid NSAIDs secondary to anticoagulation  Patient may continue tylenol PRN and should not exceed more than 4000mg in 24 hours  Follow up as needed at this time    History of Present Illness: The patient is a 77 y.o. male last seen on 1/30/2023 who presents for a follow up office visit in regards to chronic low back pain that radiates into the right lower extremity patient denies bowel or bladder incontinence or saddle anesthesia. Patient offers no new complaints today. Leroy Meehan He did have a bilateral L5 TFESI in April 2023 with some relief. He continues with his home exercise program and tizanidine as needed. He rates his pain a 1-2 out of 10 on the numeric pain rating scale. His pain is constant in the morning and at night and it is described as dull aching, numbness and pins-and-needles    I have personally reviewed and/or updated the patient's past medical history, past surgical history, family history, social history, current medications, allergies, and vital signs today.        Review of Systems:    Review of Systems      Past Medical History:   Diagnosis Date    A-fib [facility])     Allergic     Allergic rhinitis     Arthritis     Benign cyst of kidney     Cancer ([facility]) 2015    Cat bite 07/08/2020    Cerebral vascular disease     Chronic sinusitis     Coronary artery disease     Cryptogenic stroke ([facility]) 03/02/2020    Hyperlipidemia     Kidney stone     Melanoma ([facility])     Pancreas cyst     Stroke [facility]) 2006       Past Surgical History:   Procedure Laterality Date    COLONOSCOPY      OTHER SURGICAL HISTORY  2003    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    PATENT FORAMEN OVALE CLOSURE         Family History   Problem Relation Age of Onset Diabetes type II Mother     Cancer Father         angioma    Cancer Sister     Hearing loss Sister        Social History     Occupational History    Occupation: massage therapist   Tobacco Use    Smoking status: Former     Types: Cigars     Start date:      Quit date:      Years since quittin.9    Smokeless tobacco: Former     Types: Chew     Quit date:     Tobacco comments:     Cigars    Vaping Use    Vaping Use: Never used   Substance and Sexual Activity    Alcohol use: Yes     Alcohol/week: 1.0 standard drink of alcohol     Types: 1 Glasses of wine per week     Comment: 8 oz.  Daily    Drug use: Not Currently     Types: Marijuana    Sexual activity: Yes     Partners: Female     Birth control/protection: Male Sterilization         Current Outpatient Medications:     ascorbic acid (VITAMIN C) 250 mg tablet, Take 250 mg by mouth 2 (two) times a day, Disp: , Rfl:     clobetasol (TEMOVATE) 0.05 % cream, Apply 1 application topically 2 (two) times a day as needed Apply sparingly to affected areas, Disp: , Rfl:     Coenzyme Q10 (COQ10) 200 MG CAPS, Take 1 capsule by mouth daily, Disp: , Rfl:     colesevelam (WELCHOL) 625 mg tablet, Take 2 tablets (1,250 mg total) by mouth 2 (two) times a day with meals, Disp: 360 tablet, Rfl: 1    diltiazem (CARDIZEM) 30 mg tablet, Take 1 tablet (30 mg total) by mouth if needed (afib), Disp: 15 tablet, Rfl: 3    Eliquis 5 MG, TAKE ONE TABLET BY MOUTH TWICE A DAY, Disp: 180 tablet, Rfl: 3    ezetimibe (ZETIA) 10 mg tablet, Take 1 tablet (10 mg total) by mouth daily, Disp: 90 tablet, Rfl: 1    Glucosamine-Chondroit-Vit C-Mn (GLUCOSAMINE CHONDR 1500 COMPLX PO), Take 1 tablet by mouth 2 (two) times a day , Disp: , Rfl:     hyoscyamine (ANASPAZ) 0.125 mg, Take 0.125 mg by mouth 2 (two) times a day , Disp: , Rfl:     montelukast (SINGULAIR) 10 mg tablet, Take 1 tablet (10 mg total) by mouth daily, Disp: 90 tablet, Rfl: 1    multivitamin (THERAGRAN) TABS, Take 1 tablet by mouth 2 (two) times a day  , Disp: , Rfl:     NON FORMULARY, CBD CAPSULE  AND CREAM USE DAILY PRIN, Disp: , Rfl:     OXcarbazepine (TRILEPTAL) 300 mg tablet, Take 1 tablet (300 mg total) by mouth 2 (two) times a day, Disp: 180 tablet, Rfl: 1    tiZANidine (ZANAFLEX) 2 mg tablet, Take 1 tablet (2 mg total) by mouth 2 (two) times a day as needed for muscle spasms, Disp: 180 tablet, Rfl: 1    chlorpheniramine (CHLOR-TRIMETON) 4 MG tablet, Take 1 tablet (4 mg total) by mouth every 6 (six) hours as needed for rhinitis (Patient not taking: Reported on 11/15/2023), Disp: 30 tablet, Rfl: 0    Allergies   Allergen Reactions    Augmentin [Amoxicillin-Pot Clavulanate] Hives and Itching    Molds & Smuts Allergic Rhinitis     Category: Allergy;     Monascus Purpureus Went Yeast Fatigue     Category: Adverse Reaction; Niacin Fatigue     Category: Adverse Reaction;     Pollen Extract Allergic Rhinitis     Category: Allergy;     Pregabalin Fatigue     Category: Adverse Reaction;     Statins Fatigue and GI Intolerance     Category: Adverse Reaction; Atorvastatin GI Intolerance and Fatigue     Other reaction(s): Muscle pain, constipation, sleepiness  Category: Adverse Reaction; Other reaction(s): Muscle pain, constipation, sleepiness       Physical Exam:    /72   Pulse 60   Ht 5' 7" (1.702 m)   Wt 99.3 kg (219 lb)   BMI 34.30 kg/m²     Constitutional:normal, well developed, well nourished, alert, in no distress and non-toxic and no overt pain behavior. Eyes:anicteric  HEENT:grossly intact  Neck:supple, symmetric, trachea midline and no masses   Pulmonary:even and unlabored  Cardiovascular:No edema or pitting edema present  Skin:Normal without rashes or lesions and well hydrated  Psychiatric:Mood and affect appropriate  Neurologic:Cranial Nerves II-XII grossly intact  Musculoskeletal:normal gait      Imaging  No orders to display         No orders of the defined types were placed in this encounter.

## 2023-12-05 ENCOUNTER — OFFICE VISIT (OUTPATIENT)
Dept: PAIN MEDICINE | Facility: CLINIC | Age: 68
End: 2023-12-05
Payer: COMMERCIAL

## 2023-12-05 ENCOUNTER — APPOINTMENT (OUTPATIENT)
Dept: LAB | Facility: CLINIC | Age: 68
End: 2023-12-05
Payer: COMMERCIAL

## 2023-12-05 VITALS
BODY MASS INDEX: 34.37 KG/M2 | DIASTOLIC BLOOD PRESSURE: 72 MMHG | SYSTOLIC BLOOD PRESSURE: 109 MMHG | WEIGHT: 219 LBS | HEIGHT: 67 IN | HEART RATE: 60 BPM

## 2023-12-05 DIAGNOSIS — M47.816 LUMBAR SPONDYLOSIS: ICD-10-CM

## 2023-12-05 DIAGNOSIS — M51.36 DDD (DEGENERATIVE DISC DISEASE), LUMBAR: ICD-10-CM

## 2023-12-05 DIAGNOSIS — R41.3 MEMORY LOSS: ICD-10-CM

## 2023-12-05 DIAGNOSIS — M54.16 LUMBAR RADICULOPATHY: Primary | ICD-10-CM

## 2023-12-05 DIAGNOSIS — M47.816 SPONDYLOSIS OF LUMBAR REGION WITHOUT MYELOPATHY OR RADICULOPATHY: ICD-10-CM

## 2023-12-05 LAB
FOLATE SERPL-MCNC: 21.4 NG/ML
TSH SERPL DL<=0.05 MIU/L-ACNC: 1.85 UIU/ML (ref 0.45–4.5)
VIT B12 SERPL-MCNC: 638 PG/ML (ref 180–914)

## 2023-12-05 PROCEDURE — 82746 ASSAY OF FOLIC ACID SERUM: CPT

## 2023-12-05 PROCEDURE — 36415 COLL VENOUS BLD VENIPUNCTURE: CPT

## 2023-12-05 PROCEDURE — 99214 OFFICE O/P EST MOD 30 MIN: CPT | Performed by: NURSE PRACTITIONER

## 2023-12-05 PROCEDURE — 84443 ASSAY THYROID STIM HORMONE: CPT

## 2023-12-05 PROCEDURE — 82607 VITAMIN B-12: CPT

## 2023-12-05 RX ORDER — TIZANIDINE 2 MG/1
2 TABLET ORAL 2 TIMES DAILY PRN
Qty: 180 TABLET | Refills: 1 | Status: SHIPPED | OUTPATIENT
Start: 2023-12-05

## 2023-12-07 ENCOUNTER — TELEPHONE (OUTPATIENT)
Dept: NEUROLOGY | Facility: CLINIC | Age: 68
End: 2023-12-07

## 2023-12-07 NOTE — TELEPHONE ENCOUNTER
----- Message from Robson Gómez DO sent at 12/6/2023  7:40 AM EST -----  Please let the patient know that his labs are normal. Thank you!     Sincerely,  Grecia Foster    ----- Message -----  From: Lab, Background User  Sent: 12/5/2023  11:56 AM EST  To: Robson Gómez DO

## 2024-01-11 ENCOUNTER — OFFICE VISIT (OUTPATIENT)
Dept: CARDIOLOGY CLINIC | Facility: CLINIC | Age: 69
End: 2024-01-11
Payer: COMMERCIAL

## 2024-01-11 VITALS
SYSTOLIC BLOOD PRESSURE: 102 MMHG | HEIGHT: 67 IN | DIASTOLIC BLOOD PRESSURE: 90 MMHG | HEART RATE: 57 BPM | BODY MASS INDEX: 34.45 KG/M2 | WEIGHT: 219.5 LBS

## 2024-01-11 DIAGNOSIS — I48.0 PAROXYSMAL ATRIAL FIBRILLATION (HCC): Primary | ICD-10-CM

## 2024-01-11 PROCEDURE — 99214 OFFICE O/P EST MOD 30 MIN: CPT | Performed by: INTERNAL MEDICINE

## 2024-01-11 PROCEDURE — 93000 ELECTROCARDIOGRAM COMPLETE: CPT | Performed by: INTERNAL MEDICINE

## 2024-01-11 NOTE — PROGRESS NOTES
HEART AND VASCULAR  CARDIAC ELECTROPHYSIOLOGY   HEART RHYTHM CENTER  [facility]    Outpatient  Follow-up  Today's Date: 01/11/24        Patient name: George Ramirez  YOB: 1955  Sex: male         Chief Complaint: f/u afib      ASSESSMENT:  Problem List Items Addressed This Visit          Cardiovascular and Mediastinum    Paroxysmal atrial fibrillation (HCC) - Primary    Relevant Orders    POCT ECG     67 yo male  1) F/u for Paroxysmal afib. Rare episdoes. 2 episodes about 2 hours each in past year, didn't take dilt prn due to low BP. First diagnosed 2006 after PFO closure (percutaneous) poorly controlled on propafenone so went on to amiodarone for about 5 months. He went all the way until May 2015 w/o episode when he felt it again, frequent episodes usually lasting minutes. Finally went to ER July 10 2015 and was in afib, started on Diltiazem gtt and converted.   FWLUN6Domo=8 (CVA, age, CAD) on Eliquis    2) Stable CAD, s/p Cath 2017 after CP. 50% mid LAD just distal to prior stent, FFR neg. CP better. Mild. He had Stent to LAD in 2011 at [facility]. Can't tolerate statins LDL improved    3)  H/o percutaenous PFO closure4) H/o CVA insular, this is what lead to PFO closure. He has minimal residual defecits. 5) Intolerant to statins, tried 5 of them.       PLAN:  1. Cont ELiquis    Follow up in: 12 months    Orders Placed This Encounter   Procedures    POCT ECG     There are no discontinued medications.        .............................................................................................    HPI/Subjective:   67 yo male  1) F/u for Paroxysmal afib. Rare episdoes. 2 episodes about 2 hours each in past year, didn't take dilt prn due to low BP. First diagnosed 2006 after PFO closure (percutaneous) poorly controlled on propafenone so went on to amiodarone for about 5 months. He went all the way until May 2015 w/o episode when he felt it again, frequent episodes usually lasting  minutes. Finally went to ER July 10 2015 and was in afib, started on Diltiazem gtt and converted.   LCJIT4Ofej=7 (CVA, age, CAD) on Eliquis    2) Stable CAD, s/p Cath 2017 after CP. 50% mid LAD just distal to prior stent, FFR neg. CP better. Mild. He [facility] St[facility] LAD [facility]. Can't tolerate statins LDL improved    3)  H/o percutaenous PFO closure4) H/o CVA insular, this is what lead to PFO closure. He has minimal residual defecits. 5) Intolerant to statins, tried 5 of them.         Past Medical History:   Diagnosis Date    A-fib (HCC)     Allergic     Allergic rhinitis     Arthritis     Benign cyst of kidney     Cancer (HCC) 2015    Cat bite 07/08/2020    Cerebral vascular disease     Chronic sinusitis     Coronary artery disease     Cryptogenic stroke ([facility]) 03/02/2020    Hyperlipidemia     Kidney stone     Melanoma (HCC)     Pancreas cyst     Stroke ([facility]) 2006       Allergies   Allergen Reactions    Augmentin [Amoxicillin-Pot Clavulanate] Hives and Itching    Molds & Smuts Allergic Rhinitis     Category: Allergy;     Monascus Purpureus Went Yeast Fatigue     Category: Adverse Reaction;     Niacin Fatigue     Category: Adverse Reaction;     Pollen Extract Allergic Rhinitis     Category: Allergy;     Pregabalin Fatigue     Category: Adverse Reaction;     Statins Fatigue and GI Intolerance     Category: Adverse Reaction;     Atorvastatin GI Intolerance and Fatigue     Other reaction(s): Muscle pain, constipation, sleepiness  Category: Adverse Reaction;   Other reaction(s): Muscle pain, constipation, sleepiness     I reviewed the Home Medication list and Allergies in the chart.   Scheduled Meds:  Current Outpatient Medications   Medication Sig Dispense Refill    ascorbic acid (VITAMIN C) 250 mg tablet Take 250 mg by mouth 2 (two) times a day      chlorpheniramine (CHLOR-TRIMETON) 4 MG tablet Take 1 tablet (4 mg total) by mouth every 6 (six) hours as needed for rhinitis 30 tablet 0    clobetasol (TEMOVATE) 0.05  % cream Apply 1 application topically 2 (two) times a day as needed Apply sparingly to affected areas      Coenzyme Q10 (COQ10) 200 MG CAPS Take 1 capsule by mouth daily      colesevelam (WELCHOL) 625 mg tablet Take 2 tablets (1,250 mg total) by mouth 2 (two) times a day with meals 360 tablet 1    diltiazem (CARDIZEM) 30 mg tablet Take 1 tablet (30 mg total) by mouth if needed (afib) 15 tablet 3    Eliquis 5 MG TAKE ONE TABLET BY MOUTH TWICE A  tablet 3    ezetimibe (ZETIA) 10 mg tablet Take 1 tablet (10 mg total) by mouth daily 90 tablet 1    Glucosamine-Chondroit-Vit C-Mn (GLUCOSAMINE CHONDR 1500 COMPLX PO) Take 1 tablet by mouth 2 (two) times a day       hyoscyamine (ANASPAZ) 0.125 mg Take 0.125 mg by mouth 2 (two) times a day       multivitamin (THERAGRAN) TABS Take 1 tablet by mouth 2 (two) times a day        NON FORMULARY CBD CAPSULE  AND CREAM USE DAILY PRIN      OXcarbazepine (TRILEPTAL) 300 mg tablet Take 1 tablet (300 mg total) by mouth 2 (two) times a day 180 tablet 1    tiZANidine (ZANAFLEX) 2 mg tablet Take 1 tablet (2 mg total) by mouth 2 (two) times a day as needed for muscle spasms 180 tablet 1    montelukast (SINGULAIR) 10 mg tablet Take 1 tablet (10 mg total) by mouth daily 90 tablet 1     No current facility-administered medications for this visit.     PRN Meds:.        Family History   Problem Relation Age of Onset    Diabetes type II Mother     Cancer Father         angioma    Cancer Sister     Hearing loss Sister        Social History     Socioeconomic History    Marital status: /Civil Union     Spouse name: Not on file    Number of children: Not on file    Years of education: Not on file    Highest education level: Not on file   Occupational History    Occupation: massage therapist   Tobacco Use    Smoking status: Former     Types: Cigars     Start date:      Quit date:      Years since quittin.0    Smokeless tobacco: Former     Types: Chew     Quit date:      "Tobacco comments:     Cigars    Vaping Use    Vaping status: Never Used   Substance and Sexual Activity    Alcohol use: Yes     Alcohol/week: 1.0 standard drink of alcohol     Types: 1 Glasses of wine per week     Comment: 8 oz. Daily    Drug use: Not Currently     Types: Marijuana    Sexual activity: Yes     Partners: Female     Birth control/protection: Male Sterilization   Other Topics Concern    Not on file   Social History Narrative    Caffeine: drinks 1 cup coffee/ tea a day.     Social Determinants of Health     Financial Resource Strain: Low Risk  (11/11/2023)    Overall Financial Resource Strain (CARDIA)     Difficulty of Paying Living Expenses: Not hard at all   Food Insecurity: Not on file   Transportation Needs: No Transportation Needs (11/11/2023)    PRAPARE - Transportation     Lack of Transportation (Medical): No     Lack of Transportation (Non-Medical): No   Physical Activity: Not on file   Stress: Not on file   Social Connections: Not on file   Intimate Partner Violence: Not on file   Housing Stability: Not on file         OBJECTIVE:    /90 (BP Location: Left arm, Patient Position: Sitting, Cuff Size: Large)   Pulse 57   Ht 5' 7\" (1.702 m)   Wt 99.6 kg (219 lb 8 oz)   BMI 34.38 kg/m²   Vitals:    01/11/24 0759   Weight: 99.6 kg (219 lb 8 oz)       GEN: Now acute distress, Alert and oriented, well appearing  HEENT:Head, neck, ears, oral pharynx: Mucus membranes moist, oral pharynx clear, nares clear. External ears normal  EYES: Pupils equal, sclera anicteric, midline, normal conjuctiva  NECK: No JVD, supple, no obvious masses or thryomegaly or goiter  CARDIOVASCULAR: RRR, No murmur, rub, gallops S1,S2  LUNGS: Clear To auscultation bilaterally, normal effort, no rales, rhonchi, crackles  ABDOMEN: Soft, nondistended, nontender, without obvious organomegaly or ascites  EXTREMITIES/VASCULAR: No edema. Radial pulses intact, pedal pulses difficult to palpate, warm an well perfused.  PSYCH: Normal " "Affect, no overt suicidal ideation, linear speech pattern without evidence of psychosis.   NEURO: Grossly intact, moving all extremiteis equal, face symmetric, alert and responsive, no obvious focal defecits  HEME: No bleeding, bruising, petechia, purpura  SKIN: No significant rashes, warm, no diaphoresis or pallor.       Lab Results:       LABS:      Chemistry        Component Value Date/Time     2016 0740    K 4.5 2023 0917    K 4.3 2016 0740     2023 0917     2016 0740    CO2 28 2023 0917    CO2 26 2016 0740    BUN 17 2023 0917    BUN 18 2016 0740    CREATININE 0.88 2023 0917    CREATININE 0.91 2016 0740        Component Value Date/Time    CALCIUM 9.4 2023 0917    CALCIUM 9.0 2016 0740    ALKPHOS 70 2023 0917    ALKPHOS 78 2016 0740    AST 24 2023 0917    AST 22 2016 0740    ALT 28 2023 0917    ALT 24 2016 0740    BILITOT 0.3 2016 0740            Lab Results   Component Value Date    CHOL 197 2016    CHOL 216 2015    CHOL 207 2014     Lab Results   Component Value Date    HDL 63 2023    HDL 61 05/10/2023    HDL 61 10/13/2022     Lab Results   Component Value Date    LDLCALC 140 (H) 2023    LDLCALC 165 (H) 05/10/2023    LDLCALC 155 (H) 10/13/2022     Lab Results   Component Value Date    TRIG 82 2023    TRIG 69 05/10/2023    TRIG 77 10/13/2022     No results found for: \"CHOLHDL\"    IMAGING: No results found.     Cardiac testing:   Results for orders placed during the hospital encounter of 17   Echo complete with contrast if indicated    Narrative [address] [address] [phone]    Transthoracic Echocardiogram  2D, M-mode, Doppler, and Color Doppler    Study date:  09-Mar-2017    Patient: KOREY MODESTA  MR number: [id number]  Account number: [id number]  : " 1955  Age: 61 years  Gender: Male  Status: Outpatient  Location: Echo lab  Height: 68 in  Weight: 194.7 lb  BP: 122/ 70 mmHg    Indications: Cardiac murmur; Alpha 1 antitrypsin deficiency    Diagnoses: R01.1 - Cardiac murmur, unspecified    Sonographer:  FRANCES Aj, RDCS  Primary Physician:  Asad Molina MD  Referring Physician:  Vito Bernal MD  Group:  [facility]  Interpreting Physician:  Regis Sher MD    SUMMARY    LEFT VENTRICLE:  Systolic function was normal. Ejection fraction was estimated to be 66 %.  There were no regional wall motion abnormalities.    ATRIAL SEPTUM:  There was a closure device in place.    MITRAL VALVE:  There was trace regurgitation.    TRICUSPID VALVE:  There was trace regurgitation.    PULMONIC VALVE:  There was trace regurgitation.    HISTORY: PRIOR HISTORY: Angina; Atrial fibrillation; Coronary artery disease; Chest pain; Shortness of breath; PFO closure with cardioseal    PROCEDURE: The procedure was performed in the echo lab. This was a routine study. The transthoracic approach was used. The study included complete 2D imaging, M-mode, complete spectral Doppler, and color Doppler. The heart rate was 66 bpm,  at the start of the study. Images were obtained from the parasternal, apical, subcostal, and suprasternal notch acoustic windows. Image quality was adequate.    LEFT VENTRICLE: Size was normal. Systolic function was normal. Ejection fraction was estimated to be 66 %. There were no regional wall motion abnormalities. Wall thickness was normal. There was no evidence of concentric hypertrophy.  DOPPLER: Left ventricular diastolic function parameters were normal.    RIGHT VENTRICLE: The size was normal. Systolic function was normal. Wall thickness was normal.    LEFT ATRIUM: Size was normal.    ATRIAL SEPTUM: There was a closure device in place.    RIGHT ATRIUM: Size was normal.    MITRAL VALVE: Valve structure was normal. There  was normal leaflet separation. DOPPLER: The transmitral velocity was within the normal range. There was no evidence for stenosis. There was trace regurgitation.    AORTIC VALVE: The valve was trileaflet. Leaflets exhibited normal thickness and normal cuspal separation. DOPPLER: Transaortic velocity was within the normal range. There was no evidence for stenosis. There was no regurgitation.    TRICUSPID VALVE: The valve structure was normal. There was normal leaflet separation. DOPPLER: The transtricuspid velocity was within the normal range. There was no evidence for stenosis. There was trace regurgitation. Pulmonary artery  systolic pressure was within the normal range.    PULMONIC VALVE: Leaflets exhibited normal thickness, no calcification, and normal cuspal separation. DOPPLER: The transpulmonic velocity was within the normal range. There was trace regurgitation.    PERICARDIUM: There was no pericardial effusion. The pericardium was normal in appearance.    AORTA: The root exhibited normal size.    SYSTEM MEASUREMENT TABLES    2D  %FS: 32.64 %  Ao Diam: 3.26 cm  EDV(Teich): 63.83 ml  EF(Cube): 69.43 %  EF(Teich): 61.74 %  ESV(Cube): 17.42 ml  ESV(Teich): 24.42 ml  IVSd: 1.29 cm  LA Area: 14.5 cm2  LA Diam: 2.97 cm  LVEDV MOD A4C: 74.15 ml  LVEF MOD A4C: 76.28 %  LVESV MOD A4C: 17.59 ml  LVIDd: 3.85 cm  LVIDs: 2.59 cm  LVLd A4C: 8.41 cm  LVLs A4C: 6.28 cm  LVPWd: 1.06 cm  RA Area: 15.47 cm2  SI(Cube): 19.58 ml/m2  SI(Teich): 19.51 ml/m2  SV MOD A4C: 56.57 ml  SV(Cube): 39.55 ml  SV(Teich): 39.41 ml  rv diam: 3.12 cm    CW  TR Vmax: 2.36 m/s  TR maxP.32 mmHg    MM  TAPSE: 3.1 cm    PW  E': 0.08 m/s  E/E': 7.1  MV A Marcio: 0.59 m/s  MV Dec Nance: 2.22 m/s2  MV DecT: 270.24 ms  MV E Marcio: 0.6 m/s  MV E/A Ratio: 1.02    [employer] Accredited Echocardiography Laboratory    Prepared and electronically signed by    Regis Sher MD  Signed 09-Mar-2017 19:10:14       No results found for this or any  previous visit.  No results found for this or any previous visit.  No results found for this or any previous visit.        I reviewed and interpreted the following LABS/EKG/TELE/IMAGING and below is summary of my interpretation (if data available):    LABS:  HDL is 63    Current EKG and Rhythm Strip: NSR normal EKG

## 2024-01-12 ENCOUNTER — TELEPHONE (OUTPATIENT)
Age: 69
End: 2024-01-12

## 2024-01-12 DIAGNOSIS — N52.9 ERECTILE DYSFUNCTION, UNSPECIFIED ERECTILE DYSFUNCTION TYPE: Primary | ICD-10-CM

## 2024-01-12 RX ORDER — SILDENAFIL CITRATE 20 MG/1
TABLET ORAL
Qty: 30 TABLET | Refills: 3 | Status: SHIPPED | OUTPATIENT
Start: 2024-01-12

## 2024-01-14 PROBLEM — Z00.00 MEDICARE ANNUAL WELLNESS VISIT, SUBSEQUENT: Status: RESOLVED | Noted: 2019-08-28 | Resolved: 2024-01-14

## 2024-02-12 ENCOUNTER — HOSPITAL ENCOUNTER (INPATIENT)
Facility: HOSPITAL | Age: 69
LOS: 1 days | Discharge: HOME | DRG: 322 | End: 2024-02-14
Attending: EMERGENCY MEDICINE | Admitting: INTERNAL MEDICINE
Payer: COMMERCIAL

## 2024-02-12 ENCOUNTER — APPOINTMENT (EMERGENCY)
Dept: RADIOLOGY | Facility: HOSPITAL | Age: 69
DRG: 322 | End: 2024-02-12
Payer: COMMERCIAL

## 2024-02-12 ENCOUNTER — TELEPHONE (OUTPATIENT)
Dept: CARDIOLOGY CLINIC | Facility: CLINIC | Age: 69
End: 2024-02-12

## 2024-02-12 DIAGNOSIS — R07.9 CHEST PAIN, UNSPECIFIED TYPE: ICD-10-CM

## 2024-02-12 DIAGNOSIS — I48.0 PAROXYSMAL ATRIAL FIBRILLATION (HCC): Primary | ICD-10-CM

## 2024-02-12 DIAGNOSIS — I25.10 CORONARY ARTERY DISEASE INVOLVING NATIVE CORONARY ARTERY OF NATIVE HEART WITHOUT ANGINA PECTORIS: ICD-10-CM

## 2024-02-12 DIAGNOSIS — R07.9 CHEST PAIN: Primary | ICD-10-CM

## 2024-02-12 LAB
2HR DELTA HS TROPONIN: -1 NG/L
4HR DELTA HS TROPONIN: 0 NG/L
ALBUMIN SERPL BCP-MCNC: 4.6 G/DL (ref 3.5–5)
ALP SERPL-CCNC: 74 U/L (ref 34–104)
ALT SERPL W P-5'-P-CCNC: 45 U/L (ref 7–52)
ANION GAP SERPL CALCULATED.3IONS-SCNC: 6 MMOL/L
APTT PPP: 28 SECONDS (ref 23–37)
AST SERPL W P-5'-P-CCNC: 33 U/L (ref 13–39)
BASOPHILS # BLD AUTO: 0.03 THOUSANDS/ÂΜL (ref 0–0.1)
BASOPHILS NFR BLD AUTO: 1 % (ref 0–1)
BILIRUB SERPL-MCNC: 0.41 MG/DL (ref 0.2–1)
BUN SERPL-MCNC: 18 MG/DL (ref 5–25)
CALCIUM SERPL-MCNC: 9.2 MG/DL (ref 8.4–10.2)
CARDIAC TROPONIN I PNL SERPL HS: 4 NG/L
CARDIAC TROPONIN I PNL SERPL HS: 5 NG/L
CARDIAC TROPONIN I PNL SERPL HS: 5 NG/L
CHLORIDE SERPL-SCNC: 104 MMOL/L (ref 96–108)
CO2 SERPL-SCNC: 27 MMOL/L (ref 21–32)
CREAT SERPL-MCNC: 0.98 MG/DL (ref 0.6–1.3)
EOSINOPHIL # BLD AUTO: 0.09 THOUSAND/ÂΜL (ref 0–0.61)
EOSINOPHIL NFR BLD AUTO: 1 % (ref 0–6)
ERYTHROCYTE [DISTWIDTH] IN BLOOD BY AUTOMATED COUNT: 12.5 % (ref 11.6–15.1)
GFR SERPL CREATININE-BSD FRML MDRD: 77 ML/MIN/1.73SQ M
GLUCOSE SERPL-MCNC: 90 MG/DL (ref 65–140)
HCT VFR BLD AUTO: 46.6 % (ref 36.5–49.3)
HGB BLD-MCNC: 15.4 G/DL (ref 12–17)
IMM GRANULOCYTES # BLD AUTO: 0.02 THOUSAND/UL (ref 0–0.2)
IMM GRANULOCYTES NFR BLD AUTO: 0 % (ref 0–2)
INR PPP: 1.08 (ref 0.84–1.19)
LYMPHOCYTES # BLD AUTO: 1.85 THOUSANDS/ÂΜL (ref 0.6–4.47)
LYMPHOCYTES NFR BLD AUTO: 30 % (ref 14–44)
MCH RBC QN AUTO: 32.2 PG (ref 26.8–34.3)
MCHC RBC AUTO-ENTMCNC: 33 G/DL (ref 31.4–37.4)
MCV RBC AUTO: 97 FL (ref 82–98)
MONOCYTES # BLD AUTO: 0.47 THOUSAND/ÂΜL (ref 0.17–1.22)
MONOCYTES NFR BLD AUTO: 8 % (ref 4–12)
NEUTROPHILS # BLD AUTO: 3.77 THOUSANDS/ÂΜL (ref 1.85–7.62)
NEUTS SEG NFR BLD AUTO: 60 % (ref 43–75)
NRBC BLD AUTO-RTO: 0 /100 WBCS
PLATELET # BLD AUTO: 232 THOUSANDS/UL (ref 149–390)
PMV BLD AUTO: 9.3 FL (ref 8.9–12.7)
POTASSIUM SERPL-SCNC: 4.4 MMOL/L (ref 3.5–5.3)
PROT SERPL-MCNC: 7.3 G/DL (ref 6.4–8.4)
PROTHROMBIN TIME: 13.9 SECONDS (ref 11.6–14.5)
RBC # BLD AUTO: 4.79 MILLION/UL (ref 3.88–5.62)
SODIUM SERPL-SCNC: 137 MMOL/L (ref 135–147)
WBC # BLD AUTO: 6.23 THOUSAND/UL (ref 4.31–10.16)

## 2024-02-12 PROCEDURE — 93005 ELECTROCARDIOGRAM TRACING: CPT

## 2024-02-12 PROCEDURE — 85025 COMPLETE CBC W/AUTO DIFF WBC: CPT

## 2024-02-12 PROCEDURE — NC001 PR NO CHARGE: Performed by: INTERNAL MEDICINE

## 2024-02-12 PROCEDURE — 36415 COLL VENOUS BLD VENIPUNCTURE: CPT

## 2024-02-12 PROCEDURE — 99285 EMERGENCY DEPT VISIT HI MDM: CPT | Performed by: EMERGENCY MEDICINE

## 2024-02-12 PROCEDURE — 80053 COMPREHEN METABOLIC PANEL: CPT

## 2024-02-12 PROCEDURE — 85610 PROTHROMBIN TIME: CPT

## 2024-02-12 PROCEDURE — 99223 1ST HOSP IP/OBS HIGH 75: CPT | Performed by: INTERNAL MEDICINE

## 2024-02-12 PROCEDURE — 84484 ASSAY OF TROPONIN QUANT: CPT

## 2024-02-12 PROCEDURE — 85730 THROMBOPLASTIN TIME PARTIAL: CPT

## 2024-02-12 PROCEDURE — 71045 X-RAY EXAM CHEST 1 VIEW: CPT

## 2024-02-12 PROCEDURE — 99285 EMERGENCY DEPT VISIT HI MDM: CPT

## 2024-02-12 RX ORDER — EZETIMIBE 10 MG/1
10 TABLET ORAL DAILY
Status: DISCONTINUED | OUTPATIENT
Start: 2024-02-13 | End: 2024-02-14 | Stop reason: HOSPADM

## 2024-02-12 RX ORDER — HEPARIN SODIUM 10000 [USP'U]/100ML
3-20 INJECTION, SOLUTION INTRAVENOUS
Status: DISCONTINUED | OUTPATIENT
Start: 2024-02-12 | End: 2024-02-13

## 2024-02-12 RX ORDER — MONTELUKAST SODIUM 10 MG/1
10 TABLET ORAL DAILY
Status: DISCONTINUED | OUTPATIENT
Start: 2024-02-13 | End: 2024-02-14 | Stop reason: HOSPADM

## 2024-02-12 RX ORDER — OXCARBAZEPINE 300 MG/1
300 TABLET, FILM COATED ORAL 2 TIMES DAILY
Status: DISCONTINUED | OUTPATIENT
Start: 2024-02-12 | End: 2024-02-14 | Stop reason: HOSPADM

## 2024-02-12 RX ORDER — COLESEVELAM 180 1/1
1250 TABLET ORAL 2 TIMES DAILY WITH MEALS
Status: DISCONTINUED | OUTPATIENT
Start: 2024-02-13 | End: 2024-02-14 | Stop reason: HOSPADM

## 2024-02-12 RX ORDER — HEPARIN SODIUM 1000 [USP'U]/ML
2000 INJECTION, SOLUTION INTRAVENOUS; SUBCUTANEOUS EVERY 6 HOURS PRN
Status: DISCONTINUED | OUTPATIENT
Start: 2024-02-12 | End: 2024-02-13

## 2024-02-12 RX ORDER — HEPARIN SODIUM 1000 [USP'U]/ML
4000 INJECTION, SOLUTION INTRAVENOUS; SUBCUTANEOUS EVERY 6 HOURS PRN
Status: DISCONTINUED | OUTPATIENT
Start: 2024-02-12 | End: 2024-02-13

## 2024-02-12 RX ADMIN — OXCARBAZEPINE 300 MG: 300 TABLET, FILM COATED ORAL at 21:42

## 2024-02-12 RX ADMIN — HEPARIN SODIUM 11.1 UNITS/KG/HR: 10000 INJECTION, SOLUTION INTRAVENOUS at 21:50

## 2024-02-12 RX ADMIN — HYOSCYAMINE SULFATE 0.12 MG: 0.12 TABLET SUBLINGUAL at 21:42

## 2024-02-12 NOTE — TELEPHONE ENCOUNTER
Pt called c/o daily chest pain that started on 02/04, sob w exertion, fatigue, and balance issues. He states he debated going to the ED on 02/04 as he went into afib and had left center chest pain and palps with /122 and . He took 2 diltiazem 30mg and it lowered his BP to 107/85 and HR to 67 but still felt the palps. On 02/05, his BP was 108/62 and no longer c/o palps. He also states he exercises daily for 45 mins and when he attempts to row for 250 meters, he experiences chest pain as well.     02/11 -   BP - 110/73  HR - 65    02/12 at 8:30 AM -  BP - 126/74  HR - 62    Please advise. Thank you!

## 2024-02-12 NOTE — ASSESSMENT & PLAN NOTE
68 y.o. male, history of CAD s/p stent in LAD in 2011, s/p stent in mid LAD just distal to prior stent, HLD, paroxysmal A-fib.  Patient presented to [facility] ED at the direction of his cardiologist for worsening pressure-like chest pain for the past two weeks.  Pain initially began on 2/4/2024 after an episode of A-fib that was resolved after the patient took PRN diltiazem.  The pain was initially intermittent/ every other day, but now the pain is daily.  The chest pain is worse with exertion and associated with lightheadedness and dyspnea.   In the ED, initial troponin was 5-> 4.  EKG, CMP, CBC were unremarkable.  Cardiology recommended admission for observation overnight.      Echo 2017 showed LVEF 66% with normal systolic function, normal wall thickness.    Exercise stress echo 2014 was negative for ischemia.  Asymptomatic at bedside.      Given normal EKG and non-concerning troponin levels, not likely to be STEMI, NSTEMI.  Could be stable angina since the chest pain and shortness of breath occur with exertion.     This admission:  Echo 2/13/24 -     Left Ventricle: Left ventricular cavity size is normal. Wall thickness is mildly increased. The left ventricular ejection fraction is 65%. Systolic function is normal. Wall motion is normal. Diastolic function is mildly abnormal, consistent with grade I (abnormal) relaxation.    Right Ventricle: Right ventricular cavity size is normal. Systolic function is normal.    Aorta: The aortic root is normal in size. The ascending aorta is mildly dilated. The aortic root exhibited mild fibrocalcific change. The aortic root is 3.60 cm. The ascending aorta is 3.7 cm.    Cath 2/13/24 -     Ost LM lesion is 20% stenosed.    Prox LAD lesion is 30% stenosed.    Mid LAD lesion is 80% stenosed.    Prox Cx lesion is 50% stenosed.    -Overnight 2/13 post-op, patient complained of L sided CP while laying in bed that was not exertional, not assoc w diaphoresis or n/v. Some  radiation to medial chest. It improved with a combination of APAP, 1x nitro 5 mg, and massage. The patient has been pain-free since and able to ambulate without recurrence of pain.     Plan:   - Monitor telemetry  - Cardiology consulted; s/p cardiac catheterization on 2/13/24 with 80% mid LAD stenosis noted, PCI performed  - Triple therapy for 2 weeks, stop ASA at that time and continue apixaban 5 mg BID and clopidogrel 75 mg qd  - Cardiac rehab  - Follow up with outpatient cardiology

## 2024-02-12 NOTE — PROGRESS NOTES
INTERNAL MEDICINE RESIDENCY SENIOR ADMISSION NOTE     Name: George Ramirez   Age & Sex: 68 y.o. male   MRN: [id number]  Unit/Bed#: 2 215-01   Encounter: [id number]  Primary Care Provider: Viviane Resendiz DO    Admit to team: SOD Team C     Patient seen and examined. Reviewed H&P per Ivanna Reynolds . Agree with the assessment and plan with any exception/addition as noted below:    Principal Problem:    Chest pain  Active Problems:    Hypercholesterolemia    Coronary artery disease involving native coronary artery    DDD (degenerative disc disease), lumbar    Paroxysmal atrial fibrillation (HCC)    Chest pain  pAfib on eliquis  Not reproducible. trops, EKG, electrolytes normal. On Eliquis so PE less likely. Pain seems to be exacerbated by exertion and brushing his teeth, associated with palpitations and SOB. Sent in by his cardiologist for workup  Will order echo, 24hr telemetry, A1c  Cardiology consulted  hold Eliquis for now (last dose morning of 2/12 ), start therapeutic heparin bid in case of plan for cath  NPO midnight       Code Status: Level 1 - Full Code  Admission Status: OBSERVATION  Disposition: Patient requires Med/Surg with Telemetry  Expected Length of Stay: <2 midnight

## 2024-02-12 NOTE — TELEPHONE ENCOUNTER
Spoke with pt per Dr. Bernal. He was advised to go to ED for MI and stress test. He will be heading there today.    Thank you!

## 2024-02-12 NOTE — TELEPHONE ENCOUNTER
Hi, this is Kristopher Chan, I've been experiencing  regular chest pain, little bit of shortness of breath as well. If someone could get back to me and let me know how to proceed, I'd appreciate it. [phone]. Thanks

## 2024-02-12 NOTE — ED ATTENDING ATTESTATION
2/12/2024  I, Isidoro Torres MD, saw and evaluated the patient. I have discussed the patient with the resident/non-physician practitioner and agree with the resident's/non-physician practitioner's findings, Plan of Care, and MDM as documented in the resident's/non-physician practitioner's note, except where noted. All available labs and Radiology studies were reviewed.  I was present for key portions of any procedure(s) performed by the resident/non-physician practitioner and I was immediately available to provide assistance.       At this point I agree with the current assessment done in the Emergency Department.  I have conducted an independent evaluation of this patient a history and physical is as follows:    ED Course         Critical Care Time  Procedures    69 yo male c/o chest pain for few weeks, sent in by cardiology. Pt with hx of cad, stents. Afib. Pt symptoms worse with exercise and exertion.  Pt with no sob, no radiation of pain, no n/v.  Vss, afebrile, lungs cta, rrr, abdomen soft nontender, no pedal edema, no calf tenderness.  Cardiac workup

## 2024-02-12 NOTE — ED PROVIDER NOTES
"History  Chief Complaint   Patient presents with    Chest Pain     Pt c/o intermittent chest pain since 2/4, pt describes it as \"crampy\". Hx of left anterior descending stent, a-fib. Referred to ED by cardiologist     68-year-old male with history of CAD, status post stenting in 2011 with reported 50% distal disease on reevaluation at that time, presents emergency department due to progressively worsening chest pains over the last 2 weeks.  He also has a history of A-fib and reports that noticing an episode where his heart rate was in the 140s about 2 weeks ago, he took 2 doses of his home Cardizem and it returned to normal.  Since that time he has been using his home Rowing machine for exercise and noticed that he has worsening chest pains when he tries to go longer distances, become short of breath, and has to stop.  He is also noticed some discomfort when he is walking.  His pain radiates into his shoulder.  He talked with his cardiologist who recommended he come to emergency department for evaluation.  His pain is almost daily at this point and is currently present in the mid chest region.    Prior to Admission Medications   Prescriptions Last Dose Informant Patient Reported? Taking?   Coenzyme Q10 (COQ10) 200 MG CAPS 2/12/2024 Self Yes Yes   Sig: Take 1 capsule by mouth daily   Eliquis 5 MG 2/12/2024 Self No Yes   Sig: TAKE ONE TABLET BY MOUTH TWICE A DAY   Glucosamine-Chondroit-Vit C-Mn (GLUCOSAMINE CHONDR 1500 COMPLX PO) 2/12/2024 Self Yes Yes   Sig: Take 1 tablet by mouth 2 (two) times a day    NON FORMULARY 2/12/2024 Self Yes Yes   Sig: CBD CAPSULE  AND CREAM USE DAILY PRIN   OXcarbazepine (TRILEPTAL) 300 mg tablet 2/12/2024 Self No Yes   Sig: Take 1 tablet (300 mg total) by mouth 2 (two) times a day   ascorbic acid (VITAMIN C) 250 mg tablet 2/12/2024 Self Yes Yes   Sig: Take 250 mg by mouth 2 (two) times a day   chlorpheniramine (CHLOR-TRIMETON) 4 MG tablet More than a month Self No No   Sig: Take 1 tablet " (4 mg total) by mouth every 6 (six) hours as needed for rhinitis   clobetasol (TEMOVATE) 0.05 % cream More than a month Self Yes No   Sig: Apply 1 application topically 2 (two) times a day as needed Apply sparingly to affected areas   colesevelam (WELCHOL) 625 mg tablet 2/12/2024 Self No Yes   Sig: Take 2 tablets (1,250 mg total) by mouth 2 (two) times a day with meals   diltiazem (CARDIZEM) 30 mg tablet Past Month Self No Yes   Sig: Take 1 tablet (30 mg total) by mouth if needed (afib)   ezetimibe (ZETIA) 10 mg tablet 2/12/2024 Self No Yes   Sig: Take 1 tablet (10 mg total) by mouth daily   hyoscyamine (ANASPAZ) 0.125 mg 2/12/2024 Self Yes Yes   Sig: Take 0.125 mg by mouth 2 (two) times a day    montelukast (SINGULAIR) 10 mg tablet  Self No No   Sig: Take 1 tablet (10 mg total) by mouth daily   multivitamin (THERAGRAN) TABS 2/12/2024 Self Yes Yes   Sig: Take 1 tablet by mouth 2 (two) times a day     sildenafil (REVATIO) 20 mg tablet More than a month  No No   Sig: Take 1-5 tablets on empty stomach one hour prior to sexual activity if needed   tiZANidine (ZANAFLEX) 2 mg tablet Past Week Self No Yes   Sig: Take 1 tablet (2 mg total) by mouth 2 (two) times a day as needed for muscle spasms      Facility-Administered Medications: None       Past Medical History:   Diagnosis Date    A-fib (HCC)     Allergic     Allergic rhinitis     Arthritis     Benign cyst of kidney     Cancer (HCC) 2015    Cat bite 07/08/2020    Cerebral vascular disease     Chronic sinusitis     Coronary artery disease     Cryptogenic stroke (HCC) 03/02/2020    Hyperlipidemia     Kidney stone     Melanoma (HCC)     Pancreas cyst     Stroke (HCC) 2006       Past Surgical History:   Procedure Laterality Date    COLONOSCOPY      OTHER SURGICAL HISTORY  2003    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    PATENT FORAMEN OVALE CLOSURE         Family History   Problem Relation Age of Onset    Diabetes type II Mother     Cancer Father         angioma    Cancer  Sister     Hearing loss Sister      I have reviewed and agree with the history as documented.    E-Cigarette/Vaping    E-Cigarette Use Never User      E-Cigarette/Vaping Substances    Nicotine No     THC No     CBD No     Flavoring No     Other No     Unknown No      Social History     Tobacco Use    Smoking status: Former     Types: Cigars    Smokeless tobacco: Former     Types: Chew     Quit date: 1986    Tobacco comments:     Cigars    Vaping Use    Vaping status: Never Used   Substance Use Topics    Alcohol use: Yes     Alcohol/week: 1.0 standard drink of alcohol     Types: 1 Glasses of wine per week     Comment: 8 oz. Daily    Drug use: Not Currently     Types: Marijuana        Review of Systems   All other systems reviewed and are negative.      Physical Exam  ED Triage Vitals [02/12/24 1307]   Temperature Pulse Respirations Blood Pressure SpO2   97.7 °F (36.5 °C) 78 18 152/85 95 %      Temp Source Heart Rate Source Patient Position - Orthostatic VS BP Location FiO2 (%)   Oral Monitor Sitting Left arm --      Pain Score       2             Orthostatic Vital Signs  Vitals:    02/12/24 1718 02/12/24 2158 02/13/24 0420 02/13/24 0726   BP: 146/92 102/80 110/76 143/79   Pulse: 83 71 65 63   Patient Position - Orthostatic VS:   Sitting        Physical Exam  Vitals and nursing note reviewed.   Constitutional:       General: He is not in acute distress.     Appearance: He is well-developed.   HENT:      Head: Normocephalic and atraumatic.   Eyes:      Conjunctiva/sclera: Conjunctivae normal.   Cardiovascular:      Rate and Rhythm: Normal rate and regular rhythm.      Heart sounds: No murmur heard.  Pulmonary:      Effort: Pulmonary effort is normal. No respiratory distress.      Breath sounds: Normal breath sounds.   Abdominal:      Palpations: Abdomen is soft.      Tenderness: There is no abdominal tenderness.   Musculoskeletal:         General: No swelling.      Cervical back: Neck supple.   Skin:     General: Skin  is warm and dry.      Capillary Refill: Capillary refill takes less than 2 seconds.   Neurological:      Mental Status: He is alert.   Psychiatric:         Mood and Affect: Mood normal.         ED Medications  Medications   OXcarbazepine (TRILEPTAL) tablet 300 mg (300 mg Oral Given 2/13/24 0830)   ezetimibe (ZETIA) tablet 10 mg (10 mg Oral Given 2/13/24 0830)   colesevelam (WELCHOL) tablet 1,250 mg (0 mg Oral Hold 2/13/24 0835)   montelukast (SINGULAIR) tablet 10 mg (10 mg Oral Not Given 2/13/24 0830)   hyoscyamine (LEVSIN/SL) SL tablet 0.125 mg (0.125 mg Sublingual Given 2/13/24 0830)   heparin (porcine) 25,000 units in 0.45% NaCl 250 mL infusion (premix) (15.1 Units/kg/hr × 90 kg (Order-Specific) Intravenous Rate/Dose Change 2/13/24 0459)   heparin (porcine) injection 4,000 Units (4,000 Units Intravenous Given 2/13/24 0458)   heparin (porcine) injection 2,000 Units (has no administration in time range)   sodium chloride 0.9 % infusion (100 mL/hr Intravenous New Bag 2/13/24 0950)   metoprolol tartrate (LOPRESSOR) partial tablet 12.5 mg (12.5 mg Oral Given 2/13/24 0950)   aspirin chewable tablet 324 mg (324 mg Oral Given 2/13/24 0950)       Diagnostic Studies  Results Reviewed       Procedure Component Value Units Date/Time    HS Troponin I 4hr [[id number]]  (Normal) Collected: 02/12/24 1744    Lab Status: Final result Specimen: Blood from Hand, Right Updated: 02/12/24 1835     hs TnI 4hr 5 ng/L      Delta 4hr hsTnI 0 ng/L     HS Troponin I 2hr [[id number]]  (Normal) Collected: 02/12/24 1636    Lab Status: Final result Specimen: Blood from Hand, Left Updated: 02/12/24 1712     hs TnI 2hr 4 ng/L      Delta 2hr hsTnI -1 ng/L     HS Troponin 0hr (reflex protocol) [[id number]]  (Normal) Collected: 02/12/24 1357    Lab Status: Final result Specimen: Blood from Arm, Right Updated: 02/12/24 1455     hs TnI 0hr 5 ng/L     Comprehensive metabolic panel [[id number]] Collected: 02/12/24 1178    Lab Status: Final result Specimen:  Blood from Arm, Right Updated: 02/12/24 1435     Sodium 137 mmol/L      Potassium 4.4 mmol/L      Chloride 104 mmol/L      CO2 27 mmol/L      ANION GAP 6 mmol/L      BUN 18 mg/dL      Creatinine 0.98 mg/dL      Glucose 90 mg/dL      Calcium 9.2 mg/dL      AST 33 U/L      ALT 45 U/L      Alkaline Phosphatase 74 U/L      Total Protein 7.3 g/dL      Albumin 4.6 g/dL      Total Bilirubin 0.41 mg/dL      eGFR 77 ml/min/1.73sq m     Narrative:      National Kidney Disease Foundation guidelines for Chronic Kidney Disease (CKD):     Stage 1 with normal or high GFR (GFR > 90 mL/min/1.73 square meters)    Stage 2 Mild CKD (GFR = 60-89 mL/min/1.73 square meters)    Stage 3A Moderate CKD (GFR = 45-59 mL/min/1.73 square meters)    Stage 3B Moderate CKD (GFR = 30-44 mL/min/1.73 square meters)    Stage 4 Severe CKD (GFR = 15-29 mL/min/1.73 square meters)    Stage 5 End Stage CKD (GFR <15 mL/min/1.73 square meters)  Note: GFR calculation is accurate only with a steady state creatinine    CBC and differential [[id number]] Collected: 02/12/24 1357    Lab Status: Final result Specimen: Blood from Arm, Right Updated: 02/12/24 1417     WBC 6.23 Thousand/uL      RBC 4.79 Million/uL      Hemoglobin 15.4 g/dL      Hematocrit 46.6 %      MCV 97 fL      MCH 32.2 pg      MCHC 33.0 g/dL      RDW 12.5 %      MPV 9.3 fL      Platelets 232 Thousands/uL      nRBC 0 /100 WBCs      Neutrophils Relative 60 %      Immat GRANS % 0 %      Lymphocytes Relative 30 %      Monocytes Relative 8 %      Eosinophils Relative 1 %      Basophils Relative 1 %      Neutrophils Absolute 3.77 Thousands/µL      Immature Grans Absolute 0.02 Thousand/uL      Lymphocytes Absolute 1.85 Thousands/µL      Monocytes Absolute 0.47 Thousand/µL      Eosinophils Absolute 0.09 Thousand/µL      Basophils Absolute 0.03 Thousands/µL                    XR chest 1 view portable   Final Result by Mary Hollins MD (02/13 0918)      No acute cardiopulmonary disease.             Workstation performed: [id number]               Procedures  ECG 12 Lead Documentation Only    Date/Time: 2/13/2024 10:20 AM    Performed by: Yoel Corral MD  Authorized by: Yoel Corral MD    ECG reviewed by me, the ED Provider: yes    Patient location:  ED  Previous ECG:     Previous ECG:  Compared to current    Similarity:  No change  Interpretation:     Interpretation: normal    Rate:     ECG rate assessment: normal    Rhythm:     Rhythm: sinus rhythm    Ectopy:     Ectopy: none    QRS:     QRS axis:  Normal    QRS intervals:  Normal  Conduction:     Conduction: normal    ST segments:     ST segments:  Normal  T waves:     T waves: normal          ED Course  ED Course as of 02/13/24 1021   Mon Feb 12, 2024   1530 Reached out to cardiology fellow to determine need for admission stress testing vs on outpatient basis.    1609 Tigertexted cardiology again, they will determine need for admission vs outpatient management             HEART Risk Score      Flowsheet Row Most Recent Value   Heart Score Risk Calculator    History 1 Filed at: 02/13/2024 1020   ECG 0 Filed at: 02/13/2024 1020   Age 2 Filed at: 02/13/2024 1020   Risk Factors 2 Filed at: 02/13/2024 1020   Troponin 0 Filed at: 02/13/2024 1020   HEART Score 5 Filed at: 02/13/2024 1020                        SBIRT 20yo+      Flowsheet Row Most Recent Value   Initial Alcohol Screen: US AUDIT-C     1. How often do you have a drink containing alcohol? 0 Filed at: 02/12/2024 1309   2. How many drinks containing alcohol do you have on a typical day you are drinking?  0 Filed at: 02/12/2024 1309   3a. Male UNDER 65: How often do you have five or more drinks on one occasion? 0 Filed at: 02/12/2024 1309   3b. FEMALE Any Age, or MALE 65+: How often do you have 4 or more drinks on one occassion? 0 Filed at: 02/12/2024 1309   Audit-C Score 0 Filed at: 02/12/2024 1309   [place]: How many times in the past year have you...    Used an illegal drug or used a prescription  medication for non-medical reasons? Never Filed at: 02/12/2024 1305                  Medical Decision Making  5-year-old male present emergency department due to chest pain, concerning for cardiac.  May be musculoskeletal.  Cardiac workup obtained, also labs to evaluate for anemia, electrolyte disturbance.  Workup generally reassuring at this point in time, patient has continued symptoms.  Due to patient being sent in from cardiology office, discussed with on-call cardiology here who recommended patient be admitted for further monitoring and possible workup.  Patient agreeable to this plan.    Amount and/or Complexity of Data Reviewed  Labs: ordered.  Radiology: ordered.    Risk  Decision regarding hospitalization.          Disposition  Final diagnoses:   Chest pain     Time reflects when diagnosis was documented in both MDM as applicable and the Disposition within this note       Time User Action Codes Description Comment    2/12/2024  4:21 PM Yoel Corral Add [R07.9] Chest pain     2/13/2024  9:35 AM Genet Palomino Modify [R07.9] Chest pain           ED Disposition       ED Disposition   Admit    Condition   Stable    Date/Time   Mon Feb 12, 2024 9541    Comment   Case was discussed with MARY ANN and the patient's admission status was agreed to be Admission Status: observation status to the service of [facility] .               Follow-up Information    None         Current Discharge Medication List        CONTINUE these medications which have NOT CHANGED    Details   ascorbic acid (VITAMIN C) 250 mg tablet Take 250 mg by mouth 2 (two) times a day      Coenzyme Q10 (COQ10) 200 MG CAPS Take 1 capsule by mouth daily      colesevelam (WELCHOL) 625 mg tablet Take 2 tablets (1,250 mg total) by mouth 2 (two) times a day with meals  Qty: 360 tablet, Refills: 1    Associated Diagnoses: Hypercholesterolemia      diltiazem (CARDIZEM) 30 mg tablet Take 1 tablet (30 mg total) by mouth if needed (afib)  Qty: 15 tablet, Refills: 3     Associated Diagnoses: A-fib (HCC)      Eliquis 5 MG TAKE ONE TABLET BY MOUTH TWICE A DAY  Qty: 180 tablet, Refills: 3    Associated Diagnoses: Paroxysmal atrial fibrillation (HCC)      ezetimibe (ZETIA) 10 mg tablet Take 1 tablet (10 mg total) by mouth daily  Qty: 90 tablet, Refills: 1    Associated Diagnoses: Hypercholesterolemia      Glucosamine-Chondroit-Vit C-Mn (GLUCOSAMINE CHONDR 1500 COMPLX PO) Take 1 tablet by mouth 2 (two) times a day       hyoscyamine (ANASPAZ) 0.125 mg Take 0.125 mg by mouth 2 (two) times a day       multivitamin (THERAGRAN) TABS Take 1 tablet by mouth 2 (two) times a day        NON FORMULARY CBD CAPSULE  AND CREAM USE DAILY PRIN      OXcarbazepine (TRILEPTAL) 300 mg tablet Take 1 tablet (300 mg total) by mouth 2 (two) times a day  Qty: 180 tablet, Refills: 1    Associated Diagnoses: Lumbar radiculopathy      tiZANidine (ZANAFLEX) 2 mg tablet Take 1 tablet (2 mg total) by mouth 2 (two) times a day as needed for muscle spasms  Qty: 180 tablet, Refills: 1    Associated Diagnoses: Spondylosis of lumbar region without myelopathy or radiculopathy      chlorpheniramine (CHLOR-TRIMETON) 4 MG tablet Take 1 tablet (4 mg total) by mouth every 6 (six) hours as needed for rhinitis  Qty: 30 tablet, Refills: 0    Associated Diagnoses: Seasonal allergic rhinitis, unspecified trigger; Acute non-recurrent pansinusitis      clobetasol (TEMOVATE) 0.05 % cream Apply 1 application topically 2 (two) times a day as needed Apply sparingly to affected areas      montelukast (SINGULAIR) 10 mg tablet Take 1 tablet (10 mg total) by mouth daily  Qty: 90 tablet, Refills: 1    Associated Diagnoses: Chronic seasonal allergic rhinitis due to pollen      sildenafil (REVATIO) 20 mg tablet Take 1-5 tablets on empty stomach one hour prior to sexual activity if needed  Qty: 30 tablet, Refills: 3    Associated Diagnoses: Erectile dysfunction, unspecified erectile dysfunction type           No discharge procedures on  file.    PDMP Review       None             ED Provider  Attending physically available and evaluated George Ramirez. I managed the patient along with the ED Attending.    Electronically Signed by           Yoel Corral MD  02/13/24 9009

## 2024-02-12 NOTE — ASSESSMENT & PLAN NOTE
Patient needs a refill for Amlodipine  Last RX 6/14/2022 QTY 90 with 1 additional refills  Was told to return to clinic in 6 months  Refilled RX x 4     Sees Pain Medicine outpatient for lumbar radiculopathy, DDD, lumbar spondylosis.    Home meds: Tizanidine, oxcarbazepine, PRN Tylenol.     Plan:   - Continue home meds as listed above

## 2024-02-12 NOTE — ASSESSMENT & PLAN NOTE
Patient has history of CVA in 2006.  Workup at the time showed PFO, which was thought to be the cause of the CVA.  PFO was repaired at the time with a patch.  Subsequent to the patch placement, patient developed paroxysmal A-fib.  Patient takes diltiazem PRN.  Takes Eliquis ([id number] for CVA, age, CAD).  On 2/4/2024, patient experienced an episode of A-fib with palpitations, irregular heart beat, and hypertension, which resolved after taking diltiazem PRN.  Since this episode, patient developed pressure-like chest pain that has progressively worsened and associated with shortness of breath.      -Over hospital course, the patient has denied palpitations. Telemetry has shown sinus rhythm.     Plan:   - Monitor telemetry   - Continue home meds: Eliquis, hold PRN diltiazem  - Cardiology consulted, appreciate recommendations

## 2024-02-12 NOTE — ASSESSMENT & PLAN NOTE
Last lipid panel in November 2023: Cholesterol 219, triglycerides 82, HDL 63, .  Per chart review, does not tolerate statins.      Plan:   - Continue home meds: colsevelam and ezetimibe   - Per cardiology, slowly uptitrate rosuvastatin starting at 5 mg outpatient. Will consider PCSK9 inhibitors if possible.

## 2024-02-12 NOTE — ASSESSMENT & PLAN NOTE
S/p cath 2011 with stent in LAD, cath 2017 with stent in mid LAD just distal to prior stent.    Echo 2017 showed LVEF 66% with normal systolic function, normal wall thickness.    Exercise stress echo 2014 was negative for ischemia.  Home meds: colsevelam and ezetimibe (intolerance to statins in the past)    Cardiac catheterization on this admission shows new 80% mid LAD stenosis, PCI performed.   See 'Chest Pain' for additional details.    Plan:   - Continue home colsevelam and ezetimibe   - Aspirin + Plavix + eliquis. Discontinue ASA after 2 weeks.   - Lopressor 12.5 mg BID started by cardiology on admission

## 2024-02-13 ENCOUNTER — APPOINTMENT (OUTPATIENT)
Dept: NON INVASIVE DIAGNOSTICS | Facility: HOSPITAL | Age: 69
DRG: 322 | End: 2024-02-13
Payer: COMMERCIAL

## 2024-02-13 LAB
ALBUMIN SERPL BCP-MCNC: 4.1 G/DL (ref 3.5–5)
ALP SERPL-CCNC: 67 U/L (ref 34–104)
ALT SERPL W P-5'-P-CCNC: 35 U/L (ref 7–52)
ANION GAP SERPL CALCULATED.3IONS-SCNC: 4 MMOL/L
AORTIC ROOT: 3.6 CM
APICAL FOUR CHAMBER EJECTION FRACTION: 58 %
APTT PPP: 38 SECONDS (ref 23–37)
APTT PPP: 85 SECONDS (ref 23–37)
ASCENDING AORTA: 3.7 CM
AST SERPL W P-5'-P-CCNC: 27 U/L (ref 13–39)
ATRIAL RATE: 80 BPM
BILIRUB SERPL-MCNC: 0.46 MG/DL (ref 0.2–1)
BSA FOR ECHO PROCEDURE: 1.97 M2
BUN SERPL-MCNC: 15 MG/DL (ref 5–25)
CALCIUM SERPL-MCNC: 8.8 MG/DL (ref 8.4–10.2)
CHLORIDE SERPL-SCNC: 110 MMOL/L (ref 96–108)
CO2 SERPL-SCNC: 24 MMOL/L (ref 21–32)
CREAT SERPL-MCNC: 0.88 MG/DL (ref 0.6–1.3)
E WAVE DECELERATION TIME: 183 MS
E/A RATIO: 0.91
ERYTHROCYTE [DISTWIDTH] IN BLOOD BY AUTOMATED COUNT: 12.3 % (ref 11.6–15.1)
EST. AVERAGE GLUCOSE BLD GHB EST-MCNC: 105 MG/DL
FRACTIONAL SHORTENING: 30 (ref 28–44)
GFR SERPL CREATININE-BSD FRML MDRD: 87 ML/MIN/1.73SQ M
GLUCOSE SERPL-MCNC: 90 MG/DL (ref 65–140)
HBA1C MFR BLD: 5.3 %
HCT VFR BLD AUTO: 46 % (ref 36.5–49.3)
HGB BLD-MCNC: 14.9 G/DL (ref 12–17)
INTERVENTRICULAR SEPTUM IN DIASTOLE (PARASTERNAL SHORT AXIS VIEW): 1.4 CM
INTERVENTRICULAR SEPTUM: 1.4 CM (ref 0.6–1.1)
KCT BLD-ACNC: 258 SEC (ref 89–137)
LAAS-AP2: 16.8 CM2
LAAS-AP4: 16 CM2
LEFT ATRIUM SIZE: 3.1 CM
LEFT ATRIUM VOLUME (MOD BIPLANE): 44 ML
LEFT ATRIUM VOLUME INDEX (MOD BIPLANE): 22.3 ML/M2
LEFT INTERNAL DIMENSION IN SYSTOLE: 2.6 CM (ref 2.1–4)
LEFT VENTRICULAR INTERNAL DIMENSION IN DIASTOLE: 3.7 CM (ref 3.5–6)
LEFT VENTRICULAR POSTERIOR WALL IN END DIASTOLE: 1.4 CM
LEFT VENTRICULAR STROKE VOLUME: 34 ML
LVSV (TEICH): 34 ML
MAGNESIUM SERPL-MCNC: 2.2 MG/DL (ref 1.9–2.7)
MCH RBC QN AUTO: 31.2 PG (ref 26.8–34.3)
MCHC RBC AUTO-ENTMCNC: 32.4 G/DL (ref 31.4–37.4)
MCV RBC AUTO: 96 FL (ref 82–98)
MV E'TISSUE VEL-LAT: 10 CM/S
MV E'TISSUE VEL-SEP: 7 CM/S
MV PEAK A VEL: 0.7 M/S
MV PEAK E VEL: 64 CM/S
MV STENOSIS PRESSURE HALF TIME: 53 MS
MV VALVE AREA P 1/2 METHOD: 4.15
P AXIS: 64 DEGREES
PHOSPHATE SERPL-MCNC: 3.4 MG/DL (ref 2.3–4.1)
PLATELET # BLD AUTO: 200 THOUSANDS/UL (ref 149–390)
PMV BLD AUTO: 9.1 FL (ref 8.9–12.7)
POTASSIUM SERPL-SCNC: 4.5 MMOL/L (ref 3.5–5.3)
PR INTERVAL: 170 MS
PROT SERPL-MCNC: 6.7 G/DL (ref 6.4–8.4)
QRS AXIS: 66 DEGREES
QRSD INTERVAL: 58 MS
QT INTERVAL: 362 MS
QTC INTERVAL: 417 MS
RA PRESSURE ESTIMATED: 8 MMHG
RBC # BLD AUTO: 4.78 MILLION/UL (ref 3.88–5.62)
RIGHT ATRIUM AREA SYSTOLE A4C: 13.3 CM2
RIGHT VENTRICLE ID DIMENSION: 2.4 CM
RV PSP: 32 MMHG
SL CV LEFT ATRIUM LENGTH A2C: 5.2 CM
SL CV LV EF: 65
SL CV PED ECHO LEFT VENTRICLE DIASTOLIC VOLUME (MOD BIPLANE) 2D: 60 ML
SL CV PED ECHO LEFT VENTRICLE SYSTOLIC VOLUME (MOD BIPLANE) 2D: 26 ML
SODIUM SERPL-SCNC: 138 MMOL/L (ref 135–147)
SPECIMEN SOURCE: ABNORMAL
T WAVE AXIS: 32 DEGREES
TR MAX PG: 24 MMHG
TR PEAK VELOCITY: 2.5 M/S
TRICUSPID ANNULAR PLANE SYSTOLIC EXCURSION: 2.8 CM
TRICUSPID VALVE PEAK REGURGITATION VELOCITY: 2.45 M/S
VENTRICULAR RATE: 80 BPM
WBC # BLD AUTO: 5.46 THOUSAND/UL (ref 4.31–10.16)

## 2024-02-13 PROCEDURE — B2111ZZ FLUOROSCOPY OF MULTIPLE CORONARY ARTERIES USING LOW OSMOLAR CONTRAST: ICD-10-PCS | Performed by: INTERNAL MEDICINE

## 2024-02-13 PROCEDURE — 83036 HEMOGLOBIN GLYCOSYLATED A1C: CPT

## 2024-02-13 PROCEDURE — NC001 PR NO CHARGE: Performed by: INTERNAL MEDICINE

## 2024-02-13 PROCEDURE — 80053 COMPREHEN METABOLIC PANEL: CPT

## 2024-02-13 PROCEDURE — 93010 ELECTROCARDIOGRAM REPORT: CPT | Performed by: INTERNAL MEDICINE

## 2024-02-13 PROCEDURE — 027034Z DILATION OF CORONARY ARTERY, ONE ARTERY WITH DRUG-ELUTING INTRALUMINAL DEVICE, PERCUTANEOUS APPROACH: ICD-10-PCS | Performed by: INTERNAL MEDICINE

## 2024-02-13 PROCEDURE — 4A023N7 MEASUREMENT OF CARDIAC SAMPLING AND PRESSURE, LEFT HEART, PERCUTANEOUS APPROACH: ICD-10-PCS | Performed by: INTERNAL MEDICINE

## 2024-02-13 PROCEDURE — C9600 PERC DRUG-EL COR STENT SING: HCPCS | Performed by: INTERNAL MEDICINE

## 2024-02-13 PROCEDURE — C1725 CATH, TRANSLUMIN NON-LASER: HCPCS | Performed by: INTERNAL MEDICINE

## 2024-02-13 PROCEDURE — 85730 THROMBOPLASTIN TIME PARTIAL: CPT | Performed by: INTERNAL MEDICINE

## 2024-02-13 PROCEDURE — 85027 COMPLETE CBC AUTOMATED: CPT

## 2024-02-13 PROCEDURE — C1769 GUIDE WIRE: HCPCS | Performed by: INTERNAL MEDICINE

## 2024-02-13 PROCEDURE — 93458 L HRT ARTERY/VENTRICLE ANGIO: CPT | Performed by: INTERNAL MEDICINE

## 2024-02-13 PROCEDURE — 99232 SBSQ HOSP IP/OBS MODERATE 35: CPT | Performed by: INTERNAL MEDICINE

## 2024-02-13 PROCEDURE — 93571 IV DOP VEL&/PRESS C FLO 1ST: CPT | Performed by: INTERNAL MEDICINE

## 2024-02-13 PROCEDURE — 93306 TTE W/DOPPLER COMPLETE: CPT | Performed by: INTERNAL MEDICINE

## 2024-02-13 PROCEDURE — 93306 TTE W/DOPPLER COMPLETE: CPT

## 2024-02-13 PROCEDURE — 83735 ASSAY OF MAGNESIUM: CPT

## 2024-02-13 PROCEDURE — 99152 MOD SED SAME PHYS/QHP 5/>YRS: CPT | Performed by: INTERNAL MEDICINE

## 2024-02-13 PROCEDURE — 99153 MOD SED SAME PHYS/QHP EA: CPT | Performed by: INTERNAL MEDICINE

## 2024-02-13 PROCEDURE — 92928 PRQ TCAT PLMT NTRAC ST 1 LES: CPT | Performed by: INTERNAL MEDICINE

## 2024-02-13 PROCEDURE — 93572 IV DOP VEL&/PRESS C FLO EA: CPT | Performed by: INTERNAL MEDICINE

## 2024-02-13 PROCEDURE — C1874 STENT, COATED/COV W/DEL SYS: HCPCS | Performed by: INTERNAL MEDICINE

## 2024-02-13 PROCEDURE — 99223 1ST HOSP IP/OBS HIGH 75: CPT | Performed by: INTERNAL MEDICINE

## 2024-02-13 PROCEDURE — 85347 COAGULATION TIME ACTIVATED: CPT

## 2024-02-13 PROCEDURE — C1887 CATHETER, GUIDING: HCPCS | Performed by: INTERNAL MEDICINE

## 2024-02-13 PROCEDURE — C1894 INTRO/SHEATH, NON-LASER: HCPCS | Performed by: INTERNAL MEDICINE

## 2024-02-13 PROCEDURE — 84100 ASSAY OF PHOSPHORUS: CPT

## 2024-02-13 DEVICE — IMPLANTABLE DEVICE
Type: IMPLANTABLE DEVICE | Status: FUNCTIONAL
Brand: ORSIRO MISSION

## 2024-02-13 RX ORDER — CLOPIDOGREL BISULFATE 75 MG/1
75 TABLET ORAL DAILY
Status: DISCONTINUED | OUTPATIENT
Start: 2024-02-14 | End: 2024-02-14 | Stop reason: HOSPADM

## 2024-02-13 RX ORDER — ASPIRIN 81 MG/1
324 TABLET, CHEWABLE ORAL DAILY
Status: DISCONTINUED | OUTPATIENT
Start: 2024-02-14 | End: 2024-02-14

## 2024-02-13 RX ORDER — NITROGLYCERIN 0.4 MG/1
0.4 TABLET SUBLINGUAL
Status: DISCONTINUED | OUTPATIENT
Start: 2024-02-13 | End: 2024-02-14 | Stop reason: HOSPADM

## 2024-02-13 RX ORDER — SODIUM CHLORIDE 9 MG/ML
125 INJECTION, SOLUTION INTRAVENOUS CONTINUOUS
Status: DISPENSED | OUTPATIENT
Start: 2024-02-13 | End: 2024-02-13

## 2024-02-13 RX ORDER — NITROGLYCERIN 20 MG/100ML
INJECTION INTRAVENOUS CODE/TRAUMA/SEDATION MEDICATION
Status: DISCONTINUED | OUTPATIENT
Start: 2024-02-13 | End: 2024-02-13 | Stop reason: HOSPADM

## 2024-02-13 RX ORDER — PRASUGREL 10 MG/1
TABLET, FILM COATED ORAL CODE/TRAUMA/SEDATION MEDICATION
Status: DISCONTINUED | OUTPATIENT
Start: 2024-02-13 | End: 2024-02-13 | Stop reason: HOSPADM

## 2024-02-13 RX ORDER — VERAPAMIL HYDROCHLORIDE 2.5 MG/ML
INJECTION, SOLUTION INTRAVENOUS CODE/TRAUMA/SEDATION MEDICATION
Status: DISCONTINUED | OUTPATIENT
Start: 2024-02-13 | End: 2024-02-13 | Stop reason: HOSPADM

## 2024-02-13 RX ORDER — SODIUM CHLORIDE 9 MG/ML
100 INJECTION, SOLUTION INTRAVENOUS CONTINUOUS
Status: DISCONTINUED | OUTPATIENT
Start: 2024-02-13 | End: 2024-02-14 | Stop reason: HOSPADM

## 2024-02-13 RX ORDER — HEPARIN SODIUM 1000 [USP'U]/ML
INJECTION, SOLUTION INTRAVENOUS; SUBCUTANEOUS CODE/TRAUMA/SEDATION MEDICATION
Status: DISCONTINUED | OUTPATIENT
Start: 2024-02-13 | End: 2024-02-13 | Stop reason: HOSPADM

## 2024-02-13 RX ORDER — FENTANYL CITRATE 50 UG/ML
INJECTION, SOLUTION INTRAMUSCULAR; INTRAVENOUS CODE/TRAUMA/SEDATION MEDICATION
Status: DISCONTINUED | OUTPATIENT
Start: 2024-02-13 | End: 2024-02-13 | Stop reason: HOSPADM

## 2024-02-13 RX ORDER — ACETAMINOPHEN 325 MG/1
650 TABLET ORAL EVERY 4 HOURS PRN
Status: DISCONTINUED | OUTPATIENT
Start: 2024-02-13 | End: 2024-02-14 | Stop reason: HOSPADM

## 2024-02-13 RX ORDER — MIDAZOLAM HYDROCHLORIDE 2 MG/2ML
INJECTION, SOLUTION INTRAMUSCULAR; INTRAVENOUS CODE/TRAUMA/SEDATION MEDICATION
Status: DISCONTINUED | OUTPATIENT
Start: 2024-02-13 | End: 2024-02-13 | Stop reason: HOSPADM

## 2024-02-13 RX ORDER — LIDOCAINE HYDROCHLORIDE 10 MG/ML
INJECTION, SOLUTION EPIDURAL; INFILTRATION; INTRACAUDAL; PERINEURAL CODE/TRAUMA/SEDATION MEDICATION
Status: DISCONTINUED | OUTPATIENT
Start: 2024-02-13 | End: 2024-02-13 | Stop reason: HOSPADM

## 2024-02-13 RX ORDER — ASPIRIN 81 MG/1
324 TABLET, CHEWABLE ORAL ONCE
Status: COMPLETED | OUTPATIENT
Start: 2024-02-13 | End: 2024-02-13

## 2024-02-13 RX ORDER — ONDANSETRON 2 MG/ML
4 INJECTION INTRAMUSCULAR; INTRAVENOUS EVERY 6 HOURS PRN
Status: DISCONTINUED | OUTPATIENT
Start: 2024-02-13 | End: 2024-02-14 | Stop reason: HOSPADM

## 2024-02-13 RX ADMIN — HEPARIN SODIUM 4000 UNITS: 1000 INJECTION INTRAVENOUS; SUBCUTANEOUS at 04:58

## 2024-02-13 RX ADMIN — OXCARBAZEPINE 300 MG: 300 TABLET, FILM COATED ORAL at 17:02

## 2024-02-13 RX ADMIN — SODIUM CHLORIDE 125 ML/HR: 0.9 INJECTION, SOLUTION INTRAVENOUS at 14:12

## 2024-02-13 RX ADMIN — Medication 12.5 MG: at 20:44

## 2024-02-13 RX ADMIN — HYOSCYAMINE SULFATE 0.12 MG: 0.12 TABLET SUBLINGUAL at 08:30

## 2024-02-13 RX ADMIN — Medication 12.5 MG: at 09:50

## 2024-02-13 RX ADMIN — EZETIMIBE 10 MG: 10 TABLET ORAL at 08:30

## 2024-02-13 RX ADMIN — ASPIRIN 81 MG CHEWABLE TABLET 324 MG: 81 TABLET CHEWABLE at 09:50

## 2024-02-13 RX ADMIN — OXCARBAZEPINE 300 MG: 300 TABLET, FILM COATED ORAL at 08:30

## 2024-02-13 RX ADMIN — ACETAMINOPHEN 650 MG: 325 TABLET, FILM COATED ORAL at 20:44

## 2024-02-13 RX ADMIN — NITROGLYCERIN 0.4 MG: 0.4 TABLET SUBLINGUAL at 20:44

## 2024-02-13 RX ADMIN — SODIUM CHLORIDE 100 ML/HR: 0.9 INJECTION, SOLUTION INTRAVENOUS at 09:50

## 2024-02-13 NOTE — CONSULTS
Consultation - Cardiology Team One  George Ramirez 68 y.o. male MRN: [id number]  Unit/Bed#: CW2 215-01 Encounter: [id number]    Inpatient consult to Cardiology  Consult performed by: MARGIE Tay  Consult ordered by: Isidoro Torres MD      Physician Requesting Consult: Shyam Du MD  Reason for Consult / Principal Problem: unstable angina       Assessment/ Plan    Unstable angina   HS troponin 5-->4-->5  Reviewed ECG showing NSR with non specific ST and T wave abnormalities   Echocardiogram pending  On heparin gtt   Will load with aspirin and start low dose BB   Plan for cardiac cath today   NPO    2. CAD   With prior stenting to LAD in 2011  Cardiac cath in 2017 showed L main normal, proximal LAD 0% stenosis at the site of a prior stent, mid LAD 50% stenosis at the distal margin of the stented segment     3. Paroxysmal atrial fibrillation   On eliquis 5 mg PO BID at home. Currently on heparin gtt   Last dose of eliquis was yesterday morning   On cardizem 30 mg PO PRN at home. Currently not prescribed     4. Hyperlipidemia     Intolerant to statins   Continue Zetia 10 mg PO daily     5. History of prior CVA    6. S/p PFO closure       History of Present Illness   HPI: George Ramirez is a 68 y.o. year old male who has a history of paroxysmal atrial fibrillation, CAD with prior stenting to LAD in 2011, CVA, PFO closure and hyperlipidemia. He follows with cardiologist Dr. Bernal.             He presents to [facility] ER 2/12/2024 with complaint of chest pain. Patient reports chest pain started 2/4 in the evening. He took his pulse and it was irregular and in 130s. He knew he was in atrial fibrillation. He took cardizem 30 mg PO x 1 dose. He rechecked his BP and HR 4 hours later. His HR improved but he continued to feel like he was in atrial fibrillation. He took another cardizem 30 mg PO and went to bed. On Monday, he felt back to his normal state of health. He denies further chest discomfort on  Monday. He is active at home and does daily workouts. He continued to work out through the week but mid week started feeling chest pressure with exercise that radiated to his back. The chest pressure became more frequent and higher in intensity as the week went on. Symptoms typically occurred only with activity. Yesterday, he reports symptoms of chest pressure and SOB with minimal exertion that resolve with rest.     He lives at home with his wife. He live an active lifestyle and does daily workouts. He denies recent illness. No fever or chills. He drinks alcohol socially and occasional cigar.     EKG reviewed personally:   NSR with non specific ST and T wave abnormalities   Ventricular rate 80 bpm   MO interval 170 ms  QRSD 58 ms  QT interval 362 ms  QTc interval 417 ms     Telemetry reviewed personally:   Normal sinus rhythm     Review of Systems   Constitutional: Negative for chills and fever.   HENT:  Negative for congestion.    Cardiovascular:  Positive for chest pain. Negative for dyspnea on exertion, leg swelling, orthopnea and palpitations.   Respiratory:  Negative for shortness of breath.    Musculoskeletal:  Negative for falls.   Gastrointestinal:  Negative for bloating, nausea and vomiting.   Neurological:  Negative for dizziness and light-headedness.   Psychiatric/Behavioral:  Negative for altered mental status.    All other systems reviewed and are negative.    Historical Information   Past Medical History:   Diagnosis Date    A-fib (HCC)     Allergic     Allergic rhinitis     Arthritis     Benign cyst of kidney     Cancer (HCC) 2015    Cat bite 07/08/2020    Cerebral vascular disease     Chronic sinusitis     Coronary artery disease     Cryptogenic stroke (HCC) 03/02/2020    Hyperlipidemia     Kidney stone     Melanoma (HCC)     Pancreas cyst     Stroke (HCC) 2006     Past Surgical History:   Procedure Laterality Date    COLONOSCOPY      OTHER SURGICAL HISTORY  2003    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     PATENT FORAMEN OVALE CLOSURE       Social History     Substance and Sexual Activity   Alcohol Use Yes    Alcohol/week: 1.0 standard drink of alcohol    Types: 1 Glasses of wine per week    Comment: 8 oz. Daily     Social History     Substance and Sexual Activity   Drug Use Not Currently    Types: Marijuana     Social History     Tobacco Use   Smoking Status Former    Types: Cigars   Smokeless Tobacco Former    Types: Chew    Quit date: 1986   Tobacco Comments    Cigars      Family History:   Family History   Problem Relation Age of Onset    Diabetes type II Mother     Cancer Father         angioma    Cancer Sister     Hearing loss Sister        Meds/Allergies   all current active meds have been reviewed and current meds:   Current Facility-Administered Medications   Medication Dose Route Frequency    aspirin chewable tablet 324 mg  324 mg Oral Once    colesevelam (WELCHOL) tablet 1,250 mg  1,250 mg Oral BID With Meals    ezetimibe (ZETIA) tablet 10 mg  10 mg Oral Daily    heparin (porcine) 25,000 units in 0.45% NaCl 250 mL infusion (premix)  3-20 Units/kg/hr (Order-Specific) Intravenous Titrated    heparin (porcine) injection 2,000 Units  2,000 Units Intravenous Q6H PRN    heparin (porcine) injection 4,000 Units  4,000 Units Intravenous Q6H PRN    hyoscyamine (LEVSIN/SL) SL tablet 0.125 mg  0.125 mg Sublingual BID    metoprolol tartrate (LOPRESSOR) partial tablet 12.5 mg  12.5 mg Oral Q12H MILES    montelukast (SINGULAIR) tablet 10 mg  10 mg Oral Daily    OXcarbazepine (TRILEPTAL) tablet 300 mg  300 mg Oral BID    sodium chloride 0.9 % infusion  100 mL/hr Intravenous Continuous     heparin (porcine), 3-20 Units/kg/hr (Order-Specific), Last Rate: 15.1 Units/kg/hr (02/13/24 0779)        Allergies   Allergen Reactions    Augmentin [Amoxicillin-Pot Clavulanate] Hives and Itching    Molds & Smuts Allergic Rhinitis     Category: Allergy;     Monascus Purpureus Went Yeast Fatigue     Category: Adverse Reaction;     Niacin  Fatigue     Category: Adverse Reaction;     Pollen Extract Allergic Rhinitis     Category: Allergy;     Pregabalin Fatigue     Category: Adverse Reaction;     Statins Fatigue and GI Intolerance     Category: Adverse Reaction;     Atorvastatin GI Intolerance and Fatigue     Other reaction(s): Muscle pain, constipation, sleepiness  Category: Adverse Reaction;   Other reaction(s): Muscle pain, constipation, sleepiness       Objective   Vitals: Blood pressure 143/79, pulse 63, temperature 98.3 °F (36.8 °C), resp. rate 17, weight 85.7 kg (189 lb), SpO2 96%.,     Body mass index is 29.6 kg/m².,     Systolic (24hrs), Av , Min:102 , Max:152     Diastolic (24hrs), Av, Min:69, Max:92          No intake or output data in the 24 hours ending 24 0835  Weight (last 2 days)       Date/Time Weight    24 0531 85.7 (189)          Invasive Devices       Peripheral Intravenous Line  Duration             Peripheral IV 24 Dorsal (posterior);Left Hand <1 day                      Physical Exam  Constitutional:       General: He is not in acute distress.  HENT:      Head: Normocephalic.      Mouth/Throat:      Mouth: Mucous membranes are moist.   Cardiovascular:      Rate and Rhythm: Normal rate and regular rhythm.      Pulses: Normal pulses.   Pulmonary:      Effort: Pulmonary effort is normal. No respiratory distress.      Breath sounds: Normal breath sounds.   Abdominal:      General: Bowel sounds are normal.      Palpations: Abdomen is soft.   Musculoskeletal:         General: No swelling. Normal range of motion.      Cervical back: Neck supple.   Skin:     General: Skin is warm and dry.      Comments: Varicosities lower extremities    Neurological:      General: No focal deficit present.      Mental Status: He is alert and oriented to person, place, and time.   Psychiatric:         Mood and Affect: Mood normal.           LABORATORY RESULTS:      CBC with diff:   Results from last 7 days   Lab Units  "02/13/24  0453 02/12/24  1357   WBC Thousand/uL 5.46 6.23   HEMOGLOBIN g/dL 14.9 15.4   HEMATOCRIT % 46.0 46.6   MCV fL 96 97   PLATELETS Thousands/uL 200 232   RBC Million/uL 4.78 4.79   MCH pg 31.2 32.2   MCHC g/dL 32.4 33.0   RDW % 12.3 12.5   MPV fL 9.1 9.3   NRBC AUTO /100 WBCs  --  0       CMP:  Results from last 7 days   Lab Units 02/13/24  0000 02/12/24  1357   POTASSIUM mmol/L 4.5 4.4   CHLORIDE mmol/L 110* 104   CO2 mmol/L 24 27   BUN mg/dL 15 18   CREATININE mg/dL 0.88 0.98   CALCIUM mg/dL 8.8 9.2   AST U/L 27 33   ALT U/L 35 45   ALK PHOS U/L 67 74   EGFR ml/min/1.73sq m 87 77       BMP:  Results from last 7 days   Lab Units 02/13/24  0000 02/12/24  1357   POTASSIUM mmol/L 4.5 4.4   CHLORIDE mmol/L 110* 104   CO2 mmol/L 24 27   BUN mg/dL 15 18   CREATININE mg/dL 0.88 0.98   CALCIUM mg/dL 8.8 9.2          No results found for: \"NTBNP\"         Results from last 7 days   Lab Units 02/13/24  0000   MAGNESIUM mg/dL 2.2          Results from last 7 days   Lab Units 02/13/24  0412   HEMOGLOBIN A1C % 5.3              Results from last 7 days   Lab Units 02/12/24  2154   INR  1.08     Lipid Profile:   Lab Results   Component Value Date    CHOL 197 01/30/2016    CHOL 216 05/12/2015    CHOL 207 08/14/2014     Lab Results   Component Value Date    HDL 63 11/03/2023    HDL 61 05/10/2023    HDL 61 10/13/2022     Lab Results   Component Value Date    LDLCALC 140 (H) 11/03/2023    LDLCALC 165 (H) 05/10/2023    LDLCALC 155 (H) 10/13/2022     Lab Results   Component Value Date    TRIG 82 11/03/2023    TRIG 69 05/10/2023    TRIG 77 10/13/2022         Cardiac testing:   Results for orders placed during the hospital encounter of 03/09/17    Echo complete with contrast if indicated    [address] [address] [phone]    Transthoracic Echocardiogram  2D, M-mode, Doppler, and Color Doppler    Study date:  09-Mar-2017    Patient: KOREY BYERS  MR " number: [id number]  Account number: [id number]  : 1955  Age: 61 years  Gender: Male  Status: Outpatient  Location: Echo lab  Height: 68 in  Weight: 194.7 lb  BP: 122/ 70 mmHg    Indications: Cardiac murmur; Alpha 1 antitrypsin deficiency    Diagnoses: R01.1 - Cardiac murmur, unspecified    Sonographer:  FRANCES Aj, RDCS  Primary Physician:  Asad Molina MD  Referring Physician:  Vito Bernal MD  Group:  [facility]  Interpreting Physician:  Regis Sher MD    SUMMARY    LEFT VENTRICLE:  Systolic function was normal. Ejection fraction was estimated to be 66 %.  There were no regional wall motion abnormalities.    ATRIAL SEPTUM:  There was a closure device in place.    MITRAL VALVE:  There was trace regurgitation.    TRICUSPID VALVE:  There was trace regurgitation.    PULMONIC VALVE:  There was trace regurgitation.    HISTORY: PRIOR HISTORY: Angina; Atrial fibrillation; Coronary artery disease; Chest pain; Shortness of breath; PFO closure with cardioseal    PROCEDURE: The procedure was performed in the echo lab. This was a routine study. The transthoracic approach was used. The study included complete 2D imaging, M-mode, complete spectral Doppler, and color Doppler. The heart rate was 66 bpm,  at the start of the study. Images were obtained from the parasternal, apical, subcostal, and suprasternal notch acoustic windows. Image quality was adequate.    LEFT VENTRICLE: Size was normal. Systolic function was normal. Ejection fraction was estimated to be 66 %. There were no regional wall motion abnormalities. Wall thickness was normal. There was no evidence of concentric hypertrophy.  DOPPLER: Left ventricular diastolic function parameters were normal.    RIGHT VENTRICLE: The size was normal. Systolic function was normal. Wall thickness was normal.    LEFT ATRIUM: Size was normal.    ATRIAL SEPTUM: There was a closure device in place.    RIGHT ATRIUM: Size was  normal.    MITRAL VALVE: Valve structure was normal. There was normal leaflet separation. DOPPLER: The transmitral velocity was within the normal range. There was no evidence for stenosis. There was trace regurgitation.    AORTIC VALVE: The valve was trileaflet. Leaflets exhibited normal thickness and normal cuspal separation. DOPPLER: Transaortic velocity was within the normal range. There was no evidence for stenosis. There was no regurgitation.    TRICUSPID VALVE: The valve structure was normal. There was normal leaflet separation. DOPPLER: The transtricuspid velocity was within the normal range. There was no evidence for stenosis. There was trace regurgitation. Pulmonary artery  systolic pressure was within the normal range.    PULMONIC VALVE: Leaflets exhibited normal thickness, no calcification, and normal cuspal separation. DOPPLER: The transpulmonic velocity was within the normal range. There was trace regurgitation.    PERICARDIUM: There was no pericardial effusion. The pericardium was normal in appearance.    AORTA: The root exhibited normal size.    SYSTEM MEASUREMENT TABLES    2D  %FS: 32.64 %  Ao Diam: 3.26 cm  EDV(Teich): 63.83 ml  EF(Cube): 69.43 %  EF(Teich): 61.74 %  ESV(Cube): 17.42 ml  ESV(Teich): 24.42 ml  IVSd: 1.29 cm  LA Area: 14.5 cm2  LA Diam: 2.97 cm  LVEDV MOD A4C: 74.15 ml  LVEF MOD A4C: 76.28 %  LVESV MOD A4C: 17.59 ml  LVIDd: 3.85 cm  LVIDs: 2.59 cm  LVLd A4C: 8.41 cm  LVLs A4C: 6.28 cm  LVPWd: 1.06 cm  RA Area: 15.47 cm2  SI(Cube): 19.58 ml/m2  SI(Teich): 19.51 ml/m2  SV MOD A4C: 56.57 ml  SV(Cube): 39.55 ml  SV(Teich): 39.41 ml  rv diam: 3.12 cm    CW  TR Vmax: 2.36 m/s  TR maxP.32 mmHg    MM  TAPSE: 3.1 cm    PW  E': 0.08 m/s  E/E': 7.1  MV A Marcio: 0.59 m/s  MV Dec [place]: 2.22 m/s2  MV DecT: 270.24 ms  MV E Marcio: 0.6 m/s  MV E/A Ratio: 1.02    [employer] Accredited Echocardiography Laboratory    Prepared and electronically signed by    Regis Sher MD  Signed  09-Mar-2017 19:10:14    No results found for this or any previous visit.    No valid procedures specified.  No results found for this or any previous visit.        Imaging:   No results found.    Thank you for allowing us to participate in this patient's care.   Counseling / Coordination of Care  Total floor / unit time spent today 45 minutes.  Greater than 50% of total time was spent with the patient and / or family counseling and / or coordination of care.  A description of the counseling / coordination of care: Review of history, current assessment, development of a plan.      Code Status: Level 1 - Full Code    ** Please Note: Dragon 360 Dictation voice to text software may have been used in the creation of this document. **

## 2024-02-13 NOTE — QUICK NOTE
Spoke with patient's spouse Aurora on the phone. Provided update on patient's clinical status and answered all questions to their satisfaction.

## 2024-02-13 NOTE — UTILIZATION REVIEW
"Initial Clinical Review    OBSERVATION WRITTEN 2/12/24 @ 1630 CONVERTED TO INPATIENT ADMISSION 2/13/24 @ 1502 DUE TO FURTHER DIAGNOSTIC WORKUP REQUIRED FOR CHEST PAIN, REQUIRING AT LEAST A 2 MIDNIGHT STAY.    Admission: Date/Time/Statement:   Admission Orders (From admission, onward)       Ordered        02/13/24 1502  Inpatient Admission  Once            02/12/24 1630  Place in Observation  Once                          Orders Placed This Encounter   Procedures    Inpatient Admission     Standing Status:   Standing     Number of Occurrences:   1     Order Specific Question:   Level of Care     Answer:   Med Surg [16]     Order Specific Question:   Estimated length of stay     Answer:   More than 2 Midnights     Order Specific Question:   Certification     Answer:   I certify that inpatient services are medically necessary for this patient for a duration of greater than two midnights. See H&P and MD Progress Notes for additional information about the patient's course of treatment.     ED Arrival Information       Expected   2/12/2024 12:29    Arrival   2/12/2024 13:03    Acuity   Urgent              Means of arrival   Walk-In    Escorted by   Self    Service   [facility]    Admission type   Emergency              Arrival complaint   Per Dr. Bernal, chest pain, if neg for MI can get a stress test .             Chief Complaint   Patient presents with    Chest Pain     Pt c/o intermittent chest pain since 2/4, pt describes it as \"crampy\". Hx of left anterior descending stent, a-fib. Referred to ED by cardiologist       Initial Presentation: 68 y.o. male who presented to [facility] ED initially admitted Observation status then converted to Inpatient due to Chest pain.  Presented after Cardiologist advised to come to ED for further eval. C/o left-sided chest pain for 2 weeks.  Pain initially began on 2/4/2024 after an episode of A-fib that was resolved after patient took PRN diltiazem.  The pain was " initially intermittent/ every other day, but now the pain is daily.  The chest pain is worse with exertion and associated with lightheadedness, clamminess, and dyspnea.  Pain and shortness of breath are not present at rest.  In the ED, initial troponin was 5-> 4.  EKG, CMP, CBC were unremarkable.  PMH: CAD s/p LAD stent 2011 and 2017, HLD, paroxysmal A-fib (diltiazem, Eliquis).  Plan: med surg telemetry, Cardiology consult, continue home meds.     2/13/2024 Inpatient Admission:  Per Cardiology: On heparin drip, load with ASA and start low dose BB, keep NPO for cardiac cath.     2/13 Per Internal Med:   Patient underwent cardiac catheterization today, status post PCI.  Will discontinue heparin drip and resume Eliquis.  Continue supportive care.  Paroxysmal atrial fibrillation: Continue current regimen.  Cardiology following.  Continue current treatment plan for management of chronic conditions.    Date: 2/14  Has surpassed a 2nd midnight with active treatments and services, which include labs, fu on cardiac cath.     ED Triage Vitals [02/12/24 1307]   Temperature Pulse Respirations Blood Pressure SpO2   97.7 °F (36.5 °C) 78 18 152/85 95 %      Temp Source Heart Rate Source Patient Position - Orthostatic VS BP Location FiO2 (%)   Oral Monitor Sitting Left arm --      Pain Score       2          Wt Readings from Last 1 Encounters:   02/14/24 97.5 kg (215 lb)     Additional Vital Signs:   Date/Time Temp Pulse Resp BP MAP (mmHg) SpO2 Calculated FIO2 (%) - Nasal Cannula Nasal Cannula O2 Flow Rate (L/min) O2 Device Patient Position - Orthostatic VS   02/14/24 07:24:23 97.3 °F (36.3 °C) Abnormal  61 18 130/73 92 98 % -- -- None (Room air) Lying   02/13/24 21:33:58 98.1 °F (36.7 °C) 62 -- 125/67 86 98 % -- -- -- --   02/13/24 20:46:47 -- 63 -- 140/86 104 97 % -- -- -- --   02/13/24 2044 -- 64 -- 140/86 -- -- -- -- -- --   02/13/24 18:48:16 97.6 °F (36.4 °C) 59 -- 144/69 94 98 % -- -- -- --   02/13/24 1545 -- 58 -- 132/69 90  98 % -- -- -- --   02/13/24 1500 -- 80 -- 150/72 98 98 % -- -- -- --   02/13/24 1430 -- 53 Abnormal  -- 150/91 111 98 % -- -- -- --   02/13/24 1415 -- 59 -- 140/91 107 99 % -- -- -- --   02/13/24 14:03:02 -- 61 19 140/91 107 99 % -- -- -- --   02/13/24 12:03:25 -- -- -- -- -- -- 28 2 L/min Nasal cannula --   02/13/24 1120 -- 63 -- 143/79 -- -- -- -- -- --   02/13/24 0900 -- -- -- -- -- -- -- -- None (Room air) --   02/13/24 07:26:54 98.3 °F (36.8 °C) 63 17 143/79 100 96 % -- -- -- --   02/13/24 04:20:30 98.1 °F (36.7 °C) 65 -- 110/76 87 95 % -- -- None (Room air) Sitting   02/12/24 21:58:47 -- 71 -- 102/80 87 96 % -- -- -- --   02/12/24 1838 -- -- -- -- -- 94 % -- -- None (Room air) --   02/12/24 17:18:46 98.2 °F (36.8 °C) 83 -- 146/92 110 98 % -- -- -- --   02/12/24 1403 -- 75 16 126/69 -- 96 % -- -- None (Room air) --   02/12/24 1307 97.7 °F (36.5 °C) 78 18 152/85 111 95 % -- -- None (Room air) Sitting     Pertinent Labs/Diagnostic Test Results:   2/13 CARDIAC CATH:      Ost LM lesion is 20% stenosed.    Prox LAD lesion is 30% stenosed.    Mid LAD lesion is 80% stenosed.    Prox Cx lesion is 50% stenosed.     2v CAD    2/13 ECHO:    Left Ventricle: Left ventricular cavity size is normal. Wall thickness is mildly increased. The left ventricular ejection fraction is 65%. Systolic function is normal. Wall motion is normal. Diastolic function is mildly abnormal, consistent with grade I (abnormal) relaxation.    Right Ventricle: Right ventricular cavity size is normal. Systolic function is normal.    Aorta: The aortic root is normal in size. The ascending aorta is mildly dilated. The aortic root exhibited mild fibrocalcific change. The aortic root is 3.60 cm. The ascending aorta is 3.7 cm.    XR chest 1 view portable   Final Result by Mary Hollins MD (02/13 0918)      No acute cardiopulmonary disease.            Workstation performed: [id number]               Results from last 7 days   Lab Units 02/14/24  2545  02/13/24  0453 02/12/24  1357   WBC Thousand/uL 8.43 5.46 6.23   HEMOGLOBIN g/dL 16.6 14.9 15.4   HEMATOCRIT % 49.1 46.0 46.6   PLATELETS Thousands/uL 221 200 232   NEUTROS ABS Thousands/µL  --   --  3.77         Results from last 7 days   Lab Units 02/13/24  0000 02/12/24  1357   SODIUM mmol/L 138 137   POTASSIUM mmol/L 4.5 4.4   CHLORIDE mmol/L 110* 104   CO2 mmol/L 24 27   ANION GAP mmol/L 4 6   BUN mg/dL 15 18   CREATININE mg/dL 0.88 0.98   EGFR ml/min/1.73sq m 87 77   CALCIUM mg/dL 8.8 9.2   MAGNESIUM mg/dL 2.2  --    PHOSPHORUS mg/dL 3.4  --      Results from last 7 days   Lab Units 02/13/24  0000 02/12/24  1357   AST U/L 27 33   ALT U/L 35 45   ALK PHOS U/L 67 74   TOTAL PROTEIN g/dL 6.7 7.3   ALBUMIN g/dL 4.1 4.6   TOTAL BILIRUBIN mg/dL 0.46 0.41         Results from last 7 days   Lab Units 02/13/24  0000 02/12/24  1357   GLUCOSE RANDOM mg/dL 90 90         Results from last 7 days   Lab Units 02/13/24  0412   HEMOGLOBIN A1C % 5.3   EAG mg/dl 105       Results from last 7 days   Lab Units 02/12/24  1744 02/12/24  1636 02/12/24  1357   HS TNI 0HR ng/L  --   --  5   HS TNI 2HR ng/L  --  4  --    HSTNI D2 ng/L  --  -1  --    HS TNI 4HR ng/L 5  --   --    HSTNI D4 ng/L 0  --   --          Results from last 7 days   Lab Units 02/13/24  1103 02/13/24  0412 02/12/24  2154   PROTIME seconds  --   --  13.9   INR   --   --  1.08   PTT seconds 85* 38* 28         Past Medical History:   Diagnosis Date    A-fib (HCC)     Allergic     Allergic rhinitis     Arthritis     Benign cyst of kidney     Cancer (HCC) 2015    Cat bite 07/08/2020    Cerebral vascular disease     Chronic sinusitis     Coronary artery disease     Cryptogenic stroke (HCC) 03/02/2020    Hyperlipidemia     Kidney stone     Melanoma (HCC)     Pancreas cyst     Stroke (HCC) 2006     Present on Admission:   (Resolved) Atrial fibrillation (HCC)   DDD (degenerative disc disease), lumbar   Coronary artery disease involving native coronary artery    Hypercholesterolemia   Paroxysmal atrial fibrillation (HCC)   Chest pain      Admitting Diagnosis: Chest pain [R07.9]  Age/Sex: 68 y.o. male  Admission Orders:  Scheduled Medications:  apixaban, 5 mg, Oral, BID  aspirin, 324 mg, Oral, Daily  clopidogrel, 75 mg, Oral, Daily  colesevelam, 1,250 mg, Oral, BID With Meals  ezetimibe, 10 mg, Oral, Daily  hyoscyamine, 0.125 mg, Sublingual, BID  metoprolol tartrate, 12.5 mg, Oral, Q12H MILES  montelukast, 10 mg, Oral, Daily  OXcarbazepine, 300 mg, Oral, BID      Continuous IV Infusions:  sodium chloride, 100 mL/hr, Intravenous, Continuous    heparin (porcine) 25,000 units in 0.45% NaCl 250 mL infusion (premix)  Rate: 2.7-18 mL/hr Dose: 3-20 Units/kg/hr  Weight Dosing Info: 90 kg (Order-Specific)  Freq: Titrated Route: IV  Last Dose: Stopped (02/13/24 1413)  Start: 02/12/24 2145 End: 02/13/24 1405     PRN Meds:  acetaminophen, 650 mg, Oral, Q4H PRN  nitroglycerin, 0.4 mg, Sublingual, Q5 Min PRN  ondansetron, 4 mg, Intravenous, Q6H PRN    scd    IP CONSULT TO CARDIOLOGY    Network Utilization Review Department  ATTENTION: Please call with any questions or concerns to [phone] and carefully listen to the prompts so that you are directed to the right person. All voicemails are confidential.   For Discharge needs, contact Care Management DC Support Team at [phone] opt. [phone]  Send all requests for admission clinical reviews, approved or denied determinations and any other requests to dedicated fax number below belonging to the campus where the patient is receiving treatment. List of dedicated fax numbers for the Facilities:  FACILITY NAME UR FAX NUMBER   ADMISSION DENIALS (Administrative/Medical Necessity) [phone]   DISCHARGE SUPPORT TEAM (NETWORK) [phone]   PARENT CHILD HEALTH (Maternity/NICU/Pediatrics) [phone]   [facility] [phone]   [facility] [phone]   [facility]  [phone]   [facility] [phone]   [facility] [phone]   [facility] [phone]   [facility] [phone]   [facility] [phone]   [facility] [phone]   [facility] [phone]   [facility] [phone]   [facility] [phone]

## 2024-02-13 NOTE — PROGRESS NOTES
INTERNAL MEDICINE RESIDENCY PROGRESS NOTE     Name: George Ramirez   Age & Sex: 68 y.o. male   MRN: [id number]  Unit/Bed#: CW2 215-01   Encounter: [id number]  Team: SOD Team C     PATIENT INFORMATION     Name: George Ramirez   Age & Sex: 68 y.o. male   MRN: [id number]  Hospital Stay Days: 0    ASSESSMENT/PLAN     Principal Problem:    Chest pain  Active Problems:    Coronary artery disease involving native coronary artery    Hypercholesterolemia    DDD (degenerative disc disease), lumbar    Paroxysmal atrial fibrillation (HCC)      * Chest pain  Assessment & Plan  68 y.o. male, history of CAD s/p stent in LAD in 2011, s/p stent in mid LAD just distal to prior stent, HLD, paroxysmal A-fib.  Patient presented to [facility] ED at the direction of his cardiologist for worsening pressure-like chest pain for the past two weeks.  Pain initially began on 2/4/2024 after an episode of A-fib that was resolved after the patient took PRN diltiazem.  The pain was initially intermittent/ every other day, but now the pain is daily.  The chest pain is worse with exertion and associated with lightheadedness and dyspnea.   In the ED, initial troponin was 5-> 4.  EKG, CMP, CBC were unremarkable.  Cardiology recommended admission for observation overnight.      Echo 2017 showed LVEF 66% with normal systolic function, normal wall thickness.    Exercise stress echo 2014 was negative for ischemia.  Asymptomatic at bedside.      Given normal EKG and non-concerning troponin levels, not likely to be STEMI, NSTEMI.  Could be stable angina since the chest pain and shortness of breath occur with exertion.     This admission:  Echo 2/13/24 -     Left Ventricle: Left ventricular cavity size is normal. Wall thickness is mildly increased. The left ventricular ejection fraction is 65%. Systolic function is normal. Wall motion is normal. Diastolic function is mildly abnormal, consistent with grade I (abnormal) relaxation.     Right Ventricle: Right ventricular cavity size is normal. Systolic function is normal.    Aorta: The aortic root is normal in size. The ascending aorta is mildly dilated. The aortic root exhibited mild fibrocalcific change. The aortic root is 3.60 cm. The ascending aorta is 3.7 cm.    Cath 2/13/24 -     Ost LM lesion is 20% stenosed.    Prox LAD lesion is 30% stenosed.    Mid LAD lesion is 80% stenosed.    Prox Cx lesion is 50% stenosed.    Plan:   - Monitor telemetry  - Cardiology consulted; s/p cardiac catheterization on 2/13/24 with 80% mid LAD stenosis noted, PCI performed  - Previously on heparin gtt, resume home Eliquis    Coronary artery disease involving native coronary artery  Assessment & Plan  S/p cath 2011 with stent in LAD, cath 2017 with stent in mid LAD just distal to prior stent.    Echo 2017 showed LVEF 66% with normal systolic function, normal wall thickness.    Exercise stress echo 2014 was negative for ischemia.  Home meds: colsevelam and ezetimibe (intolerance to statins in the past)    Cardiac catheterization on this admission shows new 80% mid LAD stenosis, PCI performed.   See 'Chest Pain' for additional details.    Plan:   - Continue home colsevelam and ezetimibe   - Aspirin + Plavix  - Lopressor 12.5 mg BID started by cardiology on admission    Paroxysmal atrial fibrillation (HCC)  Assessment & Plan  Patient has history of CVA in 2006.  Workup at the time showed PFO, which was thought to be the cause of the CVA.  PFO was repaired at the time with a patch.  Subsequent to the patch placement, patient developed paroxysmal A-fib.  Patient takes diltiazem PRN.  Takes Eliquis ([id number] for CVA, age, CAD).  On 2/4/2024, patient experienced an episode of A-fib with palpitations, irregular heart beat, and hypertension, which resolved after taking diltiazem PRN.  Since this episode, patient developed pressure-like chest pain that has progressively worsened and associated with shortness of  "breath.      Plan:   - Monitor telemetry   - Continue home meds: Eliquis, hold PRN diltiazem  - Cardiology consulted, appreciate recommendations    DDD (degenerative disc disease), lumbar  Assessment & Plan  Sees Pain Medicine outpatient for lumbar radiculopathy, DDD, lumbar spondylosis.    Home meds: Tizanidine, oxcarbazepine, PRN Tylenol.     Plan:   - Continue home meds as listed above     Hypercholesterolemia  Assessment & Plan  Last lipid panel in 2023: Cholesterol 219, triglycerides 82, HDL 63, .  Per chart review, does not tolerate statins.      Plan:   - Continue home meds: colsevelam and ezetimibe         Disposition: inpatient; med/surg. Hopeful discharge tomorrow.     SUBJECTIVE     Patient seen and examined. No acute events overnight. Was getting bedside echo done during interview with cardiac catheterization still pending. Patient denies any chest pain since being admitted to the hospital, resting comfortably.    OBJECTIVE     Vitals:    24 0531 24 0726 24 1120 24 1403   BP:  143/79 143/79 140/91   BP Location:       Pulse:  63 63 61   Resp:  17  19   Temp:  98.3 °F (36.8 °C)     TempSrc:       SpO2:  96%  99%   Weight: 85.7 kg (189 lb)  85.7 kg (188 lb 15 oz)    Height:   5' 7\" (1.702 m)       Temperature:   Temp (24hrs), Av.2 °F (36.8 °C), Min:98.1 °F (36.7 °C), Max:98.3 °F (36.8 °C)    Temperature: 98.3 °F (36.8 °C)  Intake & Output:  I/O          0701   0700  0701   07 07 07           Unmeasured Urine Occurrence  1 x     Unmeasured Stool Occurrence  1 x           Weights:   IBW (Ideal Body Weight): 66.1 kg    Body mass index is 29.59 kg/m².  Weight (last 2 days)       Date/Time Weight    24 1120 85.7 (188.93)    24 0531 85.7 (189)          Physical Exam  Constitutional:       Patient is alert, awake and in no acute distress.   HENT:      Normocephalic, atraumatic.      Mucous membranes are moist.      " Airway patent.   Eyes:      PERRL, EOMI.     No scleral icterus.  Cardiovascular:      Normal rate and regular rhythm.     S1 and S2 present. No murmur heard. No friction rub. No gallop.   Pulmonary:      No respiratory distress or increased work of breathing. Breath sounds clear to auscultation bilaterally.    Abdominal:      Abdomen is soft, non-tender and non-distended. No tenderness, rebound or guarding. Bowel sounds present.   Musculoskeletal:      Neck supple with full ROM.     No lower extremity edema bilaterally. Distal pulses present.  Skin:     Skin is warm and dry.   Neurological:      Patient is oriented x3, mental status is at baseline.   Psychiatric:         Affect: normal    LABORATORY DATA     Labs: I have personally reviewed pertinent reports.  Results from last 7 days   Lab Units 02/13/24  0453 02/12/24  1357   WBC Thousand/uL 5.46 6.23   HEMOGLOBIN g/dL 14.9 15.4   HEMATOCRIT % 46.0 46.6   PLATELETS Thousands/uL 200 232   NEUTROS PCT %  --  60   MONOS PCT %  --  8   EOS PCT %  --  1      Results from last 7 days   Lab Units 02/13/24  0000 02/12/24  1357   POTASSIUM mmol/L 4.5 4.4   CHLORIDE mmol/L 110* 104   CO2 mmol/L 24 27   BUN mg/dL 15 18   CREATININE mg/dL 0.88 0.98   CALCIUM mg/dL 8.8 9.2   ALK PHOS U/L 67 74   ALT U/L 35 45   AST U/L 27 33     Results from last 7 days   Lab Units 02/13/24  0000   MAGNESIUM mg/dL 2.2     Results from last 7 days   Lab Units 02/13/24  0000   PHOSPHORUS mg/dL 3.4      Results from last 7 days   Lab Units 02/13/24  1103 02/13/24  0412 02/12/24  2154   INR   --   --  1.08   PTT seconds 85* 38* 28               IMAGING & DIAGNOSTIC TESTING     Radiology Results: I have personally reviewed pertinent reports.  XR chest 1 view portable    Result Date: 2/13/2024  Impression: No acute cardiopulmonary disease. Workstation performed: [id number]     Other Diagnostic Testing: I have personally reviewed pertinent reports.    ACTIVE MEDICATIONS     Current  "Facility-Administered Medications   Medication Dose Route Frequency    acetaminophen (TYLENOL) tablet 650 mg  650 mg Oral Q4H PRN    [START ON 2/14/2024] apixaban (ELIQUIS) tablet 5 mg  5 mg Oral BID    [START ON 2/14/2024] aspirin chewable tablet 324 mg  324 mg Oral Daily    [START ON 2/14/2024] clopidogrel (PLAVIX) tablet 75 mg  75 mg Oral Daily    colesevelam (WELCHOL) tablet 1,250 mg  1,250 mg Oral BID With Meals    ezetimibe (ZETIA) tablet 10 mg  10 mg Oral Daily    hyoscyamine (LEVSIN/SL) SL tablet 0.125 mg  0.125 mg Sublingual BID    metoprolol tartrate (LOPRESSOR) partial tablet 12.5 mg  12.5 mg Oral Q12H MILES    montelukast (SINGULAIR) tablet 10 mg  10 mg Oral Daily    nitroglycerin (NITROSTAT) SL tablet 0.4 mg  0.4 mg Sublingual Q5 Min PRN    ondansetron (ZOFRAN) injection 4 mg  4 mg Intravenous Q6H PRN    OXcarbazepine (TRILEPTAL) tablet 300 mg  300 mg Oral BID    sodium chloride 0.9 % infusion  100 mL/hr Intravenous Continuous    sodium chloride 0.9 % infusion  125 mL/hr Intravenous Continuous       VTE Pharmacologic Prophylaxis: Reason for no pharmacologic prophylaxis - heparin gtt -> Eliquis after catheterization  VTE Mechanical Prophylaxis: sequential compression device and foot pump applied    Portions of the record may have been created with voice recognition software.  Occasional wrong word or \"sound a like\" substitutions may have occurred due to the inherent limitations of voice recognition software.  Read the chart carefully and recognize, using context, where substitutions have occurred.  ==  Owen Maldonado MD  [facility]  Internal Medicine Residency PGY-1  "

## 2024-02-14 VITALS
SYSTOLIC BLOOD PRESSURE: 127 MMHG | HEART RATE: 53 BPM | RESPIRATION RATE: 16 BRPM | WEIGHT: 215 LBS | DIASTOLIC BLOOD PRESSURE: 63 MMHG | TEMPERATURE: 97.3 F | BODY MASS INDEX: 33.74 KG/M2 | HEIGHT: 67 IN | OXYGEN SATURATION: 97 %

## 2024-02-14 LAB
ANION GAP SERPL CALCULATED.3IONS-SCNC: 4 MMOL/L
APTT PPP: 27 SECONDS (ref 23–37)
BUN SERPL-MCNC: 13 MG/DL (ref 5–25)
CALCIUM SERPL-MCNC: 9.3 MG/DL (ref 8.4–10.2)
CHLORIDE SERPL-SCNC: 107 MMOL/L (ref 96–108)
CO2 SERPL-SCNC: 27 MMOL/L (ref 21–32)
CREAT SERPL-MCNC: 0.86 MG/DL (ref 0.6–1.3)
ERYTHROCYTE [DISTWIDTH] IN BLOOD BY AUTOMATED COUNT: 12.3 % (ref 11.6–15.1)
GFR SERPL CREATININE-BSD FRML MDRD: 87 ML/MIN/1.73SQ M
GLUCOSE SERPL-MCNC: 93 MG/DL (ref 65–140)
HCT VFR BLD AUTO: 49.1 % (ref 36.5–49.3)
HGB BLD-MCNC: 16.6 G/DL (ref 12–17)
MAGNESIUM SERPL-MCNC: 2 MG/DL (ref 1.9–2.7)
MCH RBC QN AUTO: 31.9 PG (ref 26.8–34.3)
MCHC RBC AUTO-ENTMCNC: 33.8 G/DL (ref 31.4–37.4)
MCV RBC AUTO: 94 FL (ref 82–98)
PHOSPHATE SERPL-MCNC: 2.7 MG/DL (ref 2.3–4.1)
PLATELET # BLD AUTO: 221 THOUSANDS/UL (ref 149–390)
PMV BLD AUTO: 9.3 FL (ref 8.9–12.7)
POTASSIUM SERPL-SCNC: 4.4 MMOL/L (ref 3.5–5.3)
RBC # BLD AUTO: 5.2 MILLION/UL (ref 3.88–5.62)
SODIUM SERPL-SCNC: 138 MMOL/L (ref 135–147)
WBC # BLD AUTO: 8.43 THOUSAND/UL (ref 4.31–10.16)

## 2024-02-14 PROCEDURE — 85730 THROMBOPLASTIN TIME PARTIAL: CPT | Performed by: INTERNAL MEDICINE

## 2024-02-14 PROCEDURE — 80048 BASIC METABOLIC PNL TOTAL CA: CPT | Performed by: STUDENT IN AN ORGANIZED HEALTH CARE EDUCATION/TRAINING PROGRAM

## 2024-02-14 PROCEDURE — 83735 ASSAY OF MAGNESIUM: CPT | Performed by: STUDENT IN AN ORGANIZED HEALTH CARE EDUCATION/TRAINING PROGRAM

## 2024-02-14 PROCEDURE — 99238 HOSP IP/OBS DSCHRG MGMT 30/<: CPT | Performed by: INTERNAL MEDICINE

## 2024-02-14 PROCEDURE — 84100 ASSAY OF PHOSPHORUS: CPT | Performed by: STUDENT IN AN ORGANIZED HEALTH CARE EDUCATION/TRAINING PROGRAM

## 2024-02-14 PROCEDURE — 99232 SBSQ HOSP IP/OBS MODERATE 35: CPT | Performed by: INTERNAL MEDICINE

## 2024-02-14 PROCEDURE — 85027 COMPLETE CBC AUTOMATED: CPT | Performed by: STUDENT IN AN ORGANIZED HEALTH CARE EDUCATION/TRAINING PROGRAM

## 2024-02-14 RX ORDER — NITROGLYCERIN 0.4 MG/1
0.4 TABLET SUBLINGUAL
Qty: 30 TABLET | Refills: 0 | Status: SHIPPED | OUTPATIENT
Start: 2024-02-14

## 2024-02-14 RX ORDER — NITROGLYCERIN 0.4 MG/1
0.4 TABLET SUBLINGUAL
Qty: 30 TABLET | Refills: 2 | Status: CANCELLED | OUTPATIENT
Start: 2024-02-14

## 2024-02-14 RX ORDER — CLOPIDOGREL BISULFATE 75 MG/1
75 TABLET ORAL DAILY
Qty: 90 TABLET | Refills: 1 | Status: SHIPPED | OUTPATIENT
Start: 2024-02-15

## 2024-02-14 RX ORDER — ROSUVASTATIN CALCIUM 5 MG/1
5 TABLET, COATED ORAL WEEKLY
Qty: 30 TABLET | Refills: 0 | Status: SHIPPED | OUTPATIENT
Start: 2024-02-14 | End: 2024-02-21 | Stop reason: SDUPTHER

## 2024-02-14 RX ADMIN — ASPIRIN 81 MG: 81 TABLET, COATED ORAL at 09:51

## 2024-02-14 RX ADMIN — EZETIMIBE 10 MG: 10 TABLET ORAL at 08:32

## 2024-02-14 RX ADMIN — CLOPIDOGREL BISULFATE 75 MG: 75 TABLET ORAL at 08:31

## 2024-02-14 RX ADMIN — APIXABAN 5 MG: 5 TABLET, FILM COATED ORAL at 08:31

## 2024-02-14 RX ADMIN — MONTELUKAST 10 MG: 10 TABLET, FILM COATED ORAL at 08:31

## 2024-02-14 RX ADMIN — Medication 12.5 MG: at 08:31

## 2024-02-14 RX ADMIN — HYOSCYAMINE SULFATE 0.12 MG: 0.12 TABLET SUBLINGUAL at 08:32

## 2024-02-14 NOTE — PLAN OF CARE
Problem: PAIN - ADULT  Goal: Verbalizes/displays adequate comfort level or baseline comfort level  Description: Interventions:  - Encourage patient to monitor pain and request assistance  - Assess pain using appropriate pain scale  - Administer analgesics based on type and severity of pain and evaluate response  - Implement non-pharmacological measures as appropriate and evaluate response  - Consider cultural and social influences on pain and pain management  - Notify physician/advanced practitioner if interventions unsuccessful or patient reports new pain  Outcome: Progressing     Problem: INFECTION - ADULT  Goal: Absence or prevention of progression during hospitalization  Description: INTERVENTIONS:  - Assess and monitor for signs and symptoms of infection  - Monitor lab/diagnostic results  - Monitor all insertion sites, i.e. indwelling lines, tubes, and drains  - Monitor endotracheal if appropriate and nasal secretions for changes in amount and color  - [place] appropriate cooling/warming therapies per order  - Administer medications as ordered  - Instruct and encourage patient and family to use good hand hygiene technique  - Identify and instruct in appropriate isolation precautions for identified infection/condition  Outcome: Progressing  Goal: Absence of fever/infection during neutropenic period  Description: INTERVENTIONS:  - Monitor WBC    Outcome: Progressing     Problem: SAFETY ADULT  Goal: Patient will remain free of falls  Description: INTERVENTIONS:  - Educate patient/family on patient safety including physical limitations  - Instruct patient to call for assistance with activity   - Consult OT/PT to assist with strengthening/mobility   - Keep Call bell within reach  - Keep bed low and locked with side rails adjusted as appropriate  - Keep care items and personal belongings within reach  - Initiate and maintain comfort rounds  - Make Fall Risk Sign visible to staff  - Offer Toileting every  Hours,  in advance of need  - Initiate/Maintain alarm  - Obtain necessary fall risk management equipment:   - Apply yellow socks and bracelet for high fall risk patients  - Consider moving patient to room near nurses station  Outcome: Progressing  Goal: Maintain or return to baseline ADL function  Description: INTERVENTIONS:  -  Assess patient's ability to carry out ADLs; assess patient's baseline for ADL function and identify physical deficits which impact ability to perform ADLs (bathing, care of mouth/teeth, toileting, grooming, dressing, etc.)  - Assess/evaluate cause of self-care deficits   - Assess range of motion  - Assess patient's mobility; develop plan if impaired  - Assess patient's need for assistive devices and provide as appropriate  - Encourage maximum independence but intervene and supervise when necessary  - Involve family in performance of ADLs  - Assess for home care needs following discharge   - Consider OT consult to assist with ADL evaluation and planning for discharge  - Provide patient education as appropriate  Outcome: Progressing  Goal: Maintains/Returns to pre admission functional level  Description: INTERVENTIONS:  - Perform AM-PAC 6 Click Basic Mobility/ Daily Activity assessment daily.  - Set and communicate daily mobility goal to care team and patient/family/caregiver.   - Collaborate with rehabilitation services on mobility goals if consulted  - Perform Range of Motion times a day.  - Reposition patient every  hours.  - Dangle patient  times a day  - Stand patient times a day  - Ambulate patient  times a day  - Out of bed to chair times a day   - Out of bed for meals  times a day  - Out of bed for toileting  - Record patient progress and toleration of activity level   Outcome: Progressing     Problem: DISCHARGE PLANNING  Goal: Discharge to home or other facility with appropriate resources  Description: INTERVENTIONS:  - Identify barriers to discharge w/patient and caregiver  - Arrange for  needed discharge resources and transportation as appropriate  - Identify discharge learning needs (meds, wound care, etc.)  - Arrange for interpretive services to assist at discharge as needed  - Refer to Case Management Department for coordinating discharge planning if the patient needs post-hospital services based on physician/advanced practitioner order or complex needs related to functional status, cognitive ability, or social support system  Outcome: Progressing     Problem: Knowledge Deficit  Goal: Patient/family/caregiver demonstrates understanding of disease process, treatment plan, medications, and discharge instructions  Description: Complete learning assessment and assess knowledge base.  Interventions:  - Provide teaching at level of understanding  - Provide teaching via preferred learning methods  Outcome: Progressing

## 2024-02-14 NOTE — DISCHARGE SUMMARY
INTERNAL MEDICINE RESIDENCY DISCHARGE SUMMARY     George Ramirez   68 y.o. male  MRN: [id number]  Room/Bed: /-01     [facility] BE MED SURG 5   Encounter: [id number]    Inpatient Problem list:  1) Chest pain  2) CAD requiring stenting  3) A-fib   4) Degenerative disc disease  5) Hypercholesterolemia    * Chest pain  Assessment & Plan  68 y.o. male, history of CAD s/p stent in LAD in 2011, s/p stent in mid LAD just distal to prior stent, HLD, paroxysmal A-fib.  Patient presented to [facility] ED at the direction of his cardiologist for worsening pressure-like chest pain for the past two weeks.  Pain initially began on 2/4/2024 after an episode of A-fib that was resolved after the patient took PRN diltiazem.  The pain was initially intermittent/ every other day, but now the pain is daily.  The chest pain is worse with exertion and associated with lightheadedness and dyspnea.   In the ED, initial troponin was 5-> 4.  EKG, CMP, CBC were unremarkable.  Cardiology recommended admission for observation overnight.      Echo 2017 showed LVEF 66% with normal systolic function, normal wall thickness.    Exercise stress echo 2014 was negative for ischemia.  Asymptomatic at bedside.      Given normal EKG and non-concerning troponin levels, not likely to be STEMI, NSTEMI.  Could be stable angina since the chest pain and shortness of breath occur with exertion.     This admission:  Echo 2/13/24 -   •  Left Ventricle: Left ventricular cavity size is normal. Wall thickness is mildly increased. The left ventricular ejection fraction is 65%. Systolic function is normal. Wall motion is normal. Diastolic function is mildly abnormal, consistent with grade I (abnormal) relaxation.  •  Right Ventricle: Right ventricular cavity size is normal. Systolic function is normal.  •  Aorta: The aortic root is normal in size. The ascending aorta is mildly dilated. The aortic root  exhibited mild fibrocalcific change. The aortic root is 3.60 cm. The ascending aorta is 3.7 cm.    Cath 2/13/24 -   •  Ost LM lesion is 20% stenosed.  •  Prox LAD lesion is 30% stenosed.  •  Mid LAD lesion is 80% stenosed.  •  Prox Cx lesion is 50% stenosed.    -Overnight 2/13 post-op, patient complained of L sided CP while laying in bed that was not exertional, not assoc w diaphoresis or n/v. Some radiation to medial chest. It improved with a combination of APAP, 1x nitro 5 mg, and massage. The patient has been pain-free since and able to ambulate without recurrence of pain.     Plan:   - Monitor telemetry  - Cardiology consulted; s/p cardiac catheterization on 2/13/24 with 80% mid LAD stenosis noted, PCI performed  - Triple therapy for 2 weeks, stop ASA at that time and continue apixaban 5 mg BID and clopidogrel 75 mg qd  - Cardiac rehab  - Follow up with outpatient cardiology    Coronary artery disease involving native coronary artery  Assessment & Plan  S/p cath 2011 with stent in LAD, cath 2017 with stent in mid LAD just distal to prior stent.    Echo 2017 showed LVEF 66% with normal systolic function, normal wall thickness.    Exercise stress echo 2014 was negative for ischemia.  Home meds: colsevelam and ezetimibe (intolerance to statins in the past)    Cardiac catheterization on this admission shows new 80% mid LAD stenosis, PCI performed.   See 'Chest Pain' for additional details.    Plan:   - Continue home colsevelam and ezetimibe   - Aspirin + Plavix + eliquis. Discontinue ASA after 2 weeks.   - Lopressor 12.5 mg BID started by cardiology on admission    Paroxysmal atrial fibrillation (HCC)  Assessment & Plan  Patient has history of CVA in 2006.  Workup at the time showed PFO, which was thought to be the cause of the CVA.  PFO was repaired at the time with a patch.  Subsequent to the patch placement, patient developed paroxysmal A-fib.  Patient takes diltiazem PRN.  Takes Eliquis ([id number] 4 for CVA,  age, CAD).  On 2/4/2024, patient experienced an episode of A-fib with palpitations, irregular heart beat, and hypertension, which resolved after taking diltiazem PRN.  Since this episode, patient developed pressure-like chest pain that has progressively worsened and associated with shortness of breath.      -Over hospital course, the patient has denied palpitations. Telemetry has shown sinus rhythm.     Plan:   - Monitor telemetry   - Continue home meds: Eliquis, hold PRN diltiazem  - Cardiology consulted, appreciate recommendations    DDD (degenerative disc disease), lumbar  Assessment & Plan  Sees Pain Medicine outpatient for lumbar radiculopathy, DDD, lumbar spondylosis.    Home meds: Tizanidine, oxcarbazepine, PRN Tylenol.     Plan:   - Continue home meds as listed above     Hypercholesterolemia  Assessment & Plan  Last lipid panel in November 2023: Cholesterol 219, triglycerides 82, HDL 63, .  Per chart review, does not tolerate statins.      Plan:   - Continue home meds: colsevelam and ezetimibe   - Per cardiology, slowly uptitrate rosuvastatin starting at 5 mg outpatient. Will consider PCSK9 inhibitors if possible.        DETAILS OF HOSPITAL COURSE     Kristopher Ramirez is a 68-year-old male with PMHx CAD s/p LAD stent 2011 and 2017, HLD, paroxysmal A-fib (diltiazem, Eliquis), PFO w CVA s/p PFO closure. The patient presented to [facility] ED on 2/12/2024 at the direction of his cardiologist, Dr. Vito Bernal, for left-sided pressure-like chest pain for the past 2 weeks.  Pain initially began on 2/4/2024 after an episode of A-fib that was resolved after patient took PRN diltiazem.  The pain was initially intermittent/ every other day, but the pain became daily. The chest pain is worse with exertion and associated with lightheadedness, clamminess, and dyspnea.     In the ED, initial troponin was 5-> 4.  EKG, CMP, CBC were unremarkable.  Cardiology recommended admission for observation  "overnight.    On the floors, the patient was taken for cardiac catheterization and is now s/p WIL placement in the mid LAD after it was demonstrated to have 80% stenosis. Heparin gtt was stopped and eliquis was restarted. The patient had one more episode of L sided CP that was \"very similar\" to his anginal symptoms post-op. The pain improved with APA, 1x nitro, and massage. Since then, the patient has been pain free. Telemetry monitoring revealed NSR.     On the day of discharge, the patient had no complaints. He was ambulating without CP, tolerating PO. He denied CP, SOB, palpitations, LE edema, dizziness/light headedness. He was started on lopressor 12.5 mg BID, ASA 81 mg qd, and rosuvastatin 5 mg once weekly. He will continue triple therapy for two weeks at which point he should discontinue ASA 81 mg. Otherwise, continue PTA medications. The patient was cleared by the cardiology service. As the patient had a previous statin intolerance, the rosuvastatin will be slowly uptitrated outpatient while exploring the cost of PCSK9 inhibitors. The patient will need follow up with PCP and cardiology. He will need cardiac rehab as well.     Physical Exam  Vitals and nursing note reviewed.   Constitutional:       General: He is not in acute distress.     Appearance: He is well-developed.   HENT:      Head: Normocephalic and atraumatic.      Mouth/Throat:      Mouth: Mucous membranes are moist.      Pharynx: Oropharynx is clear.   Eyes:      Extraocular Movements: Extraocular movements intact.      Conjunctiva/sclera: Conjunctivae normal.      Pupils: Pupils are equal, round, and reactive to light.   Cardiovascular:      Rate and Rhythm: Normal rate and regular rhythm.      Heart sounds: No murmur heard.  Pulmonary:      Effort: Pulmonary effort is normal. No respiratory distress.      Breath sounds: Normal breath sounds.   Abdominal:      General: Abdomen is flat. Bowel sounds are normal.      Palpations: Abdomen is soft.    "   Tenderness: There is no abdominal tenderness.   Musculoskeletal:         General: No swelling.      Cervical back: Neck supple.      Right lower leg: No edema.      Left lower leg: No edema.      Comments:   -Mildly TTP at L chest wall over pectoralis major   Skin:     General: Skin is warm and dry.      Capillary Refill: Capillary refill takes less than 2 seconds.   Neurological:      Mental Status: He is alert and oriented to person, place, and time.   Psychiatric:         Mood and Affect: Mood normal.          DISCHARGE INFORMATION     PCP at Discharge:  Viviane Resendiz DO    Admitting Provider: Shyam Du MD  Admission Date: 2/12/2024    Discharge Provider: Shyam Du MD  Discharge Date: 2/14/24    Discharge Disposition: Home/Self Care  Discharge Condition: good  Discharge with Lines: no    Discharge Diet: cardiac diet  Activity Restrictions: none  Test Results Pending at Discharge: none    Discharge Diagnoses:  Principal Problem:    Chest pain  Active Problems:    Coronary artery disease involving native coronary artery    Paroxysmal atrial fibrillation (HCC)    DDD (degenerative disc disease), lumbar    Hypercholesterolemia  Resolved Problems:    Atrial fibrillation (HCC)      Consulting Providers:      Diagnostic & Therapeutic Procedures Performed:  XR chest 1 view portable    Result Date: 2/13/2024  Impression: No acute cardiopulmonary disease. Workstation performed: [id number]       Code Status: Level 1 - Full Code  Advance Directive & Living Will: Received  Power of :    POLST:      Medications:  Current Discharge Medication List        Current Discharge Medication List        Current Discharge Medication List        CONTINUE these medications which have NOT CHANGED    Details   ascorbic acid (VITAMIN C) 250 mg tablet Take 250 mg by mouth 2 (two) times a day      Coenzyme Q10 (COQ10) 200 MG CAPS Take 1 capsule by mouth daily      colesevelam (WELCHOL) 625 mg tablet Take 2 tablets (1,250 mg  total) by mouth 2 (two) times a day with meals  Qty: 360 tablet, Refills: 1    Associated Diagnoses: Hypercholesterolemia      diltiazem (CARDIZEM) 30 mg tablet Take 1 tablet (30 mg total) by mouth if needed (afib)  Qty: 15 tablet, Refills: 3    Associated Diagnoses: A-fib (HCC)      Eliquis 5 MG TAKE ONE TABLET BY MOUTH TWICE A DAY  Qty: 180 tablet, Refills: 3    Associated Diagnoses: Paroxysmal atrial fibrillation (HCC)      ezetimibe (ZETIA) 10 mg tablet Take 1 tablet (10 mg total) by mouth daily  Qty: 90 tablet, Refills: 1    Associated Diagnoses: Hypercholesterolemia      Glucosamine-Chondroit-Vit C-Mn (GLUCOSAMINE CHONDR 1500 COMPLX PO) Take 1 tablet by mouth 2 (two) times a day       hyoscyamine (ANASPAZ) 0.125 mg Take 0.125 mg by mouth 2 (two) times a day       multivitamin (THERAGRAN) TABS Take 1 tablet by mouth 2 (two) times a day        NON FORMULARY CBD CAPSULE  AND CREAM USE DAILY PRIN      OXcarbazepine (TRILEPTAL) 300 mg tablet Take 1 tablet (300 mg total) by mouth 2 (two) times a day  Qty: 180 tablet, Refills: 1    Associated Diagnoses: Lumbar radiculopathy      tiZANidine (ZANAFLEX) 2 mg tablet Take 1 tablet (2 mg total) by mouth 2 (two) times a day as needed for muscle spasms  Qty: 180 tablet, Refills: 1    Associated Diagnoses: Spondylosis of lumbar region without myelopathy or radiculopathy      chlorpheniramine (CHLOR-TRIMETON) 4 MG tablet Take 1 tablet (4 mg total) by mouth every 6 (six) hours as needed for rhinitis  Qty: 30 tablet, Refills: 0    Associated Diagnoses: Seasonal allergic rhinitis, unspecified trigger; Acute non-recurrent pansinusitis      clobetasol (TEMOVATE) 0.05 % cream Apply 1 application topically 2 (two) times a day as needed Apply sparingly to affected areas      montelukast (SINGULAIR) 10 mg tablet Take 1 tablet (10 mg total) by mouth daily  Qty: 90 tablet, Refills: 1    Associated Diagnoses: Chronic seasonal allergic rhinitis due to pollen      sildenafil (REVATIO) 20 mg  "tablet Take 1-5 tablets on empty stomach one hour prior to sexual activity if needed  Qty: 30 tablet, Refills: 3    Associated Diagnoses: Erectile dysfunction, unspecified erectile dysfunction type             Allergies:  Allergies   Allergen Reactions   • Augmentin [Amoxicillin-Pot Clavulanate] Hives and Itching   • Molds & Smuts Allergic Rhinitis     Category: Allergy;    • [place]ascus Purpureus Went Yeast Fatigue     Category: Adverse Reaction;    • Niacin Fatigue     Category: Adverse Reaction;    • Pollen Extract Allergic Rhinitis     Category: Allergy;    • Pregabalin Fatigue     Category: Adverse Reaction;    • Statins Fatigue and GI Intolerance     Category: Adverse Reaction;    • Atorvastatin GI Intolerance and Fatigue     Other reaction(s): Muscle pain, constipation, sleepiness  Category: Adverse Reaction;   Other reaction(s): Muscle pain, constipation, sleepiness       FOLLOW-UP     PCP Outpatient Follow-up:  Viviane Resendiz DO    Consulting Providers Follow-up:  Follow up with cardiologist Dr. Bernal      Active Issues Requiring Follow-up:   none    Discharge Statement:   I spent 30 minutes minutes discharging the patient. This time was spent on the day of discharge. I had direct contact with the patient on the day of discharge. Additional documentation is required if more than 30 minutes were spent on discharge.    Portions of the record may have been created with voice recognition software.  Occasional wrong word or \"sound a like\" substitutions may have occurred due to the inherent limitations of voice recognition software.  Read the chart carefully and recognize, using context, where substitutions have occurred.    ==  Lalo Lee  [facility]  [id number]   "

## 2024-02-14 NOTE — DISCHARGE INSTR - AVS FIRST PAGE
We are starting you on METOPROLOL TARTRATE (Lopressor) twice a day. Please continue this medication as prescribed.    You should be taking APIXABAN (Eliquis), ASPIRIN, and CLOPIDOGREL (Plavix) for the next 2 weeks. After that, you should STOP taking ASPIRIN and CONTINUE taking APIXABAN (Eliquis) and CLOPIDOGREL (Plavix) as prescribed. It is very important that you do not miss any doses of these medications as they protect your new stent from becoming blocked.    Continue taking all other medications as prescribed to you.    It is also very important that you follow up with a Cardiologist and your PCP within 1 week of hospital discharge.    Participate in Cardiac Rehabilitation as directed by your Cardiologist.    -----------------------------------------------------------------------------------------------------------------------------------------------------    FROM CARDIOLOGY:  1. Please see the post cardiac catheterization/ angioseal closure device instructions and stent booklet. No heavy lifting, greater than 10 lbs. or strenuous  activity for 48 hrs.  See the post cardiac catheterization/ angioseal closure device instructions.     2.Remove band aid tomorrow.  Shower and wash area- wrist gently with soap and water- beginning tomorrow. Rinse and pat dry.  Apply new water seal band aid.  Repeat this process for 5 days. No powders, creams lotions or antibiotic ointments  for 5 days.  No tub baths, hot tubs or swimming for 5 days.     3.No driving for 2 days    4. Do not stop aspirin or plavix (clopidigrel) for any reason without a cardiologist’s consent, or the stent could block up and cause a heart attack.

## 2024-02-14 NOTE — PROGRESS NOTES
Appointment scheduled, UA ordered.   General Cardiology   Progress Note -  [facility]   George Ramirez 68 y.o. male MRN: [id number]    Unit/Bed#: -01 Encounter: [id number]    Assessment/ Plan    Unstable angina   HS troponin 5-->4-->5  Reviewed ECG showing NSR with non specific ST and T wave abnormalities   Cardiac cath yesterday showed mid LAD 80% stenosis s/p WIL   Continue DAPT: aspirin and plavix  Will do triple therapy for 2 weeks then can discontinue aspirin   Echocardiogram showed EF 65%, grade I diastolic dysfunction  Reviewed post cath discharge instructions with patient      2. CAD   With prior stenting to LAD in 2011  Cardiac cath in 2017 showed L main normal, proximal LAD 0% stenosis at the site of a prior stent, mid LAD 50% stenosis at the distal margin of the stented segment   Cardiac cath yesterday showed ostial LM 20%, proximal LAD 30% stenosis, mid LAD 80% stenosis and proximal Cx 50% stenosis     3. Paroxysmal atrial fibrillation   On eliquis 5 mg PO BID  On cardizem 30 mg PO PRN at home. Currently not prescribed   Started on metoprolol 12.5 mg PO BID this admission in the setting of #1      4. Hyperlipidemia     Intolerant to statins   Recommend PCSK9 as outpatient   Continue Zetia 10 mg PO daily   Patient is agreeable to crestor once a week.      5. History of prior CVA     6. S/p PFO closure     Patient will follow up with Anna HANSON 2/21/2024.      Subjective  Patient reports no chest pain, SOB or palpitations today. No fever or chills. He is eager to go home.     Review of Systems   Constitutional: Negative for chills and fever.   HENT:  Negative for congestion.    Cardiovascular:  Negative for chest pain, dyspnea on exertion, leg swelling, orthopnea and palpitations.   Respiratory:  Negative for shortness of breath.    Musculoskeletal:  Negative for falls.   Gastrointestinal:  Negative for bloating, nausea and vomiting.   Neurological:  Negative for dizziness and light-headedness.  "  Psychiatric/Behavioral:  Negative for altered mental status.    All other systems reviewed and are negative.      Objective:   Vitals: Blood pressure 130/73, pulse 61, temperature (!) 97.3 °F (36.3 °C), temperature source Oral, resp. rate 18, height 5' 7\" (1.702 m), weight 97.5 kg (215 lb), SpO2 98%.,       Body mass index is 33.67 kg/m².,     Systolic (24hrs), Av , Min:125 , Max:150     Diastolic (24hrs), Av, Min:67, Max:91        Intake/Output Summary (Last 24 hours) at 2024 0839  Last data filed at 2024 0724  Gross per 24 hour   Intake 480 ml   Output --   Net 480 ml     Weight (last 2 days)       Date/Time Weight    24 0530 97.5 (215)    24 0500 97.5 (215)     Weight: measured 2 times per pt closure to normal weight than 188lb prev wt per notes at 24 0500    24 1120 85.7 (188.93)    24 0531 85.7 (189)          Telemetry Review: Normal sinus rhythm     Physical Exam  Constitutional:       General: He is not in acute distress.  HENT:      Head: Normocephalic.      Mouth/Throat:      Mouth: Mucous membranes are moist.   Cardiovascular:      Rate and Rhythm: Normal rate and regular rhythm.      Pulses: Normal pulses.   Pulmonary:      Effort: Pulmonary effort is normal.      Breath sounds: Normal breath sounds.   Abdominal:      General: Bowel sounds are normal.      Palpations: Abdomen is soft.   Musculoskeletal:         General: Normal range of motion.      Cervical back: Neck supple.   Skin:     General: Skin is warm and dry.      Capillary Refill: Capillary refill takes less than 2 seconds.      Comments: R wrist dsg CDI. No hematoma or bleeding    Neurological:      Mental Status: He is alert and oriented to person, place, and time.   Psychiatric:         Mood and Affect: Mood normal.       LABORATORY RESULTS      CBC with diff:   Results from last 7 days   Lab Units 24  0505 24  0453 24  1357   WBC Thousand/uL 8.43 5.46 6.23   HEMOGLOBIN g/dL " "16.6 14.9 15.4   HEMATOCRIT % 49.1 46.0 46.6   MCV fL 94 96 97   PLATELETS Thousands/uL 221 200 232   RBC Million/uL 5.20 4.78 4.79   MCH pg 31.9 31.2 32.2   MCHC g/dL 33.8 32.4 33.0   RDW % 12.3 12.3 12.5   MPV fL 9.3 9.1 9.3   NRBC AUTO /100 WBCs  --   --  0       CMP:  Results from last 7 days   Lab Units 02/13/24  0000 02/12/24  1357   POTASSIUM mmol/L 4.5 4.4   CHLORIDE mmol/L 110* 104   CO2 mmol/L 24 27   BUN mg/dL 15 18   CREATININE mg/dL 0.88 0.98   CALCIUM mg/dL 8.8 9.2   AST U/L 27 33   ALT U/L 35 45   ALK PHOS U/L 67 74   EGFR ml/min/1.73sq m 87 77       BMP:  Results from last 7 days   Lab Units 02/13/24  0000 02/12/24  1357   POTASSIUM mmol/L 4.5 4.4   CHLORIDE mmol/L 110* 104   CO2 mmol/L 24 27   BUN mg/dL 15 18   CREATININE mg/dL 0.88 0.98   CALCIUM mg/dL 8.8 9.2       No results found for: \"NTBNP\"          Results from last 7 days   Lab Units 02/13/24  0000   MAGNESIUM mg/dL 2.2          Results from last 7 days   Lab Units 02/13/24  0412   HEMOGLOBIN A1C % 5.3              Results from last 7 days   Lab Units 02/12/24  2154   INR  1.08       Lipid Profile:   Lab Results   Component Value Date    CHOL 197 01/30/2016    CHOL 216 05/12/2015    CHOL 207 08/14/2014     Lab Results   Component Value Date    HDL 63 11/03/2023    HDL 61 05/10/2023    HDL 61 10/13/2022     Lab Results   Component Value Date    LDLCALC 140 (H) 11/03/2023    LDLCALC 165 (H) 05/10/2023    LDLCALC 155 (H) 10/13/2022     Lab Results   Component Value Date    TRIG 82 11/03/2023    TRIG 69 05/10/2023    TRIG 77 10/13/2022       Cardiac testing:   Results for orders placed during the hospital encounter of 03/09/17    Echo complete with contrast if indicated    Narrative  [address] [address] [phone]    Transthoracic Echocardiogram  2D, M-mode, Doppler, and Color Doppler    Study date:  09-Mar-2017    Patient: KOREY MODESTA  MR number: [id number]  Account " number: [id number]  : 1955  Age: 61 years  Gender: Male  Status: Outpatient  Location: Echo lab  Height: 68 in  Weight: 194.7 lb  BP: 122/ 70 mmHg    Indications: Cardiac murmur; Alpha 1 antitrypsin deficiency    Diagnoses: R01.1 - Cardiac murmur, unspecified    Sonographer:  FRANCES Aj, RDCS  Primary Physician:  Asad Molina MD  Referring Physician:  Vito Bernal MD  Group:  [facility]  Interpreting Physician:  Regis Sher MD    SUMMARY    LEFT VENTRICLE:  Systolic function was normal. Ejection fraction was estimated to be 66 %.  There were no regional wall motion abnormalities.    ATRIAL SEPTUM:  There was a closure device in place.    MITRAL VALVE:  There was trace regurgitation.    TRICUSPID VALVE:  There was trace regurgitation.    PULMONIC VALVE:  There was trace regurgitation.    HISTORY: PRIOR HISTORY: Angina; Atrial fibrillation; Coronary artery disease; Chest pain; Shortness of breath; PFO closure with cardioseal    PROCEDURE: The procedure was performed in the echo lab. This was a routine study. The transthoracic approach was used. The study included complete 2D imaging, M-mode, complete spectral Doppler, and color Doppler. The heart rate was 66 bpm,  at the start of the study. Images were obtained from the parasternal, apical, subcostal, and suprasternal notch acoustic windows. Image quality was adequate.    LEFT VENTRICLE: Size was normal. Systolic function was normal. Ejection fraction was estimated to be 66 %. There were no regional wall motion abnormalities. Wall thickness was normal. There was no evidence of concentric hypertrophy.  DOPPLER: Left ventricular diastolic function parameters were normal.    RIGHT VENTRICLE: The size was normal. Systolic function was normal. Wall thickness was normal.    LEFT ATRIUM: Size was normal.    ATRIAL SEPTUM: There was a closure device in place.    RIGHT ATRIUM: Size was normal.    MITRAL VALVE: Valve  structure was normal. There was normal leaflet separation. DOPPLER: The transmitral velocity was within the normal range. There was no evidence for stenosis. There was trace regurgitation.    AORTIC VALVE: The valve was trileaflet. Leaflets exhibited normal thickness and normal cuspal separation. DOPPLER: Transaortic velocity was within the normal range. There was no evidence for stenosis. There was no regurgitation.    TRICUSPID VALVE: The valve structure was normal. There was normal leaflet separation. DOPPLER: The transtricuspid velocity was within the normal range. There was no evidence for stenosis. There was trace regurgitation. Pulmonary artery  systolic pressure was within the normal range.    PULMONIC VALVE: Leaflets exhibited normal thickness, no calcification, and normal cuspal separation. DOPPLER: The transpulmonic velocity was within the normal range. There was trace regurgitation.    PERICARDIUM: There was no pericardial effusion. The pericardium was normal in appearance.    AORTA: The root exhibited normal size.    SYSTEM MEASUREMENT TABLES    2D  %FS: 32.64 %  Ao Diam: 3.26 cm  EDV(Teich): 63.83 ml  EF(Cube): 69.43 %  EF(Teich): 61.74 %  ESV(Cube): 17.42 ml  ESV(Teich): 24.42 ml  IVSd: 1.29 cm  LA Area: 14.5 cm2  LA Diam: 2.97 cm  LVEDV MOD A4C: 74.15 ml  LVEF MOD A4C: 76.28 %  LVESV MOD A4C: 17.59 ml  LVIDd: 3.85 cm  LVIDs: 2.59 cm  LVLd A4C: 8.41 cm  LVLs A4C: 6.28 cm  LVPWd: 1.06 cm  RA Area: 15.47 cm2  SI(Cube): 19.58 ml/m2  SI(Teich): 19.51 ml/m2  SV MOD A4C: 56.57 ml  SV(Cube): 39.55 ml  SV(Teich): 39.41 ml  rv diam: 3.12 cm    CW  TR Vmax: 2.36 m/s  TR maxP.32 mmHg    MM  TAPSE: 3.1 cm    PW  E': 0.08 m/s  E/E': 7.1  MV A Marcio: 0.59 m/s  MV Dec Placer: 2.22 m/s2  MV DecT: 270.24 ms  MV E Marcio: 0.6 m/s  MV E/A Ratio: 1.02    [facility] in the [place] Commission Accredited Echocardiography Laboratory    Prepared and electronically signed by    Regis Sher MD  Signed 09-Mar-2017 19:10:14    No results  found for this or any previous visit.    No results found for this or any previous visit.    No valid procedures specified.  No results found for this or any previous visit.        Meds/Allergies   all current active meds have been reviewed and current meds:   Current Facility-Administered Medications   Medication Dose Route Frequency    acetaminophen (TYLENOL) tablet 650 mg  650 mg Oral Q4H PRN    apixaban (ELIQUIS) tablet 5 mg  5 mg Oral BID    aspirin (ECOTRIN LOW STRENGTH) EC tablet 81 mg  81 mg Oral Daily    clopidogrel (PLAVIX) tablet 75 mg  75 mg Oral Daily    colesevelam (WELCHOL) tablet 1,250 mg  1,250 mg Oral BID With Meals    ezetimibe (ZETIA) tablet 10 mg  10 mg Oral Daily    hyoscyamine (LEVSIN/SL) SL tablet 0.125 mg  0.125 mg Sublingual BID    metoprolol tartrate (LOPRESSOR) partial tablet 12.5 mg  12.5 mg Oral Q12H MILES    montelukast (SINGULAIR) tablet 10 mg  10 mg Oral Daily    nitroglycerin (NITROSTAT) SL tablet 0.4 mg  0.4 mg Sublingual Q5 Min PRN    ondansetron (ZOFRAN) injection 4 mg  4 mg Intravenous Q6H PRN    OXcarbazepine (TRILEPTAL) tablet 300 mg  300 mg Oral BID    sodium chloride 0.9 % infusion  100 mL/hr Intravenous Continuous     Medications Prior to Admission   Medication    ascorbic acid (VITAMIN C) 250 mg tablet    Coenzyme Q10 (COQ10) 200 MG CAPS    colesevelam (WELCHOL) 625 mg tablet    diltiazem (CARDIZEM) 30 mg tablet    Eliquis 5 MG    ezetimibe (ZETIA) 10 mg tablet    Glucosamine-Chondroit-Vit C-Mn (GLUCOSAMINE CHONDR 1500 COMPLX PO)    hyoscyamine (ANASPAZ) 0.125 mg    multivitamin (THERAGRAN) TABS    NON FORMULARY    OXcarbazepine (TRILEPTAL) 300 mg tablet    tiZANidine (ZANAFLEX) 2 mg tablet    chlorpheniramine (CHLOR-TRIMETON) 4 MG tablet    clobetasol (TEMOVATE) 0.05 % cream    montelukast (SINGULAIR) 10 mg tablet    sildenafil (REVATIO) 20 mg tablet       sodium chloride, 100 mL/hr, Last Rate: Stopped (02/13/24 1413)      Counseling / Coordination of Care  Total floor /  unit time spent today 20 minutes.  Greater than 50% of total time was spent with the patient and / or family counseling and / or coordination of care.      ** Please Note: Dragon 360 Dictation voice to text software may have been used in the creation of this document. **

## 2024-02-14 NOTE — PLAN OF CARE
Problem: PAIN - ADULT  Goal: Verbalizes/displays adequate comfort level or baseline comfort level  Description: Interventions:  - Encourage patient to monitor pain and request assistance  - Assess pain using appropriate pain scale  - Administer analgesics based on type and severity of pain and evaluate response  - Implement non-pharmacological measures as appropriate and evaluate response  - Consider cultural and social influences on pain and pain management  - Notify physician/advanced practitioner if interventions unsuccessful or patient reports new pain  Outcome: Progressing     Problem: INFECTION - ADULT  Goal: Absence or prevention of progression during hospitalization  Description: INTERVENTIONS:  - Assess and monitor for signs and symptoms of infection  - Monitor lab/diagnostic results  - Monitor all insertion sites, i.e. indwelling lines, tubes, and drains  - Monitor endotracheal if appropriate and nasal secretions for changes in amount and color  - [place] appropriate cooling/warming therapies per order  - Administer medications as ordered  - Instruct and encourage patient and family to use good hand hygiene technique  - Identify and instruct in appropriate isolation precautions for identified infection/condition  Outcome: Progressing  Goal: Absence of fever/infection during neutropenic period  Description: INTERVENTIONS:  - Monitor WBC    Outcome: Progressing     Problem: SAFETY ADULT  Goal: Patient will remain free of falls  Description: INTERVENTIONS:  - Educate patient/family on patient safety including physical limitations  - Instruct patient to call for assistance with activity   - Consult OT/PT to assist with strengthening/mobility   - Keep Call bell within reach  - Keep bed low and locked with side rails adjusted as appropriate  - Keep care items and personal belongings within reach  - Initiate and maintain comfort rounds  - Apply yellow socks and bracelet for high fall risk patients  - Consider  moving patient to room near nurses station  Outcome: Progressing  Goal: Maintain or return to baseline ADL function  Description: INTERVENTIONS:  -  Assess patient's ability to carry out ADLs; assess patient's baseline for ADL function and identify physical deficits which impact ability to perform ADLs (bathing, care of mouth/teeth, toileting, grooming, dressing, etc.)  - Assess/evaluate cause of self-care deficits   - Assess range of motion  - Assess patient's mobility; develop plan if impaired  - Assess patient's need for assistive devices and provide as appropriate  - Encourage maximum independence but intervene and supervise when necessary  - Involve family in performance of ADLs  - Assess for home care needs following discharge   - Consider OT consult to assist with ADL evaluation and planning for discharge  - Provide patient education as appropriate  Outcome: Progressing  Goal: Maintains/Returns to pre admission functional level  Description: INTERVENTIONS:  - Perform AM-PAC 6 Click Basic Mobility/ Daily Activity assessment daily.  - Set and communicate daily mobility goal to care team and patient/family/caregiver.   - Collaborate with rehabilitation services on mobility goals if consulted  - Out of bed for toileting  - Record patient progress and toleration of activity level   Outcome: Progressing     Problem: DISCHARGE PLANNING  Goal: Discharge to home or other facility with appropriate resources  Description: INTERVENTIONS:  - Identify barriers to discharge w/patient and caregiver  - Arrange for needed discharge resources and transportation as appropriate  - Identify discharge learning needs (meds, wound care, etc.)  - Arrange for interpretive services to assist at discharge as needed  - Refer to Case Management Department for coordinating discharge planning if the patient needs post-hospital services based on physician/advanced practitioner order or complex needs related to functional status, cognitive  ability, or social support system  Outcome: Progressing     Problem: Knowledge Deficit  Goal: Patient/family/caregiver demonstrates understanding of disease process, treatment plan, medications, and discharge instructions  Description: Complete learning assessment and assess knowledge base.  Interventions:  - Provide teaching at level of understanding  - Provide teaching via preferred learning methods  Outcome: Progressing

## 2024-02-15 ENCOUNTER — TRANSITIONAL CARE MANAGEMENT (OUTPATIENT)
Dept: INTERNAL MEDICINE CLINIC | Facility: OTHER | Age: 69
End: 2024-02-15

## 2024-02-15 ENCOUNTER — TELEPHONE (OUTPATIENT)
Dept: INTERNAL MEDICINE CLINIC | Facility: OTHER | Age: 69
End: 2024-02-15

## 2024-02-15 ENCOUNTER — OFFICE VISIT (OUTPATIENT)
Dept: INTERNAL MEDICINE CLINIC | Age: 69
End: 2024-02-15
Payer: COMMERCIAL

## 2024-02-15 VITALS
DIASTOLIC BLOOD PRESSURE: 84 MMHG | WEIGHT: 220 LBS | SYSTOLIC BLOOD PRESSURE: 114 MMHG | BODY MASS INDEX: 34.46 KG/M2 | OXYGEN SATURATION: 98 % | TEMPERATURE: 98.1 F | HEART RATE: 67 BPM

## 2024-02-15 DIAGNOSIS — E88.01 ALPHA-1-ANTITRYPSIN DEFICIENCY (HCC): ICD-10-CM

## 2024-02-15 DIAGNOSIS — E78.00 HYPERCHOLESTEROLEMIA: ICD-10-CM

## 2024-02-15 DIAGNOSIS — Z79.899 ON STATIN THERAPY: ICD-10-CM

## 2024-02-15 DIAGNOSIS — I48.0 PAROXYSMAL ATRIAL FIBRILLATION (HCC): ICD-10-CM

## 2024-02-15 DIAGNOSIS — I25.110 CORONARY ARTERY DISEASE INVOLVING NATIVE CORONARY ARTERY OF NATIVE HEART WITH UNSTABLE ANGINA PECTORIS (HCC): Primary | ICD-10-CM

## 2024-02-15 DIAGNOSIS — Z95.820 S/P ANGIOPLASTY WITH STENT: ICD-10-CM

## 2024-02-15 DIAGNOSIS — Z09 HOSPITAL DISCHARGE FOLLOW-UP: ICD-10-CM

## 2024-02-15 DIAGNOSIS — Z78.9 STATIN INTOLERANCE: ICD-10-CM

## 2024-02-15 PROCEDURE — 99496 TRANSJ CARE MGMT HIGH F2F 7D: CPT | Performed by: INTERNAL MEDICINE

## 2024-02-15 NOTE — PROGRESS NOTES
Assessment/Plan:    Transitional care management visit/hospital follow-up  -Done 1 day post discharge  -Patient continues to do well and will follow-up with cardiology and also hopefully go for cardiac rehab.  He will discuss this with cardiology.    Coronary artery disease with unstable angina  -Status post angioplasty with stent placement  -Continue with dual antiplatelet agents, aspirin and Plavix for a total of 2 weeks, and then monotherapy with Plavix, metoprolol, rosuvastatin weekly and as needed nitroglycerin    Paroxysmal atrial fibrillation  -Rate controlled  -Continue with diltiazem, metoprolol and Eliquis    Hyperlipidemia  -Improving  -Continue with WelChol, Zetia and once weekly Crestor as well as a heart healthy diet with exercise    Statin intolerance  -Patient reports a side effects to statins which involves muscle weakness and bloating  -He has been introduced to once weekly low-dose Crestor and will be monitored closely clinically and dose of Crestor increased as tolerated.  -Will order LFT    Alpha-1 antitrypsin deficiency  -Stable without emphysema  -We will continue to monitor patient clinically       Diagnoses and all orders for this visit:    Coronary artery disease involving native coronary artery of native heart with unstable angina pectoris (HCC)    Hospital discharge follow-up    S/P angioplasty with stent    Hypercholesterolemia    Statin intolerance  -     Hepatic function panel; Future    On statin therapy  -     Hepatic function panel; Future    Alpha-1-antitrypsin deficiency (HCC)    Paroxysmal atrial fibrillation (HCC)            Depression Screening and Follow-up Plan: Patient was screened for depression during today's encounter. They screened negative with a PHQ-2 score of 0.      Subjective:      Patient ID: George Ramirez is a 68 y.o. male.    HPI    Patient presents for a transitional care management visit.  He was admitted at [facility] from 2/12/2024 to  2/14/2024 with left-sided pressure-like chest pain without elevation of troponin.  He had cardiac catheterization done that showed two-vessel coronary artery disease with 80% blockage of the mid LAD believed to be the culprit vessel, as well as that 50% stenosis of the proximal LAD and 50% stenosis of the proximal circumflex and is status post angioplasty with stent placement.  He was started on dual antiplatelet agents with Plavix and aspirin and will continue with his Eliquis for his paroxysmal atrial fibrillation.  He was instructed to discontinue aspirin after 2 weeks and to continue with Plavix.  He was also started on Lopressor, as well as 5 milligrams of rosuvastatin once weekly to be titrated up, because of his history of statin intolerance.  He will follow-up with cardiology and will also have cardiac rehab.  Today, patient states that the chest pain has completely stopped   He states that he had some chest pain and sob post op but after nitroglycerin and and tylenol it stopped.   He will take crestor for the first time today   He states that in the past when he takes statins he develops fatigue and constipation with bloating.  Will see cardiology next week Wednesday   Will talk to cards about cardiac rehab.  He states that he is not yet sure that he will go but will discuss it with them.  He admits to nasal congestion and rhinorrhea which is chronic for him, likely secondary to allergies but  denies any fever, chills, night sweats, headache, dizziness, pnd, sore throat, ear ache, sinus pain or pressure, wheezing, cough, chest pain, sob, palpitations, nausea, vomiting, diarrhea, constipation, hematochezia, hematuria, melena stools, arthralgias, myalgias, feelings of anxiety, depression or insomnia.        TCM Call     Date and time call was made  2/15/2024 10:02 AM    Hospital care reviewed  Records reviewed    Patient was hospitialized at  [facility]    Date of Admission  02/12/24    Date of  discharge  02/14/24    Diagnosis  chest pain    Disposition  Home    Current Symptoms  None      TCM Call     Post hospital issues  None    Scheduled for follow up?  Yes    Did you obtain your prescribed medications  Yes    Do you need help managing your prescriptions or medications  No    Is transportation to your appointment needed  No    I have advised the patient to call PCP with any new or worsening symptoms  Evans Colvin CMA          The following portions of the patient's history were reviewed and updated as appropriate: He  has a past medical history of A-fib ([facility]), Allergic, Allergic rhinitis, Arthritis, Benign cyst of kidney, Cancer ([facility]) (2015), Cat bite (07/08/2020), Cerebral vascular disease, Chronic sinusitis, Coronary artery disease, Cryptogenic stroke ([facility]) (03/02/2020), Hyperlipidemia, Kidney stone, Melanoma ([facility]), Pancreas cyst, and Stroke ([facility]) (2006).  He   Patient Active Problem List    Diagnosis Date Noted   • Chest pain 02/12/2024   • Symptomatic varicose veins of both lower extremities 02/14/2023   • Skin lesion 02/03/2023   • BMI 34.0-34.9,adult 02/03/2023   • Paroxysmal atrial fibrillation ([facility]) 04/14/2021   • Chronic pain syndrome 04/05/2021   • Tinea corporis 10/12/2020   • Lumbar radiculopathy 10/11/2019   • Low back pain with sciatica 10/11/2019   • DDD (degenerative disc disease), lumbar 10/11/2019   • Lumbar spondylosis 10/11/2019   • Pancreatic cyst 09/25/2019   • BPH with obstruction/lower urinary tract symptoms 06/12/2018   • Alpha-1-antitrypsin deficiency ([facility]) 02/16/2017   • Coronary artery disease involving native coronary artery 02/02/2017   • Hypercholesterolemia 12/19/2014   • Seasonal allergic rhinitis 05/20/2014   • Nephrolithiasis 05/08/2014   • Osteoarthritis 03/04/2014   • Irritable bowel syndrome 03/04/2014   • Coronary atherosclerosis 03/21/2013     He  has a past surgical history that includes Patent foramen ovale closure; Other surgical history (2003);  Colonoscopy; Cardiac catheterization (N/A, 2/13/2024); Cardiac catheterization (N/A, 2/13/2024); Cardiac catheterization (N/A, 2/13/2024); and Cardiac catheterization (Left, 2/13/2024).  His family history includes Cancer in his father and sister; Diabetes type II in his mother; Hearing loss in his sister.  He  reports that he quit smoking about 38 years ago. His smoking use included cigars. He started smoking about 48 years ago. He quit smokeless tobacco use about 38 years ago.  His smokeless tobacco use included chew. He reports current alcohol use of about 1.0 standard drink of alcohol per week. He reports that he does not currently use drugs after having used the following drugs: Marijuana.  Current Outpatient Medications   Medication Sig Dispense Refill   • ascorbic acid (VITAMIN C) 250 mg tablet Take 250 mg by mouth 2 (two) times a day     • aspirin (ECOTRIN LOW STRENGTH) 81 mg EC tablet Take 1 tablet (81 mg total) by mouth daily 14 tablet 0   • clobetasol (TEMOVATE) 0.05 % cream Apply 1 application topically 2 (two) times a day as needed Apply sparingly to affected areas     • clopidogrel (PLAVIX) 75 mg tablet Take 1 tablet (75 mg total) by mouth daily 90 tablet 1   • Coenzyme Q10 (COQ10) 200 MG CAPS Take 1 capsule by mouth daily     • colesevelam (WELCHOL) 625 mg tablet Take 2 tablets (1,250 mg total) by mouth 2 (two) times a day with meals 360 tablet 1   • diltiazem (CARDIZEM) 30 mg tablet Take 1 tablet (30 mg total) by mouth if needed (afib) 15 tablet 3   • Eliquis 5 MG TAKE ONE TABLET BY MOUTH TWICE A  tablet 3   • ezetimibe (ZETIA) 10 mg tablet Take 1 tablet (10 mg total) by mouth daily 90 tablet 1   • Glucosamine-Chondroit-Vit C-Mn (GLUCOSAMINE CHONDR 1500 COMPLX PO) Take 1 tablet by mouth 2 (two) times a day      • hyoscyamine (ANASPAZ) 0.125 mg Take 0.125 mg by mouth 2 (two) times a day      • metoprolol tartrate (LOPRESSOR) 25 mg tablet Take 0.5 tablets (12.5 mg total) by mouth every 12  (twelve) hours 90 tablet 1   • multivitamin (THERAGRAN) TABS Take 1 tablet by mouth 2 (two) times a day       • nitroglycerin (NITROSTAT) 0.4 mg SL tablet Place 1 tablet (0.4 mg total) under the tongue every 5 (five) minutes as needed for chest pain - do not take if you have recently taken Sildenafil (Revatio) 30 tablet 0   • OXcarbazepine (TRILEPTAL) 300 mg tablet Take 1 tablet (300 mg total) by mouth 2 (two) times a day 180 tablet 1   • rosuvastatin (CRESTOR) 5 mg tablet Take 1 tablet (5 mg total) by mouth once a week 30 tablet 0   • sildenafil (REVATIO) 20 mg tablet Take 1-5 tablets on empty stomach one hour prior to sexual activity if needed 30 tablet 3   • tiZANidine (ZANAFLEX) 2 mg tablet Take 1 tablet (2 mg total) by mouth 2 (two) times a day as needed for muscle spasms 180 tablet 1   • chlorpheniramine (CHLOR-TRIMETON) 4 MG tablet Take 1 tablet (4 mg total) by mouth every 6 (six) hours as needed for rhinitis (Patient not taking: Reported on 2/15/2024) 30 tablet 0   • NON FORMULARY CBD CAPSULE  AND CREAM USE DAILY PRIN (Patient not taking: Reported on 2/15/2024)       No current facility-administered medications for this visit.     Current Outpatient Medications on File Prior to Visit   Medication Sig   • ascorbic acid (VITAMIN C) 250 mg tablet Take 250 mg by mouth 2 (two) times a day   • aspirin (ECOTRIN LOW STRENGTH) 81 mg EC tablet Take 1 tablet (81 mg total) by mouth daily   • clobetasol (TEMOVATE) 0.05 % cream Apply 1 application topically 2 (two) times a day as needed Apply sparingly to affected areas   • clopidogrel (PLAVIX) 75 mg tablet Take 1 tablet (75 mg total) by mouth daily   • Coenzyme Q10 (COQ10) 200 MG CAPS Take 1 capsule by mouth daily   • colesevelam (WELCHOL) 625 mg tablet Take 2 tablets (1,250 mg total) by mouth 2 (two) times a day with meals   • diltiazem (CARDIZEM) 30 mg tablet Take 1 tablet (30 mg total) by mouth if needed (afib)   • Eliquis 5 MG TAKE ONE TABLET BY MOUTH TWICE A DAY   •  ezetimibe (ZETIA) 10 mg tablet Take 1 tablet (10 mg total) by mouth daily   • Glucosamine-Chondroit-Vit C-Mn (GLUCOSAMINE CHONDR 1500 COMPLX PO) Take 1 tablet by mouth 2 (two) times a day    • hyoscyamine (ANASPAZ) 0.125 mg Take 0.125 mg by mouth 2 (two) times a day    • metoprolol tartrate (LOPRESSOR) 25 mg tablet Take 0.5 tablets (12.5 mg total) by mouth every 12 (twelve) hours   • multivitamin (THERAGRAN) TABS Take 1 tablet by mouth 2 (two) times a day     • nitroglycerin (NITROSTAT) 0.4 mg SL tablet Place 1 tablet (0.4 mg total) under the tongue every 5 (five) minutes as needed for chest pain - do not take if you have recently taken Sildenafil (Revatio)   • OXcarbazepine (TRILEPTAL) 300 mg tablet Take 1 tablet (300 mg total) by mouth 2 (two) times a day   • rosuvastatin (CRESTOR) 5 mg tablet Take 1 tablet (5 mg total) by mouth once a week   • sildenafil (REVATIO) 20 mg tablet Take 1-5 tablets on empty stomach one hour prior to sexual activity if needed   • tiZANidine (ZANAFLEX) 2 mg tablet Take 1 tablet (2 mg total) by mouth 2 (two) times a day as needed for muscle spasms   • chlorpheniramine (CHLOR-TRIMETON) 4 MG tablet Take 1 tablet (4 mg total) by mouth every 6 (six) hours as needed for rhinitis (Patient not taking: Reported on 2/15/2024)   • NON FORMULARY CBD CAPSULE  AND CREAM USE DAILY PRIN (Patient not taking: Reported on 2/15/2024)   • [DISCONTINUED] montelukast (SINGULAIR) 10 mg tablet Take 1 tablet (10 mg total) by mouth daily     Current Facility-Administered Medications on File Prior to Visit   Medication   • [DISCONTINUED] acetaminophen (TYLENOL) tablet 650 mg   • [DISCONTINUED] apixaban (ELIQUIS) tablet 5 mg   • [DISCONTINUED] aspirin (ECOTRIN LOW STRENGTH) EC tablet 81 mg   • [DISCONTINUED] clopidogrel (PLAVIX) tablet 75 mg   • [DISCONTINUED] colesevelam (WELCHOL) tablet 1,250 mg   • [DISCONTINUED] ezetimibe (ZETIA) tablet 10 mg   • [DISCONTINUED] hyoscyamine (LEVSIN/SL) SL tablet 0.125 mg   •  [DISCONTINUED] metoprolol tartrate (LOPRESSOR) partial tablet 12.5 mg   • [DISCONTINUED] montelukast (SINGULAIR) tablet 10 mg   • [DISCONTINUED] nitroglycerin (NITROSTAT) SL tablet 0.4 mg   • [DISCONTINUED] ondansetron (ZOFRAN) injection 4 mg   • [DISCONTINUED] OXcarbazepine (TRILEPTAL) tablet 300 mg   • [DISCONTINUED] sodium chloride 0.9 % infusion     He is allergic to augmentin [amoxicillin-pot clavulanate], molds & smuts, monascus purpureus went yeast, niacin, pollen extract, pregabalin, statins, and atorvastatin..    Review of Systems   Constitutional:  Negative for activity change, chills, fatigue, fever and unexpected weight change.   HENT:  Positive for congestion (usually in the am- has a cat and has allergies - did allergy shots for years) and rhinorrhea (chronic). Negative for ear pain, postnasal drip, sinus pressure and sore throat.    Eyes:  Negative for pain.   Respiratory:  Negative for cough, choking, chest tightness, shortness of breath and wheezing.    Cardiovascular:  Negative for chest pain, palpitations and leg swelling.   Gastrointestinal:  Negative for abdominal pain, constipation, diarrhea, nausea and vomiting.   Genitourinary:  Negative for dysuria and hematuria.   Musculoskeletal:  Negative for arthralgias, back pain, gait problem, joint swelling, myalgias and neck stiffness.   Skin:  Negative for pallor and rash.   Neurological:  Negative for dizziness, tremors, seizures, syncope, light-headedness and headaches.   Hematological:  Negative for adenopathy.   Psychiatric/Behavioral:  Negative for behavioral problems.          Objective:      /84 (BP Location: Left arm, Patient Position: Sitting, Cuff Size: Standard)   Pulse 67   Temp 98.1 °F (36.7 °C) (Temporal)   Wt 99.8 kg (220 lb)   SpO2 98%   BMI 34.46 kg/m²          Physical Exam  Constitutional:       General: He is not in acute distress.     Appearance: He is well-developed. He is not diaphoretic.   HENT:      Head:  Normocephalic and atraumatic.      Right Ear: External ear normal.      Left Ear: External ear normal.      Nose: Nose normal.      Mouth/Throat:      Mouth: Mucous membranes are dry.      Pharynx: Posterior oropharyngeal erythema present. No oropharyngeal exudate.   Eyes:      General: No scleral icterus.        Right eye: No discharge.         Left eye: No discharge.      Conjunctiva/sclera: Conjunctivae normal.      Pupils: Pupils are equal, round, and reactive to light.   Neck:      Thyroid: No thyromegaly.      Vascular: No JVD.      Trachea: No tracheal deviation.   Cardiovascular:      Rate and Rhythm: Normal rate and regular rhythm.      Heart sounds: Normal heart sounds. No murmur heard.     No friction rub. No gallop.   Pulmonary:      Effort: Pulmonary effort is normal. No respiratory distress.      Breath sounds: Normal breath sounds. No wheezing or rales.   Chest:      Chest wall: No tenderness.   Abdominal:      General: Bowel sounds are normal. There is no distension.      Palpations: Abdomen is soft. There is no mass.      Tenderness: There is no abdominal tenderness. There is no guarding or rebound.   Musculoskeletal:         General: No tenderness or deformity. Normal range of motion.      Cervical back: Normal range of motion and neck supple.      Left lower leg: Edema (With varicose veins) present.   Lymphadenopathy:      Head:      Right side of head: Submandibular adenopathy present.      Left side of head: Submandibular adenopathy present.      Cervical: No cervical adenopathy.   Skin:     General: Skin is warm and dry.      Coloration: Skin is not pale.      Findings: No erythema or rash.   Neurological:      Mental Status: He is alert and oriented to person, place, and time.      Cranial Nerves: No cranial nerve deficit.      Motor: No abnormal muscle tone.      Coordination: Coordination normal.      Deep Tendon Reflexes: Reflexes are normal and symmetric.   Psychiatric:         Behavior:  Behavior normal.           Admission on 02/12/2024, Discharged on 02/14/2024   Component Date Value Ref Range Status   • Ventricular Rate 02/12/2024 80  BPM Final   • Atrial Rate 02/12/2024 80  BPM Final   • ND Interval 02/12/2024 170  ms Final   • QRSD Interval 02/12/2024 58  ms Final   • QT Interval 02/12/2024 362  ms Final   • QTC Interval 02/12/2024 417  ms Final   • P Axis 02/12/2024 64  degrees Final   • QRS Axis 02/12/2024 66  degrees Final   • T Wave Axis 02/12/2024 32  degrees Final   • WBC 02/12/2024 6.23  4.31 - 10.16 Thousand/uL Final   • RBC 02/12/2024 4.79  3.88 - 5.62 Million/uL Final   • Hemoglobin 02/12/2024 15.4  12.0 - 17.0 g/dL Final   • Hematocrit 02/12/2024 46.6  36.5 - 49.3 % Final   • MCV 02/12/2024 97  82 - 98 fL Final   • MCH 02/12/2024 32.2  26.8 - 34.3 pg Final   • MCHC 02/12/2024 33.0  31.4 - 37.4 g/dL Final   • RDW 02/12/2024 12.5  11.6 - 15.1 % Final   • MPV 02/12/2024 9.3  8.9 - 12.7 fL Final   • Platelets 02/12/2024 232  149 - 390 Thousands/uL Final   • nRBC 02/12/2024 0  /100 WBCs Final   • Neutrophils Relative 02/12/2024 60  43 - 75 % Final   • Immat GRANS % 02/12/2024 0  0 - 2 % Final   • Lymphocytes Relative 02/12/2024 30  14 - 44 % Final   • Monocytes Relative 02/12/2024 8  4 - 12 % Final   • Eosinophils Relative 02/12/2024 1  0 - 6 % Final   • Basophils Relative 02/12/2024 1  0 - 1 % Final   • Neutrophils Absolute 02/12/2024 3.77  1.85 - 7.62 Thousands/µL Final   • Immature Grans Absolute 02/12/2024 0.02  0.00 - 0.20 Thousand/uL Final   • Lymphocytes Absolute 02/12/2024 1.85  0.60 - 4.47 Thousands/µL Final   • Monocytes Absolute 02/12/2024 0.47  0.17 - 1.22 Thousand/µL Final   • Eosinophils Absolute 02/12/2024 0.09  0.00 - 0.61 Thousand/µL Final   • Basophils Absolute 02/12/2024 0.03  0.00 - 0.10 Thousands/µL Final   • Sodium 02/12/2024 137  135 - 147 mmol/L Final   • Potassium 02/12/2024 4.4  3.5 - 5.3 mmol/L Final   • Chloride 02/12/2024 104  96 - 108 mmol/L Final   • CO2  "02/12/2024 27  21 - 32 mmol/L Final   • ANION GAP 02/12/2024 6  mmol/L Final   • BUN 02/12/2024 18  5 - 25 mg/dL Final   • Creatinine 02/12/2024 0.98  0.60 - 1.30 mg/dL Final    Standardized to IDMS reference method   • Glucose 02/12/2024 90  65 - 140 mg/dL Final    If the patient is fasting, the ADA then defines impaired fasting glucose as > 100 mg/dL and diabetes as > or equal to 123 mg/dL.   • Calcium 02/12/2024 9.2  8.4 - 10.2 mg/dL Final   • AST 02/12/2024 33  13 - 39 U/L Final   • ALT 02/12/2024 45  7 - 52 U/L Final    Specimen collection should occur prior to Sulfasalazine administration due to the potential for falsely depressed results.    • Alkaline Phosphatase 02/12/2024 74  34 - 104 U/L Final   • Total Protein 02/12/2024 7.3  6.4 - 8.4 g/dL Final   • Albumin 02/12/2024 4.6  3.5 - 5.0 g/dL Final   • Total Bilirubin 02/12/2024 0.41  0.20 - 1.00 mg/dL Final    Use of this assay is not recommended for patients undergoing treatment with eltrombopag due to the potential for falsely elevated results.  N-acetyl-p-benzoquinone imine (metabolite of Acetaminophen) will generate erroneously low results in samples for patients that have taken an overdose of Acetaminophen.   • eGFR 02/12/2024 77  ml/min/1.73sq m Final   • hs TnI 0hr 02/12/2024 5  \"Refer to ACS Flowchart\"- see link ng/L Final    Comment:                                              Initial (time 0) result  If >=50 ng/L, Myocardial injury suggested ;  Type of myocardial injury and treatment strategy  to be determined.  If 5-49 ng/L, a delta result at 2 hours and or 4 hours will be needed to further evaluate.  If <4 ng/L, and chest pain has been >3 hours since onset, patient may qualify for discharge based on the HEART score in the ED.  If <5 ng/L and <3hours since onset of chest pain, a delta result at 2 hours will be needed to further evaluate.    HS Troponin 99th Percentile URL of a Health Population=12 ng/L with a 95% Confidence Interval of 8-18 " "ng/L.    Second Troponin (time 2 hours)  If calculated delta >= 20 ng/L,  Myocardial injury suggested ; Type of myocardial injury and treatment strategy to be determined.  If 5-49 ng/L and the calculated delta is 5-19 ng/L, consult medical service for evaluation.  Continue evaluation for ischemia on ecg and other possible etiology and repeat hs troponin at 4 hours.  If delta                            is <5 ng/L at 2 hours, consider discharge based on risk stratification via the HEART score (if in ED), or NUSRAT risk score in IP/Observation.    HS Troponin 99th Percentile URL of a Health Population=12 ng/L with a 95% Confidence Interval of 8-18 ng/L.   • hs TnI 2hr 02/12/2024 4  \"Refer to ACS Flowchart\"- see link ng/L Final    Comment:                                              Initial (time 0) result  If >=50 ng/L, Myocardial injury suggested ;  Type of myocardial injury and treatment strategy  to be determined.  If 5-49 ng/L, a delta result at 2 hours and or 4 hours will be needed to further evaluate.  If <4 ng/L, and chest pain has been >3 hours since onset, patient may qualify for discharge based on the HEART score in the ED.  If <5 ng/L and <3hours since onset of chest pain, a delta result at 2 hours will be needed to further evaluate.    HS Troponin 99th Percentile URL of a Health Population=12 ng/L with a 95% Confidence Interval of 8-18 ng/L.    Second Troponin (time 2 hours)  If calculated delta >= 20 ng/L,  Myocardial injury suggested ; Type of myocardial injury and treatment strategy to be determined.  If 5-49 ng/L and the calculated delta is 5-19 ng/L, consult medical service for evaluation.  Continue evaluation for ischemia on ecg and other possible etiology and repeat hs troponin at 4 hours.  If delta                            is <5 ng/L at 2 hours, consider discharge based on risk stratification via the HEART score (if in ED), or NUSRAT risk score in IP/Observation.    HS Troponin 99th Percentile URL of " "a Health Population=12 ng/L with a 95% Confidence Interval of 8-18 ng/L.   • Delta 2hr hsTnI 02/12/2024 -1  <20 ng/L Final   • hs TnI 4hr 02/12/2024 5  \"Refer to ACS Flowchart\"- see link ng/L Final    Comment:                                              Initial (time 0) result  If >=50 ng/L, Myocardial injury suggested ;  Type of myocardial injury and treatment strategy  to be determined.  If 5-49 ng/L, a delta result at 2 hours and or 4 hours will be needed to further evaluate.  If <4 ng/L, and chest pain has been >3 hours since onset, patient may qualify for discharge based on the HEART score in the ED.  If <5 ng/L and <3hours since onset of chest pain, a delta result at 2 hours will be needed to further evaluate.    HS Troponin 99th Percentile URL of a Health Population=12 ng/L with a 95% Confidence Interval of 8-18 ng/L.    Second Troponin (time 2 hours)  If calculated delta >= 20 ng/L,  Myocardial injury suggested ; Type of myocardial injury and treatment strategy to be determined.  If 5-49 ng/L and the calculated delta is 5-19 ng/L, consult medical service for evaluation.  Continue evaluation for ischemia on ecg and other possible etiology and repeat hs troponin at 4 hours.  If delta                            is <5 ng/L at 2 hours, consider discharge based on risk stratification via the HEART score (if in ED), or NURSAT risk score in IP/Observation.    HS Troponin 99th Percentile URL of a Health Population=12 ng/L with a 95% Confidence Interval of 8-18 ng/L.   • Delta 4hr hsTnI 02/12/2024 0  <20 ng/L Final   • BSA 02/13/2024 1.97  m2 Final   • A4C EF 02/13/2024 58  % Final   • LVIDd 02/13/2024 3.70  cm Final   • LVIDS 02/13/2024 2.60  cm Final   • IVSd 02/13/2024 1.40  cm Final   • LVPWd 02/13/2024 1.40  cm Final   • FS 02/13/2024 30  28 - 44 Final   • MV E' Tissue Velocity Septal 02/13/2024 7  cm/s Final   • MV E' Tissue Velocity Lateral 02/13/2024 10  cm/s Final   • LA Volume Index (BP) 02/13/2024 22.3  " mL/m2 Final   • E/A ratio 02/13/2024 0.91   Final   • E wave deceleration time 02/13/2024 183  ms Final   • MV Peak E Marcio 02/13/2024 64  cm/s Final   • MV Peak A Marcio 02/13/2024 0.7  m/s Final   • RVID d 02/13/2024 2.4  cm Final   • Tricuspid annular plane systolic e* 02/13/2024 2.80  cm Final   • LA size 02/13/2024 3.1  cm Final   • LA length (A2C) 02/13/2024 5.20  cm Final   • LA volume (BP) 02/13/2024 44  mL Final   • RAA A4C 02/13/2024 13.3  cm2 Final   • MV stenosis pressure 1/2 time 02/13/2024 53  ms Final   • MV valve area p 1/2 method 02/13/2024 4.15   Final   • TR Peak Marcio 02/13/2024 2.5  m/s Final   • Triscuspid Valve Regurgitation Pea* 02/13/2024 24.0  mmHg Final   • Ao root 02/13/2024 3.60  cm Final   • Asc Ao 02/13/2024 3.7  cm Final   • Tricuspid valve peak regurgitation* 02/13/2024 2.45  m/s Final   • Left ventricular stroke volume (2D) 02/13/2024 34.00  mL Final   • IVS 02/13/2024 1.4  cm Final   • LEFT VENTRICLE SYSTOLIC VOLUME (MO* 02/13/2024 26  mL Final   • LV DIASTOLIC VOLUME (MOD BIPLANE) * 02/13/2024 60  mL Final   • Left Atrium Area-systolic Four Klaudia* 02/13/2024 16  cm2 Final   • Left Atrium Area-systolic Apical T* 02/13/2024 16.8  cm2 Final   • LVSV, 2D 02/13/2024 34  mL Final   • LV EF 02/13/2024 65   Final   • Est. RA pres 02/13/2024 8.0  mmHg Final   • Right Ventricular Peak Systolic Pr* 02/13/2024 32.00  mmHg Final   • Hemoglobin A1C 02/13/2024 5.3  Normal 4.0-5.6%; PreDiabetic 5.7-6.4%; Diabetic >=6.5%; Glycemic control for adults with diabetes <7.0% % Final   • EAG 02/13/2024 105  mg/dl Final   • PTT 02/12/2024 28  23 - 37 seconds Final    Therapeutic Heparin Range =  60-90 seconds   • Protime 02/12/2024 13.9  11.6 - 14.5 seconds Final   • INR 02/12/2024 1.08  0.84 - 1.19 Final   • PTT 02/13/2024 38 (H)  23 - 37 seconds Final    Therapeutic Heparin Range =  60-90 seconds   • Sodium 02/13/2024 138  135 - 147 mmol/L Final   • Potassium 02/13/2024 4.5  3.5 - 5.3 mmol/L Final   • Chloride  02/13/2024 110 (H)  96 - 108 mmol/L Final   • CO2 02/13/2024 24  21 - 32 mmol/L Final   • ANION GAP 02/13/2024 4  mmol/L Final   • BUN 02/13/2024 15  5 - 25 mg/dL Final   • Creatinine 02/13/2024 0.88  0.60 - 1.30 mg/dL Final    Standardized to IDMS reference method   • Glucose 02/13/2024 90  65 - 140 mg/dL Final    If the patient is fasting, the ADA then defines impaired fasting glucose as > 100 mg/dL and diabetes as > or equal to 123 mg/dL.   • Calcium 02/13/2024 8.8  8.4 - 10.2 mg/dL Final   • AST 02/13/2024 27  13 - 39 U/L Final   • ALT 02/13/2024 35  7 - 52 U/L Final    Specimen collection should occur prior to Sulfasalazine administration due to the potential for falsely depressed results.    • Alkaline Phosphatase 02/13/2024 67  34 - 104 U/L Final   • Total Protein 02/13/2024 6.7  6.4 - 8.4 g/dL Final   • Albumin 02/13/2024 4.1  3.5 - 5.0 g/dL Final   • Total Bilirubin 02/13/2024 0.46  0.20 - 1.00 mg/dL Final    Use of this assay is not recommended for patients undergoing treatment with eltrombopag due to the potential for falsely elevated results.  N-acetyl-p-benzoquinone imine (metabolite of Acetaminophen) will generate erroneously low results in samples for patients that have taken an overdose of Acetaminophen.   • eGFR 02/13/2024 87  ml/min/1.73sq m Final   • Magnesium 02/13/2024 2.2  1.9 - 2.7 mg/dL Final   • Phosphorus 02/13/2024 3.4  2.3 - 4.1 mg/dL Final   • WBC 02/13/2024 5.46  4.31 - 10.16 Thousand/uL Final   • RBC 02/13/2024 4.78  3.88 - 5.62 Million/uL Final   • Hemoglobin 02/13/2024 14.9  12.0 - 17.0 g/dL Final   • Hematocrit 02/13/2024 46.0  36.5 - 49.3 % Final   • MCV 02/13/2024 96  82 - 98 fL Final   • MCH 02/13/2024 31.2  26.8 - 34.3 pg Final   • MCHC 02/13/2024 32.4  31.4 - 37.4 g/dL Final   • RDW 02/13/2024 12.3  11.6 - 15.1 % Final   • Platelets 02/13/2024 200  149 - 390 Thousands/uL Final   • MPV 02/13/2024 9.1  8.9 - 12.7 fL Final   • PTT 02/13/2024 85 (H)  23 - 37 seconds Final     Therapeutic Heparin Range =  60-90 seconds   • Activated Clotting Time, i-STAT 02/13/2024 258 (H)  89 - 137 sec Final   • Specimen Type 02/13/2024 ARTERIAL   Final   • PTT 02/14/2024 27  23 - 37 seconds Final    Therapeutic Heparin Range =  60-90 seconds   • WBC 02/14/2024 8.43  4.31 - 10.16 Thousand/uL Final   • RBC 02/14/2024 5.20  3.88 - 5.62 Million/uL Final   • Hemoglobin 02/14/2024 16.6  12.0 - 17.0 g/dL Final   • Hematocrit 02/14/2024 49.1  36.5 - 49.3 % Final   • MCV 02/14/2024 94  82 - 98 fL Final   • MCH 02/14/2024 31.9  26.8 - 34.3 pg Final   • MCHC 02/14/2024 33.8  31.4 - 37.4 g/dL Final   • RDW 02/14/2024 12.3  11.6 - 15.1 % Final   • Platelets 02/14/2024 221  149 - 390 Thousands/uL Final   • MPV 02/14/2024 9.3  8.9 - 12.7 fL Final   • Sodium 02/14/2024 138  135 - 147 mmol/L Final   • Potassium 02/14/2024 4.4  3.5 - 5.3 mmol/L Final   • Chloride 02/14/2024 107  96 - 108 mmol/L Final   • CO2 02/14/2024 27  21 - 32 mmol/L Final   • ANION GAP 02/14/2024 4  mmol/L Final   • BUN 02/14/2024 13  5 - 25 mg/dL Final   • Creatinine 02/14/2024 0.86  0.60 - 1.30 mg/dL Final    Standardized to IDMS reference method   • Glucose 02/14/2024 93  65 - 140 mg/dL Final    If the patient is fasting, the ADA then defines impaired fasting glucose as > 100 mg/dL and diabetes as > or equal to 123 mg/dL.   • Calcium 02/14/2024 9.3  8.4 - 10.2 mg/dL Final   • eGFR 02/14/2024 87  ml/min/1.73sq m Final   • Magnesium 02/14/2024 2.0  1.9 - 2.7 mg/dL Final   • Phosphorus 02/14/2024 2.7  2.3 - 4.1 mg/dL Final   Appointment on 12/05/2023   Component Date Value Ref Range Status   • Vitamin B-12 12/05/2023 638  180 - 914 pg/mL Final   • Folate 12/05/2023 21.4  >5.9 ng/mL Final    The World Health Organization has determined deficient folate concentrations are considered to be <4.0 ng/mL.   Appointment on 11/03/2023   Component Date Value Ref Range Status   • WBC 11/03/2023 5.64  4.31 - 10.16 Thousand/uL Final   • RBC 11/03/2023 4.70  3.88  - 5.62 Million/uL Final   • Hemoglobin 11/03/2023 14.9  12.0 - 17.0 g/dL Final   • Hematocrit 11/03/2023 45.4  36.5 - 49.3 % Final   • MCV 11/03/2023 97  82 - 98 fL Final   • MCH 11/03/2023 31.7  26.8 - 34.3 pg Final   • MCHC 11/03/2023 32.8  31.4 - 37.4 g/dL Final   • RDW 11/03/2023 12.2  11.6 - 15.1 % Final   • MPV 11/03/2023 9.5  8.9 - 12.7 fL Final   • Platelets 11/03/2023 214  149 - 390 Thousands/uL Final   • nRBC 11/03/2023 0  /100 WBCs Final   • Neutrophils Relative 11/03/2023 52  43 - 75 % Final   • Immat GRANS % 11/03/2023 0  0 - 2 % Final   • Lymphocytes Relative 11/03/2023 36  14 - 44 % Final   • Monocytes Relative 11/03/2023 9  4 - 12 % Final   • Eosinophils Relative 11/03/2023 2  0 - 6 % Final   • Basophils Relative 11/03/2023 1  0 - 1 % Final   • Neutrophils Absolute 11/03/2023 2.97  1.85 - 7.62 Thousands/µL Final   • Immature Grans Absolute 11/03/2023 0.01  0.00 - 0.20 Thousand/uL Final   • Lymphocytes Absolute 11/03/2023 2.03  0.60 - 4.47 Thousands/µL Final   • Monocytes Absolute 11/03/2023 0.50  0.17 - 1.22 Thousand/µL Final   • Eosinophils Absolute 11/03/2023 0.09  0.00 - 0.61 Thousand/µL Final   • Basophils Absolute 11/03/2023 0.04  0.00 - 0.10 Thousands/µL Final   • Sodium 11/03/2023 140  135 - 147 mmol/L Final   • Potassium 11/03/2023 4.5  3.5 - 5.3 mmol/L Final   • Chloride 11/03/2023 105  96 - 108 mmol/L Final   • CO2 11/03/2023 28  21 - 32 mmol/L Final   • ANION GAP 11/03/2023 7  mmol/L Final   • BUN 11/03/2023 17  5 - 25 mg/dL Final   • Creatinine 11/03/2023 0.88  0.60 - 1.30 mg/dL Final    Standardized to IDMS reference method   • Glucose, Fasting 11/03/2023 89  65 - 99 mg/dL Final   • Calcium 11/03/2023 9.4  8.4 - 10.2 mg/dL Final   • AST 11/03/2023 24  13 - 39 U/L Final   • ALT 11/03/2023 28  7 - 52 U/L Final    Specimen collection should occur prior to Sulfasalazine administration due to the potential for falsely depressed results.    • Alkaline Phosphatase 11/03/2023 70  34 - 104 U/L  Final   • Total Protein 11/03/2023 7.0  6.4 - 8.4 g/dL Final   • Albumin 11/03/2023 4.3  3.5 - 5.0 g/dL Final   • Total Bilirubin 11/03/2023 0.53  0.20 - 1.00 mg/dL Final    Use of this assay is not recommended for patients undergoing treatment with eltrombopag due to the potential for falsely elevated results.  N-acetyl-p-benzoquinone imine (metabolite of Acetaminophen) will generate erroneously low results in samples for patients that have taken an overdose of Acetaminophen.   • eGFR 11/03/2023 87  ml/min/1.73sq m Final   • Cholesterol 11/03/2023 219 (H)  See Comment mg/dL Final    Cholesterol:         Pediatric <18 Years        Desirable          <170 mg/dL      Borderline High    170-199 mg/dL      High               >=200 mg/dL        Adult >=18 Years            Desirable         <200 mg/dL      Borderline High   200-239 mg/dL      High              >239 mg/dL     • Triglycerides 11/03/2023 82  See Comment mg/dL Final    Triglyceride:     0-9Y            <75mg/dL     10Y-17Y         <90 mg/dL       >=18Y     Normal          <150 mg/dL     Borderline High 150-199 mg/dL     High            200-499 mg/dL        Very High       >499 mg/dL    Specimen collection should occur prior to Metamizole administration due to the potential for falsely depressed results.   • HDL, Direct 11/03/2023 63  >=40 mg/dL Final   • LDL Calculated 11/03/2023 140 (H)  0 - 100 mg/dL Final    LDL Cholesterol:     Optimal           <100 mg/dl     Near Optimal      100-129 mg/dl     Above Optimal       Borderline High 130-159 mg/dl       High            160-189 mg/dl       Very High       >189 mg/dl         This screening LDL is a calculated result.   It does not have the accuracy of the Direct Measured LDL in the monitoring of patients with hyperlipidemia and/or statin therapy.   Direct Measure LDL (GAY024) must be ordered separately in these patients.   • Non-HDL-Chol (CHOL-HDL) 11/03/2023 156  mg/dl Final   • PSA 11/03/2023 3.21  0.00 -  4.00 ng/mL Final    [employer] chemiluminescent immunoassay. Confirm baseline values for patients being serially monitored.    • TSH 3RD GENERATON 12/05/2023 1.852  0.450 - 4.500 uIU/mL Final    Adult TSH (3rd generation) reference range follows the recommended guidelines of the [employer], January, 2020.   Office Visit on 06/13/2023   Component Date Value Ref Range Status   • POST-VOID RESIDUAL VOLUME, ML POC 06/13/2023 50  mL Final   Appointment on 05/10/2023   Component Date Value Ref Range Status   • WBC 05/10/2023 6.56  4.31 - 10.16 Thousand/uL Final   • RBC 05/10/2023 4.78  3.88 - 5.62 Million/uL Final   • Hemoglobin 05/10/2023 14.8  12.0 - 17.0 g/dL Final   • Hematocrit 05/10/2023 46.2  36.5 - 49.3 % Final   • MCV 05/10/2023 97  82 - 98 fL Final   • MCH 05/10/2023 31.0  26.8 - 34.3 pg Final   • MCHC 05/10/2023 32.0  31.4 - 37.4 g/dL Final   • RDW 05/10/2023 12.2  11.6 - 15.1 % Final   • MPV 05/10/2023 9.3  8.9 - 12.7 fL Final   • Platelets 05/10/2023 179  149 - 390 Thousands/uL Final   • nRBC 05/10/2023 0  /100 WBCs Final   • Neutrophils Relative 05/10/2023 61  43 - 75 % Final   • Immat GRANS % 05/10/2023 0  0 - 2 % Final   • Lymphocytes Relative 05/10/2023 27  14 - 44 % Final   • Monocytes Relative 05/10/2023 8  4 - 12 % Final   • Eosinophils Relative 05/10/2023 3  0 - 6 % Final   • Basophils Relative 05/10/2023 1  0 - 1 % Final   • Neutrophils Absolute 05/10/2023 4.01  1.85 - 7.62 Thousands/µL Final   • Immature Grans Absolute 05/10/2023 0.01  0.00 - 0.20 Thousand/uL Final   • Lymphocytes Absolute 05/10/2023 1.78  0.60 - 4.47 Thousands/µL Final   • Monocytes Absolute 05/10/2023 0.53  0.17 - 1.22 Thousand/µL Final   • Eosinophils Absolute 05/10/2023 0.18  0.00 - 0.61 Thousand/µL Final   • Basophils Absolute 05/10/2023 0.05  0.00 - 0.10 Thousands/µL Final   • Sodium 05/10/2023 138  135 - 147 mmol/L Final   • Potassium 05/10/2023 4.7  3.5 - 5.3 mmol/L Final   • Chloride 05/10/2023  110 (H)  96 - 108 mmol/L Final   • CO2 05/10/2023 28  21 - 32 mmol/L Final   • ANION GAP 05/10/2023 0 (L)  4 - 13 mmol/L Final   • BUN 05/10/2023 20  5 - 25 mg/dL Final   • Creatinine 05/10/2023 1.06  0.60 - 1.30 mg/dL Final    Standardized to IDMS reference method   • Glucose, Fasting 05/10/2023 101 (H)  65 - 99 mg/dL Final    Specimen collection should occur prior to Sulfasalazine administration due to the potential for falsely depressed results. Specimen collection should occur prior to Sulfapyridine administration due to the potential for falsely elevated results.   • Calcium 05/10/2023 9.1  8.3 - 10.1 mg/dL Final   • AST 05/10/2023 16  5 - 45 U/L Final    Specimen collection should occur prior to Sulfasalazine administration due to the potential for falsely depressed results.    • ALT 05/10/2023 40  12 - 78 U/L Final    Specimen collection should occur prior to Sulfasalazine and/or Sulfapyridine administration due to the potential for falsely depressed results.    • Alkaline Phosphatase 05/10/2023 76  46 - 116 U/L Final   • Total Protein 05/10/2023 7.2  6.4 - 8.4 g/dL Final   • Albumin 05/10/2023 4.0  3.5 - 5.0 g/dL Final   • Total Bilirubin 05/10/2023 0.46  0.20 - 1.00 mg/dL Final    Use of this assay is not recommended for patients undergoing treatment with eltrombopag due to the potential for falsely elevated results.   • eGFR 05/10/2023 71  ml/min/1.73sq m Final   • Cholesterol 05/10/2023 240 (H)  See Comment mg/dL Final    Cholesterol:         Pediatric <18 Years        Desirable          <170 mg/dL      Borderline High    170-199 mg/dL      High               >=200 mg/dL        Adult >=18 Years            Desirable         <200 mg/dL      Borderline High   200-239 mg/dL      High              >239 mg/dL     • Triglycerides 05/10/2023 69  See Comment mg/dL Final    Triglyceride:     0-9Y            <75mg/dL     10Y-17Y         <90 mg/dL       >=18Y     Normal          <150 mg/dL     Borderline High 150-199  mg/dL     High            200-499 mg/dL        Very High       >499 mg/dL    Specimen collection should occur prior to N-Acetylcysteine or Metamizole administration due to the potential for falsely depressed results.   • HDL, Direct 05/10/2023 61  >=40 mg/dL Final    Specimen collection should occur prior to Metamizole administration due to the potential for falsley depressed results.   • LDL Calculated 05/10/2023 165 (H)  0 - 100 mg/dL Final    LDL Cholesterol:     Optimal           <100 mg/dl     Near Optimal      100-129 mg/dl     Above Optimal       Borderline High 130-159 mg/dl       High            160-189 mg/dl       Very High       >189 mg/dl         This screening LDL is a calculated result.   It does not have the accuracy of the Direct Measured LDL in the monitoring of patients with hyperlipidemia and/or statin therapy.   Direct Measure LDL (DHH275) must be ordered separately in these patients.   • Non-HDL-Chol (CHOL-HDL) 05/10/2023 179  mg/dl Final

## 2024-02-15 NOTE — UTILIZATION REVIEW
NOTIFICATION OF ADMISSION DISCHARGE   This is a Notification of Discharge from [facility]. Please be advised that this patient has been discharge from our facility. Below you will find the admission and discharge date and time including the patient’s disposition.   UTILIZATION REVIEW CONTACT:  America Gauthier  Utilization   Network Utilization Review Department  Phone: [phone] x carefully listen to the prompts. All voicemails are confidential.  Email: [email or web address]     ADMISSION INFORMATION  PRESENTATION DATE: 2/12/2024  1:12 PM  OBERVATION ADMISSION DATE:   INPATIENT ADMISSION DATE: 2/13/24  3:02 PM   DISCHARGE DATE: 2/14/2024  1:36 PM   DISPOSITION:Home/Self Care    Network Utilization Review Department  ATTENTION: Please call with any questions or concerns to [phone] and carefully listen to the prompts so that you are directed to the right person. All voicemails are confidential.   For Discharge needs, contact Care Management DC Support Team at [phone] opt. [phone]  Send all requests for admission clinical reviews, approved or denied determinations and any other requests to dedicated fax number below belonging to the campus where the patient is receiving treatment. List of dedicated fax numbers for the Facilities:  FACILITY NAME UR FAX NUMBER   ADMISSION DENIALS (Administrative/Medical Necessity) [phone]   DISCHARGE SUPPORT TEAM ([facility]) [phone]   PARENT CHILD HEALTH (Maternity/NICU/Pediatrics) [phone]   [facility] [phone]   [facility] [phone]   [facility] [phone]   [facility] [phone]   [facility] [phone]   [facility] [phone]   [facility] [phone]   [facility] [phone]    [facility] [phone]   [facility] [phone]   [facility] [phone]   [facility] [phone]

## 2024-02-20 NOTE — PROGRESS NOTES
Cardiology Follow Up    George Ramirez  1955  [id number]  [facility] [place]  [address] [facility] [id number] [phone]    1. S/P drug eluting coronary stent placement        2. Mixed hyperlipidemia        3. Paroxysmal atrial fibrillation (HCC)        4. Coronary artery disease involving native coronary artery of native heart without angina pectoris  Ambulatory referral to Cardiology    rosuvastatin (CRESTOR) 5 mg tablet          Interval History:   Mr. Kristopher Gastelum was admitted to [facility] on 2/12 - 2/14/24 with chest pain.  Presented to the emergency room with 2-week history of left-sided chest pressure.  The emergency troponin elevated to 5.  EKG CMP CBC unremarkable.  Cardiology consulted and he was observed overnight.  2/13 TTE showed LVEF 65% systolic function normal wall motion normal diastolic function mildly abnormal with consistent grade 1 abnormal relaxation.  Right ventricular size systolic function normal.  Ascending aorta mildly dilated, aortic root mild fibrotic changes aortic root 3.60 cm ascending aorta 3.7 cm.  2/13 catheterization  showed ostial left main 20% stenosis, proximal LAD 30% stenosis previously treated stent.  Mid LAD 80% stenosis, ramus intermediate vessel moderate in size mild diffuse disease throughout the vessel.  Left circumflex large and nondominant.  Proximal circumflex 50% stenosis, RCA vessel large normal and dominant.  Status post angioplasty with Aptela drug-eluting stent placed across the 80% lesion of the mid LAD, 0% residual stenosis.      Presents her office for recent hospitalization follow-up visit.  He admits to left-sided musculoskeletal discomfort.  He denies dyspnea with minimal moderate exertion chest pain lightheadedness or dizziness taking all his medications as prescribed.    Medical History   Primary Cardiologist   Gloria and Dr Bernal   CAD history of stent to LAD in  and 2017  Hyperlipidemia 11/3/23  TG 82 HDL 63  intolerant to statin  Paroxysmal atrial fibrillation [id number]= 3 on Eliquis milligrams twice daily for stroke prevention  PFO with CVA status post PFO closure    Patient Active Problem List   Diagnosis    Seasonal allergic rhinitis    Osteoarthritis    Nephrolithiasis    Irritable bowel syndrome    Hypercholesterolemia    Coronary atherosclerosis    Coronary artery disease involving native coronary artery    Alpha-1-antitrypsin deficiency (HCC)    BPH with obstruction/lower urinary tract symptoms    Pancreatic cyst    Lumbar radiculopathy    Low back pain with sciatica    DDD (degenerative disc disease), lumbar    Lumbar spondylosis    Tinea corporis    Chronic pain syndrome    Paroxysmal atrial fibrillation (HCC)    Skin lesion    BMI 34.0-34.9,adult    Symptomatic varicose veins of both lower extremities    Chest pain     Past Medical History:   Diagnosis Date    A-fib (HCC)     Allergic     Allergic rhinitis     Arthritis     Benign cyst of kidney     Cancer (HCC) 2015    Cat bite 2020    Cerebral vascular disease     Chronic sinusitis     Coronary artery disease     Cryptogenic stroke (HCC) 2020    Hyperlipidemia     Kidney stone     Melanoma (HCC)     Pancreas cyst     Stroke (HCC)      Social History     Socioeconomic History    Marital status: /Civil Union     Spouse name: Not on file    Number of children: Not on file    Years of education: Not on file    Highest education level: Not on file   Occupational History    Occupation: massage therapist   Tobacco Use    Smoking status: Former     Types: Cigars     Start date:      Quit date:      Years since quittin.1    Smokeless tobacco: Former     Types: Chew     Quit date:     Tobacco comments:     Cigars    Vaping Use    Vaping status: Never Used   Substance and Sexual Activity    Alcohol use: Yes      Alcohol/week: 1.0 standard drink of alcohol     Types: 1 Glasses of wine per week     Comment: 8 oz. Daily    Drug use: Not Currently     Types: Marijuana    Sexual activity: Yes     Partners: Female     Birth control/protection: Male Sterilization   Other Topics Concern    Not on file   Social History Narrative    Caffeine: drinks 1 cup coffee/ tea a day.     Social Determinants of Health     Financial Resource Strain: Low Risk  (11/11/2023)    Overall Financial Resource Strain (CARDIA)     Difficulty of Paying Living Expenses: Not hard at all   Food Insecurity: Not on file   Transportation Needs: No Transportation Needs (11/11/2023)    PRAPARE - Transportation     Lack of Transportation (Medical): No     Lack of Transportation (Non-Medical): No   Physical Activity: Not on file   Stress: Not on file   Social Connections: Not on file   Intimate Partner Violence: Not on file   Housing Stability: Not on file      Family History   Problem Relation Age of Onset    Diabetes type II Mother     Cancer Father         angioma    Cancer Sister     Hearing loss Sister      Past Surgical History:   Procedure Laterality Date    CARDIAC CATHETERIZATION N/A 2/13/2024    Procedure: Cardiac catheterization;  Surgeon: Tripp Cross MD;  Location: [facility];  Service: Cardiology    CARDIAC CATHETERIZATION N/A 2/13/2024    Procedure: Cardiac Coronary Angiogram;  Surgeon: Tripp Cross MD;  Location: [facility];  Service: Cardiology    CARDIAC CATHETERIZATION N/A 2/13/2024    Procedure: Cardiac pci;  Surgeon: Tripp Cross MD;  Location: [facility];  Service: Cardiology    CARDIAC CATHETERIZATION Left 2/13/2024    Procedure: Cardiac Left Heart Cath;  Surgeon: Tripp Cross MD;  Location: [facility];  Service: Cardiology    COLONOSCOPY      OTHER SURGICAL HISTORY  2003    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    PATENT FORAMEN OVALE CLOSURE         Current Outpatient Medications:     ascorbic acid  (VITAMIN C) 250 mg tablet, Take 250 mg by mouth 2 (two) times a day, Disp: , Rfl:     aspirin (ECOTRIN LOW STRENGTH) 81 mg EC tablet, Take 1 tablet (81 mg total) by mouth daily, Disp: 14 tablet, Rfl: 0    chlorpheniramine (CHLOR-TRIMETON) 4 MG tablet, Take 1 tablet (4 mg total) by mouth every 6 (six) hours as needed for rhinitis (Patient not taking: Reported on 2/15/2024), Disp: 30 tablet, Rfl: 0    clobetasol (TEMOVATE) 0.05 % cream, Apply 1 application topically 2 (two) times a day as needed Apply sparingly to affected areas, Disp: , Rfl:     clopidogrel (PLAVIX) 75 mg tablet, Take 1 tablet (75 mg total) by mouth daily, Disp: 90 tablet, Rfl: 1    Coenzyme Q10 (COQ10) 200 MG CAPS, Take 1 capsule by mouth daily, Disp: , Rfl:     colesevelam (WELCHOL) 625 mg tablet, Take 2 tablets (1,250 mg total) by mouth 2 (two) times a day with meals, Disp: 360 tablet, Rfl: 1    diltiazem (CARDIZEM) 30 mg tablet, Take 1 tablet (30 mg total) by mouth if needed (afib), Disp: 15 tablet, Rfl: 3    Eliquis 5 MG, TAKE ONE TABLET BY MOUTH TWICE A DAY, Disp: 180 tablet, Rfl: 3    ezetimibe (ZETIA) 10 mg tablet, Take 1 tablet (10 mg total) by mouth daily, Disp: 90 tablet, Rfl: 1    Glucosamine-Chondroit-Vit C-Mn (GLUCOSAMINE CHONDR 1500 COMPLX PO), Take 1 tablet by mouth 2 (two) times a day , Disp: , Rfl:     hyoscyamine (ANASPAZ) 0.125 mg, Take 0.125 mg by mouth 2 (two) times a day , Disp: , Rfl:     metoprolol tartrate (LOPRESSOR) 25 mg tablet, Take 0.5 tablets (12.5 mg total) by mouth every 12 (twelve) hours, Disp: 90 tablet, Rfl: 1    multivitamin (THERAGRAN) TABS, Take 1 tablet by mouth 2 (two) times a day  , Disp: , Rfl:     nitroglycerin (NITROSTAT) 0.4 mg SL tablet, Place 1 tablet (0.4 mg total) under the tongue every 5 (five) minutes as needed for chest pain - do not take if you have recently taken Sildenafil (Revatio), Disp: 30 tablet, Rfl: 0    NON FORMULARY, CBD CAPSULE  AND CREAM USE DAILY PRIN (Patient not taking: Reported on  2/15/2024), Disp: , Rfl:     OXcarbazepine (TRILEPTAL) 300 mg tablet, Take 1 tablet (300 mg total) by mouth 2 (two) times a day, Disp: 180 tablet, Rfl: 1    rosuvastatin (CRESTOR) 5 mg tablet, Take 1 tablet (5 mg total) by mouth once a week, Disp: 30 tablet, Rfl: 0    sildenafil (REVATIO) 20 mg tablet, Take 1-5 tablets on empty stomach one hour prior to sexual activity if needed, Disp: 30 tablet, Rfl: 3    tiZANidine (ZANAFLEX) 2 mg tablet, Take 1 tablet (2 mg total) by mouth 2 (two) times a day as needed for muscle spasms, Disp: 180 tablet, Rfl: 1  Allergies   Allergen Reactions    Augmentin [Amoxicillin-Pot Clavulanate] Hives and Itching    Molds & Smuts Allergic Rhinitis     Category: Allergy;     [place] Purpureus Went Yeast Fatigue     Category: Adverse Reaction;     Niacin Fatigue     Category: Adverse Reaction;     Pollen Extract Allergic Rhinitis     Category: Allergy;     Pregabalin Fatigue     Category: Adverse Reaction;     Statins Fatigue and GI Intolerance     Category: Adverse Reaction;     Atorvastatin GI Intolerance and Fatigue     Other reaction(s): Muscle pain, constipation, sleepiness  Category: Adverse Reaction;   Other reaction(s): Muscle pain, constipation, sleepiness       Labs:  Admission on 02/12/2024, Discharged on 02/14/2024   Component Date Value    Ventricular Rate 02/12/2024 80     Atrial Rate 02/12/2024 80     DC Interval 02/12/2024 170     QRSD Interval 02/12/2024 58     QT Interval 02/12/2024 362     QTC Interval 02/12/2024 417     P Axis 02/12/2024 64     QRS Axis 02/12/2024 66     T Wave [place] 02/12/2024 32     WBC 02/12/2024 6.23     RBC 02/12/2024 4.79     Hemoglobin 02/12/2024 15.4     Hematocrit 02/12/2024 46.6     MCV 02/12/2024 97     MCH 02/12/2024 32.2     MCHC 02/12/2024 33.0     RDW 02/12/2024 12.5     MPV 02/12/2024 9.3     Platelets 02/12/2024 232     nRBC 02/12/2024 0     Neutrophils Relative 02/12/2024 60     Immat GRANS % 02/12/2024 0     Lymphocytes Relative  02/12/2024 30     Monocytes Relative 02/12/2024 8     Eosinophils Relative 02/12/2024 1     Basophils Relative 02/12/2024 1     Neutrophils Absolute 02/12/2024 3.77     Immature Grans Absolute 02/12/2024 0.02     Lymphocytes Absolute 02/12/2024 1.85     Monocytes Absolute 02/12/2024 0.47     Eosinophils Absolute 02/12/2024 0.09     Basophils Absolute 02/12/2024 0.03     Sodium 02/12/2024 137     Potassium 02/12/2024 4.4     Chloride 02/12/2024 104     CO2 02/12/2024 27     ANION GAP 02/12/2024 6     BUN 02/12/2024 18     Creatinine 02/12/2024 0.98     Glucose 02/12/2024 90     Calcium 02/12/2024 9.2     AST 02/12/2024 33     ALT 02/12/2024 45     Alkaline Phosphatase 02/12/2024 74     Total Protein 02/12/2024 7.3     Albumin 02/12/2024 4.6     Total Bilirubin 02/12/2024 0.41     eGFR 02/12/2024 77     hs TnI 0hr 02/12/2024 5     hs TnI 2hr 02/12/2024 4     Delta 2hr hsTnI 02/12/2024 -1     hs TnI 4hr 02/12/2024 5     Delta 4hr hsTnI 02/12/2024 0     BSA 02/13/2024 1.97     A4C EF 02/13/2024 58     LVIDd 02/13/2024 3.70     LVIDS 02/13/2024 2.60     IVSd 02/13/2024 1.40     LVPWd 02/13/2024 1.40     FS 02/13/2024 30     MV E' Tissue Velocity Se* 02/13/2024 7     MV E' Tissue Velocity La* 02/13/2024 10     LA Volume Index (BP) 02/13/2024 22.3     E/A ratio 02/13/2024 0.91     E wave deceleration time 02/13/2024 183     MV Peak E Marcio 02/13/2024 64     MV Peak A Marcio 02/13/2024 0.7     RVID d 02/13/2024 2.4     Tricuspid annular plane * 02/13/2024 2.80     LA size 02/13/2024 3.1     LA length (A2C) 02/13/2024 5.20     LA volume (BP) 02/13/2024 44     RAA A4C 02/13/2024 13.3     MV stenosis pressure 1/2* 02/13/2024 53     MV valve area p 1/2 meth* 02/13/2024 4.15     TR Peak Marcio 02/13/2024 2.5     Triscuspid Valve Regurgi* 02/13/2024 24.0     Ao root 02/13/2024 3.60     Asc Ao 02/13/2024 3.7     Tricuspid valve peak reg* 02/13/2024 2.45     Left ventricular stroke * 02/13/2024 34.00     IVS 02/13/2024 1.4     LEFT  VENTRICLE SYSTOLIC * 02/13/2024 26     LV DIASTOLIC VOLUME (MOD* 02/13/2024 60     Left Atrium Area-systoli* 02/13/2024 16     Left Atrium Area-systoli* 02/13/2024 16.8     LVSV, 2D 02/13/2024 34     LV EF 02/13/2024 65     Est. RA pres 02/13/2024 8.0     Right Ventricular Peak S* 02/13/2024 32.00     Hemoglobin A1C 02/13/2024 5.3     EAG 02/13/2024 105     PTT 02/12/2024 28     Protime 02/12/2024 13.9     INR 02/12/2024 1.08     PTT 02/13/2024 38 (H)     Sodium 02/13/2024 138     Potassium 02/13/2024 4.5     Chloride 02/13/2024 110 (H)     CO2 02/13/2024 24     ANION GAP 02/13/2024 4     BUN 02/13/2024 15     Creatinine 02/13/2024 0.88     Glucose 02/13/2024 90     Calcium 02/13/2024 8.8     AST 02/13/2024 27     ALT 02/13/2024 35     Alkaline Phosphatase 02/13/2024 67     Total Protein 02/13/2024 6.7     Albumin 02/13/2024 4.1     Total Bilirubin 02/13/2024 0.46     eGFR 02/13/2024 87     Magnesium 02/13/2024 2.2     Phosphorus 02/13/2024 3.4     WBC 02/13/2024 5.46     RBC 02/13/2024 4.78     Hemoglobin 02/13/2024 14.9     Hematocrit 02/13/2024 46.0     MCV 02/13/2024 96     MCH 02/13/2024 31.2     MCHC 02/13/2024 32.4     RDW 02/13/2024 12.3     Platelets 02/13/2024 200     MPV 02/13/2024 9.1     PTT 02/13/2024 85 (H)     Activated Clotting Time,* 02/13/2024 258 (H)     Specimen Type 02/13/2024 ARTERIAL     PTT 02/14/2024 27     WBC 02/14/2024 8.43     RBC 02/14/2024 5.20     Hemoglobin 02/14/2024 16.6     Hematocrit 02/14/2024 49.1     MCV 02/14/2024 94     MCH 02/14/2024 31.9     MCHC 02/14/2024 33.8     RDW 02/14/2024 12.3     Platelets 02/14/2024 221     MPV 02/14/2024 9.3     Sodium 02/14/2024 138     Potassium 02/14/2024 4.4     Chloride 02/14/2024 107     CO2 02/14/2024 27     ANION GAP 02/14/2024 4     BUN 02/14/2024 13     Creatinine 02/14/2024 0.86     Glucose 02/14/2024 93     Calcium 02/14/2024 9.3     eGFR 02/14/2024 87     Magnesium 02/14/2024 2.0     Phosphorus 02/14/2024 2.7      Imaging: Cardiac  catheterization    Result Date: 2/13/2024  Narrative:   Ost LM lesion is 20% stenosed.   Prox LAD lesion is 30% stenosed.   Mid LAD lesion is 80% stenosed.   Prox Cx lesion is 50% stenosed. 2v CAD     Echo complete w/ contrast if indicated    Result Date: 2/13/2024  Narrative:   Left Ventricle: Left ventricular cavity size is normal. Wall thickness is mildly increased. The left ventricular ejection fraction is 65%. Systolic function is normal. Wall motion is normal. Diastolic function is mildly abnormal, consistent with grade I (abnormal) relaxation.   Right Ventricle: Right ventricular cavity size is normal. Systolic function is normal.   Aorta: The aortic root is normal in size. The ascending aorta is mildly dilated. The aortic root exhibited mild fibrocalcific change. The aortic root is 3.60 cm. The ascending aorta is 3.7 cm.     XR chest 1 view portable    Result Date: 2/13/2024  Narrative: XR CHEST PORTABLE INDICATION: chest pain. COMPARISON: 5/11/2009 FINDINGS: Clear lungs. No pneumothorax or pleural effusion. Normal cardiomediastinal silhouette. Bones are unremarkable for age. Normal upper abdomen.     Impression: No acute cardiopulmonary disease. Workstation performed: [id number]       Review of Systems:  Review of Systems   Musculoskeletal:  Positive for arthralgias and myalgias.   All other systems reviewed and are negative.      Physical Exam:  Physical Exam  Vitals reviewed.   Constitutional:       Appearance: Normal appearance.   Cardiovascular:      Rate and Rhythm: Normal rate and regular rhythm.      Pulses: Normal pulses.      Heart sounds: Normal heart sounds.   Pulmonary:      Effort: Pulmonary effort is normal.      Breath sounds: Normal breath sounds.   Musculoskeletal:         General: Normal range of motion.      Cervical back: Normal range of motion and neck supple.      Right lower leg: No edema.      Left lower leg: No edema.   Skin:     General: Skin is warm and dry.      Capillary Refill:  Capillary refill takes less than 2 seconds.      Comments: Right radial cath site appears clean dry intact without erythema or ecchymosis   Neurological:      General: No focal deficit present.      Mental Status: He is alert and oriented to person, place, and time.   Psychiatric:         Mood and Affect: Mood normal.         Behavior: Behavior normal.         Discussion/Summary:  # 2/13/24 sp Status post angioplasty with [employer] drug-eluting stent placed across the 80% lesion of the mid LAD, 0% residual stenosis.  Continue on Plavix 75 mg daily, aspirin 81 mg daily for a total of 2 weeks then stop continues on Eliquis 5 mg twice daily, metoprolol tartrate 12.5 mg every 12 hours, Zetia 10 mg daily, increased Crestor from 5 mg weekly to 5 mg twice a day, continue WelChol 1250 mg daily.  He was instructed not to take sildenafil within 24 hours of using sublingual nitroglycerin.  Heart healthy diet cardiac rehabilitation in the near future  # Hyperlipidemia 11/3/23  TG 82 HDL 63  intolerant to statin currently on Crestor 5 mg weekly.  Increase Crestor to 5 mg twice a week.  Continue on Zetia 10 mg daily and Welchol 1250 mg twice daily, fasting lipid profile in 3 months.  Consider starting Praluent or Repatha.  Will have our office inquire on price  # Paroxysmal atrial fibrillation [id number]= 3 on Eliquis 5mg twice daily for stroke prevention, continue metoprolol tartrate 12.5 mg every 12 hours  # CAD 2/13/24 LHC: ostial left main 20% stenosis, proximal LAD 30% stenosis previously treated stent.  Continue on medications as above.  Heart healthy diet and daily exercise

## 2024-02-21 ENCOUNTER — OFFICE VISIT (OUTPATIENT)
Dept: CARDIOLOGY CLINIC | Facility: CLINIC | Age: 69
End: 2024-02-21
Payer: COMMERCIAL

## 2024-02-21 VITALS
DIASTOLIC BLOOD PRESSURE: 70 MMHG | HEIGHT: 67 IN | OXYGEN SATURATION: 98 % | HEART RATE: 76 BPM | WEIGHT: 220.1 LBS | BODY MASS INDEX: 34.55 KG/M2 | SYSTOLIC BLOOD PRESSURE: 118 MMHG

## 2024-02-21 DIAGNOSIS — Z95.5 S/P DRUG ELUTING CORONARY STENT PLACEMENT: Primary | ICD-10-CM

## 2024-02-21 DIAGNOSIS — E78.2 MIXED HYPERLIPIDEMIA: ICD-10-CM

## 2024-02-21 DIAGNOSIS — I25.10 CORONARY ARTERY DISEASE INVOLVING NATIVE CORONARY ARTERY OF NATIVE HEART WITHOUT ANGINA PECTORIS: ICD-10-CM

## 2024-02-21 DIAGNOSIS — I48.0 PAROXYSMAL ATRIAL FIBRILLATION (HCC): ICD-10-CM

## 2024-02-21 PROCEDURE — 99215 OFFICE O/P EST HI 40 MIN: CPT | Performed by: NURSE PRACTITIONER

## 2024-02-21 RX ORDER — ROSUVASTATIN CALCIUM 5 MG/1
TABLET, COATED ORAL
Qty: 30 TABLET | Refills: 3 | Status: SHIPPED | OUTPATIENT
Start: 2024-02-21

## 2024-02-22 ENCOUNTER — TELEPHONE (OUTPATIENT)
Dept: CARDIOLOGY CLINIC | Facility: CLINIC | Age: 69
End: 2024-02-22

## 2024-02-22 NOTE — TELEPHONE ENCOUNTER
Patient called, asked if pre dental antibiotics are needed prior to dental appointments.    Per verbal discussion with Anna, no pre dental antibiotic needed, but patient should not have a dental appt until one month post stent placement .  Patient aware and verbalized understanding.

## 2024-02-23 ENCOUNTER — APPOINTMENT (OUTPATIENT)
Dept: LAB | Facility: CLINIC | Age: 69
End: 2024-02-23
Payer: COMMERCIAL

## 2024-02-23 ENCOUNTER — TELEPHONE (OUTPATIENT)
Dept: CARDIOLOGY CLINIC | Facility: CLINIC | Age: 69
End: 2024-02-23

## 2024-02-23 DIAGNOSIS — Z78.9 STATIN INTOLERANCE: ICD-10-CM

## 2024-02-23 DIAGNOSIS — Z79.899 ON STATIN THERAPY: ICD-10-CM

## 2024-02-23 LAB
ALBUMIN SERPL BCP-MCNC: 4.6 G/DL (ref 3.5–5)
ALP SERPL-CCNC: 78 U/L (ref 34–104)
ALT SERPL W P-5'-P-CCNC: 34 U/L (ref 7–52)
AST SERPL W P-5'-P-CCNC: 22 U/L (ref 13–39)
BILIRUB DIRECT SERPL-MCNC: 0.1 MG/DL (ref 0–0.2)
BILIRUB SERPL-MCNC: 0.61 MG/DL (ref 0.2–1)
PROT SERPL-MCNC: 6.7 G/DL (ref 6.4–8.4)

## 2024-02-23 PROCEDURE — 80076 HEPATIC FUNCTION PANEL: CPT

## 2024-02-23 PROCEDURE — 36415 COLL VENOUS BLD VENIPUNCTURE: CPT

## 2024-02-29 ENCOUNTER — TELEPHONE (OUTPATIENT)
Dept: CARDIAC REHAB | Facility: HOSPITAL | Age: 69
End: 2024-02-29

## 2024-02-29 NOTE — TELEPHONE ENCOUNTER
Repatha prior auth submitted to [employer]   ID [id number]    Prior auth needed for pricing .    [facility] requires prior authorization, unable to do verbally or electronically will submit when in the office on 3/6/2024

## 2024-03-01 ENCOUNTER — TRANSCRIBE ORDERS (OUTPATIENT)
Dept: CARDIAC REHAB | Age: 69
End: 2024-03-01

## 2024-03-01 ENCOUNTER — CLINICAL SUPPORT (OUTPATIENT)
Dept: CARDIAC REHAB | Age: 69
End: 2024-03-01
Payer: COMMERCIAL

## 2024-03-01 DIAGNOSIS — Z95.5 STENTED CORONARY ARTERY: Primary | ICD-10-CM

## 2024-03-01 DIAGNOSIS — I25.10 CORONARY ARTERY DISEASE INVOLVING NATIVE CORONARY ARTERY OF NATIVE HEART WITHOUT ANGINA PECTORIS: ICD-10-CM

## 2024-03-01 PROCEDURE — 93797 PHYS/QHP OP CAR RHAB WO ECG: CPT

## 2024-03-01 NOTE — PROGRESS NOTES
Cardiac Rehabilitation Plan of Care   Initial Care Plan          Today's date: 3/1/2024   # of Exercise Sessions Completed: 1- initial eval   Patient name: George Ramirez      : 1955  Age: 68 y.o.       MRN: [id number]  Referring Physician: Shyam Du MD  Cardiologist: MARGIE Valles (appt with Anna , sending initial care plan to referring provider, pt wants to make an appt with Nohelia Castro DO)    Provider: Oewn  Clinician: Makenna Morales MS     Dx:   Encounter Diagnoses   Name Primary?    Coronary artery disease involving native coronary artery of native heart without angina pectoris     Stented coronary artery Yes       Description of Diagnosis: Kristopher was admitted to [facility] on  - 24 with chest pain.  Presented to the emergency room with 2-week history of left-sided chest pressure.  The emergency troponin elevated to 5.  EKG CMP CBC unremarkable.  Cardiology consulted and he was observed overnight.   TTE showed LVEF 65% systolic function normal wall motion normal diastolic function.  cath: Status post angioplasty with [employer] drug-eluting stent placed across the 80% lesion of the mid LAD, 0% residual stenosis.          Ost LM lesion is 20% stenosed.    Prox LAD lesion is 30% stenosed.    Mid LAD lesion is 80% stenosed.    Prox Cx lesion is 50% stenosed.       Date of onset: 2024        Dr. Shyam Du MD   Please review the following patient assessment for Kristopher to begin cardiac rehabilitation post stent x1.  Please verify you agree with the outlined plan of care and exercise prescription by signing with attached order.    Thank You.      SUMMARY OF PROGRESS:  Today is Kristopher's initial evaluation to begin Cardiac Rehab post s/p stent x1.  The patient does currently follow a home exercise program.  Home exercise includes core exercise minutes, 6 days/wk. Kristopher was encouraged to add cardiovascular fitness and he understand the  importance and is excited to get enrolled in cardiac rehab. He has resumed all ADLs without limitations.  Depression screening using the PHQ-9 interprets the patient's score of  2  as  1-4 = Minimal Depression. SWATHI-7 screening tool for anxiety suggests 0  0-4  = Not anxious. When addressed, the patient denies having depression/anxiety. Patient reports excellent social/emotional support from his wife.  Information to utilize [employer] was provided as well as contact information for counseling through  [facility].  PHQ-9 score will be reassessed in 30 days due to an initial score revealing concern for depression. They rate stress 0-1/10 with the following stressors: the news.  Patient will attend a group class on stress and relaxation.   The patient is a  former cigar smoking, quit in 1986 .   Patient admits to 100% medication compliance.  The patient completed an initial submaximal TM ETT. The patient completed 3 minutes of stage 5 (7.5METs) with test termination of RPE 6.    Resting  /84 with Normal response to exercise reaching 170/80.  Blood Pressure will be monitored throughout the program and cardiologist will be notified of elevated trends.  Patient reported no symptoms with exercise.. Telemetry revealed NSR.  Kristopher was counseled on exercise guidelines to achieve a minimum of 150 mins/wk of moderate intensity (RPE 4-6) exercise and encouraged to add 1-2 days of exercise on opposite days of cardiac rehab as tolerated. We discussed current dietary habits and goals of heart healthy eating for lipid management and weight loss. The patient has N/A.   His education will focus on lifestyle modification/education specific to his/her CAD risk factors including:  obesity/overweight and hyperlipidemia.  Group and individualized education will be provided on heart healthy eating, reading food labels, stress management, risk factor reduction, understanding heart disease and common heart medications.  Patient  will attend 35 monitored exercise sessions beginning Tuesday March 5th at 10am.  See outlined plan of care below for specific patient goals in each component of care.        Medication compliance: Yes   Comments: Pt reports to be compliant with medications    Fall Risk: Low   Comments: Ambulates with a steady gait with no assist device and Denies a fall in the past 6 months      EXERCISE ASSESSMENT and PLAN    ECG Interpretation: NSR    SMART Goals:   10% improvement in functional capacity based on max METs achieved in initial fitness assessment  improved DASI score by 10%  increased exercise capacity by 40% based on peak METs tolerated in cardiac rehab exercise session  maintain > 150 minutes per week of moderate intensity exercise    Patient Specific EXERCISE GOALS:   (home exercise, ADLs, recreation):  resume ADLs with increased strength and attend rehab regularly    Patient's progress toward SMART and personal goals: pt will add more cardiovascular fitness to his exercise routine both in rehab and at home. Pt does a lot of core exercises and was encouraged to add more cardio, pt was educated on doing home exercises with what he is able to tolerate in rehab    Plan For next 30 days: education on home exercise guidelines, home exercise 30+ mins 2 days opposite CR, and Group class: Risk Factors for Heart Disease    Current Aerobic Exercise Prescription:      Frequency: 3 days/week   Supplement with home exercise 2+ days/wk as tolerated       Minutes: 20 - 40         METS: 3.0-5.0            HR: (50-85% HRR or RHR +30-40bpm)   RPE: 4-6         Modalities: Treadmill, UBE, NuStep, and Recumbent bike     Exercise workloads will be progressed gradually as tolerated, within limits of patient's ability, and according to the patient's response to the exercise program.      30 Day Goals for Aerobic Exercise Progression:    Frequency: 3 days/week of cardiac rehab       Supplement with home exercise 2+ days/wk as tolerated     Minutes: 40                            >150 mins/wk of moderate intensity exercise   METS: 4.0-6.0   HR: RHR +30     RPE: 4-6   Modalities: Treadmill, Airdyne bike, Lifecycle, Elliptical, and Rower    Strength trainin-3 days / week  12-15 repetitions  1-2 sets per modality   Will be added following at least 8 weeks post surgery and 8-10 monitored sessions   Modalities: Leg Press, Chest Press, Pull Downs, Arm Curl, Seated Row, and Sit to Stands    Home Exercise:  core exercises, 6d/wk    Group and Individual Education: benefit of exercise for CAD risk factors, home exercise guidelines, AHA guidelines to achieve >150 mins/wk of moderate exercise, and RPE scale       Readiness to change: Preparation:  (Getting ready to change)       NUTRITION ASSESSMENT AND PLAN    Weight control:    Starting weight: 217.3 lbs   Current weight:         Diabetes: N/A  A1c: 5.3    last measured: 2024    Lipid management: Discussed diet and lipid management and Last lipid profile 11/3/2023  Chol 219  TRG 82  HDL 63      SMART Goals:   LDL <100, HDL >40, TRG <150, CHOL <200, Improved Rate Your Plate score  >64, Wt. loss 1 ppw,  goal of 30 lbs., eat no more than 6oz of meat per day, and eat poultry without the skin    Patient Specific NUTRITION GOALS:  (wt control, diabetes management, dietary modifications): wt loss 30 lbs and continue making heart healthy diet choices    Patient's progress toward SMART and personal goals: pt encouraged to continue healthy diet, will reassess weight loss and diet in the next 30 days    Plan for next 30 days: group class: Reading Food Labels, group Class: Heart Healthy Eating, and continue making healthy diet plans    Measurable goals were based Rate Your Plate Dietary Self-Assessment. These are the areas in which the patient could score higher on the assessment.  Goals include recommendations for a heart healthy diet based on American Heart Association.    Group and Individual  Education: weight loss and management strategies  low sodium diet  maintaining hydration    Readiness to change: Preparation:  (Getting ready to change)       PSYCHOSOCIAL ASSESSMENT AND PLAN    Emotional:  Depression assessment:  PHQ-9 = 2  1-4 = Minimal Depression            Anxiety measure:  SWATHI-7 = 0  0-4  = Not anxious    Assessment of depression and anxiety    Patient reports they are coping well with good social support and denies depression or anxiety    Self-reported stress level:   0-1  Stress Management: Exercise    Patient's rating of Social support: Excellent   Social Support Network: spouse    Psychosocial Assessment as it relates to rehabilitation:   Patient denies issues with his/her family or home life that may affect their rehabilitation efforts.     SMART Goals:     Overall Health in [facility] Score < 3 and feel less tired with more energy    Patient Specific PSYCHOCOSOCIAL GOALS:  (stress, emotional well being, social support):  spend time with family and maintain emotional health    Patient's progress toward SMART and personal goals: pt reports low stress and no depression or anxiety with good support when needed, pt will maintain emotional health     Plan For next 30 days: Class: Stress and Your Health, Class: Relaxation, and Exercise    Group and Individual Education: benefits of a positive support system and stress management techniques    Readiness to change: Preparation:  (Getting ready to change)       OTHER CORE COMPONENTS     Tobacco:   Social History     Tobacco Use   Smoking Status Former    Types: Cigars    Start date:     Quit date:     Years since quittin.1   Smokeless Tobacco Former    Types: Chew    Quit date:    Tobacco Comments    Cigars        Tobacco Use Intervention:   N/A: Pt has a remote history of smoking, cigar smoker occasionally, quit      Anginal Symptoms:  chest pressure, reports it being muscle soreness   NTG use: Understands proper use, Reviewed  Proper use, and Pt has not used NTG since event    Blood pressure:    Restin/84   Exercise: 170/80    SMART Goals: consistent, controlled resting BP < 130/80      Patient Specific CORE COMPONENT GOALS (smoking, BP control, angina control, medication): reduced dietary sodium <2000mg and medication compliance    Patient's progress toward SMART and personal goals: pt was encouraged to watch sodium intake, carrying nitro, and stop exercise if angina pain becomes greater than a 4.     Plan For next 30 days: group class: Understanding Heart Disease, group class: Common Heart Medications, and medication compliance    Group and Individual Education:  understanding high blood pressure and it's relationship to CAD, components of blood pressure management, and proper use of sublingual NTG    Readiness to change: Preparation:  (Getting ready to change)

## 2024-03-01 NOTE — PROGRESS NOTES
"CARDIAC REHAB ASSESSMENT    Today's date: 2024  Patient name: George Ramirez     : 1955       MRN: [id number]  PCP: Viviane Resendiz DO  Referring Physician: Shyam Du MD  Cardiologist: MARGIE Valles (appt with Anna , sending initial care plan to referring provider, pt wants to make an appt with Nohelia Castro DO)  Surgeon: Tripp Cross MD  Dx:   Encounter Diagnoses   Name Primary?    Coronary artery disease involving native coronary artery of native heart without angina pectoris     Stented coronary artery Yes       Date of onset: 2024  Cultural needs: none    Weight    Wt Readings from Last 1 Encounters:   24 99.8 kg (220 lb 1.6 oz)      Height:   Ht Readings from Last 1 Encounters:   24 5' 7\" (1.702 m)     Medical History:   Past Medical History:   Diagnosis Date    A-fib (HCC)     Allergic     Allergic rhinitis     Arthritis     Benign cyst of kidney     Cancer (HCC)     Cat bite 2020    Cerebral vascular disease     Chronic sinusitis     Coronary artery disease     Cryptogenic stroke (HCC) 2020    Hyperlipidemia     Kidney stone     Melanoma (HCC)     Pancreas cyst     Stroke (HCC)          Physical Limitations: R knee    Fall Risk: Low   Comments: Ambulates with a steady gait with no assist device and Denies a fall in the past 6 months    Anginal Equivalent: Lightheadedness and muscular chest pressure   NTG use: Understands proper use, Reviewed Proper use, Pt has not used NTG since event, and does not carry with him, was encouraged to carry with him, pt understood    Risk Factors   Cholesterol: Yes  Smoking: Former user quit , occasional cigar  HTN: No  DM: No  Obesity: Yes   Inactivity: No  Stress:  perceived  stress: 0-1/10   Stressors: the news    Goals for Stress Management:Practice Relaxation Techniques and Exercise    Family History:  Family History   Problem Relation Age of Onset    Diabetes type II Mother     Cancer " Father         angioma    Cancer Sister     Hearing loss Sister        Allergies: Augmentin [amoxicillin-pot clavulanate], Molds & smuts, Monascus purpureus went yeast, Niacin, Pollen extract, Pregabalin, Statins, and Atorvastatin    ETOH:   Social History     Substance and Sexual Activity   Alcohol Use Yes    Alcohol/week: 1.0 standard drink of alcohol    Types: 1 Glasses of wine per week    Comment: 8 oz. Daily       Current Medications:   Current Outpatient Medications   Medication Sig Dispense Refill    ascorbic acid (VITAMIN C) 250 mg tablet Take 250 mg by mouth 2 (two) times a day      aspirin (ECOTRIN LOW STRENGTH) 81 mg EC tablet Take 1 tablet (81 mg total) by mouth daily 14 tablet 0    chlorpheniramine (CHLOR-TRIMETON) 4 MG tablet Take 1 tablet (4 mg total) by mouth every 6 (six) hours as needed for rhinitis 30 tablet 0    clobetasol (TEMOVATE) 0.05 % cream Apply 1 application topically 2 (two) times a day as needed Apply sparingly to affected areas      clopidogrel (PLAVIX) 75 mg tablet Take 1 tablet (75 mg total) by mouth daily 90 tablet 1    Coenzyme Q10 (COQ10) 200 MG CAPS Take 1 capsule by mouth daily      colesevelam (WELCHOL) 625 mg tablet Take 2 tablets (1,250 mg total) by mouth 2 (two) times a day with meals 360 tablet 1    diltiazem (CARDIZEM) 30 mg tablet Take 1 tablet (30 mg total) by mouth if needed (afib) 15 tablet 3    Eliquis 5 MG TAKE ONE TABLET BY MOUTH TWICE A  tablet 3    ezetimibe (ZETIA) 10 mg tablet Take 1 tablet (10 mg total) by mouth daily 90 tablet 1    Glucosamine-Chondroit-Vit C-Mn (GLUCOSAMINE CHONDR 1500 COMPLX PO) Take 1 tablet by mouth 2 (two) times a day       hyoscyamine (ANASPAZ) 0.125 mg Take 0.125 mg by mouth 2 (two) times a day       metoprolol tartrate (LOPRESSOR) 25 mg tablet Take 0.5 tablets (12.5 mg total) by mouth every 12 (twelve) hours 90 tablet 1    multivitamin (THERAGRAN) TABS Take 1 tablet by mouth 2 (two) times a day        nitroglycerin (NITROSTAT)  0.4 mg SL tablet Place 1 tablet (0.4 mg total) under the tongue every 5 (five) minutes as needed for chest pain - do not take if you have recently taken Sildenafil (Revatio) (Patient not taking: Reported on 2/21/2024) 30 tablet 0    NON FORMULARY CBD CAPSULE  AND CREAM USE DAILY PRIN      OXcarbazepine (TRILEPTAL) 300 mg tablet Take 1 tablet (300 mg total) by mouth 2 (two) times a day 180 tablet 1    rosuvastatin (CRESTOR) 5 mg tablet Take Crestor 5mg twice week on Wed and Saturday 30 tablet 3    sildenafil (REVATIO) 20 mg tablet Take 1-5 tablets on empty stomach one hour prior to sexual activity if needed 30 tablet 3    tiZANidine (ZANAFLEX) 2 mg tablet Take 1 tablet (2 mg total) by mouth 2 (two) times a day as needed for muscle spasms 180 tablet 1     No current facility-administered medications for this visit.         Functional Status Prior to Diagnosis for Treatment   Occupation: retired massage therapist, does occasional appts   Recreation: exercise   ADL’s: No limitations  [place]: No limitations  Exercise: 6-7 d/wk core and cardio   Other: none    Current Functional Status  Occupation: retired, massage therapist, does occasional appts  Recreation: exercise   ADL’s:No limitations  [place]: No limitations  Exercise: 6-7 d/wk core and some cardio  Home exercise equipment: concept 2   Other: none      Patient Specific Goals:     EXERCISE GOALS (home exercise, ADLs):   Starting rehab and adding more cardio to his exercise routine in rehab and also at home  Weight training   NUTRITION GOALS (wt control, diabetes management, dietary modifications):   Decrease red meat intake  Lose 30 lbs   PSYCHOCOSOCIAL GOALS (stress, emotional well being, social support):   Keeping a mindset set when things get hard  Maintain emotional health with good support at home    CORE COMPONENT GOALS (smoking, BP control, angina control, medication):   Taking medication part of his daily routine, carrying   Angina control and  being aware of any symptoms       Ability to reach goals/rehabilitation potential:  Excellent    Projected return to function: 12 weeks  Objective tests: sub-max TM ETT      Nutritional   Reviewed details of Rate your Plate. Discussed key elements of heart healthy eating. Reviewed patient goals for dietary modifications and their clinical implications.  Reviewed most recent lipid profile.     Goals for dietary modification (based on Rate Your Plate Dietary Assessment)   LDL <100, HDL >40, TRG <150, CHOL <200, Improved Rate Your Plate score  >64, and Wt. loss 1 ppw,  goal of 30 lbs.    Psychosocial Assessment as it relates to rehabilitation  Are there any aspects of the patient's family and home situation that will affect their rehabilitation?  no    Assessment of depression and anxiety   Patient reports they are coping well with good social support and denies depression or anxiety    Psychosocial Evaluation    PHQ-9: 2  1-4 = Minimal Depression  SWATHI-7: 0  0-4  = Not anxious       Marital status:   Social Support: spouse    Domestic Violence Screening: No      REPORT OF CURRENT CARDIAC EVENT: Kristopher was admitted to [facility] on 2/12 - 2/14/24 with chest pain.  Presented to the emergency room with 2-week history of left-sided chest pressure.  The emergency troponin elevated to 5.  EKG CMP CBC unremarkable.  Cardiology consulted and he was observed overnight.  2/13 TTE showed LVEF 65% systolic function normal wall motion normal diastolic function. 2/13 cath: Status post angioplasty with [employer] drug-eluting stent placed across the 80% lesion of the mid LAD, 0% residual stenosis.         Ost LM lesion is 20% stenosed.    Prox LAD lesion is 30% stenosed.    Mid LAD lesion is 80% stenosed.    Prox Cx lesion is 50% stenosed.    OTHER CARDIAC HISTORY: stent to the LAD 2011, cath 2017 w/ mid LAD just distal to the prior stent

## 2024-03-05 ENCOUNTER — CLINICAL SUPPORT (OUTPATIENT)
Dept: CARDIAC REHAB | Age: 69
End: 2024-03-05
Payer: COMMERCIAL

## 2024-03-05 DIAGNOSIS — Z95.5 STENTED CORONARY ARTERY: Primary | ICD-10-CM

## 2024-03-05 PROCEDURE — 93798 PHYS/QHP OP CAR RHAB W/ECG: CPT

## 2024-03-06 DIAGNOSIS — M54.16 LUMBAR RADICULOPATHY: ICD-10-CM

## 2024-03-06 RX ORDER — OXCARBAZEPINE 300 MG/1
300 TABLET, FILM COATED ORAL 2 TIMES DAILY
Qty: 180 TABLET | Refills: 1 | Status: SHIPPED | OUTPATIENT
Start: 2024-03-06

## 2024-03-07 ENCOUNTER — CLINICAL SUPPORT (OUTPATIENT)
Dept: CARDIAC REHAB | Age: 69
End: 2024-03-07
Payer: COMMERCIAL

## 2024-03-07 DIAGNOSIS — Z95.5 STENTED CORONARY ARTERY: Primary | ICD-10-CM

## 2024-03-07 PROCEDURE — 93798 PHYS/QHP OP CAR RHAB W/ECG: CPT

## 2024-03-11 ENCOUNTER — TELEPHONE (OUTPATIENT)
Dept: CARDIOLOGY CLINIC | Facility: CLINIC | Age: 69
End: 2024-03-11

## 2024-03-11 NOTE — TELEPHONE ENCOUNTER
Pt is asking about the possibility of going on Leqvio.  Recently his wife was approved through insurance as well as accepted to receive Leqvio free of cost.  If this is something you're ok with the pt going on I can begin the paperwork.

## 2024-03-12 ENCOUNTER — CLINICAL SUPPORT (OUTPATIENT)
Dept: CARDIAC REHAB | Age: 69
End: 2024-03-12
Payer: COMMERCIAL

## 2024-03-12 DIAGNOSIS — Z95.5 STENTED CORONARY ARTERY: Primary | ICD-10-CM

## 2024-03-12 PROCEDURE — 93798 PHYS/QHP OP CAR RHAB W/ECG: CPT

## 2024-03-14 ENCOUNTER — CLINICAL SUPPORT (OUTPATIENT)
Dept: CARDIAC REHAB | Age: 69
End: 2024-03-14
Payer: COMMERCIAL

## 2024-03-14 DIAGNOSIS — Z95.5 STENTED CORONARY ARTERY: Primary | ICD-10-CM

## 2024-03-14 PROCEDURE — 93798 PHYS/QHP OP CAR RHAB W/ECG: CPT

## 2024-03-19 ENCOUNTER — CLINICAL SUPPORT (OUTPATIENT)
Dept: CARDIAC REHAB | Age: 69
End: 2024-03-19
Payer: COMMERCIAL

## 2024-03-19 DIAGNOSIS — Z95.5 STENTED CORONARY ARTERY: Primary | ICD-10-CM

## 2024-03-19 PROCEDURE — 93798 PHYS/QHP OP CAR RHAB W/ECG: CPT

## 2024-03-21 ENCOUNTER — CLINICAL SUPPORT (OUTPATIENT)
Dept: CARDIAC REHAB | Age: 69
End: 2024-03-21
Payer: COMMERCIAL

## 2024-03-21 DIAGNOSIS — Z95.5 STENTED CORONARY ARTERY: Primary | ICD-10-CM

## 2024-03-21 PROCEDURE — 93798 PHYS/QHP OP CAR RHAB W/ECG: CPT

## 2024-03-26 ENCOUNTER — CLINICAL SUPPORT (OUTPATIENT)
Dept: CARDIAC REHAB | Age: 69
End: 2024-03-26
Payer: COMMERCIAL

## 2024-03-26 DIAGNOSIS — Z95.5 STENTED CORONARY ARTERY: Primary | ICD-10-CM

## 2024-03-26 PROCEDURE — 93798 PHYS/QHP OP CAR RHAB W/ECG: CPT

## 2024-03-28 ENCOUNTER — CLINICAL SUPPORT (OUTPATIENT)
Dept: CARDIAC REHAB | Age: 69
End: 2024-03-28
Payer: COMMERCIAL

## 2024-03-28 DIAGNOSIS — Z95.5 STENTED CORONARY ARTERY: Primary | ICD-10-CM

## 2024-03-28 PROCEDURE — 93798 PHYS/QHP OP CAR RHAB W/ECG: CPT

## 2024-03-28 NOTE — PROGRESS NOTES
Cardiac Rehabilitation Plan of Care   30 DAY          Today's date: 3/28/2024   # of Exercise Sessions Completed: 9  Patient name: George Ramirez      : 1955  Age: 68 y.o.       MRN: [id number]  Referring Physician: Shyam Du MD  Cardiologist: MARGIE Valles (appt with Anna , sending initial care plan to referring provider, pt wants to make an appt with Nohelia Castro DO)    Provider: Owen  Clinician: Alis Block, MS, CEP    Dx:   Encounter Diagnosis   Name Primary?    Stented coronary artery Yes       Description of Diagnosis:   3/1: Kristopher was admitted to [facility] on  - 24 with chest pain.  Presented to the emergency room with 2-week history of left-sided chest pressure.  The emergency troponin elevated to 5.  EKG CMP CBC unremarkable.  Cardiology consulted and he was observed overnight.   TTE showed LVEF 65% systolic function normal wall motion normal diastolic function.  cath: Status post angioplasty with [employer] drug-eluting stent placed across the 80% lesion of the mid LAD, 0% residual stenosis.          Ost LM lesion is 20% stenosed.    Prox LAD lesion is 30% stenosed.    Mid LAD lesion is 80% stenosed.    Prox Cx lesion is 50% stenosed.       Date of onset: 2024        Dr. Nohelia Castro,   Please review the following patient assessment for Kristopher to begin cardiac rehabilitation post stent x1.  Please verify you agree with the outlined plan of care and exercise prescription by signing with attached order.    Thank You.      SUMMARY OF PROGRESS:    3/28:Kristopher has attended 9 exercise sessions in the past 30 days, attending 2x/wk. He only wanted to trial the program for 1 month. We are planning for discharge next Tuesday. He tolerates 45 mins at 3.6 - 5.1 METs.  A light strength training component has not been added to their exercise program..   He is tolerating progression of intensity levels to maintain RPE 4-6.   Resting BP   120/66 - 136/64 with Normal response to exercise reaching 132/74- 164/72.  NSR on tele with occ PVCs and couplets observed.  RHR 62 - 89  with Normal response to exercise reaching 96 - 119.    No cardiac complaints.  Patient attends group educational classes on cardiac risk factor modification.   His exercise program will be progressed as tolerated to maintain RPE 4-6.  They will continue to be educated on lifestyle modification and encouraged to supplement with a home exercise program as tolerated.      3/1: Today is Kristopher's initial evaluation to begin Cardiac Rehab post s/p stent x1.  The patient does currently follow a home exercise program.  Home exercise includes core exercise minutes, 6 days/wk. Kristopher was encouraged to add cardiovascular fitness and he understand the importance and is excited to get enrolled in cardiac rehab. He has resumed all ADLs without limitations.  Depression screening using the PHQ-9 interprets the patient's score of  2  as  1-4 = Minimal Depression. SWATHI-7 screening tool for anxiety suggests 0  0-4  = Not anxious. When addressed, the patient denies having depression/anxiety. Patient reports excellent social/emotional support from his wife.  Information to utilize [employer] was provided as well as contact information for counseling through  [facility].  PHQ-9 score will be reassessed in 30 days due to an initial score revealing concern for depression. They rate stress 0-1/10 with the following stressors: the news.  Patient will attend a group class on stress and relaxation.   The patient is a  former cigar smoking, quit in 1986 .   Patient admits to 100% medication compliance.  The patient completed an initial submaximal TM ETT. The patient completed 3 minutes of stage 5 (7.5METs) with test termination of RPE 6.    Resting  /84 with Normal response to exercise reaching 170/80.  Blood Pressure will be monitored throughout the program and cardiologist will be notified of  elevated trends.  Patient reported no symptoms with exercise.. Telemetry revealed NSR.  Kristopher was counseled on exercise guidelines to achieve a minimum of 150 mins/wk of moderate intensity (RPE 4-6) exercise and encouraged to add 1-2 days of exercise on opposite days of cardiac rehab as tolerated. We discussed current dietary habits and goals of heart healthy eating for lipid management and weight loss. The patient has N/A.   His education will focus on lifestyle modification/education specific to his/her CAD risk factors including:  obesity/overweight and hyperlipidemia.  Group and individualized education will be provided on heart healthy eating, reading food labels, stress management, risk factor reduction, understanding heart disease and common heart medications.  Patient will attend 35 monitored exercise sessions beginning Tuesday March 5th at 10am.  See outlined plan of care below for specific patient goals in each component of care.        Medication compliance: Yes   Comments: Pt reports to be compliant with medications    Fall Risk: Low   Comments: Ambulates with a steady gait with no assist device and Denies a fall in the past 6 months      EXERCISE ASSESSMENT and PLAN    ECG Interpretation: NSR    SMART Goals:   10% improvement in functional capacity based on max METs achieved in initial fitness assessment  improved DASI score by 10%  increased exercise capacity by 40% based on peak METs tolerated in cardiac rehab exercise session  maintain > 150 minutes per week of moderate intensity exercise    Patient Specific EXERCISE GOALS:   (home exercise, ADLs, recreation):  resume ADLs with increased strength and attend rehab regularly    Patient's progress toward SMART and personal goals:Pt does a lot of core exercises and weight training and was encouraged to add more cardio, pt was educated on doing home exercises with what he is able to tolerate in rehab. Encouraged 3-5days/wk or 150m/wk of aerobic exercise.      Plan For next 30 days: education on home exercise guidelines, home exercise 30+ mins 2 days opposite CR, and Patient education:   Exercise After Cardiac Rehabilitation    Current Aerobic Exercise Prescription:      Frequency: 3 days/week   Supplement with home exercise 2+ days/wk as tolerated       Minutes: 45         METS: 3.0-5.0            HR: (50-85% HRR or RHR +30-40bpm)   RPE: 4-6         Modalities: Treadmill, Airdyne bike, UBE, Lifecycle, Rower, and Recumbent bike     Exercise workloads will be progressed gradually as tolerated, within limits of patient's ability, and according to the patient's response to the exercise program.      30 Day Goals for Aerobic Exercise Progression:    Frequency: 3 days/week of cardiac rehab       Supplement with home exercise 2+ days/wk as tolerated    Minutes: 40                            >150 mins/wk of moderate intensity exercise   METS: 4.0-6.0   HR: RHR +30     RPE: 4-6   Modalities: Treadmill, Airdyne bike, Lifecycle, Elliptical, and Rower    Strength trainin-3 days / week  12-15 repetitions  1-2 sets per modality   Will be added following at least 8 weeks post surgery and 8-10 monitored sessions   Modalities: Leg Press, Chest Press, Pull Downs, Arm Curl, Seated Row, and Sit to Stands    Home Exercise:  core exercises, 6d/wk walking 1x/wk for 2 miles.    Group and Individual Education: benefit of exercise for CAD risk factors, home exercise guidelines, AHA guidelines to achieve >150 mins/wk of moderate exercise, and RPE scale       Readiness to change: Contemplation:  (Acknowledging that there is a problem but not yet ready or sure of wanting to make a change) and Action:  (Changing behavior)      NUTRITION ASSESSMENT AND PLAN    Weight control:    Starting weight: 217.3 lbs   Current weight:         Diabetes: N/A  A1c: 5.3    last measured: 2024    Lipid management: Discussed diet and lipid management and Last lipid profile 11/3/2023  Chol 219  TRG 82  HDL  63      SMART Goals:   LDL <100, HDL >40, TRG <150, CHOL <200, Improved Rate Your Plate score  >64, Wt. loss 1 ppw,  goal of 30 lbs., eat no more than 6oz of meat per day, and eat poultry without the skin    Patient Specific NUTRITION GOALS:  (wt control, diabetes management, dietary modifications): wt loss 30 lbs and continue making heart healthy diet choices    Patient's progress toward SMART and personal goals: Pt cut out red meat or only rarely eating, cut out processed meats, eating egg white and veggies for breakfast, more fruit. Struggling with hydration.    Plan for next 30 days: After discharge patient will continue to maintain healthy lifestyle habits and focus on risk factor modification. and increase hydration up to 64oz/day    Measurable goals were based Rate Your Plate Dietary Self-Assessment. These are the areas in which the patient could score higher on the assessment.  Goals include recommendations for a heart healthy diet based on American Heart Association.    Group and Individual Education: weight loss and management strategies  low sodium diet  maintaining hydration  group class: Heart Healthy Eating  group class:  Label Reading    Readiness to change: Preparation:  (Getting ready to change)  and Action:  (Changing behavior)      PSYCHOSOCIAL ASSESSMENT AND PLAN    Emotional:  Depression assessment:  PHQ-9 = 2  1-4 = Minimal Depression            Anxiety measure:  SWATHI-7 = 0  0-4  = Not anxious    Assessment of depression and anxiety    Patient reports they are coping well with good social support and denies depression or anxiety    Self-reported stress level:   0-1  Stress Management: Exercise    Patient's rating of Social support: Excellent   Social Support Network: spouse    Psychosocial Assessment as it relates to rehabilitation:   Patient denies issues with his/her family or home life that may affect their rehabilitation efforts.     SMART Goals:     Overall Health in [facility] Score  < 3 and feel less tired with more energy    Patient Specific PSYCHOCOSOCIAL GOALS:  (stress, emotional well being, social support):  spend time with family and maintain emotional health    Patient's progress toward SMART and personal goals: pt reports low stress and no depression or anxiety with good support when needed, pt will maintain emotional health     Plan For next 30 days: Exercise    Group and Individual Education: benefits of a positive support system, stress management techniques, class:  Relaxation, and class:  Stress and Your Health     Readiness to change: Maintenance: (Maintaining the behavior change)      OTHER CORE COMPONENTS     Tobacco:   Social History     Tobacco Use   Smoking Status Former    Types: Cigars    Start date:     Quit date:     Years since quittin.2   Smokeless Tobacco Former    Types: Chew    Quit date:    Tobacco Comments    Cigars        Tobacco Use Intervention:   N/A: Pt has a remote history of smoking, cigar smoker occasionally, quit      Anginal Symptoms:  chest pressure, reports it being muscle soreness   NTG use: Understands proper use, Reviewed Proper use, and Pt has not used NTG since event    Blood pressure:    Restin//64   Exercise: 132//72    SMART Goals: consistent, controlled resting BP < 130/80      Patient Specific CORE COMPONENT GOALS (smoking, BP control, angina control, medication): reduced dietary sodium <2000mg and medication compliance    Patient's progress toward SMART and personal goals: pt was encouraged to watch sodium intake, medication compliance    Plan For next 30 days: medication compliance, avoid processed foods, and engage in regular exercise    Group and Individual Education:  understanding high blood pressure and it's relationship to CAD, components of blood pressure management, and proper use of sublingual NTG    Readiness to change: Action:  (Changing behavior)

## 2024-03-29 ENCOUNTER — TELEPHONE (OUTPATIENT)
Dept: CARDIOLOGY CLINIC | Facility: CLINIC | Age: 69
End: 2024-03-29

## 2024-03-29 NOTE — TELEPHONE ENCOUNTER
Kristopher left a message on the [facility] clinical line asking if you would call him. He would like to speak with you about the [employer] as he has recently been notified of the denial by [employer].  His phone # [phone].    Thank you,  Kenyatta

## 2024-04-02 ENCOUNTER — CLINICAL SUPPORT (OUTPATIENT)
Dept: CARDIAC REHAB | Age: 69
End: 2024-04-02
Payer: COMMERCIAL

## 2024-04-02 DIAGNOSIS — Z95.5 STENTED CORONARY ARTERY: Primary | ICD-10-CM

## 2024-04-02 PROCEDURE — 93798 PHYS/QHP OP CAR RHAB W/ECG: CPT

## 2024-04-02 NOTE — PROGRESS NOTES
Cardiac Rehabilitation Plan of Care   DISCHARGE            Today's date: 2024   # of Exercise Sessions Completed: 10  Patient name: George Ramirez      : 1955  Age: 68 y.o.       MRN: [id number]  Referring Physician: Shyam Du MD  Cardiologist: MARGIE Valles (appt with Anna , sending initial care plan to referring provider, pt wants to make an appt with Nohelia Castro DO)    Provider: Owen  Clinician: Alis Block, MS, CEP    Dx:   Encounter Diagnosis   Name Primary?    Stented coronary artery Yes       Description of Diagnosis:   3/1: Kristopher was admitted to [facility] on  - 24 with chest pain.  Presented to the emergency room with 2-week history of left-sided chest pressure.  The emergency troponin elevated to 5.  EKG CMP CBC unremarkable.  Cardiology consulted and he was observed overnight.   TTE showed LVEF 65% systolic function normal wall motion normal diastolic function.  cath: Status post angioplasty with [employer] drug-eluting stent placed across the 80% lesion of the mid LAD, 0% residual stenosis.          Ost LM lesion is 20% stenosed.    Prox LAD lesion is 30% stenosed.    Mid LAD lesion is 80% stenosed.    Prox Cx lesion is 50% stenosed.       Date of onset: 2024        Dr. Nohelia Castro,   Please review the following patient assessment for Kristopher to begin cardiac rehabilitation post stent x1.  Please verify you agree with the outlined plan of care and exercise prescription by signing with attached order.    Thank You.      SUMMARY OF PROGRESS:    : Discharge note for Kristopher Ramirez.  His max METS remained the same in his final submax ETT at 7.5METS with test termination of RPE 6. His exercise tolerance (max METs in tolerated in cardiac rehab) increased by 26%.  His DASI score remained the same at 9.89 METS. His [facility] QOL improved by 6%.  PHQ-9 score decreased from 2 to 0. His weight increased by 2 pounds.  Rate Your Plate score improved from 59 to 67.   Discharge plans include continuing with home exercise.  Encouraged the patient  to continue a disciplined exercise regimen: Frequency: 4-6 days/wk, Intensity: RPE 4-5, Time: 40-50 mins daily, 150-200 mins/wk.  The patient was encouraged to remain compliant with medications and follow up with cardiologist with any cardiac symptoms and medication management.    3/28:Kristopher has attended 9 exercise sessions in the past 30 days, attending 2x/wk. He only wanted to trial the program for 1 month. We are planning for discharge next Tuesday. He tolerates 45 mins at 3.6 - 5.1 METs.  A light strength training component has not been added to their exercise program..   He is tolerating progression of intensity levels to maintain RPE 4-6.   Resting BP  120/66 - 136/64 with Normal response to exercise reaching 132/74- 164/72.  NSR on tele with occ PVCs and couplets observed.  RHR 62 - 89  with Normal response to exercise reaching 96 - 119.    No cardiac complaints.  Patient attends group educational classes on cardiac risk factor modification.   His exercise program will be progressed as tolerated to maintain RPE 4-6.  They will continue to be educated on lifestyle modification and encouraged to supplement with a home exercise program as tolerated.      Medication compliance: Yes   Comments: Pt reports to be compliant with medications    Fall Risk: Low   Comments: Ambulates with a steady gait with no assist device and Denies a fall in the past 6 months      EXERCISE ASSESSMENT and PLAN    ECG Interpretation: NSR    SMART Goals:   10% improvement in functional capacity based on max METs achieved in initial fitness assessment  improved DASI score by 10%  increased exercise capacity by 40% based on peak METs tolerated in cardiac rehab exercise session  maintain > 150 minutes per week of moderate intensity exercise    Patient Specific EXERCISE GOALS:   (home exercise, ADLs, recreation):   resume ADLs with increased strength and attend rehab regularly    Patient's progress toward SMART and personal goals:Pt does a lot of core exercises and weight training and was encouraged to add more cardio, pt was educated on doing home exercises with what he is able to tolerate in rehab. Encouraged 3-5days/wk or 150m/wk of aerobic exercise.     Plan For next 30 days: After discharge patient will continue to follow a regular exercise regimen with the goal of a minimum of 150 minutes per week of moderate intensity exercise.      Current Aerobic Exercise Prescription:      Frequency: 3 days/week   Supplement with home exercise 2+ days/wk as tolerated       Minutes: 45         METS: 3.0-5.0            HR: (50-85% HRR or RHR +30-40bpm)   RPE: 4-6         Modalities: Treadmill, Airdyne bike, UBE, Lifecycle, Rower, and Recumbent bike     Exercise workloads will be progressed gradually as tolerated, within limits of patient's ability, and according to the patient's response to the exercise program.    Strength trainin-3 days / week  12-15 repetitions  1-2 sets per modality   Will be added following at least 8 weeks post surgery and 8-10 monitored sessions   Modalities: Leg Press, Chest Press, Pull Downs, Arm Curl, Seated Row, and Sit to Stands    Home Exercise:  core exercises, 6d/wk walking 1x/wk for 2 miles.    Group and Individual Education: benefit of exercise for CAD risk factors, home exercise guidelines, AHA guidelines to achieve >150 mins/wk of moderate exercise, RPE scale, and Importance of aerobic exercise for cardiac disease risk factor modifcation.        Readiness to change: Contemplation:  (Acknowledging that there is a problem but not yet ready or sure of wanting to make a change)      NUTRITION ASSESSMENT AND PLAN    Weight control:    Starting weight: 217.3 lbs   Current weight:   219      Diabetes: N/A  A1c: 5.3    last measured: 2024    Lipid management: Discussed diet and lipid management and  Last lipid profile 11/3/2023  Chol 219  TRG 82  HDL 63      SMART Goals:   LDL <100, HDL >40, TRG <150, CHOL <200, Improved Rate Your Plate score  >64, Wt. loss 1 ppw,  goal of 30 lbs., eat no more than 6oz of meat per day, and eat poultry without the skin    Patient Specific NUTRITION GOALS:  (wt control, diabetes management, dietary modifications): wt loss 30 lbs and continue making heart healthy diet choices    Patient's progress toward SMART and personal goals: Pt cut out red meat or only rarely eating, cut out processed meats, eating egg white and veggies for breakfast, more fruit. Struggling with hydration.    Plan for next 30 days: After discharge patient will continue to maintain healthy lifestyle habits and focus on risk factor modification. and increase hydration up to 64oz/day    Measurable goals were based Rate Your Plate Dietary Self-Assessment. These are the areas in which the patient could score higher on the assessment.  Goals include recommendations for a heart healthy diet based on American Heart Association.    Group and Individual Education: weight loss and management strategies  low sodium diet  maintaining hydration  group class: Heart Healthy Eating  group class:  Label Reading    Readiness to change: Action:  (Changing behavior)      PSYCHOSOCIAL ASSESSMENT AND PLAN    Emotional:  Depression assessment:  PHQ-9 = 2  1-4 = Minimal Depression            Anxiety measure:  SWATHI-7 = 0  0-4  = Not anxious    Assessment of depression and anxiety    Patient reports they are coping well with good social support and denies depression or anxiety    Self-reported stress level:   0-1  Stress Management: Exercise    Patient's rating of Social support: Excellent   Social Support Network: spouse    Psychosocial Assessment as it relates to rehabilitation:   Patient denies issues with his/her family or home life that may affect their rehabilitation efforts.     SMART Goals:     Overall Health in [facility]  Score < 3 and feel less tired with more energy    Patient Specific PSYCHOCOSOCIAL GOALS:  (stress, emotional well being, social support):  spend time with family and maintain emotional health    Patient's progress toward SMART and personal goals: pt reports low stress and no depression or anxiety with good support when needed, pt will maintain emotional health     Plan For next 30 days: After discharge patient will continue to maintain healthy lifestyle habits and focus on risk factor modification.    Group and Individual Education: benefits of a positive support system, stress management techniques, class:  Relaxation, and class:  Stress and Your Health     Readiness to change: Maintenance: (Maintaining the behavior change)      OTHER CORE COMPONENTS     Tobacco:   Social History     Tobacco Use   Smoking Status Former    Types: Cigars    Start date:     Quit date:     Years since quittin.2   Smokeless Tobacco Former    Types: Chew    Quit date:    Tobacco Comments    Cigars        Tobacco Use Intervention:   N/A: Pt has a remote history of smoking, cigar smoker occasionally, quit      Anginal Symptoms:  chest pressure, reports it being muscle soreness   NTG use: Understands proper use, Reviewed Proper use, and Pt has not used NTG since event    Blood pressure:    Restin//64   Exercise: 132//72    SMART Goals: consistent, controlled resting BP < 130/80      Patient Specific CORE COMPONENT GOALS (smoking, BP control, angina control, medication): reduced dietary sodium <2000mg and medication compliance    Patient's progress toward SMART and personal goals: pt was encouraged to watch sodium intake, medication compliance    Plan For next 30 days: medication compliance, avoid processed foods, engage in regular exercise, and After discharge patient will continue to maintain healthy lifestyle habits and focus on risk factor modification.    Group and Individual Education:   understanding high blood pressure and it's relationship to CAD, components of blood pressure management, and proper use of sublingual NTG    Readiness to change: Action:  (Changing behavior)

## 2024-04-09 ENCOUNTER — DOCUMENTATION (OUTPATIENT)
Dept: CARDIOLOGY CLINIC | Facility: CLINIC | Age: 69
End: 2024-04-09

## 2024-04-09 DIAGNOSIS — E78.00 HYPERCHOLESTEROLEMIA: ICD-10-CM

## 2024-04-09 RX ORDER — EZETIMIBE 10 MG/1
10 TABLET ORAL DAILY
Qty: 90 TABLET | Refills: 1 | Status: SHIPPED | OUTPATIENT
Start: 2024-04-09

## 2024-04-15 DIAGNOSIS — J30.2 SEASONAL ALLERGIC RHINITIS, UNSPECIFIED TRIGGER: Primary | ICD-10-CM

## 2024-04-15 DIAGNOSIS — E78.00 HYPERCHOLESTEROLEMIA: ICD-10-CM

## 2024-04-15 RX ORDER — COLESEVELAM 180 1/1
1250 TABLET ORAL 2 TIMES DAILY WITH MEALS
Qty: 360 TABLET | Refills: 1 | Status: SHIPPED | OUTPATIENT
Start: 2024-04-15 | End: 2024-10-12

## 2024-04-15 RX ORDER — MONTELUKAST SODIUM 10 MG/1
10 TABLET ORAL
Qty: 90 TABLET | Refills: 1 | Status: SHIPPED | OUTPATIENT
Start: 2024-04-15

## 2024-04-24 NOTE — PROGRESS NOTES
Assessment and Plan:     Problem List Items Addressed This Visit        Cardiovascular and Mediastinum    Coronary artery disease involving native coronary artery    Paroxysmal atrial fibrillation (HCC)       Musculoskeletal and Integument    DDD (degenerative disc disease), lumbar    Lumbar spondylosis       Genitourinary    Nephrolithiasis    BPH with obstruction/lower urinary tract symptoms       Other    Hypercholesterolemia    Medicare annual wellness visit, subsequent - Primary   Other Visit Diagnoses     Personal history of colonic polyps        last colonoscopy was in 2020 and showed diverticulosis and he was told to return for repeat colonoscopy in 5 years - 2025    Screening for prostate cancer        last psa was normal this week - 3.21 - admits to nocturia x 2, hesitancy, poor stream    Screening for AAA (abdominal aortic aneurysm)        pt smoked up to 100 cigars in his life timeand  had an MRI of the abdomen in 2021 and it showed no AAA    Encounter for immunization        pt is up to date with 5 covid vaccines, flu shot, tdap, pneumococcal x 2 and shingles vaccine           Preventive health issues were discussed with patient, and age appropriate screening tests were ordered as noted in patient's After Visit Summary. Personalized health advice and appropriate referrals for health education or preventive services given if needed, as noted in patient's After Visit Summary.      History of Present Illness:     Patient presents for a Medicare Wellness Visit    HPI   Patient Care Team:  Ruddy Patrick MD as PCP - General  MD Cristine Ford MD Devona Aly, MD (Urology)  Geni Roberts MD (Gastroenterology)     Review of Systems:     Review of Systems     Problem List:     Patient Active Problem List   Diagnosis   • Seasonal allergic rhinitis   • Osteoarthritis   • Nephrolithiasis   • Irritable bowel syndrome   • Hypercholesterolemia   • Coronary atherosclerosis   • Coronary artery disease involving native coronary artery   • Alpha-1-antitrypsin deficiency (HCC)   • BPH with obstruction/lower urinary tract symptoms   • Medicare annual wellness visit, subsequent   • Pancreatic cyst   • Lumbar radiculopathy   • Low back pain with sciatica   • DDD (degenerative disc disease), lumbar   • Lumbar spondylosis   • Tinea corporis   • Chronic pain syndrome   • Paroxysmal atrial fibrillation (HCC)   • Skin lesion   • BMI 34.0-34.9,adult   • Symptomatic varicose veins of both lower extremities      Past Medical and Surgical History:     Past Medical History:   Diagnosis Date   • A-fib ([address])    • Allergic    • Allergic rhinitis    • Arthritis    • Benign cyst of kidney    • Cancer ([address])    • Cat bite 2020   • Cerebral vascular disease    • Chronic sinusitis    • Coronary artery disease    • Cryptogenic stroke ([address]) 2020   • Hyperlipidemia    • Kidney stone    • Melanoma ([address])    • Pancreas cyst    • Stroke [facility])      Past Surgical History:   Procedure Laterality Date   • COLONOSCOPY     • OTHER SURGICAL HISTORY      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   • PATENT FORAMEN OVALE CLOSURE        Family History:     Family History   Problem Relation Age of Onset   • Diabetes type II Mother    • Cancer Father         angioma   • Cancer Sister    • Hearing loss Sister       Social History:     Social History     Socioeconomic History   • Marital status: /Civil Union     Spouse name: None   • Number of children: None   • Years of education: None   • Highest education level: None   Occupational History   • Occupation: massage therapist   Tobacco Use   • Smoking status: Former     Types: Cigars     Start date:      Quit date:      Years since quittin.8   • Smokeless tobacco: Former     Types: Chew     Quit date:    • Tobacco comments:     Cigars    Vaping Use   • Vaping Use: Never used   Substance and Sexual Activity   • Alcohol use:  Yes Alcohol/week: 1.0 standard drink of alcohol     Types: 1 Glasses of wine per week     Comment: 8 oz. Daily   • Drug use: Not Currently     Types: Marijuana   • Sexual activity: Yes     Partners: Female     Birth control/protection: Male Sterilization   Other Topics Concern   • None   Social History Narrative    Caffeine: drinks 1 cup coffee/ tea a day. Social Determinants of Health     Financial Resource Strain: Low Risk  (11/11/2023)    Overall Financial Resource Strain (CARDIA)    • Difficulty of Paying Living Expenses: Not hard at all   Food Insecurity: Not on file   Transportation Needs: No Transportation Needs (11/11/2023)    PRAPARE - Transportation    • Lack of Transportation (Medical): No    • Lack of Transportation (Non-Medical):  No   Physical Activity: Not on file   Stress: Not on file   Social Connections: Not on file   Intimate Partner Violence: Not on file   Housing Stability: Not on file      Medications and Allergies:     Current Outpatient Medications   Medication Sig Dispense Refill   • ascorbic acid (VITAMIN C) 250 mg tablet Take 250 mg by mouth 2 (two) times a day     • clobetasol (TEMOVATE) 0.05 % cream Apply 1 application topically 2 (two) times a day as needed Apply sparingly to affected areas     • Coenzyme Q10 (COQ10) 200 MG CAPS Take 1 capsule by mouth daily     • colesevelam (WELCHOL) 625 mg tablet Take 2 tablets (1,250 mg total) by mouth 2 (two) times a day with meals 360 tablet 1   • diltiazem (CARDIZEM) 30 mg tablet Take 1 tablet (30 mg total) by mouth if needed (afib) 15 tablet 3   • Eliquis 5 MG TAKE ONE TABLET BY MOUTH TWICE A  tablet 3   • ezetimibe (ZETIA) 10 mg tablet Take 1 tablet (10 mg total) by mouth daily 90 tablet 1   • Glucosamine-Chondroit-Vit C-Mn (GLUCOSAMINE CHONDR 1500 COMPLX PO) Take 1 tablet by mouth 2 (two) times a day      • hyoscyamine (ANASPAZ) 0.125 mg Take 0.125 mg by mouth 2 (two) times a day      • montelukast (SINGULAIR) 10 mg tablet Take 1 tablet (10 mg total) by mouth daily 90 tablet 1   • multivitamin (THERAGRAN) TABS Take 1 tablet by mouth 2 (two) times a day       • NON FORMULARY CBD CAPSULE  AND CREAM USE DAILY PRIN     • OXcarbazepine (TRILEPTAL) 300 mg tablet Take 1 tablet (300 mg total) by mouth 2 (two) times a day 180 tablet 1   • tiZANidine (ZANAFLEX) 2 mg tablet Take 1 tablet (2 mg total) by mouth 2 (two) times a day as needed for muscle spasms 60 tablet 2   • chlorpheniramine (CHLOR-TRIMETON) 4 MG tablet Take 1 tablet (4 mg total) by mouth every 6 (six) hours as needed for rhinitis (Patient not taking: Reported on 11/15/2023) 30 tablet 0     No current facility-administered medications for this visit. Allergies   Allergen Reactions   • Augmentin [Amoxicillin-Pot Clavulanate] Hives and Itching   • Molds & Smuts Allergic Rhinitis     Category: Allergy;    • Monascus Purpureus Went Yeast Fatigue     Category: Adverse Reaction;    • Niacin Fatigue     Category: Adverse Reaction;    • Pollen Extract Allergic Rhinitis     Category: Allergy;    • Pregabalin Fatigue     Category: Adverse Reaction;    • Statins Fatigue and GI Intolerance     Category: Adverse Reaction;    • Atorvastatin GI Intolerance and Fatigue     Other reaction(s): Muscle pain, constipation, sleepiness  Category: Adverse Reaction;    Other reaction(s): Muscle pain, constipation, sleepiness      Immunizations:     Immunization History   Administered Date(s) Administered   • COVID-19 MODERNA VACC 0.25 ML IM BOOSTER 11/09/2021   • COVID-19 MODERNA VACC 0.5 ML IM 03/17/2021, 04/16/2021, 11/09/2021, 11/01/2023   • INFLUENZA 11/01/2023   • Influenza Quadrivalent, 6-35 Months IM 11/12/2015, 01/05/2017   • Influenza, high dose seasonal 0.7 mL 10/12/2020, 10/25/2021   • Influenza, seasonal, injectable 11/03/2014   • Pneumococcal Conjugate 13-Valent 08/28/2019   • Pneumococcal Polysaccharide PPV23 10/12/2020   • Tdap 07/08/2020   • Zoster Vaccine Recombinant 12/01/2019      Health Maintenance:         Topic Date Due   • Colorectal Cancer Screening  01/24/2030   • Hepatitis C Screening  Completed     There are no preventive care reminders to display for this patient. Medicare Screening Tests and Risk Assessments:     Chapin Sunshine is here for his Subsequent Wellness visit. Health Risk Assessment:   Patient rates overall health as good. Patient feels that their physical health rating is slightly better. Patient is satisfied with their life. Eyesight was rated as same. Hearing was rated as same. Patient feels that their emotional and mental health rating is same. Patients states they are never, rarely angry. Patient states they are sometimes unusually tired/fatigued. Pain experienced in the last 7 days has been some. Patient's pain rating has been 4/10. Patient states that he has experienced no weight loss or gain in last 6 months. Fall Risk Screening: In the past year, patient has experienced: no history of falling in past year      Home Safety:  Patient does not have trouble with stairs inside or outside of their home. Patient has working smoke alarms and has working carbon monoxide detector. Home safety hazards include: none. Nutrition:   Current diet is Regular. Medications:   Patient is currently taking over-the-counter supplements. OTC medications include: see medication list. Patient is able to manage medications. Activities of Daily Living (ADLs)/Instrumental Activities of Daily Living (IADLs):   Walk and transfer into and out of bed and chair?: Yes  Dress and groom yourself?: Yes    Bathe or shower yourself?: Yes    Feed yourself? Yes  Do your laundry/housekeeping?: Yes  Manage your money, pay your bills and track your expenses?: Yes  Make your own meals?: Yes    Do your own shopping?: Yes    Previous Hospitalizations:   Any hospitalizations or ED visits within the last 12 months?: No      Advance Care Planning:   Living will: Yes    Durable POA for healthcare:  Yes Advanced directive: Yes      PREVENTIVE SCREENINGS      Cardiovascular Screening:    General: Screening Not Indicated and History Lipid Disorder      Diabetes Screening:     General: Screening Current      Colorectal Cancer Screening:     General: Screening Current      Prostate Cancer Screening:    General: Screening Current      Abdominal Aortic Aneurysm (AAA) Screening:    Risk factors include: age between 70-75 yo and tobacco use        Lung Cancer Screening:     General: Screening Not Indicated      Hepatitis C Screening:    General: Screening Current    Screening, Brief Intervention, and Referral to Treatment (SBIRT)    Screening  Typical number of drinks in a day: 13  Typical number of drinks in a week: 7  Interpretation: Low risk drinking behavior. AUDIT-C Screenin) How often did you have a drink containing alcohol in the past year? 4 or more times a week  2) How many drinks did you have on a typical day when you were drinking in the past year? 1 to 2  3) How often did you have 6 or more drinks on one occasion in the past year? never    AUDIT-C Score: 4  Interpretation: Score 4-12 (male): POSITIVE screen for alcohol misuse    AUDIT Screenin) How often during the last year have you found that you were not able to stop drinking once you had started? 0 - never  5) How often during the last year have you failed to do what was normally expected from you because of drinking? 0 - never  6) How often during the last year have you needed a first drink in the morning to get yourself going after a heavy drinking session?  0 - never  7) How often during the last year have you had a feeling of guilt or remorse after drinking? 0 - never  8) How often during the last year have you been unable to remember what happened the night before because you had been drinking? 0 - never  9) Have you or someone else been injured as a result of your drinking? 0 - no  10) Has a relative or friend or a doctor or another health worker been concerned about your drinking or suggested you cut down? 0 - no    AUDIT Score: 4  Interpretation: Low risk alcohol consumption    Single Item Drug Screening:  How often have you used an illegal drug (including marijuana) or a prescription medication for non-medical reasons in the past year? never    Single Item Drug Screen Score: 0  Interpretation: Negative screen for possible drug use disorder    No results found.      Physical Exam:     /74 (BP Location: Left arm, Patient Position: Sitting, Cuff Size: Standard)   Pulse 80   Temp 98.4 °F (36.9 °C) (Temporal)   Ht 5' 7.52" (1.715 m) Comment: shoes off  Wt 98.9 kg (218 lb)   SpO2 95%   BMI 33.62 kg/m²     Physical Exam     Viviane Resendiz DO Vaccine status unknown

## 2024-04-26 ENCOUNTER — TELEPHONE (OUTPATIENT)
Age: 69
End: 2024-04-26

## 2024-04-26 NOTE — TELEPHONE ENCOUNTER
Patient called requesting refill for Tizanidine 2 MG tablets. Patient made aware medication was refilled on 12/05/2023 for 180 with 0 refills to [facility] pharmacy. Patient instructed to contact the pharmacy to obtain refills of medication. Patient verbalized understanding.

## 2024-04-29 ENCOUNTER — TELEPHONE (OUTPATIENT)
Age: 69
End: 2024-04-29

## 2024-04-29 ENCOUNTER — TELEPHONE (OUTPATIENT)
Dept: CARDIOLOGY CLINIC | Facility: CLINIC | Age: 69
End: 2024-04-29

## 2024-04-29 DIAGNOSIS — E78.5 DYSLIPIDEMIA: Primary | ICD-10-CM

## 2024-04-29 NOTE — TELEPHONE ENCOUNTER
Spoke with pt re: Lipid panel to be done before appt with Dr Castro, 5/16/2024. Script in Epic. Pt will comply.

## 2024-04-29 NOTE — TELEPHONE ENCOUNTER
Patient is scheduled to see you on 5/16.  He calls asking if you'd like any blood work prior such as a lipid panel.    Please advise.

## 2024-05-08 ENCOUNTER — APPOINTMENT (OUTPATIENT)
Dept: LAB | Facility: CLINIC | Age: 69
End: 2024-05-08
Payer: COMMERCIAL

## 2024-05-08 DIAGNOSIS — E78.5 DYSLIPIDEMIA: ICD-10-CM

## 2024-05-08 LAB
CHOLEST SERPL-MCNC: 217 MG/DL
HDLC SERPL-MCNC: 65 MG/DL
LDLC SERPL CALC-MCNC: 132 MG/DL (ref 0–100)
LDLC SERPL DIRECT ASSAY-MCNC: 149 MG/DL (ref 0–100)
NONHDLC SERPL-MCNC: 152 MG/DL
TRIGL SERPL-MCNC: 98 MG/DL

## 2024-05-08 PROCEDURE — 80061 LIPID PANEL: CPT

## 2024-05-08 PROCEDURE — 36415 COLL VENOUS BLD VENIPUNCTURE: CPT

## 2024-05-08 PROCEDURE — 83721 ASSAY OF BLOOD LIPOPROTEIN: CPT

## 2024-05-15 ENCOUNTER — RA CDI HCC (OUTPATIENT)
Dept: OTHER | Facility: HOSPITAL | Age: 69
End: 2024-05-15

## 2024-05-16 ENCOUNTER — OFFICE VISIT (OUTPATIENT)
Dept: CARDIOLOGY CLINIC | Facility: CLINIC | Age: 69
End: 2024-05-16
Payer: COMMERCIAL

## 2024-05-16 VITALS
HEIGHT: 67 IN | SYSTOLIC BLOOD PRESSURE: 120 MMHG | BODY MASS INDEX: 34.81 KG/M2 | WEIGHT: 221.8 LBS | HEART RATE: 67 BPM | DIASTOLIC BLOOD PRESSURE: 70 MMHG

## 2024-05-16 DIAGNOSIS — E78.5 DYSLIPIDEMIA: ICD-10-CM

## 2024-05-16 DIAGNOSIS — I48.0 PAROXYSMAL ATRIAL FIBRILLATION (HCC): ICD-10-CM

## 2024-05-16 DIAGNOSIS — I25.10 CORONARY ARTERY DISEASE INVOLVING NATIVE CORONARY ARTERY OF NATIVE HEART WITHOUT ANGINA PECTORIS: Primary | ICD-10-CM

## 2024-05-16 DIAGNOSIS — Z95.5 PRESENCE OF DRUG-ELUTING STENT IN ANTERIOR DESCENDING BRANCH OF LEFT CORONARY ARTERY: ICD-10-CM

## 2024-05-16 PROCEDURE — 99214 OFFICE O/P EST MOD 30 MIN: CPT | Performed by: INTERNAL MEDICINE

## 2024-05-16 NOTE — PROGRESS NOTES
Cardiology Follow Up    George Ramirez  1955  [id number]  [facility]  [address]  [place] [address]  Phone#  [phone]  Fax#  [phone]      1. Coronary artery disease involving native coronary artery of native heart without angina pectoris        2. Presence of drug-eluting stent in anterior descending branch of left coronary artery        3. Paroxysmal atrial fibrillation (HCC)        4. Dyslipidemia        5. BMI 34.0-34.9,adult            Discussion/Summary: Mr. Ramirez is a pleasant 68-year-old gentleman who presents to the office today for hospital follow-up.  Since discharge he has been feeling well.    His blood pressure is well-controlled in the office today on beta-blockade alone.    His most recent lipids continue to reveal suboptimal numbers.  He has been intolerant to statins in the past but is tolerating rosuvastatin however with multiple perceived side effects.  He is also on Zetia.  The cost of Leqvio is being investigated.    Otherwise he is maintaining sinus rhythm without any signs or symptoms of recurrent atrial fibrillation.  He is maintained on Eliquis for systemic anticoagulation and is under the care of our electrophysiology team.     He is maintained on Plavix but no aspirin in the setting of Eliquis use.     I will see him back in the office in six months or sooner if deemed necessary.      Interval History: Mr. Ramirez is a 68-year-old gentleman who presents to the office today for follow-up.  I had met him when he was admitted to [facility] with angina.  He underwent a repeat left heart catheterization revealing a patent proximal LAD stent but a high-grade lesion in his mid LAD for which he underwent drug-eluting stent placement.  Previous to this he was intolerant to statins.  He was agreeable to reattempting rosuvastatin which he is tolerating once a week but  reports a multitude of side effects including muscular, GI and respiratory side effects.    He had been attending cardiac rehabilitation.  He did so for a month and now is exercising on his own.  He does core exercises and is walking on a regular basis.  This week he was able to walk for four miles.  With the activity he performs he feels well.  He denies any recurrent symptoms of chest pain or shortness of breath.  He denies any signs or symptoms of congestive heart failure including lower extremity edema, paroxysmal nocturnal dyspnea, orthopnea, acute weight gain or increasing abdominal girth.  He denies lightheadedness, syncope or presyncope.  He denies palpitations or symptoms of claudication.      He is maintained on Eliquis and Plavix without any bleeding consequences.    Medical Problems       Problem List       Seasonal allergic rhinitis    Osteoarthritis    Nephrolithiasis    Irritable bowel syndrome    Hypercholesterolemia    Coronary atherosclerosis    Overview Signed 2/2/2018  3:18 PM by Asad Molina MD     Overview:   s/p WIL  PLACEMENT         Coronary artery disease involving native coronary artery    Alpha-1-antitrypsin deficiency (HCC)    BPH with obstruction/lower urinary tract symptoms    Pancreatic cyst    Lumbar radiculopathy    Low back pain with sciatica    DDD (degenerative disc disease), lumbar    Lumbar spondylosis    Tinea corporis    Chronic pain syndrome    Paroxysmal atrial fibrillation (HCC)    Skin lesion    BMI 34.0-34.9,adult    Symptomatic varicose veins of both lower extremities    Chest pain    Stented coronary artery        Past Medical History:   Diagnosis Date    A-fib (HCC)     Allergic     Allergic rhinitis     Arthritis     Benign cyst of kidney     Cancer (HCC) 2015    Cat bite 07/08/2020    Cerebral vascular disease     Chronic sinusitis     Coronary artery disease     Cryptogenic stroke (HCC) 03/02/2020    Hyperlipidemia     Kidney stone     Melanoma (HCC)      Pancreas cyst     Stroke (HCC) 2006     Social History     Socioeconomic History    Marital status: /Civil Union     Spouse name: Not on file    Number of children: Not on file    Years of education: Not on file    Highest education level: Not on file   Occupational History    Occupation: massage therapist   Tobacco Use    Smoking status: Former     Types: Cigars     Start date:      Quit date:      Years since quittin.3    Smokeless tobacco: Former     Types: Chew     Quit date:     Tobacco comments:     Cigars    Vaping Use    Vaping status: Never Used   Substance and Sexual Activity    Alcohol use: Yes     Alcohol/week: 1.0 standard drink of alcohol     Types: 1 Glasses of wine per week     Comment: 8 oz. Daily    Drug use: Not Currently     Types: Marijuana    Sexual activity: Yes     Partners: Female     Birth control/protection: Male Sterilization   Other Topics Concern    Not on file   Social History Narrative    Caffeine: drinks 1 cup coffee/ tea a day.     Social Determinants of Health     Financial Resource Strain: Low Risk  (2023)    Overall Financial Resource Strain (CARDIA)     Difficulty of Paying Living Expenses: Not hard at all   Food Insecurity: Not on file   Transportation Needs: No Transportation Needs (2023)    PRAPARE - Transportation     Lack of Transportation (Medical): No     Lack of Transportation (Non-Medical): No   Physical Activity: Not on file   Stress: Not on file   Social Connections: Not on file   Intimate Partner Violence: Not on file   Housing Stability: Not on file      Family History   Problem Relation Age of Onset    Diabetes type II Mother     Cancer Father         angioma    Cancer Sister     Hearing loss Sister      Past Surgical History:   Procedure Laterality Date    CARDIAC CATHETERIZATION N/A 2024    Procedure: Cardiac catheterization;  Surgeon: Tripp Cross MD;  Location: [facility];  Service: Cardiology    CARDIAC  CATHETERIZATION N/A 2/13/2024    Procedure: Cardiac Coronary Angiogram;  Surgeon: Tripp Cross MD;  Location: [facility];  Service: Cardiology    CARDIAC CATHETERIZATION N/A 2/13/2024    Procedure: Cardiac pci;  Surgeon: Tripp Cross MD;  Location: [facility];  Service: Cardiology    CARDIAC CATHETERIZATION Left 2/13/2024    Procedure: Cardiac Left Heart Cath;  Surgeon: Tripp Cross MD;  Location: [facility];  Service: Cardiology    COLONOSCOPY      OTHER SURGICAL HISTORY  2003    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    PATENT FORAMEN OVALE CLOSURE         Current Outpatient Medications:     ascorbic acid (VITAMIN C) 250 mg tablet, Take 250 mg by mouth 2 (two) times a day, Disp: , Rfl:     chlorpheniramine (CHLOR-TRIMETON) 4 MG tablet, Take 1 tablet (4 mg total) by mouth every 6 (six) hours as needed for rhinitis, Disp: 30 tablet, Rfl: 0    clobetasol (TEMOVATE) 0.05 % cream, Apply 1 application topically 2 (two) times a day as needed Apply sparingly to affected areas, Disp: , Rfl:     clopidogrel (PLAVIX) 75 mg tablet, Take 1 tablet (75 mg total) by mouth daily, Disp: 90 tablet, Rfl: 1    Coenzyme Q10 (COQ10) 200 MG CAPS, Take 1 capsule by mouth daily, Disp: , Rfl:     colesevelam (WELCHOL) 625 mg tablet, Take 2 tablets (1,250 mg total) by mouth 2 (two) times a day with meals, Disp: 360 tablet, Rfl: 1    diltiazem (CARDIZEM) 30 mg tablet, Take 1 tablet (30 mg total) by mouth if needed (afib), Disp: 15 tablet, Rfl: 3    Eliquis 5 MG, TAKE ONE TABLET BY MOUTH TWICE A DAY, Disp: 180 tablet, Rfl: 3    ezetimibe (ZETIA) 10 mg tablet, Take 1 tablet (10 mg total) by mouth daily, Disp: 90 tablet, Rfl: 1    Glucosamine-Chondroit-Vit C-Mn (GLUCOSAMINE CHONDR 1500 COMPLX PO), Take 1 tablet by mouth 2 (two) times a day , Disp: , Rfl:     hyoscyamine (ANASPAZ) 0.125 mg, Take 0.125 mg by mouth 2 (two) times a day , Disp: , Rfl:     metoprolol tartrate (LOPRESSOR) 25 mg tablet, Take 0.5 tablets (12.5  mg total) by mouth every 12 (twelve) hours, Disp: 90 tablet, Rfl: 1    montelukast (SINGULAIR) 10 mg tablet, Take 1 tablet (10 mg total) by mouth daily at bedtime, Disp: 90 tablet, Rfl: 1    multivitamin (THERAGRAN) TABS, Take 1 tablet by mouth 2 (two) times a day  , Disp: , Rfl:     NON FORMULARY, CBD CAPSULE  AND CREAM USE DAILY PRIN, Disp: , Rfl:     OXcarbazepine (TRILEPTAL) 300 mg tablet, Take 1 tablet (300 mg total) by mouth 2 (two) times a day, Disp: 180 tablet, Rfl: 1    rosuvastatin (CRESTOR) 5 mg tablet, Take Crestor 5mg twice week on Wed and Saturday, Disp: 30 tablet, Rfl: 3    sildenafil (REVATIO) 20 mg tablet, Take 1-5 tablets on empty stomach one hour prior to sexual activity if needed, Disp: 30 tablet, Rfl: 3    tiZANidine (ZANAFLEX) 2 mg tablet, Take 1 tablet (2 mg total) by mouth 2 (two) times a day as needed for muscle spasms, Disp: 180 tablet, Rfl: 1    aspirin (ECOTRIN LOW STRENGTH) 81 mg EC tablet, Take 1 tablet (81 mg total) by mouth daily (Patient not taking: Reported on 5/16/2024), Disp: 14 tablet, Rfl: 0    nitroglycerin (NITROSTAT) 0.4 mg SL tablet, Place 1 tablet (0.4 mg total) under the tongue every 5 (five) minutes as needed for chest pain - do not take if you have recently taken Sildenafil (Revatio) (Patient not taking: Reported on 2/21/2024), Disp: 30 tablet, Rfl: 0  Allergies   Allergen Reactions    Augmentin [Amoxicillin-Pot Clavulanate] Hives and Itching    Molds & Smuts Allergic Rhinitis     Category: Allergy;     Monascus Purpureus Went Yeast Fatigue     Category: Adverse Reaction;     Niacin Fatigue     Category: Adverse Reaction;     Pollen Extract Allergic Rhinitis     Category: Allergy;     Pregabalin Fatigue     Category: Adverse Reaction;     Statins Fatigue and GI Intolerance     Category: Adverse Reaction;     Atorvastatin GI Intolerance and Fatigue     Other reaction(s): Muscle pain, constipation, sleepiness  Category: Adverse Reaction;   Other reaction(s): Muscle pain,  "constipation, sleepiness       Labs:     Chemistry        Component Value Date/Time     01/30/2016 0740    K 4.4 02/14/2024 0829    K 4.3 01/30/2016 0740     02/14/2024 0829     01/30/2016 0740    CO2 27 02/14/2024 0829    CO2 26 01/30/2016 0740    BUN 13 02/14/2024 0829    BUN 18 01/30/2016 0740    CREATININE 0.86 02/14/2024 0829    CREATININE 0.91 01/30/2016 0740        Component Value Date/Time    CALCIUM 9.3 02/14/2024 0829    CALCIUM 9.0 01/30/2016 0740    ALKPHOS 78 02/23/2024 0741    ALKPHOS 78 01/30/2016 0740    AST 22 02/23/2024 0741    AST 22 01/30/2016 0740    ALT 34 02/23/2024 0741    ALT 24 01/30/2016 0740    BILITOT 0.3 01/30/2016 0740            Lab Results   Component Value Date    CHOL 197 01/30/2016    CHOL 216 05/12/2015    CHOL 207 08/14/2014     Lab Results   Component Value Date    HDL 65 05/08/2024    HDL 63 11/03/2023    HDL 61 05/10/2023     Lab Results   Component Value Date    LDLCALC 132 (H) 05/08/2024    LDLCALC 140 (H) 11/03/2023    LDLCALC 165 (H) 05/10/2023     Lab Results   Component Value Date    TRIG 98 05/08/2024    TRIG 82 11/03/2023    TRIG 69 05/10/2023     No results found for: \"CHOLHDL\"    Imaging: No results found.        Review of Systems   Cardiovascular:  Negative for chest pain, dyspnea on exertion, irregular heartbeat, orthopnea and palpitations.   Musculoskeletal:  Positive for arthritis, back pain and myalgias.   All other systems reviewed and are negative.      Vitals:    05/16/24 0859   BP: 120/70   Pulse:      Vitals:    05/16/24 0840   Weight: 101 kg (221 lb 12.8 oz)     Height: 5' 7\" (170.2 cm)   Body mass index is 34.74 kg/m².    Physical Exam:   General appearance:  Appears stated age, alert, well appearing and in no distress  HEENT:  PERRLA, EOMI, no scleral icterus, no conjunctival pallor  NECK:  Supple, No elevated JVP, no thyromegaly, no carotid bruits  HEART:  Regular rate and rhythm, normal S1/S2, no S3/S4, no murmur or rub  LUNGS:  " Clear to auscultation bilaterally  ABDOMEN:  Soft, non-tender, positive bowel sounds, no rebound or guarding, no organomegaly   EXTREMITIES:  No edema  VASCULAR:  Normal pedal pulses   SKIN: No lesions or rashes on exposed skin  NEURO:  CN II-XII intact, no focal deficits

## 2024-05-22 ENCOUNTER — OFFICE VISIT (OUTPATIENT)
Dept: INTERNAL MEDICINE CLINIC | Age: 69
End: 2024-05-22
Payer: COMMERCIAL

## 2024-05-22 ENCOUNTER — TELEPHONE (OUTPATIENT)
Age: 69
End: 2024-05-22

## 2024-05-22 VITALS
TEMPERATURE: 98.1 F | HEART RATE: 63 BPM | HEIGHT: 67 IN | WEIGHT: 220 LBS | SYSTOLIC BLOOD PRESSURE: 122 MMHG | DIASTOLIC BLOOD PRESSURE: 70 MMHG | BODY MASS INDEX: 34.53 KG/M2 | OXYGEN SATURATION: 96 %

## 2024-05-22 DIAGNOSIS — J30.2 SEASONAL ALLERGIC RHINITIS, UNSPECIFIED TRIGGER: ICD-10-CM

## 2024-05-22 DIAGNOSIS — Z95.5 PRESENCE OF DRUG-ELUTING STENT IN ANTERIOR DESCENDING BRANCH OF LEFT CORONARY ARTERY: ICD-10-CM

## 2024-05-22 DIAGNOSIS — Z78.9 STATIN INTOLERANCE: ICD-10-CM

## 2024-05-22 DIAGNOSIS — G89.4 CHRONIC PAIN SYNDROME: ICD-10-CM

## 2024-05-22 DIAGNOSIS — I48.0 PAROXYSMAL ATRIAL FIBRILLATION (HCC): ICD-10-CM

## 2024-05-22 DIAGNOSIS — E88.01 ALPHA-1-ANTITRYPSIN DEFICIENCY (HCC): ICD-10-CM

## 2024-05-22 DIAGNOSIS — M54.16 LUMBAR RADICULOPATHY: ICD-10-CM

## 2024-05-22 DIAGNOSIS — M47.816 LUMBAR SPONDYLOSIS: ICD-10-CM

## 2024-05-22 DIAGNOSIS — N20.0 NEPHROLITHIASIS: ICD-10-CM

## 2024-05-22 DIAGNOSIS — I25.10 CORONARY ARTERY DISEASE INVOLVING NATIVE CORONARY ARTERY OF NATIVE HEART WITHOUT ANGINA PECTORIS: Primary | ICD-10-CM

## 2024-05-22 DIAGNOSIS — E78.5 DYSLIPIDEMIA: ICD-10-CM

## 2024-05-22 PROCEDURE — 99214 OFFICE O/P EST MOD 30 MIN: CPT | Performed by: INTERNAL MEDICINE

## 2024-05-22 PROCEDURE — G2211 COMPLEX E/M VISIT ADD ON: HCPCS | Performed by: INTERNAL MEDICINE

## 2024-05-22 NOTE — TELEPHONE ENCOUNTER
Kristopher called following up on appeal for Mart[employer]  [employer] is the insurance    He advised has not heard form Elissa. Pt is following up on where it stands     C/b [phone]     Thank you

## 2024-05-22 NOTE — TELEPHONE ENCOUNTER
Critical Care Gastroenterology Hospitalist Nephrology Palliative Care Please advise  Urology Infectious Disease

## 2024-05-22 NOTE — PROGRESS NOTES
Assessment/Plan:    Coronary artery disease  -Status post stent placement  -Stable without acute exacerbation  - continue with metoprolol, clopidogrel, diltiazem and as needed sublingual nitroglycerin  -continue with a heart healthy diet and with exercise    Seasonal allergic rhinitis  -, Stable  -Continue with montelukast    Dyslipidemia with statin intolerance  -Lipids are not well controlled but patient is statin intolerant even at the decreased dose.  - last lipid panel done on 5/8/2024, showed a total cholesterol of 217, down from 219 on 11/3/2023,  A triglyceride of 98, up from 82, and HDL of 65, up from 63 and an LDL of 132, down from 140  -continue with a heart healthy diet  -continue with ezetimibe and WelChol  -Follow-up with cardiology for possibility of getting Leqvio from his insurance.    Lumbar radiculopathy  -Stable  -Continue with tizanidine, and Trileptal  -Continue with range of motion exercises    Paroxysmal atrial fibrillation  -Rate controlled  -Continue with metoprolol, diltiazem and apixaban    Alpha 1 antitrypsin deficiency  -Stable without symptoms of emphysema  -Will continue to monitor patient clinically    Nephrolithiasis  -Very well-controlled without any stones in over a decade  -Continue to keep well-hydrated     Diagnoses and all orders for this visit:    Coronary artery disease involving native coronary artery of native heart without angina pectoris    Alpha-1-antitrypsin deficiency (HCC)    Seasonal allergic rhinitis, unspecified trigger    Dyslipidemia    Presence of drug-eluting stent in anterior descending branch of left coronary artery    Nephrolithiasis    Chronic pain syndrome    Lumbar spondylosis    Lumbar radiculopathy    Paroxysmal atrial fibrillation (HCC)    Statin intolerance               Subjective:      Patient ID: George Ramirez is a 68 y.o. male.    HPI    Patient presents for a follow-up visit regarding his CAD, nephrolithiasis, PAF, lumbar spondylosis,   seasonal allergies, dyslipidemia.  He states that he  has been taking  most of his medications as prescribed.  Today pt states that he has occasional chest pain which is mostly muscular because it is reproducible and he has only had to used his nitroglycerin just once since his last visit.  Only does triger point release with a ball and feels better. He works as a massage therapist.  Did not do well with the Crestor so he had to stop taking it 3 months ago   He states that it affects his neck, lower back with tightness and he also has an associated headache.  When he stopped taking it the symptoms resolved over 2 days  He states that he had stopped lifting and the neck symptoms still continued  He saw the cardiologist last week   Cardiologist is working on getting the leqvio.  Taking zetia and welchol   Cut out all the red meat and egg yolk  Exercisng   Drinking 72-96 oz of water daily   Last kidney stone was over 10 years ago  He admits to occasional musculoskeletal chest pain, postnasal drip, rhinorrhea secondary to seasonal allergies, abdominal bloating and chronic back pain but denies any  fever, chills, night sweats, headache, dizziness, nasal congestion,  sore throat, ear ache, sinus pain or pressure, wheezing, cough,   sob, palpitations, nausea, vomiting, diarrhea, constipation, hematochezia, hematuria, melena stools, arthralgias, myalgias, feelings of anxiety, depression or insomnia.      The following portions of the patient's history were reviewed and updated as appropriate: He  has a past medical history of A-fib (Formerly [facility]), Allergic, Allergic rhinitis, Arthritis, Benign cyst of kidney, Cancer (Formerly [facility]) (2015), Cat bite (07/08/2020), Cerebral vascular disease, Chronic sinusitis, Coronary artery disease, Cryptogenic stroke (Formerly [facility]) (03/02/2020), Hyperlipidemia, Kidney stone, Melanoma (Formerly [facility]), Pancreas cyst, and Stroke (Formerly [facility]) (2006).  He   Patient Active Problem List    Diagnosis Date Noted   • Statin intolerance  05/22/2024   • Presence of drug-eluting stent in anterior descending branch of left coronary artery 03/01/2024   • Chest pain 02/12/2024   • Symptomatic varicose veins of both lower extremities 02/14/2023   • Skin lesion 02/03/2023   • BMI 34.0-34.9,adult 02/03/2023   • Paroxysmal atrial fibrillation (HCC) 04/14/2021   • Chronic pain syndrome 04/05/2021   • Tinea corporis 10/12/2020   • Lumbar radiculopathy 10/11/2019   • Low back pain with sciatica 10/11/2019   • DDD (degenerative disc disease), lumbar 10/11/2019   • Lumbar spondylosis 10/11/2019   • Pancreatic cyst 09/25/2019   • BPH with obstruction/lower urinary tract symptoms 06/12/2018   • Alpha-1-antitrypsin deficiency (HCC) 02/16/2017   • Coronary artery disease involving native coronary artery 02/02/2017   • Dyslipidemia 12/19/2014   • Seasonal allergic rhinitis 05/20/2014   • Nephrolithiasis 05/08/2014   • Osteoarthritis 03/04/2014   • Irritable bowel syndrome 03/04/2014     He  has a past surgical history that includes Patent foramen ovale closure; Other surgical history (2003); Colonoscopy; Cardiac catheterization (N/A, 2/13/2024); Cardiac catheterization (N/A, 2/13/2024); Cardiac catheterization (N/A, 2/13/2024); and Cardiac catheterization (Left, 2/13/2024).  His family history includes Cancer in his father and sister; Diabetes type II in his mother; Hearing loss in his sister.  He  reports that he quit smoking about 38 years ago. His smoking use included cigars and cigarettes. He started smoking about 49 years ago. He quit smokeless tobacco use about 38 years ago.  His smokeless tobacco use included chew. He reports current alcohol use of about 1.0 standard drink of alcohol per week. He reports that he does not currently use drugs after having used the following drugs: Marijuana.  Current Outpatient Medications   Medication Sig Dispense Refill   • ascorbic acid (VITAMIN C) 250 mg tablet Take 250 mg by mouth 2 (two) times a day     • chlorpheniramine  (CHLOR-TRIMETON) 4 MG tablet Take 1 tablet (4 mg total) by mouth every 6 (six) hours as needed for rhinitis 30 tablet 0   • clobetasol (TEMOVATE) 0.05 % cream Apply 1 application topically 2 (two) times a day as needed Apply sparingly to affected areas     • clopidogrel (PLAVIX) 75 mg tablet Take 1 tablet (75 mg total) by mouth daily 90 tablet 1   • Coenzyme Q10 (COQ10) 200 MG CAPS Take 1 capsule by mouth daily     • colesevelam (WELCHOL) 625 mg tablet Take 2 tablets (1,250 mg total) by mouth 2 (two) times a day with meals 360 tablet 1   • diltiazem (CARDIZEM) 30 mg tablet Take 1 tablet (30 mg total) by mouth if needed (afib) 15 tablet 3   • Eliquis 5 MG TAKE ONE TABLET BY MOUTH TWICE A  tablet 3   • ezetimibe (ZETIA) 10 mg tablet Take 1 tablet (10 mg total) by mouth daily 90 tablet 1   • Glucosamine-Chondroit-Vit C-Mn (GLUCOSAMINE CHONDR 1500 COMPLX PO) Take 1 tablet by mouth 2 (two) times a day      • hyoscyamine (ANASPAZ) 0.125 mg Take 0.125 mg by mouth 2 (two) times a day      • metoprolol tartrate (LOPRESSOR) 25 mg tablet Take 0.5 tablets (12.5 mg total) by mouth every 12 (twelve) hours 90 tablet 1   • montelukast (SINGULAIR) 10 mg tablet Take 1 tablet (10 mg total) by mouth daily at bedtime 90 tablet 1   • multivitamin (THERAGRAN) TABS Take 1 tablet by mouth 2 (two) times a day       • NON FORMULARY CBD CAPSULE  AND CREAM USE DAILY PRIN     • OXcarbazepine (TRILEPTAL) 300 mg tablet Take 1 tablet (300 mg total) by mouth 2 (two) times a day 180 tablet 1   • sildenafil (REVATIO) 20 mg tablet Take 1-5 tablets on empty stomach one hour prior to sexual activity if needed 30 tablet 3   • tiZANidine (ZANAFLEX) 2 mg tablet Take 1 tablet (2 mg total) by mouth 2 (two) times a day as needed for muscle spasms 180 tablet 1   • nitroglycerin (NITROSTAT) 0.4 mg SL tablet Place 1 tablet (0.4 mg total) under the tongue every 5 (five) minutes as needed for chest pain - do not take if you have recently taken Sildenafil  (Revatio) (Patient not taking: Reported on 2/21/2024) 30 tablet 0   • rosuvastatin (CRESTOR) 5 mg tablet Take Crestor 5mg twice week on Wed and Saturday (Patient not taking: Reported on 5/22/2024) 30 tablet 3     No current facility-administered medications for this visit.     Current Outpatient Medications on File Prior to Visit   Medication Sig   • ascorbic acid (VITAMIN C) 250 mg tablet Take 250 mg by mouth 2 (two) times a day   • chlorpheniramine (CHLOR-TRIMETON) 4 MG tablet Take 1 tablet (4 mg total) by mouth every 6 (six) hours as needed for rhinitis   • clobetasol (TEMOVATE) 0.05 % cream Apply 1 application topically 2 (two) times a day as needed Apply sparingly to affected areas   • clopidogrel (PLAVIX) 75 mg tablet Take 1 tablet (75 mg total) by mouth daily   • Coenzyme Q10 (COQ10) 200 MG CAPS Take 1 capsule by mouth daily   • colesevelam (WELCHOL) 625 mg tablet Take 2 tablets (1,250 mg total) by mouth 2 (two) times a day with meals   • diltiazem (CARDIZEM) 30 mg tablet Take 1 tablet (30 mg total) by mouth if needed (afib)   • Eliquis 5 MG TAKE ONE TABLET BY MOUTH TWICE A DAY   • ezetimibe (ZETIA) 10 mg tablet Take 1 tablet (10 mg total) by mouth daily   • Glucosamine-Chondroit-Vit C-Mn (GLUCOSAMINE CHONDR 1500 COMPLX PO) Take 1 tablet by mouth 2 (two) times a day    • hyoscyamine (ANASPAZ) 0.125 mg Take 0.125 mg by mouth 2 (two) times a day    • metoprolol tartrate (LOPRESSOR) 25 mg tablet Take 0.5 tablets (12.5 mg total) by mouth every 12 (twelve) hours   • montelukast (SINGULAIR) 10 mg tablet Take 1 tablet (10 mg total) by mouth daily at bedtime   • multivitamin (THERAGRAN) TABS Take 1 tablet by mouth 2 (two) times a day     • NON FORMULARY CBD CAPSULE  AND CREAM USE DAILY PRIN   • OXcarbazepine (TRILEPTAL) 300 mg tablet Take 1 tablet (300 mg total) by mouth 2 (two) times a day   • sildenafil (REVATIO) 20 mg tablet Take 1-5 tablets on empty stomach one hour prior to sexual activity if needed   •  tiZANidine (ZANAFLEX) 2 mg tablet Take 1 tablet (2 mg total) by mouth 2 (two) times a day as needed for muscle spasms   • nitroglycerin (NITROSTAT) 0.4 mg SL tablet Place 1 tablet (0.4 mg total) under the tongue every 5 (five) minutes as needed for chest pain - do not take if you have recently taken Sildenafil (Revatio) (Patient not taking: Reported on 2/21/2024)   • rosuvastatin (CRESTOR) 5 mg tablet Take Crestor 5mg twice week on Wed and Saturday (Patient not taking: Reported on 5/22/2024)   • [DISCONTINUED] aspirin (ECOTRIN LOW STRENGTH) 81 mg EC tablet Take 1 tablet (81 mg total) by mouth daily (Patient not taking: Reported on 5/16/2024)     No current facility-administered medications on file prior to visit.     He is allergic to augmentin [amoxicillin-pot clavulanate], molds & smuts, monascus purpureus went yeast, niacin, pollen extract, pregabalin, statins, and atorvastatin..    Review of Systems   Constitutional:  Negative for activity change, chills, fatigue, fever and unexpected weight change.   HENT:  Positive for postnasal drip and rhinorrhea (has seasonal allergies - went back on singulair when the pollen started). Negative for ear pain, sinus pressure and sore throat.    Eyes:  Negative for pain.   Respiratory:  Negative for cough, choking, chest tightness, shortness of breath and wheezing.    Cardiovascular:  Positive for chest pain (occasonal chest pain - msk). Negative for palpitations and leg swelling.   Gastrointestinal:  Positive for abdominal distention (occasional bloating). Negative for abdominal pain, constipation, diarrhea, nausea and vomiting.   Genitourinary:  Negative for dysuria and hematuria.   Musculoskeletal:  Positive for back pain. Negative for arthralgias, gait problem, joint swelling, myalgias and neck stiffness.   Skin:  Negative for pallor and rash.   Neurological:  Negative for dizziness, tremors, seizures, syncope, light-headedness and headaches.   Hematological:  Negative for  "adenopathy.   Psychiatric/Behavioral:  Negative for behavioral problems.          Objective:      /70 (BP Location: Left arm, Patient Position: Sitting, Cuff Size: Standard)   Pulse 63   Temp 98.1 °F (36.7 °C) (Temporal)   Ht 5' 7\" (1.702 m)   Wt 99.8 kg (220 lb)   SpO2 96%   BMI 34.46 kg/m²          Physical Exam  Constitutional:       General: He is not in acute distress.     Appearance: He is well-developed. He is not diaphoretic.   HENT:      Head: Normocephalic and atraumatic.      Right Ear: External ear normal.      Left Ear: External ear normal.      Nose: Nose normal.      Mouth/Throat:      Mouth: Mucous membranes are dry.      Pharynx: Posterior oropharyngeal erythema present. No oropharyngeal exudate.   Eyes:      General: No scleral icterus.        Right eye: No discharge.         Left eye: No discharge.      Conjunctiva/sclera: Conjunctivae normal.      Pupils: Pupils are equal, round, and reactive to light.   Neck:      Thyroid: No thyromegaly.      Vascular: No JVD.      Trachea: No tracheal deviation.   Cardiovascular:      Rate and Rhythm: Normal rate and regular rhythm.      Heart sounds: Normal heart sounds. No murmur heard.     No friction rub. No gallop.   Pulmonary:      Effort: Pulmonary effort is normal. No respiratory distress.      Breath sounds: Normal breath sounds. No wheezing or rales.   Chest:      Chest wall: No tenderness.   Abdominal:      General: Bowel sounds are normal. There is no distension.      Palpations: Abdomen is soft. There is no mass.      Tenderness: There is no abdominal tenderness. There is no guarding or rebound.   Musculoskeletal:         General: No tenderness or deformity. Normal range of motion.      Cervical back: Normal range of motion and neck supple.      Left lower leg: Swelling present. 1+ Pitting Edema (1+ pitting pedal edema worse on the left lower extremity) present.   Lymphadenopathy:      Cervical: No cervical adenopathy.   Skin:     " General: Skin is warm and dry.      Coloration: Skin is not pale.      Findings: No erythema or rash.   Neurological:      Mental Status: He is alert and oriented to person, place, and time.      Cranial Nerves: No cranial nerve deficit.      Motor: No abnormal muscle tone.      Coordination: Coordination normal.      Deep Tendon Reflexes: Reflexes are normal and symmetric.   Psychiatric:         Behavior: Behavior normal.           Appointment on 05/08/2024   Component Date Value Ref Range Status   • Cholesterol 05/08/2024 217 (H)  See Comment mg/dL Final    Cholesterol:         Pediatric <18 Years        Desirable          <170 mg/dL      Borderline High    170-199 mg/dL      High               >=200 mg/dL        Adult >=18 Years            Desirable         <200 mg/dL      Borderline High   200-239 mg/dL      High              >239 mg/dL     • Triglycerides 05/08/2024 98  See Comment mg/dL Final    Triglyceride:     0-9Y            <75mg/dL     10Y-17Y         <90 mg/dL       >=18Y     Normal          <150 mg/dL     Borderline High 150-199 mg/dL     High            200-499 mg/dL        Very High       >499 mg/dL    Specimen collection should occur prior to Metamizole administration due to the potential for falsely depressed results.   • HDL, Direct 05/08/2024 65  >=40 mg/dL Final   • LDL Calculated 05/08/2024 132 (H)  0 - 100 mg/dL Final    LDL Cholesterol:     Optimal           <100 mg/dl     Near Optimal      100-129 mg/dl     Above Optimal       Borderline High 130-159 mg/dl       High            160-189 mg/dl       Very High       >189 mg/dl         This screening LDL is a calculated result.   It does not have the accuracy of the Direct Measured LDL in the monitoring of patients with hyperlipidemia and/or statin therapy.   Direct Measure LDL (TDL302) must be ordered separately in these patients.   • Non-HDL-Chol (CHOL-HDL) 05/08/2024 152  mg/dl Final   • LDL Direct 05/08/2024 149 (H)  0 - 100 mg/dl Final    Appointment on 02/23/2024   Component Date Value Ref Range Status   • Total Bilirubin 02/23/2024 0.61  0.20 - 1.00 mg/dL Final    Use of this assay is not recommended for patients undergoing treatment with eltrombopag due to the potential for falsely elevated results.  N-acetyl-p-benzoquinone imine (metabolite of Acetaminophen) will generate erroneously low results in samples for patients that have taken an overdose of Acetaminophen.   • Bilirubin, Direct 02/23/2024 0.10  0.00 - 0.20 mg/dL Final   • Alkaline Phosphatase 02/23/2024 78  34 - 104 U/L Final   • AST 02/23/2024 22  13 - 39 U/L Final   • ALT 02/23/2024 34  7 - 52 U/L Final    Specimen collection should occur prior to Sulfasalazine administration due to the potential for falsely depressed results.    • Total Protein 02/23/2024 6.7  6.4 - 8.4 g/dL Final   • Albumin 02/23/2024 4.6  3.5 - 5.0 g/dL Final   Admission on 02/12/2024, Discharged on 02/14/2024   Component Date Value Ref Range Status   • Ventricular Rate 02/12/2024 80  BPM Final   • Atrial Rate 02/12/2024 80  BPM Final   • FL Interval 02/12/2024 170  ms Final   • QRSD Interval 02/12/2024 58  ms Final   • QT Interval 02/12/2024 362  ms Final   • QTC Interval 02/12/2024 417  ms Final   • P Axis 02/12/2024 64  degrees Final   • QRS Axis 02/12/2024 66  degrees Final   • T Wave Axis 02/12/2024 32  degrees Final   • WBC 02/12/2024 6.23  4.31 - 10.16 Thousand/uL Final   • RBC 02/12/2024 4.79  3.88 - 5.62 Million/uL Final   • Hemoglobin 02/12/2024 15.4  12.0 - 17.0 g/dL Final   • Hematocrit 02/12/2024 46.6  36.5 - 49.3 % Final   • MCV 02/12/2024 97  82 - 98 fL Final   • MCH 02/12/2024 32.2  26.8 - 34.3 pg Final   • MCHC 02/12/2024 33.0  31.4 - 37.4 g/dL Final   • RDW 02/12/2024 12.5  11.6 - 15.1 % Final   • MPV 02/12/2024 9.3  8.9 - 12.7 fL Final   • Platelets 02/12/2024 232  149 - 390 Thousands/uL Final   • nRBC 02/12/2024 0  /100 WBCs Final   • Segmented % 02/12/2024 60  43 - 75 % Final   • Immature  Grans % 02/12/2024 0  0 - 2 % Final   • Lymphocytes % 02/12/2024 30  14 - 44 % Final   • Monocytes % 02/12/2024 8  4 - 12 % Final   • Eosinophils Relative 02/12/2024 1  0 - 6 % Final   • Basophils Relative 02/12/2024 1  0 - 1 % Final   • Absolute Neutrophils 02/12/2024 3.77  1.85 - 7.62 Thousands/µL Final   • Absolute Immature Grans 02/12/2024 0.02  0.00 - 0.20 Thousand/uL Final   • Absolute Lymphocytes 02/12/2024 1.85  0.60 - 4.47 Thousands/µL Final   • Absolute Monocytes 02/12/2024 0.47  0.17 - 1.22 Thousand/µL Final   • Eosinophils Absolute 02/12/2024 0.09  0.00 - 0.61 Thousand/µL Final   • Basophils Absolute 02/12/2024 0.03  0.00 - 0.10 Thousands/µL Final   • Sodium 02/12/2024 137  135 - 147 mmol/L Final   • Potassium 02/12/2024 4.4  3.5 - 5.3 mmol/L Final   • Chloride 02/12/2024 104  96 - 108 mmol/L Final   • CO2 02/12/2024 27  21 - 32 mmol/L Final   • ANION GAP 02/12/2024 6  mmol/L Final   • BUN 02/12/2024 18  5 - 25 mg/dL Final   • Creatinine 02/12/2024 0.98  0.60 - 1.30 mg/dL Final    Standardized to IDMS reference method   • Glucose 02/12/2024 90  65 - 140 mg/dL Final    If the patient is fasting, the ADA then defines impaired fasting glucose as > 100 mg/dL and diabetes as > or equal to 123 mg/dL.   • Calcium 02/12/2024 9.2  8.4 - 10.2 mg/dL Final   • AST 02/12/2024 33  13 - 39 U/L Final   • ALT 02/12/2024 45  7 - 52 U/L Final    Specimen collection should occur prior to Sulfasalazine administration due to the potential for falsely depressed results.    • Alkaline Phosphatase 02/12/2024 74  34 - 104 U/L Final   • Total Protein 02/12/2024 7.3  6.4 - 8.4 g/dL Final   • Albumin 02/12/2024 4.6  3.5 - 5.0 g/dL Final   • Total Bilirubin 02/12/2024 0.41  0.20 - 1.00 mg/dL Final    Use of this assay is not recommended for patients undergoing treatment with eltrombopag due to the potential for falsely elevated results.  N-acetyl-p-benzoquinone imine (metabolite of Acetaminophen) will generate erroneously low results  "in samples for patients that have taken an overdose of Acetaminophen.   • eGFR 02/12/2024 77  ml/min/1.73sq m Final   • hs TnI 0hr 02/12/2024 5  \"Refer to ACS Flowchart\"- see link ng/L Final    Comment:                                              Initial (time 0) result  If >=50 ng/L, Myocardial injury suggested ;  Type of myocardial injury and treatment strategy  to be determined.  If 5-49 ng/L, a delta result at 2 hours and or 4 hours will be needed to further evaluate.  If <4 ng/L, and chest pain has been >3 hours since onset, patient may qualify for discharge based on the HEART score in the ED.  If <5 ng/L and <3hours since onset of chest pain, a delta result at 2 hours will be needed to further evaluate.    HS Troponin 99th Percentile URL of a Health Population=12 ng/L with a 95% Confidence Interval of 8-18 ng/L.    Second Troponin (time 2 hours)  If calculated delta >= 20 ng/L,  Myocardial injury suggested ; Type of myocardial injury and treatment strategy to be determined.  If 5-49 ng/L and the calculated delta is 5-19 ng/L, consult medical service for evaluation.  Continue evaluation for ischemia on ecg and other possible etiology and repeat hs troponin at 4 hours.  If delta                            is <5 ng/L at 2 hours, consider discharge based on risk stratification via the HEART score (if in ED), or NUSRAT risk score in IP/Observation.    HS Troponin 99th Percentile URL of a Health Population=12 ng/L with a 95% Confidence Interval of 8-18 ng/L.   • hs TnI 2hr 02/12/2024 4  \"Refer to ACS Flowchart\"- see link ng/L Final    Comment:                                              Initial (time 0) result  If >=50 ng/L, Myocardial injury suggested ;  Type of myocardial injury and treatment strategy  to be determined.  If 5-49 ng/L, a delta result at 2 hours and or 4 hours will be needed to further evaluate.  If <4 ng/L, and chest pain has been >3 hours since onset, patient may qualify for discharge based on the " "HEART score in the ED.  If <5 ng/L and <3hours since onset of chest pain, a delta result at 2 hours will be needed to further evaluate.    HS Troponin 99th Percentile URL of a Health Population=12 ng/L with a 95% Confidence Interval of 8-18 ng/L.    Second Troponin (time 2 hours)  If calculated delta >= 20 ng/L,  Myocardial injury suggested ; Type of myocardial injury and treatment strategy to be determined.  If 5-49 ng/L and the calculated delta is 5-19 ng/L, consult medical service for evaluation.  Continue evaluation for ischemia on ecg and other possible etiology and repeat hs troponin at 4 hours.  If delta                            is <5 ng/L at 2 hours, consider discharge based on risk stratification via the HEART score (if in ED), or NUSRAT risk score in IP/Observation.    HS Troponin 99th Percentile URL of a Health Population=12 ng/L with a 95% Confidence Interval of 8-18 ng/L.   • Delta 2hr hsTnI 02/12/2024 -1  <20 ng/L Final   • hs TnI 4hr 02/12/2024 5  \"Refer to ACS Flowchart\"- see link ng/L Final    Comment:                                              Initial (time 0) result  If >=50 ng/L, Myocardial injury suggested ;  Type of myocardial injury and treatment strategy  to be determined.  If 5-49 ng/L, a delta result at 2 hours and or 4 hours will be needed to further evaluate.  If <4 ng/L, and chest pain has been >3 hours since onset, patient may qualify for discharge based on the HEART score in the ED.  If <5 ng/L and <3hours since onset of chest pain, a delta result at 2 hours will be needed to further evaluate.    HS Troponin 99th Percentile URL of a Health Population=12 ng/L with a 95% Confidence Interval of 8-18 ng/L.    Second Troponin (time 2 hours)  If calculated delta >= 20 ng/L,  Myocardial injury suggested ; Type of myocardial injury and treatment strategy to be determined.  If 5-49 ng/L and the calculated delta is 5-19 ng/L, consult medical service for evaluation.  Continue evaluation for " ischemia on ecg and other possible etiology and repeat hs troponin at 4 hours.  If delta                            is <5 ng/L at 2 hours, consider discharge based on risk stratification via the HEART score (if in ED), or NUSRAT risk score in IP/Observation.    HS Troponin 99th Percentile URL of a Health Population=12 ng/L with a 95% Confidence Interval of 8-18 ng/L.   • Delta 4hr hsTnI 02/12/2024 0  <20 ng/L Final   • BSA 02/13/2024 1.97  m2 Final   • A4C EF 02/13/2024 58  % Final   • LVIDd 02/13/2024 3.70  cm Final   • LVIDS 02/13/2024 2.60  cm Final   • IVSd 02/13/2024 1.40  cm Final   • LVPWd 02/13/2024 1.40  cm Final   • FS 02/13/2024 30  28 - 44 Final   • MV E' Tissue Velocity Septal 02/13/2024 7  cm/s Final   • MV E' Tissue Velocity Lateral 02/13/2024 10  cm/s Final   • LA Volume Index (BP) 02/13/2024 22.3  mL/m2 Final   • E/A ratio 02/13/2024 0.91   Final   • E wave deceleration time 02/13/2024 183  ms Final   • MV Peak E Marcio 02/13/2024 64  cm/s Final   • MV Peak A Marcio 02/13/2024 0.7  m/s Final   • RVID d 02/13/2024 2.4  cm Final   • Tricuspid annular plane systolic e* 02/13/2024 2.80  cm Final   • LA size 02/13/2024 3.1  cm Final   • LA length (A2C) 02/13/2024 5.20  cm Final   • LA volume (BP) 02/13/2024 44  mL Final   • RAA A4C 02/13/2024 13.3  cm2 Final   • MV stenosis pressure 1/2 time 02/13/2024 53  ms Final   • MV valve area p 1/2 method 02/13/2024 4.15   Final   • TR Peak Marcio 02/13/2024 2.5  m/s Final   • Triscuspid Valve Regurgitation Pea* 02/13/2024 24.0  mmHg Final   • Ao root 02/13/2024 3.60  cm Final   • Asc Ao 02/13/2024 3.7  cm Final   • Tricuspid valve peak regurgitation* 02/13/2024 2.45  m/s Final   • Left ventricular stroke volume (2D) 02/13/2024 34.00  mL Final   • IVS 02/13/2024 1.4  cm Final   • LEFT VENTRICLE SYSTOLIC VOLUME (MO* 02/13/2024 26  mL Final   • LV DIASTOLIC VOLUME (MOD BIPLANE) * 02/13/2024 60  mL Final   • Left Atrium Area-systolic Four Klaudia* 02/13/2024 16  cm2 Final   • Left  Atrium Area-systolic Apical T* 02/13/2024 16.8  cm2 Final   • LVSV, 2D 02/13/2024 34  mL Final   • LV EF 02/13/2024 65   Final   • Est. RA pres 02/13/2024 8.0  mmHg Final   • Right Ventricular Peak Systolic Pr* 02/13/2024 32.00  mmHg Final   • Hemoglobin A1C 02/13/2024 5.3  Normal 4.0-5.6%; PreDiabetic 5.7-6.4%; Diabetic >=6.5%; Glycemic control for adults with diabetes <7.0% % Final   • EAG 02/13/2024 105  mg/dl Final   • PTT 02/12/2024 28  23 - 37 seconds Final    Therapeutic Heparin Range =  60-90 seconds   • Protime 02/12/2024 13.9  11.6 - 14.5 seconds Final   • INR 02/12/2024 1.08  0.84 - 1.19 Final   • PTT 02/13/2024 38 (H)  23 - 37 seconds Final    Therapeutic Heparin Range =  60-90 seconds   • Sodium 02/13/2024 138  135 - 147 mmol/L Final   • Potassium 02/13/2024 4.5  3.5 - 5.3 mmol/L Final   • Chloride 02/13/2024 110 (H)  96 - 108 mmol/L Final   • CO2 02/13/2024 24  21 - 32 mmol/L Final   • ANION GAP 02/13/2024 4  mmol/L Final   • BUN 02/13/2024 15  5 - 25 mg/dL Final   • Creatinine 02/13/2024 0.88  0.60 - 1.30 mg/dL Final    Standardized to IDMS reference method   • Glucose 02/13/2024 90  65 - 140 mg/dL Final    If the patient is fasting, the ADA then defines impaired fasting glucose as > 100 mg/dL and diabetes as > or equal to 123 mg/dL.   • Calcium 02/13/2024 8.8  8.4 - 10.2 mg/dL Final   • AST 02/13/2024 27  13 - 39 U/L Final   • ALT 02/13/2024 35  7 - 52 U/L Final    Specimen collection should occur prior to Sulfasalazine administration due to the potential for falsely depressed results.    • Alkaline Phosphatase 02/13/2024 67  34 - 104 U/L Final   • Total Protein 02/13/2024 6.7  6.4 - 8.4 g/dL Final   • Albumin 02/13/2024 4.1  3.5 - 5.0 g/dL Final   • Total Bilirubin 02/13/2024 0.46  0.20 - 1.00 mg/dL Final    Use of this assay is not recommended for patients undergoing treatment with eltrombopag due to the potential for falsely elevated results.  N-acetyl-p-benzoquinone imine (metabolite of  Acetaminophen) will generate erroneously low results in samples for patients that have taken an overdose of Acetaminophen.   • eGFR 02/13/2024 87  ml/min/1.73sq m Final   • Magnesium 02/13/2024 2.2  1.9 - 2.7 mg/dL Final   • Phosphorus 02/13/2024 3.4  2.3 - 4.1 mg/dL Final   • WBC 02/13/2024 5.46  4.31 - 10.16 Thousand/uL Final   • RBC 02/13/2024 4.78  3.88 - 5.62 Million/uL Final   • Hemoglobin 02/13/2024 14.9  12.0 - 17.0 g/dL Final   • Hematocrit 02/13/2024 46.0  36.5 - 49.3 % Final   • MCV 02/13/2024 96  82 - 98 fL Final   • MCH 02/13/2024 31.2  26.8 - 34.3 pg Final   • MCHC 02/13/2024 32.4  31.4 - 37.4 g/dL Final   • RDW 02/13/2024 12.3  11.6 - 15.1 % Final   • Platelets 02/13/2024 200  149 - 390 Thousands/uL Final   • MPV 02/13/2024 9.1  8.9 - 12.7 fL Final   • PTT 02/13/2024 85 (H)  23 - 37 seconds Final    Therapeutic Heparin Range =  60-90 seconds   • Activated Clotting Time, i-STAT 02/13/2024 258 (H)  89 - 137 sec Final   • Specimen Type 02/13/2024 ARTERIAL   Final   • PTT 02/14/2024 27  23 - 37 seconds Final    Therapeutic Heparin Range =  60-90 seconds   • WBC 02/14/2024 8.43  4.31 - 10.16 Thousand/uL Final   • RBC 02/14/2024 5.20  3.88 - 5.62 Million/uL Final   • Hemoglobin 02/14/2024 16.6  12.0 - 17.0 g/dL Final   • Hematocrit 02/14/2024 49.1  36.5 - 49.3 % Final   • MCV 02/14/2024 94  82 - 98 fL Final   • MCH 02/14/2024 31.9  26.8 - 34.3 pg Final   • MCHC 02/14/2024 33.8  31.4 - 37.4 g/dL Final   • RDW 02/14/2024 12.3  11.6 - 15.1 % Final   • Platelets 02/14/2024 221  149 - 390 Thousands/uL Final   • MPV 02/14/2024 9.3  8.9 - 12.7 fL Final   • Sodium 02/14/2024 138  135 - 147 mmol/L Final   • Potassium 02/14/2024 4.4  3.5 - 5.3 mmol/L Final   • Chloride 02/14/2024 107  96 - 108 mmol/L Final   • CO2 02/14/2024 27  21 - 32 mmol/L Final   • ANION GAP 02/14/2024 4  mmol/L Final   • BUN 02/14/2024 13  5 - 25 mg/dL Final   • Creatinine 02/14/2024 0.86  0.60 - 1.30 mg/dL Final    Standardized to IDMS  reference method   • Glucose 02/14/2024 93  65 - 140 mg/dL Final    If the patient is fasting, the ADA then defines impaired fasting glucose as > 100 mg/dL and diabetes as > or equal to 123 mg/dL.   • Calcium 02/14/2024 9.3  8.4 - 10.2 mg/dL Final   • eGFR 02/14/2024 87  ml/min/1.73sq m Final   • Magnesium 02/14/2024 2.0  1.9 - 2.7 mg/dL Final   • Phosphorus 02/14/2024 2.7  2.3 - 4.1 mg/dL Final   Appointment on 12/05/2023   Component Date Value Ref Range Status   • Vitamin B-12 12/05/2023 638  180 - 914 pg/mL Final   • Folate 12/05/2023 21.4  >5.9 ng/mL Final    The World Health Organization has determined deficient folate concentrations are considered to be <4.0 ng/mL.   Appointment on 11/03/2023   Component Date Value Ref Range Status   • WBC 11/03/2023 5.64  4.31 - 10.16 Thousand/uL Final   • RBC 11/03/2023 4.70  3.88 - 5.62 Million/uL Final   • Hemoglobin 11/03/2023 14.9  12.0 - 17.0 g/dL Final   • Hematocrit 11/03/2023 45.4  36.5 - 49.3 % Final   • MCV 11/03/2023 97  82 - 98 fL Final   • MCH 11/03/2023 31.7  26.8 - 34.3 pg Final   • MCHC 11/03/2023 32.8  31.4 - 37.4 g/dL Final   • RDW 11/03/2023 12.2  11.6 - 15.1 % Final   • MPV 11/03/2023 9.5  8.9 - 12.7 fL Final   • Platelets 11/03/2023 214  149 - 390 Thousands/uL Final   • nRBC 11/03/2023 0  /100 WBCs Final   • Segmented % 11/03/2023 52  43 - 75 % Final   • Immature Grans % 11/03/2023 0  0 - 2 % Final   • Lymphocytes % 11/03/2023 36  14 - 44 % Final   • Monocytes % 11/03/2023 9  4 - 12 % Final   • Eosinophils Relative 11/03/2023 2  0 - 6 % Final   • Basophils Relative 11/03/2023 1  0 - 1 % Final   • Absolute Neutrophils 11/03/2023 2.97  1.85 - 7.62 Thousands/µL Final   • Absolute Immature Grans 11/03/2023 0.01  0.00 - 0.20 Thousand/uL Final   • Absolute Lymphocytes 11/03/2023 2.03  0.60 - 4.47 Thousands/µL Final   • Absolute Monocytes 11/03/2023 0.50  0.17 - 1.22 Thousand/µL Final   • Eosinophils Absolute 11/03/2023 0.09  0.00 - 0.61 Thousand/µL Final   •  Basophils Absolute 11/03/2023 0.04  0.00 - 0.10 Thousands/µL Final   • Sodium 11/03/2023 140  135 - 147 mmol/L Final   • Potassium 11/03/2023 4.5  3.5 - 5.3 mmol/L Final   • Chloride 11/03/2023 105  96 - 108 mmol/L Final   • CO2 11/03/2023 28  21 - 32 mmol/L Final   • ANION GAP 11/03/2023 7  mmol/L Final   • BUN 11/03/2023 17  5 - 25 mg/dL Final   • Creatinine 11/03/2023 0.88  0.60 - 1.30 mg/dL Final    Standardized to IDMS reference method   • Glucose, Fasting 11/03/2023 89  65 - 99 mg/dL Final   • Calcium 11/03/2023 9.4  8.4 - 10.2 mg/dL Final   • AST 11/03/2023 24  13 - 39 U/L Final   • ALT 11/03/2023 28  7 - 52 U/L Final    Specimen collection should occur prior to Sulfasalazine administration due to the potential for falsely depressed results.    • Alkaline Phosphatase 11/03/2023 70  34 - 104 U/L Final   • Total Protein 11/03/2023 7.0  6.4 - 8.4 g/dL Final   • Albumin 11/03/2023 4.3  3.5 - 5.0 g/dL Final   • Total Bilirubin 11/03/2023 0.53  0.20 - 1.00 mg/dL Final    Use of this assay is not recommended for patients undergoing treatment with eltrombopag due to the potential for falsely elevated results.  N-acetyl-p-benzoquinone imine (metabolite of Acetaminophen) will generate erroneously low results in samples for patients that have taken an overdose of Acetaminophen.   • eGFR 11/03/2023 87  ml/min/1.73sq m Final   • Cholesterol 11/03/2023 219 (H)  See Comment mg/dL Final    Cholesterol:         Pediatric <18 Years        Desirable          <170 mg/dL      Borderline High    170-199 mg/dL      High               >=200 mg/dL        Adult >=18 Years            Desirable         <200 mg/dL      Borderline High   200-239 mg/dL      High              >239 mg/dL     • Triglycerides 11/03/2023 82  See Comment mg/dL Final    Triglyceride:     0-9Y            <75mg/dL     10Y-17Y         <90 mg/dL       >=18Y     Normal          <150 mg/dL     Borderline High 150-199 mg/dL     High            200-499 mg/dL        Very  High       >499 mg/dL    Specimen collection should occur prior to Metamizole administration due to the potential for falsely depressed results.   • HDL, Direct 11/03/2023 63  >=40 mg/dL Final   • LDL Calculated 11/03/2023 140 (H)  0 - 100 mg/dL Final    LDL Cholesterol:     Optimal           <100 mg/dl     Near Optimal      100-129 mg/dl     Above Optimal       Borderline High 130-159 mg/dl       High            160-189 mg/dl       Very High       >189 mg/dl         This screening LDL is a calculated result.   It does not have the accuracy of the Direct Measured LDL in the monitoring of patients with hyperlipidemia and/or statin therapy.   Direct Measure LDL (OHO372) must be ordered separately in these patients.   • Non-HDL-Chol (CHOL-HDL) 11/03/2023 156  mg/dl Final   • PSA 11/03/2023 3.21  0.00 - 4.00 ng/mL Final    Rhys IvyDate Access chemiluminescent immunoassay. Confirm baseline values for patients being serially monitored.    • TSH 3RD GENERATON 12/05/2023 1.852  0.450 - 4.500 uIU/mL Final    Adult TSH (3rd generation) reference range follows the recommended guidelines of the American Thy[employer] Association, January, 2020.   Office Visit on 06/13/2023   Component Date Value Ref Range Status   • POST-VOID RESIDUAL VOLUME, ML POC 06/13/2023 50  mL Final

## 2024-05-24 NOTE — TELEPHONE ENCOUNTER
[facility]     Doctor: Dr. Castro     Reason for call: Checking on status of Appeal     Call back#: [phone]

## 2024-05-29 ENCOUNTER — TELEPHONE (OUTPATIENT)
Dept: UROLOGY | Facility: CLINIC | Age: 69
End: 2024-05-29

## 2024-05-29 NOTE — TELEPHONE ENCOUNTER
Spoke to patient 5/29/24 to inform him of the cancellation of his appointment with doctor Shikha on 6/3/24 he is now rescheduled for 7/9/24 [facility] with Maxi

## 2024-05-29 NOTE — TELEPHONE ENCOUNTER
Please r/s patients appt on 6/3/24. Please r/s with AP. Can relay below message.     Within [facility] our practice model is to see the urologist for your initial visit or prior to surgery or cancer treatment, and then thereafter you see their Advanced Practitioner. We are a specialty surgical practice and our doctors are mostly in the OR or performing office procedures. They work hand in hand with their Advanced Practitioners and this is the best way we have found to ensure all of our patients are appropriately taken care of.

## 2024-06-04 ENCOUNTER — DOCUMENTATION (OUTPATIENT)
Dept: CARDIOLOGY CLINIC | Facility: CLINIC | Age: 69
End: 2024-06-04

## 2024-06-12 ENCOUNTER — TELEPHONE (OUTPATIENT)
Dept: CARDIOLOGY CLINIC | Facility: CLINIC | Age: 69
End: 2024-06-12

## 2024-06-12 DIAGNOSIS — E78.5 DYSLIPIDEMIA: Primary | ICD-10-CM

## 2024-06-12 RX ORDER — INCLISIRAN 284 MG/1.5ML
284 INJECTION, SOLUTION SUBCUTANEOUS ONCE
Qty: 1.5 ML | Refills: 0 | Status: SHIPPED | OUTPATIENT
Start: 2024-06-12 | End: 2024-06-12

## 2024-06-19 ENCOUNTER — TELEPHONE (OUTPATIENT)
Age: 69
End: 2024-06-19

## 2024-06-19 NOTE — TELEPHONE ENCOUNTER
Kristopher called the nurse line to inquire about the status of the Leqvio injections that have been prescribed by Dr Castro.      He would like an update, asked if he will be scheduled for the first injection.

## 2024-07-09 ENCOUNTER — OFFICE VISIT (OUTPATIENT)
Dept: UROLOGY | Facility: AMBULATORY SURGERY CENTER | Age: 69
End: 2024-07-09
Payer: COMMERCIAL

## 2024-07-09 ENCOUNTER — TELEPHONE (OUTPATIENT)
Age: 69
End: 2024-07-09

## 2024-07-09 VITALS
WEIGHT: 217 LBS | OXYGEN SATURATION: 97 % | BODY MASS INDEX: 33.99 KG/M2 | SYSTOLIC BLOOD PRESSURE: 126 MMHG | HEART RATE: 66 BPM | DIASTOLIC BLOOD PRESSURE: 64 MMHG

## 2024-07-09 DIAGNOSIS — R97.20 INCREASED PROSTATE SPECIFIC ANTIGEN (PSA) VELOCITY: ICD-10-CM

## 2024-07-09 DIAGNOSIS — E78.5 DYSLIPIDEMIA: Primary | ICD-10-CM

## 2024-07-09 DIAGNOSIS — N40.1 BENIGN PROSTATIC HYPERPLASIA WITH LOWER URINARY TRACT SYMPTOMS, SYMPTOM DETAILS UNSPECIFIED: Primary | ICD-10-CM

## 2024-07-09 LAB — POST-VOID RESIDUAL VOLUME, ML POC: 62 ML

## 2024-07-09 PROCEDURE — 51798 US URINE CAPACITY MEASURE: CPT

## 2024-07-09 PROCEDURE — 99213 OFFICE O/P EST LOW 20 MIN: CPT

## 2024-07-09 NOTE — PROGRESS NOTES
Office Visit- Urology  George Ramirez 1955 MRN: [id number]      Assessment/Discussion/Plan    69 y.o. male managed by     BPH with urinary tract symptoms  -Not on any pharmacotherapy at this point in time  -PVR 62 mL  -Patient with very bothersome frequency and urgency  -Previously trialed Flomax, oxybutynin but did not tolerate   -defers consideration of any further trial of pharmacotherapy  -Like to request surgical intervention.  Did have cystoscopy in 2022 which did not demonstrate significant occlusion of the prostatic urethra.  -Will plan to obtain urodynamics and cystoscopy to obtain data to best and sure which way to proceed on whether urodynamics demonstrates overactivity of the bladder in which case an intervention such as intravesical Botox may be more likely to help with patient's symptomatology versus change in degree of obstruction seen on repeat cystoscopy in which case a prostatic intervention may be more warranted or a combination of both.  Patient and wife are in agreement with plan  -Follow-up for urodynamics and cystoscopy    2.  Prostate cancer screening  -Last PSA was 3.21 in November 2023 mildly elevated from baseline of 2.2-2.6  -Will plan to obtain updated PSA now due to increased PSA velocity  -AYO with no overt nodularity or induration  -Follow-up will be determined by repeat PSA    Chief Complaint:   George is a 69 y.o. male presenting to the office for a follow up visit regarding BPH with lower tract symptoms        Subjective    Patient is a 69-year-old male with a history of BPH who presents for follow-up.  He was last in the office in June 2023.  He underwent cystoscopy in 2022 to evaluate bladder outlet given persistent urinary symptoms mainly consisting of urinary urgency and frequency.  He previously trialed Flomax and oxybutynin but did not tolerate.  He defers consideration of alternative pharmacotherapy.  He would be interested in surgical intervention.  At time of  last cystoscopy patient was advised to trial oxybutynin before considering bladder outlet obstruction procedure.  Last PSA was 3.21 in November 2023 mildly elevated from his baseline 2.2-2.6.  Denies any dysuria, gross hematuria, flank pain      AUA SYMPTOM SCORE      Flowsheet Row Most Recent Value   AUA SYMPTOM SCORE    How often have you had a sensation of not emptying your bladder completely after you finished urinating? 4 (P)     How often have you had to urinate again less than two hours after you finished urinating? 5 (P)     How often have you found you stopped and started again several times when you urinate? 5 (P)     How often have you found it difficult to postpone urination? 3 (P)     How often have you had a weak urinary stream? 4 (P)     How often have you had to push or strain to begin urination? 0 (P)     How many times did you most typically get up to urinate from the time you went to bed at night until the time you got up in the morning? 5 (P)     Quality of Life: If you were to spend the rest of your life with your urinary condition just the way it is now, how would you feel about that? 5 (P)     AUA SYMPTOM SCORE 26 (P)              ROS:   Review of Systems   Constitutional: Negative.  Negative for chills, fatigue and fever.   HENT: Negative.     Respiratory:  Negative for shortness of breath.    Cardiovascular:  Negative for chest pain.   Gastrointestinal: Negative.  Negative for abdominal pain.   Endocrine: Negative.    Musculoskeletal: Negative.    Skin: Negative.    Neurological: Negative.  Negative for dizziness and light-headedness.   Hematological: Negative.    Psychiatric/Behavioral: Negative.           Past Medical History  Past Medical History:   Diagnosis Date    A-fib (HCC)     Allergic     Allergic rhinitis     Arthritis     Benign cyst of kidney     Cancer (HCC) 2015    Cat bite 07/08/2020    Cerebral vascular disease     Chronic sinusitis     Coronary artery disease      Cryptogenic stroke (HCC) 2020    Hyperlipidemia     Kidney stone     Melanoma (HCC)     Pancreas cyst     Stroke (HCC) 2006       Past Surgical History  Past Surgical History:   Procedure Laterality Date    CARDIAC CATHETERIZATION N/A 2024    Procedure: Cardiac catheterization;  Surgeon: Tripp Cross MD;  Location: [facility];  Service: Cardiology    CARDIAC CATHETERIZATION N/A 2024    Procedure: Cardiac Coronary Angiogram;  Surgeon: Tripp Cross MD;  Location: [facility];  Service: Cardiology    CARDIAC CATHETERIZATION N/A 2024    Procedure: Cardiac pci;  Surgeon: Tripp Cross MD;  Location: [facility];  Service: Cardiology    CARDIAC CATHETERIZATION Left 2024    Procedure: Cardiac Left Heart Cath;  Surgeon: Tripp Cross MD;  Location: [facility];  Service: Cardiology    COLONOSCOPY      OTHER SURGICAL HISTORY      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    PATENT FORAMEN OVALE CLOSURE         Past Family History  Family History   Problem Relation Age of Onset    Diabetes type II Mother     Cancer Father         angioma    Cancer Sister     Hearing loss Sister        Past Social history  Social History     Socioeconomic History    Marital status: /Civil Union     Spouse name: Not on file    Number of children: Not on file    Years of education: Not on file    Highest education level: Not on file   Occupational History    Occupation: massage therapist   Tobacco Use    Smoking status: Former     Current packs/day: 0.00     Types: Cigars, Cigarettes     Start date: 1975     Quit date: 1986     Years since quittin.5    Smokeless tobacco: Former     Types: Chew     Quit date:     Tobacco comments:     Cigars    Vaping Use    Vaping status: Never Used   Substance and Sexual Activity    Alcohol use: Yes     Alcohol/week: 1.0 standard drink of alcohol     Types: 1 Glasses of wine per week     Comment: 8 oz. Daily    Drug use: Not  Currently     Types: Marijuana    Sexual activity: Yes     Partners: Female     Birth control/protection: Male Sterilization   Other Topics Concern    Not on file   Social History Narrative    Caffeine: drinks 1 cup coffee/ tea a day.     Social Determinants of Health     Financial Resource Strain: Low Risk  (11/11/2023)    Overall Financial Resource Strain (CARDIA)     Difficulty of Paying Living Expenses: Not hard at all   Food Insecurity: Not on file   Transportation Needs: No Transportation Needs (11/11/2023)    PRAPARE - Transportation     Lack of Transportation (Medical): No     Lack of Transportation (Non-Medical): No   Physical Activity: Not on file   Stress: Not on file   Social Connections: Not on file   Intimate Partner Violence: Not on file   Housing Stability: Not on file       Current Medications  Current Outpatient Medications   Medication Sig Dispense Refill    hyoscyamine (LEVSIN/SL) 0.125 mg SL tablet PLACE ONE TABLET UNDER THE TONGUE THREE TIMES A DAY      ascorbic acid (VITAMIN C) 250 mg tablet Take 250 mg by mouth 2 (two) times a day      chlorpheniramine (CHLOR-TRIMETON) 4 MG tablet Take 1 tablet (4 mg total) by mouth every 6 (six) hours as needed for rhinitis 30 tablet 0    clobetasol (TEMOVATE) 0.05 % cream Apply 1 application topically 2 (two) times a day as needed Apply sparingly to affected areas      clopidogrel (PLAVIX) 75 mg tablet Take 1 tablet (75 mg total) by mouth daily 90 tablet 1    Coenzyme Q10 (COQ10) 200 MG CAPS Take 1 capsule by mouth daily      colesevelam (WELCHOL) 625 mg tablet Take 2 tablets (1,250 mg total) by mouth 2 (two) times a day with meals 360 tablet 1    diltiazem (CARDIZEM) 30 mg tablet Take 1 tablet (30 mg total) by mouth if needed (afib) 15 tablet 3    Eliquis 5 MG TAKE ONE TABLET BY MOUTH TWICE A  tablet 3    ezetimibe (ZETIA) 10 mg tablet Take 1 tablet (10 mg total) by mouth daily 90 tablet 1    Glucosamine-Chondroit-Vit C-Mn (GLUCOSAMINE CHONDR 1500  COMPLX PO) Take 1 tablet by mouth 2 (two) times a day       hyoscyamine (ANASPAZ) 0.125 mg Take 0.125 mg by mouth 2 (two) times a day       Inclisiran Sodium (Leqvio) subcutaneous injection Inject 1.5 mL (284 mg total) under the skin once for 1 dose 1.5 mL 0    metoprolol tartrate (LOPRESSOR) 25 mg tablet Take 0.5 tablets (12.5 mg total) by mouth every 12 (twelve) hours 90 tablet 1    montelukast (SINGULAIR) 10 mg tablet Take 1 tablet (10 mg total) by mouth daily at bedtime 90 tablet 1    multivitamin (THERAGRAN) TABS Take 1 tablet by mouth 2 (two) times a day        nitroglycerin (NITROSTAT) 0.4 mg SL tablet Place 1 tablet (0.4 mg total) under the tongue every 5 (five) minutes as needed for chest pain - do not take if you have recently taken Sildenafil (Revatio) (Patient not taking: Reported on 2/21/2024) 30 tablet 0    NON FORMULARY CBD CAPSULE  AND CREAM USE DAILY PRIN      OXcarbazepine (TRILEPTAL) 300 mg tablet Take 1 tablet (300 mg total) by mouth 2 (two) times a day 180 tablet 1    rosuvastatin (CRESTOR) 5 mg tablet Take Crestor 5mg twice week on Wed and Saturday (Patient not taking: Reported on 5/22/2024) 30 tablet 3    sildenafil (REVATIO) 20 mg tablet Take 1-5 tablets on empty stomach one hour prior to sexual activity if needed 30 tablet 3    tiZANidine (ZANAFLEX) 2 mg tablet Take 1 tablet (2 mg total) by mouth 2 (two) times a day as needed for muscle spasms 180 tablet 1     Current Facility-Administered Medications   Medication Dose Route Frequency Provider Last Rate Last Admin    [START ON 10/31/2024] Inclisiran Sodium (LEQVIO) subcutaneous injection 284 mg  284 mg Subcutaneous Q6 Months         Inclisiran Sodium (LEQVIO) subcutaneous injection 284 mg  284 mg Subcutaneous Once            Allergies  Allergies   Allergen Reactions    Augmentin [Amoxicillin-Pot Clavulanate] Hives and Itching    Molds & Smuts Allergic Rhinitis     Category: Allergy;     Monascus Purpureus Went Yeast Fatigue     Category:  Adverse Reaction;     Niacin Fatigue     Category: Adverse Reaction;     Pollen Extract Allergic Rhinitis     Category: Allergy;     Pregabalin Fatigue     Category: Adverse Reaction;     Statins Fatigue and GI Intolerance     Category: Adverse Reaction;     Atorvastatin GI Intolerance and Fatigue     Other reaction(s): Muscle pain, constipation, sleepiness  Category: Adverse Reaction;   Other reaction(s): Muscle pain, constipation, sleepiness       OBJECTIVE    Vitals   Vitals:    07/09/24 1312   BP: 126/64   BP Location: Left arm   Patient Position: Sitting   Cuff Size: Adult   Pulse: 66   SpO2: 97%   Weight: 98.4 kg (217 lb)       PVR:    Physical Exam  Constitutional:       General: He is not in acute distress.     Appearance: Normal appearance. He is normal weight. He is not ill-appearing or toxic-appearing.   HENT:      Head: Normocephalic and atraumatic.   Eyes:      Conjunctiva/sclera: Conjunctivae normal.   Cardiovascular:      Rate and Rhythm: Normal rate.   Pulmonary:      Effort: Pulmonary effort is normal. No respiratory distress.   Genitourinary:     Comments: On prostate exam no overt nodularity or induration  Skin:     General: Skin is warm and dry.   Neurological:      General: No focal deficit present.      Mental Status: He is alert and oriented to person, place, and time.      Cranial Nerves: No cranial nerve deficit.   Psychiatric:         Mood and Affect: Mood normal.         Behavior: Behavior normal.         Thought Content: Thought content normal.          Labs:     Lab Results   Component Value Date    PSA 3.21 11/03/2023    PSA 2.4 10/13/2022    PSA 2.6 09/09/2021    PSA 2.4 07/20/2020     Lab Results   Component Value Date    CREATININE 0.86 02/14/2024      Lab Results   Component Value Date    HGBA1C 5.3 02/13/2024     Lab Results   Component Value Date    GLUCOSE 92 06/11/2015    CALCIUM 9.3 02/14/2024     01/30/2016    K 4.4 02/14/2024    CO2 27 02/14/2024     02/14/2024     BUN 13 02/14/2024    CREATININE 0.86 02/14/2024       I have personally reviewed all pertinent lab results and reviewed with patient    Imaging       Maxi Brownlee PA-C  Date: 7/9/2024 Time: 1:19 PM  [facility]    This note was written using fluency dictation software. Please excuse any resulting minor grammatical errors.

## 2024-07-09 NOTE — TELEPHONE ENCOUNTER
Patient calling in states that he wanted to inform Elissa from (racheal) that he spoke with someone from [facility]  that he got approved for the assistance and they will be calling for a RX to be sent to them for patient.         Patient stated he does not need a call back at this time he just wanted to let us know of this status.

## 2024-07-16 ENCOUNTER — TELEPHONE (OUTPATIENT)
Age: 69
End: 2024-07-16

## 2024-07-16 DIAGNOSIS — E78.5 DYSLIPIDEMIA: ICD-10-CM

## 2024-07-16 RX ORDER — INCLISIRAN 284 MG/1.5ML
284 INJECTION, SOLUTION SUBCUTANEOUS ONCE
Qty: 1.5 ML | Refills: 0 | Status: SHIPPED | OUTPATIENT
Start: 2024-07-16 | End: 2024-07-16 | Stop reason: SDUPTHER

## 2024-07-16 RX ORDER — INCLISIRAN 284 MG/1.5ML
284 INJECTION, SOLUTION SUBCUTANEOUS ONCE
Qty: 1.5 ML | Refills: 0 | Status: SHIPPED | OUTPATIENT
Start: 2024-07-16 | End: 2024-07-16

## 2024-07-16 NOTE — TELEPHONE ENCOUNTER
Patient called, asking about the status of the Leqvio script.  He asked if the med has been ordered and when he will be able to get the injection.

## 2024-07-26 DIAGNOSIS — I25.10 CORONARY ARTERY DISEASE INVOLVING NATIVE CORONARY ARTERY OF NATIVE HEART WITHOUT ANGINA PECTORIS: ICD-10-CM

## 2024-07-26 RX ORDER — CLOPIDOGREL BISULFATE 75 MG/1
75 TABLET ORAL DAILY
Qty: 100 TABLET | Refills: 1 | Status: SHIPPED | OUTPATIENT
Start: 2024-07-26

## 2024-07-26 NOTE — TELEPHONE ENCOUNTER
Reason for call:   [x] Refill   [] Prior Auth  [] Other:     Office:   [] PCP/Provider -   [x] Specialty/Provider - Nohelia Castro,      Medication:     Pharmacy: [address] [place], [place] - [address] [facility] [[id number]]     Does the patient have enough for 3 days?   [x] Yes   [] No - Send as HP to POD

## 2024-08-05 ENCOUNTER — CLINICAL SUPPORT (OUTPATIENT)
Dept: CARDIOLOGY CLINIC | Facility: CLINIC | Age: 69
End: 2024-08-05
Payer: COMMERCIAL

## 2024-08-05 DIAGNOSIS — E78.5 DYSLIPIDEMIA: Primary | ICD-10-CM

## 2024-08-05 PROCEDURE — 96372 THER/PROPH/DIAG INJ SC/IM: CPT

## 2024-08-27 DIAGNOSIS — M54.16 LUMBAR RADICULOPATHY: ICD-10-CM

## 2024-08-27 NOTE — TELEPHONE ENCOUNTER
Reason for call:   [x] Refill   [] Prior Auth  [] Other:     Office:   [x] PCP/Provider -   [] Specialty/Provider -     Medication: Oxcarbazepine     Dose/Frequency: 300 mg tablet taken by mouth 2x daily     Quantity: 180 tabs     Pharmacy: [facility] [place], [place] - [address] [facility] [phone]     Does the patient have enough for 3 days?   [x] Yes   [] No - Send as HP to POD

## 2024-08-28 RX ORDER — OXCARBAZEPINE 300 MG/1
300 TABLET, FILM COATED ORAL 2 TIMES DAILY
Qty: 180 TABLET | Refills: 1 | Status: SHIPPED | OUTPATIENT
Start: 2024-08-28

## 2024-09-04 ENCOUNTER — TELEPHONE (OUTPATIENT)
Age: 69
End: 2024-09-04

## 2024-09-04 NOTE — TELEPHONE ENCOUNTER
BLANCHE  Pt called stating it has been six months since WIL placed 2/13/24. He is asking if he can hold Eliquis and Plavix for an epidural injection?  We discussed the risks and he will re-evaulate in February.  He is aware Dr Castro is on vacation

## 2024-10-03 ENCOUNTER — PROCEDURE VISIT (OUTPATIENT)
Dept: UROLOGY | Facility: CLINIC | Age: 69
End: 2024-10-03
Payer: COMMERCIAL

## 2024-10-03 VITALS
HEART RATE: 58 BPM | WEIGHT: 218.2 LBS | DIASTOLIC BLOOD PRESSURE: 72 MMHG | OXYGEN SATURATION: 97 % | BODY MASS INDEX: 34.25 KG/M2 | HEIGHT: 67 IN | SYSTOLIC BLOOD PRESSURE: 112 MMHG

## 2024-10-03 DIAGNOSIS — N40.1 BPH WITH OBSTRUCTION/LOWER URINARY TRACT SYMPTOMS: ICD-10-CM

## 2024-10-03 DIAGNOSIS — N13.8 BPH WITH OBSTRUCTION/LOWER URINARY TRACT SYMPTOMS: ICD-10-CM

## 2024-10-03 DIAGNOSIS — N20.0 NEPHROLITHIASIS: ICD-10-CM

## 2024-10-03 DIAGNOSIS — R39.15 URGENCY OF URINATION: ICD-10-CM

## 2024-10-03 DIAGNOSIS — R39.11 HESITANCY: Primary | ICD-10-CM

## 2024-10-03 LAB
SL AMB  POCT GLUCOSE, UA: NORMAL
SL AMB LEUKOCYTE ESTERASE,UA: NORMAL
SL AMB POCT BILIRUBIN,UA: NORMAL
SL AMB POCT BLOOD,UA: NORMAL
SL AMB POCT CLARITY,UA: CLEAR
SL AMB POCT COLOR,UA: YELLOW
SL AMB POCT KETONES,UA: NORMAL
SL AMB POCT NITRITE,UA: NORMAL
SL AMB POCT PH,UA: 6
SL AMB POCT SPECIFIC GRAVITY,UA: 1.01
SL AMB POCT URINE PROTEIN: NORMAL
SL AMB POCT UROBILINOGEN: 0.2

## 2024-10-03 PROCEDURE — 51728 CYSTOMETROGRAM W/VP: CPT

## 2024-10-03 PROCEDURE — 51797 INTRAABDOMINAL PRESSURE TEST: CPT

## 2024-10-03 PROCEDURE — 51784 ANAL/URINARY MUSCLE STUDY: CPT

## 2024-10-03 PROCEDURE — 81002 URINALYSIS NONAUTO W/O SCOPE: CPT

## 2024-10-03 NOTE — PROGRESS NOTES
"CC: \"I go frequently and urgency at times. Occasional few drops of leakage if I don't get there in time. Yesterday I had 1 drop of leakage when coughing\"    Denies pain /burning with voiding  Feels his bladder never empties  Has hesitancy, worse in morning when bladder is full    Unable to void prior to test, straight catheterized for 70 ml    CMG:       Position:  sitting           Fill sensation:     46 ml,     pdet     3 cmH2O       First urge:           69 ml,     pdet     3 cmH2O        Normal urge:    121 ml,     pdet    -1 cmH2O       Must urge:        326 ml,     pdet     3 cmH2O    Permission to void:             456  ml,     pdet   7 cmH2O       Max pdet during void   62  cm H2O       Voided volume:   144.8  ml    Bladder stability:   stable          Compliance:  normal        EMG activity:       Normal during filling,        normal during voiding    Comments::     Sensory urgency with normal urge   Stable bladder    No MADISON provoked at 100, 200, 300, and 400 ml   At 450 ml patient was instructed to void and had detrusor contraction with max pdet of 60 cmH2O, but did not void.  He continued to try to void and after 4 minutes, voided 3 ml with max pdet of 60 cmH2O. After another 3 minutes, voided 76 ml with max pdet of 62 cmH2O, after an additional 2 minutes 50 sec, he voided 65 ml with max pdet of 58 cmH2O.     Catheter was removed and he was able to void an additional 271 ml   Calculated PVR was 41 ml    Urodynamics    Date/Time: 10/3/2024 10:30 AM    Performed by: Laurie Lawton RN  Authorized by: Marty Membreno MD  [place] Protocol:  procedure performed by consultantConsent: Verbal consent obtained. Written consent obtained.  Risks and benefits: risks, benefits and alternatives were discussed  Consent given by: patient  Patient understanding: patient states understanding of the procedure being performed  Patient consent: the patient's understanding of the procedure matches consent given  Procedure " consent: procedure consent matches procedure scheduled  Patient identity confirmed: verbally with patient  Procedure - Urodynamics:  Procedure details: CMG and EMG      Voiding Pressure Study: Yes    Intra-abdominal Voiding Pressure Study: Yes    Post-procedure:     Patient tolerance: Patient tolerated procedure well with no immediate complications

## 2024-10-10 DIAGNOSIS — E78.00 HYPERCHOLESTEROLEMIA: ICD-10-CM

## 2024-10-10 NOTE — TELEPHONE ENCOUNTER
Reason for call:   [x] Refill   [] Prior Auth  [] Other:     Office:   [x] PCP/Provider - Dr Resendiz   [] Specialty/Provider -     Medication: ezetimibe     Dose/Frequency: 10 mg take one daily     Quantity: 90    Pharmacy: [facility]    Does the patient have enough for 3 days?   [x] Yes   [] No - Send as HP to POD

## 2024-10-11 RX ORDER — EZETIMIBE 10 MG/1
10 TABLET ORAL DAILY
Qty: 90 TABLET | Refills: 1 | Status: SHIPPED | OUTPATIENT
Start: 2024-10-11

## 2024-10-14 ENCOUNTER — APPOINTMENT (OUTPATIENT)
Dept: LAB | Age: 69
End: 2024-10-14
Payer: COMMERCIAL

## 2024-10-14 DIAGNOSIS — R97.20 INCREASED PROSTATE SPECIFIC ANTIGEN (PSA) VELOCITY: ICD-10-CM

## 2024-10-14 LAB
PSA FREE MFR SERPL: 14.65 %
PSA FREE SERPL-MCNC: 0.41 NG/ML
PSA SERPL-MCNC: 2.78 NG/ML (ref 0–4)

## 2024-10-14 PROCEDURE — 84153 ASSAY OF PSA TOTAL: CPT

## 2024-10-14 PROCEDURE — 36415 COLL VENOUS BLD VENIPUNCTURE: CPT

## 2024-10-14 PROCEDURE — 84154 ASSAY OF PSA FREE: CPT

## 2024-10-17 DIAGNOSIS — J30.2 SEASONAL ALLERGIC RHINITIS, UNSPECIFIED TRIGGER: ICD-10-CM

## 2024-10-17 RX ORDER — MONTELUKAST SODIUM 10 MG/1
10 TABLET ORAL
Qty: 90 TABLET | Refills: 1 | Status: SHIPPED | OUTPATIENT
Start: 2024-10-17

## 2024-10-21 ENCOUNTER — TELEPHONE (OUTPATIENT)
Dept: UROLOGY | Facility: AMBULATORY SURGERY CENTER | Age: 69
End: 2024-10-21

## 2024-10-21 NOTE — TELEPHONE ENCOUNTER
Spoke to patient and explain his most recent PSA and explained if he has any further questions he is free to call the office or schedule an appointment. Patient informed that he has an appointment for a cysto at the end of December. If patient should call back please relay this message.

## 2024-10-23 DIAGNOSIS — I48.0 PAROXYSMAL ATRIAL FIBRILLATION (HCC): ICD-10-CM

## 2024-10-23 RX ORDER — APIXABAN 5 MG/1
TABLET, FILM COATED ORAL
Qty: 180 TABLET | Refills: 1 | Status: SHIPPED | OUTPATIENT
Start: 2024-10-23

## 2024-11-01 ENCOUNTER — TELEPHONE (OUTPATIENT)
Age: 69
End: 2024-11-01

## 2024-11-01 ENCOUNTER — TELEPHONE (OUTPATIENT)
Dept: CARDIOLOGY CLINIC | Facility: CLINIC | Age: 69
End: 2024-11-01

## 2024-11-01 NOTE — TELEPHONE ENCOUNTER
[facility] called to find out what address they are sending patient's medication to. Please call [phone]

## 2024-11-04 NOTE — TELEPHONE ENCOUNTER
Spoke with PT. Ordered Leqvio to be delivered 11/05/2024 by [facility]. Rescheduled PT for 11/07/2024 to receive injection

## 2024-11-04 NOTE — TELEPHONE ENCOUNTER
Received call from pt, states he was speaking with Fernando and got a message back from someone regarding the Leqvio injection.  Pt called the pharmacy and they said in order for them to send the medication they need a prescription sent.

## 2024-11-06 ENCOUNTER — TELEPHONE (OUTPATIENT)
Dept: CARDIOLOGY CLINIC | Facility: CLINIC | Age: 69
End: 2024-11-06

## 2024-11-06 NOTE — TELEPHONE ENCOUNTER
Spoke to PT. Informed PT that his Leqvio injection arrived at the [place] office. PT appointment confirmed for tomorrow 11/07/2024 at 10:00Am

## 2024-11-07 ENCOUNTER — CLINICAL SUPPORT (OUTPATIENT)
Dept: CARDIOLOGY CLINIC | Facility: CLINIC | Age: 69
End: 2024-11-07
Payer: COMMERCIAL

## 2024-11-07 VITALS
WEIGHT: 221 LBS | HEART RATE: 59 BPM | BODY MASS INDEX: 34.61 KG/M2 | DIASTOLIC BLOOD PRESSURE: 71 MMHG | SYSTOLIC BLOOD PRESSURE: 129 MMHG | OXYGEN SATURATION: 96 %

## 2024-11-07 DIAGNOSIS — E78.5 DYSLIPIDEMIA: Primary | ICD-10-CM

## 2024-11-07 PROCEDURE — 96372 THER/PROPH/DIAG INJ SC/IM: CPT

## 2024-11-07 PROCEDURE — 99211 OFF/OP EST MAY X REQ PHY/QHP: CPT

## 2024-11-07 NOTE — PROGRESS NOTES
2nd Leqvio injection given    PT educated on Leqvio.    Pt scheduled for 6months    Lot [id number]    Ex 05-    NDC 5427-7128-68

## 2024-11-21 ENCOUNTER — OFFICE VISIT (OUTPATIENT)
Dept: INTERNAL MEDICINE CLINIC | Age: 69
End: 2024-11-21
Payer: COMMERCIAL

## 2024-11-21 VITALS
WEIGHT: 222 LBS | OXYGEN SATURATION: 98 % | DIASTOLIC BLOOD PRESSURE: 66 MMHG | HEART RATE: 61 BPM | TEMPERATURE: 98.4 F | BODY MASS INDEX: 34.84 KG/M2 | HEIGHT: 67 IN | SYSTOLIC BLOOD PRESSURE: 114 MMHG

## 2024-11-21 DIAGNOSIS — I25.10 CORONARY ARTERY DISEASE INVOLVING NATIVE CORONARY ARTERY OF NATIVE HEART WITHOUT ANGINA PECTORIS: ICD-10-CM

## 2024-11-21 DIAGNOSIS — Z95.5 PRESENCE OF DRUG-ELUTING STENT IN ANTERIOR DESCENDING BRANCH OF LEFT CORONARY ARTERY: ICD-10-CM

## 2024-11-21 DIAGNOSIS — Z00.00 MEDICARE ANNUAL WELLNESS VISIT, SUBSEQUENT: Primary | ICD-10-CM

## 2024-11-21 DIAGNOSIS — Z23 ENCOUNTER FOR IMMUNIZATION: ICD-10-CM

## 2024-11-21 DIAGNOSIS — I48.0 PAROXYSMAL ATRIAL FIBRILLATION (HCC): ICD-10-CM

## 2024-11-21 DIAGNOSIS — Z12.5 SCREENING FOR PROSTATE CANCER: ICD-10-CM

## 2024-11-21 DIAGNOSIS — Z86.0100 HISTORY OF COLONIC POLYPS: ICD-10-CM

## 2024-11-21 DIAGNOSIS — M47.816 LUMBAR SPONDYLOSIS: ICD-10-CM

## 2024-11-21 DIAGNOSIS — E78.5 DYSLIPIDEMIA: ICD-10-CM

## 2024-11-21 DIAGNOSIS — N20.0 NEPHROLITHIASIS: ICD-10-CM

## 2024-11-21 PROCEDURE — 99214 OFFICE O/P EST MOD 30 MIN: CPT | Performed by: INTERNAL MEDICINE

## 2024-11-21 PROCEDURE — G0439 PPPS, SUBSEQ VISIT: HCPCS | Performed by: INTERNAL MEDICINE

## 2024-11-21 NOTE — ASSESSMENT & PLAN NOTE
- well controlled  - continue with tizanidine, oxcarbazepine  -Continue with range of motion exercise

## 2024-11-21 NOTE — PROGRESS NOTES
Cardiology Follow Up    George Ramirez  1955  [id number]  [facility] [place]  [address]  [place] [address]  Phone#  [phone]  Fax#  [phone]      1. Coronary artery disease involving native coronary artery of native heart without angina pectoris        2. Presence of drug-eluting stent in anterior descending branch of left coronary artery        3. Paroxysmal atrial fibrillation (HCC)        4. Statin intolerance        5. Dyslipidemia        6. Obesity (BMI 30.0-34.9)            Discussion/Summary: Mr. Ramirez is a pleasant 69-year-old gentleman who presents to the office today for routine follow-up.    His blood pressure is well-controlled in the office today on beta-blockade alone.    His most recent lipids continue to reveal suboptimal numbers but predate the addition of Leqvio.  He has been intolerant to statins.  He is maintained on Zetia.  I will reassess his lipids given the addition of Leqvio to his regimen.     Otherwise he is maintaining sinus rhythm with a few episodes of symptomatic recurrence of atrial fibrillation since his last visit with me.  He is under the care of one of our electrophysiologists whom is he due to see in the near future.  No changes are made to his AV gualberto blocking regimen.  He is on systemic anticoagulation with Eliquis.    He is maintained on Plavix but no aspirin in the setting of Eliquis use.     I will see him back in the office in six months or sooner if deemed necessary.    Interval History: Mr. Ramirez is a 69-year-old gentleman who presents to the office today for follow-up.      Since his last visit he feels well.  He does core exercises but has not been walking on a regular basis due to issues with his lumbar spine.  With the activity he performs he feels well.  He denies any recurrent symptoms of chest pain or shortness of breath.  He denies any signs or symptoms of  congestive heart failure including lower extremity edema, paroxysmal nocturnal dyspnea, orthopnea, acute weight gain or increasing abdominal girth.  He denies lightheadedness, syncope or presyncope.  He denies symptoms of claudication.      He does report that he has had a few symptomatic recurrences of his atrial fibrillation since his last visit with me.  He does take diltiazem 30 mg as needed which he feels helps but his blood pressure subsequently becomes low.  He also has been increasing his intake of electrolyte-containing hydration as he feels dehydration precipitates his atrial fibrillation.  The episodes are short-lived.    He is maintained on Eliquis and Plavix without any bleeding consequences.    He also began Leqvio since his last visit with me.  He received two injections and is tolerating it without side effects or injection site reactions.     Medical Problems       Problem List       Seasonal allergic rhinitis    Osteoarthritis    Nephrolithiasis    Irritable bowel syndrome    Hypercholesterolemia    Coronary atherosclerosis    Overview Signed 2/2/2018  3:18 PM by Asad Molina MD     Overview:   s/p WIL  PLACEMENT         Coronary artery disease involving native coronary artery    Alpha-1-antitrypsin deficiency (HCC)    BPH with obstruction/lower urinary tract symptoms    Pancreatic cyst    Lumbar radiculopathy    Low back pain with sciatica    DDD (degenerative disc disease), lumbar    Lumbar spondylosis    Tinea corporis    Chronic pain syndrome    Paroxysmal atrial fibrillation (HCC)    Skin lesion    BMI 34.0-34.9,adult    Symptomatic varicose veins of both lower extremities    Chest pain    Stented coronary artery        Past Medical History:   Diagnosis Date    A-fib (HCC)     Allergic     Allergic rhinitis     Arthritis     Benign cyst of kidney     Cancer (HCC) 2015    Cat bite 07/08/2020    Cerebral vascular disease     Chronic sinusitis     Coronary artery disease     Cryptogenic  stroke (HCC) 2020    Hyperlipidemia     Kidney stone     Melanoma (HCC)     Pancreas cyst     Stroke (HCC) 2006     Social History     Socioeconomic History    Marital status: /Civil Union     Spouse name: Not on file    Number of children: Not on file    Years of education: Not on file    Highest education level: Not on file   Occupational History    Occupation: massage therapist   Tobacco Use    Smoking status: Former     Current packs/day: 0.00     Types: Cigars, Cigarettes     Start date: 1975     Quit date: 1986     Years since quittin.9    Smokeless tobacco: Former     Types: Chew     Quit date:     Tobacco comments:     Cigars    Vaping Use    Vaping status: Never Used   Substance and Sexual Activity    Alcohol use: Yes     Alcohol/week: 1.0 standard drink of alcohol     Types: 1 Glasses of wine per week     Comment: 8 oz. Daily    Drug use: Not Currently     Types: Marijuana    Sexual activity: Yes     Partners: Female     Birth control/protection: Male Sterilization   Other Topics Concern    Not on file   Social History Narrative    Caffeine: drinks 1 cup coffee/ tea a day.     Social Drivers of Health     Financial Resource Strain: Low Risk  (2023)    Overall Financial Resource Strain (CARDIA)     Difficulty of Paying Living Expenses: Not hard at all   Food Insecurity: No Food Insecurity (2024)    Hunger Vital Sign     Worried About Running Out of Food in the Last Year: Never true     Ran Out of Food in the Last Year: Never true   Transportation Needs: No Transportation Needs (2024)    PRAPARE - Transportation     Lack of Transportation (Medical): No     Lack of Transportation (Non-Medical): No   Physical Activity: Not on file   Stress: Not on file   Social Connections: Not on file   Intimate Partner Violence: Not on file   Housing Stability: Low Risk  (2024)    Housing Stability Vital Sign     Unable to Pay for Housing in the Last Year: No     Number  of Times Moved in the Last Year: 0     Homeless in the Last Year: No      Family History   Problem Relation Age of Onset    Diabetes type II Mother     Cancer Father         angioma    Cancer Sister     Hearing loss Sister      Past Surgical History:   Procedure Laterality Date    CARDIAC CATHETERIZATION N/A 2/13/2024    Procedure: Cardiac catheterization;  Surgeon: Tripp Cross MD;  Location: [facility];  Service: Cardiology    CARDIAC CATHETERIZATION N/A 2/13/2024    Procedure: Cardiac Coronary Angiogram;  Surgeon: Tripp Cross MD;  Location: [facility];  Service: Cardiology    CARDIAC CATHETERIZATION N/A 2/13/2024    Procedure: Cardiac pci;  Surgeon: Tripp Cross MD;  Location: [facility];  Service: Cardiology    CARDIAC CATHETERIZATION Left 2/13/2024    Procedure: Cardiac Left Heart Cath;  Surgeon: Tripp Cross MD;  Location: [facility];  Service: Cardiology    COLONOSCOPY      OTHER SURGICAL HISTORY  2003    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    PATENT FORAMEN OVALE CLOSURE         Current Outpatient Medications:     apixaban (Eliquis) 5 mg, TAKE ONE TABLET BY MOUTH TWICE A DAY, Disp: 180 tablet, Rfl: 1    ascorbic acid (VITAMIN C) 250 mg tablet, Take 250 mg by mouth 2 (two) times a day, Disp: , Rfl:     chlorpheniramine (CHLOR-TRIMETON) 4 MG tablet, Take 1 tablet (4 mg total) by mouth every 6 (six) hours as needed for rhinitis, Disp: 30 tablet, Rfl: 0    clobetasol (TEMOVATE) 0.05 % cream, Apply 1 application topically 2 (two) times a day as needed Apply sparingly to affected areas, Disp: , Rfl:     clopidogrel (PLAVIX) 75 mg tablet, Take 1 tablet (75 mg total) by mouth daily, Disp: 100 tablet, Rfl: 1    Coenzyme Q10 (COQ10) 200 MG CAPS, Take 1 capsule by mouth daily, Disp: , Rfl:     colesevelam (WELCHOL) 625 mg tablet, Take 2 tablets (1,250 mg total) by mouth 2 (two) times a day with meals, Disp: 360 tablet, Rfl: 1    diltiazem (CARDIZEM) 30 mg tablet, Take 1 tablet  (30 mg total) by mouth if needed (afib), Disp: 15 tablet, Rfl: 3    ezetimibe (ZETIA) 10 mg tablet, Take 1 tablet (10 mg total) by mouth daily, Disp: 90 tablet, Rfl: 1    Glucosamine-Chondroit-Vit C-Mn (GLUCOSAMINE CHONDR 1500 COMPLX PO), Take 1 tablet by mouth 2 (two) times a day , Disp: , Rfl:     hyoscyamine (LEVSIN/SL) 0.125 mg SL tablet, PLACE ONE TABLET UNDER THE TONGUE THREE TIMES A DAY, Disp: , Rfl:     Inclisiran Sodium (Leqvio) subcutaneous injection, Inject 1.5 mL (284 mg total) under the skin once for 1 dose, Disp: 1.5 mL, Rfl: 0    metoprolol tartrate (LOPRESSOR) 25 mg tablet, Take 0.5 tablets (12.5 mg total) by mouth every 12 (twelve) hours, Disp: 100 tablet, Rfl: 1    montelukast (SINGULAIR) 10 mg tablet, TAKE ONE TABLET BY MOUTH EVERY EVENING AT BEDTIME, Disp: 90 tablet, Rfl: 1    multivitamin (THERAGRAN) TABS, Take 1 tablet by mouth 2 (two) times a day  , Disp: , Rfl:     nitroglycerin (NITROSTAT) 0.4 mg SL tablet, Place 1 tablet (0.4 mg total) under the tongue every 5 (five) minutes as needed for chest pain - do not take if you have recently taken Sildenafil (Revatio), Disp: 30 tablet, Rfl: 0    NON FORMULARY, CBD CAPSULE  AND CREAM USE DAILY PRIN, Disp: , Rfl:     OXcarbazepine (TRILEPTAL) 300 mg tablet, Take 1 tablet (300 mg total) by mouth 2 (two) times a day, Disp: 180 tablet, Rfl: 1    sildenafil (REVATIO) 20 mg tablet, Take 1-5 tablets on empty stomach one hour prior to sexual activity if needed, Disp: 30 tablet, Rfl: 3    tiZANidine (ZANAFLEX) 2 mg tablet, Take 1 tablet (2 mg total) by mouth 2 (two) times a day as needed for muscle spasms, Disp: 180 tablet, Rfl: 1    Current Facility-Administered Medications:     Inclisiran Sodium (LEQVIO) subcutaneous injection 284 mg, 284 mg, Subcutaneous, Q6 Months,   Allergies   Allergen Reactions    Augmentin [Amoxicillin-Pot Clavulanate] Hives and Itching    Molds & Smuts Allergic Rhinitis     Category: Allergy;     Monascus Purpureus Went Yeast  "Fatigue     Category: Adverse Reaction;     Niacin Fatigue     Category: Adverse Reaction;     Pollen Extract Allergic Rhinitis     Category: Allergy;     Pregabalin Fatigue     Category: Adverse Reaction;     Statins Fatigue and GI Intolerance     Category: Adverse Reaction;     Atorvastatin GI Intolerance and Fatigue     Other reaction(s): Muscle pain, constipation, sleepiness  Category: Adverse Reaction;   Other reaction(s): Muscle pain, constipation, sleepiness       Labs:     Chemistry        Component Value Date/Time     01/30/2016 0740    K 4.4 02/14/2024 0829    K 4.3 01/30/2016 0740     02/14/2024 0829     01/30/2016 0740    CO2 27 02/14/2024 0829    CO2 26 01/30/2016 0740    BUN 13 02/14/2024 0829    BUN 18 01/30/2016 0740    CREATININE 0.86 02/14/2024 0829    CREATININE 0.91 01/30/2016 0740        Component Value Date/Time    CALCIUM 9.3 02/14/2024 0829    CALCIUM 9.0 01/30/2016 0740    ALKPHOS 78 02/23/2024 0741    ALKPHOS 78 01/30/2016 0740    AST 22 02/23/2024 0741    AST 22 01/30/2016 0740    ALT 34 02/23/2024 0741    ALT 24 01/30/2016 0740    BILITOT 0.3 01/30/2016 0740            Lab Results   Component Value Date    CHOL 197 01/30/2016    CHOL 216 05/12/2015    CHOL 207 08/14/2014     Lab Results   Component Value Date    HDL 65 05/08/2024    HDL 63 11/03/2023    HDL 61 05/10/2023     Lab Results   Component Value Date    LDLCALC 132 (H) 05/08/2024    LDLCALC 140 (H) 11/03/2023    LDLCALC 165 (H) 05/10/2023     Lab Results   Component Value Date    TRIG 98 05/08/2024    TRIG 82 11/03/2023    TRIG 69 05/10/2023     No results found for: \"CHOLHDL\"    Imaging: No results found.        Review of Systems   Cardiovascular:  Negative for chest pain, claudication, cyanosis, dyspnea on exertion, leg swelling and orthopnea.   Musculoskeletal:  Positive for arthritis and back pain.   All other systems reviewed and are negative.      There were no vitals filed for this visit.    There were no " vitals filed for this visit.        There is no height or weight on file to calculate BMI.    Physical Exam:  General:  Alert and cooperative, appears stated age  HEENT:  PERRLA, EOMI, no scleral icterus, no conjunctival pallor  Neck:  No lymphadenopathy, no thyromegaly, no carotid bruits, no elevated JVP  Heart:  Regular rate and rhythm, normal S1/S2, no S3/S4, no murmur  Lungs:  Clear to auscultation bilaterally   Abdomen:  Soft, non-tender, positive bowel sounds, no rebound or guarding,   no organomegaly   Extremities:  No clubbing, cyanosis or edema   Vascular:  2+ pedal pulses  Skin:  No rashes or lesions on exposed skin  Neurologic:  Cranial nerves II-XII grossly intact without focal deficits

## 2024-11-21 NOTE — ASSESSMENT & PLAN NOTE
-Lipids are improving  - last lipid panel done on 5/8/2024, showed a total cholesterol of 149, down from 175 on 8/24/2019,  A triglyceride of 98, up from 82 , and HDL of 65, up from 63 and an LDL of 132, down from 140  -continue with a heart healthy diet  -continue with WelChol and ezetimibe

## 2024-11-21 NOTE — ASSESSMENT & PLAN NOTE
- well controlled   - continue with clopidogrel, metoprolol and continue to follow with cardiology.

## 2024-11-21 NOTE — ASSESSMENT & PLAN NOTE
-stable without acute exacerbation  - continue with clopidogrel, metoprolol, WelChol, Zetia, and as needed nitroglycerin  -continue with a heart healthy diet and with exercise

## 2024-11-21 NOTE — PROGRESS NOTES
Name: George Ramirez      : 1955      MRN: [id number]  Encounter Provider: Viviane Resendiz DO  Encounter Date: 2024   Encounter department: [facility]  :  Assessment & Plan  Medicare annual wellness visit, subsequent  -Medicare annual wellness visit done   - last colonoscopy was done on 20 and showed diverticulosis.  Patient will be due for repeat colonoscopy in 5 years  -  Lung cancer screen is not indicated because he has not smoked cigarettes long enough.  He only smoked cigars.  -He is up-to-date with his COVID vaccines, influenza vaccine, RSV, Tdap, pneumococcal vaccines and shingles vaccine.  -follow-up in 6 months       Paroxysmal atrial fibrillation (HCC)  - well controlled   - continue with metoprolol and apixaban         Coronary artery disease involving native coronary artery of native heart without angina pectoris  -stable without acute exacerbation  - continue with clopidogrel, metoprolol, WelChol, Zetia, and as needed nitroglycerin  -continue with a heart healthy diet and with exercise       Nephrolithiasis  - well controlled   - continue to keep well-hydrated       Dyslipidemia  -Lipids are improving  - last lipid panel done on 2024, showed a total cholesterol of 149, down from 175 on 2019,  A triglyceride of 98, up from 82 , and HDL of 65, up from 63 and an LDL of 132, down from 140  -continue with a heart healthy diet  -continue with WelChol and ezetimibe       Presence of drug-eluting stent in anterior descending branch of left coronary artery  - well controlled   - continue with clopidogrel, metoprolol and continue to follow with cardiology.         Lumbar spondylosis  - well controlled  - continue with tizanidine, oxcarbazepine  -Continue with range of motion exercise       History of colonic polyps  - we we will follow-up with results of upcoming colonoscopy next year           Screening for prostate cancer         Encounter  for immunization                History of Present Illness     HPI    Patient presents for a medicare annual wellness visit as well as a follow up visit regarding her/his dyslipidemia, paroxysmal atrial fibrillation, low back pain.  Has a health care poa but does not know if it is in the [employer] system  Sees cardiology for his hld and takes inclisiram  sodium injection  Will see cardilogy tomorrow  He states that he has had a shot and the  booster last month and will get the 2nd shot in May.  Used to smoke cigars a few a week, never smoked cigarettes  Drinking  oz of water daily  Back pain is under control  Taking his medications   He reports that his low back pain goes from the center to the R and radiates down the RLE  Has to stretch himself   Occasionally has chest pain amparo after a period of palpitations   As long as he keeps up with his electolytes he denies any palpitations   Takes cbd for the back pain   No bleeding from anywhere but he bruises easily amparo in his hands  He denies any   fever, chills, night sweats, headache, dizziness,  sore throat, ear ache, sinus pain or pressure, wheezing,  chest pain, sob, palpitations, nausea, vomiting, diarrhea, constipation, hematochezia, hematuria, melena stools, arthralgias, myalgias, .      Review of Systems   Constitutional:  Negative for activity change, chills, fatigue, fever and unexpected weight change.   HENT:  Positive for congestion (chronic), postnasal drip and rhinorrhea. Negative for ear pain, sinus pressure and sore throat.    Eyes:  Negative for pain.   Respiratory:  Positive for cough (2/2 pnd). Negative for choking, chest tightness, shortness of breath and wheezing.    Cardiovascular:  Negative for chest pain, palpitations and leg swelling.   Gastrointestinal:  Negative for abdominal pain, constipation, diarrhea, nausea and vomiting.   Genitourinary:  Negative for dysuria and hematuria.   Musculoskeletal:  Negative for arthralgias, back pain, gait  "problem, joint swelling, myalgias and neck stiffness.   Skin:  Positive for color change (Easy bruising, since he is on Eliquis). Negative for pallor and rash.   Neurological:  Negative for dizziness, tremors, seizures, syncope, light-headedness and headaches.   Hematological:  Negative for adenopathy.   Psychiatric/Behavioral:  Negative for behavioral problems.           Objective   /66 (BP Location: Left arm, Patient Position: Sitting, Cuff Size: Standard)   Pulse 61   Temp 98.4 °F (36.9 °C) (Temporal)   Ht 5' 7\" (1.702 m)   Wt 101 kg (222 lb)   SpO2 98%   BMI 34.77 kg/m²      Physical Exam  Constitutional:       General: He is not in acute distress.     Appearance: He is well-developed. He is not diaphoretic.   HENT:      Head: Normocephalic and atraumatic.      Right Ear: External ear normal. Decreased hearing noted.      Left Ear: External ear normal. Decreased hearing noted.      Nose: Nose normal.      Mouth/Throat:      Pharynx: Posterior oropharyngeal erythema present. No oropharyngeal exudate.   Eyes:      General: No scleral icterus.        Right eye: No discharge.         Left eye: No discharge.      Conjunctiva/sclera: Conjunctivae normal.      Pupils: Pupils are equal, round, and reactive to light.   Neck:      Thyroid: No thyromegaly.      Vascular: No JVD.      Trachea: No tracheal deviation.   Cardiovascular:      Rate and Rhythm: Normal rate and regular rhythm.      Heart sounds: Normal heart sounds. No murmur heard.     No friction rub. No gallop.   Pulmonary:      Effort: Pulmonary effort is normal. No respiratory distress.      Breath sounds: Normal breath sounds. No wheezing or rales.   Chest:      Chest wall: No tenderness.   Abdominal:      General: Bowel sounds are normal. There is no distension.      Palpations: Abdomen is soft. There is no mass.      Tenderness: There is no abdominal tenderness. There is no guarding or rebound.   Musculoskeletal:         General: No tenderness " or deformity. Normal range of motion.      Cervical back: Normal range of motion and neck supple.   Lymphadenopathy:      Head:      Right side of head: Submandibular adenopathy present.      Left side of head: Submandibular adenopathy present.      Cervical: No cervical adenopathy.   Skin:     General: Skin is warm and dry.      Coloration: Skin is not pale.      Findings: No erythema or rash.   Neurological:      Mental Status: He is alert and oriented to person, place, and time.      Cranial Nerves: No cranial nerve deficit.      Motor: No abnormal muscle tone.      Coordination: Coordination normal.      Deep Tendon Reflexes: Reflexes are normal and symmetric.   Psychiatric:         Behavior: Behavior normal.         Answers submitted by the patient for this visit:  AUDIT (Alcohol Use Disorders Identification Test) Screening (Submitted on 11/14/2024)  How often during the last year have you found that you were not able to stop drinking once you had started?: 0 - never  How often during the last year have you failed to do what was normally expected from you because of drinking?: 0 - never  How often during the last year have you needed a first drink in the morning to get yourself going after a heavy drinking session?: 0 - never  How often during the last year have you had a feeling of guilt or remorse after drinking?: 0 - never  How often during the last year have you been unable to remember what happened the night before because you had been drinking?: 0 - never  Have you or someone else been injured as a result of your drinking?: 0 - no  Has a relative or friend or a doctor or another health worker been concerned about your drinking or suggested you cut down?: 0 - no  Medicare Annual Wellness Visit (Submitted on 11/14/2024)  How would you rate your overall health?: fair  Compared to last year, how is your physical health?: slightly worse  In general, how satisfied are you with your life?: satisfied  Compared to  "last year, how is your eyesight?: same  Compared to last year, how is your hearing?: slightly worse  Compared to last year, how is your emotional/mental health?: same  How often is anger a problem for you?: never, rarely  How often do you feel unusually tired/fatigued?: sometimes  In the past 7 days, how much pain have you experienced?: a lot  If you answered \"some\" or \"a lot\", please rate the severity of your pain on a scale of 1 to 10 (1 being the least severe pain and 10 being the most intense pain).: 7/10  In the past 6 months, have you lost or gained 10 pounds without trying?: No  One or more falls in the last year: No  Do you have trouble with the stairs inside or outside your home?: No  Does your home have working smoke alarms?: Yes  Does your home have a carbon monoxide monitor?: Yes  Which safety hazards (if any) have you experienced in your home? Please select all that apply.: none  How would you describe your current diet? Please select all that apply.: Low Cholesterol  In addition to prescription medications, are you taking any over-the-counter supplements?: Yes  If yes, what supplements are you taking?: Multivitamin & extra \"C\"  Can you manage your medications?: Yes  Are you currently taking any opioid medications?: No  Can you walk and transfer into and out of your bed and chair?: Yes  Can you dress and groom yourself?: Yes  Can you bathe or shower yourself?: Yes  Can you feed yourself?: Yes  Can you do your laundry/ housekeeping?: Yes  Can you manage your money, pay your bills, and track your expenses?: Yes  Can you make your own meals?: Yes  Can you do your own shopping?: Yes  Within the last 12 months, have you had any hospitalizations or Emergency Department visits?: Yes  If yes, how many times have you been hospitalized within the past year?: 1-2  Do you have a living will?: Yes  Do you have a Durable POA (Power of ) for healthcare decisions?: No  Do you have an Advanced Directive for end " of life decisions?: Yes  How often have you used an illegal drug (including marijuana) or a prescription medication for non-medical reasons in the past year?: never  What is the typical number of drinks you consume in a day?: 1  What is the typical number of drinks you consume in a week?: 1  How often did you have a drink containing alcohol in the past year?: 4 or more times a week  How many drinks did you have on a typical day  when you were drinking in the past year?: 1 to 2  How often did you have 6 or more drinks on one occasion in the past year?: never

## 2024-11-21 NOTE — PATIENT INSTRUCTIONS
Medicare Preventive Visit Patient Instructions  Thank you for completing your Welcome to Medicare Visit or Medicare Annual Wellness Visit today. Your next wellness visit will be due in one year (11/22/2025).  The screening/preventive services that you may require over the next 5-10 years are detailed below. Some tests may not apply to you based off risk factors and/or age. Screening tests ordered at today's visit but not completed yet may show as past due. Also, please note that scanned in results may not display below.  Preventive Screenings:  Service Recommendations Previous Testing/Comments   Colorectal Cancer Screening  Colonoscopy    Fecal Occult Blood Test (FOBT)/Fecal Immunochemical Test (FIT)  Fecal DNA/Cologuard Test  Flexible Sigmoidoscopy Age: 45-75 years old   Colonoscopy: every 10 years (May be performed more frequently if at higher risk)  OR  FOBT/FIT: every 1 year  OR  Cologuard: every 3 years  OR  Sigmoidoscopy: every 5 years  Screening may be recommended earlier than age 45 if at higher risk for colorectal cancer. Also, an individualized decision between you and your healthcare provider will decide whether screening between the ages of 76-85 would be appropriate. Colonoscopy: 01/24/2020  FOBT/FIT: Not on file  Cologuard: Not on file  Sigmoidoscopy: Not on file    Screening Current     Prostate Cancer Screening Individualized decision between patient and health care provider in men between ages of 55-69   Medicare will cover every 12 months beginning on the day after your 50th birthday PSA: 2.779 ng/mL     Screening Current     Hepatitis C Screening Once for adults born between 1945 and 1965  More frequently in patients at high risk for Hepatitis C Hep C Antibody: 08/23/2019    Screening Current   Diabetes Screening 1-2 times per year if you're at risk for diabetes or have pre-diabetes Fasting glucose: 89 mg/dL (11/3/2023)  A1C: 5.3 % (2/13/2024)  Screening Current   Cholesterol Screening Once every  5 years if you don't have a lipid disorder. May order more often based on risk factors. Lipid panel: 05/08/2024  Screening Not Indicated  History Lipid Disorder      Other Preventive Screenings Covered by Medicare:  Abdominal Aortic Aneurysm (AAA) Screening: covered once if your at risk. You're considered to be at risk if you have a family history of AAA or a male between the age of 65-75 who smoking at least 100 cigarettes in your lifetime.  Lung Cancer Screening: covers low dose CT scan once per year if you meet all of the following conditions: (1) Age 55-77; (2) No signs or symptoms of lung cancer; (3) Current smoker or have quit smoking within the last 15 years; (4) You have a tobacco smoking history of at least 20 pack years (packs per day x number of years you smoked); (5) You get a written order from a healthcare provider.  Glaucoma Screening: covered annually if you're considered high risk: (1) You have diabetes OR (2) Family history of glaucoma OR (3)  aged 50 and older OR (4)  American aged 65 and older  Osteoporosis Screening: covered every 2 years if you meet one of the following conditions: (1) Have a vertebral abnormality; (2) On glucocorticoid therapy for more than 3 months; (3) Have primary hyperparathyroidism; (4) On osteoporosis medications and need to assess response to drug therapy.  HIV Screening: covered annually if you're between the age of 15-65. Also covered annually if you are younger than 15 and older than 65 with risk factors for HIV infection. For pregnant patients, it is covered up to 3 times per pregnancy.    Immunizations:  Immunization Recommendations   Influenza Vaccine Annual influenza vaccination during flu season is recommended for all persons aged >= 6 months who do not have contraindications   Pneumococcal Vaccine   * Pneumococcal conjugate vaccine = PCV13 (Prevnar 13), PCV15 (Vaxneuvance), PCV20 (Prevnar 20)  * Pneumococcal polysaccharide vaccine =  PPSV23 (Pneumovax) Adults 19-63 yo with certain risk factors or if 65+ yo  If never received any pneumonia vaccine: recommend Prevnar 20 (PCV20)  Give PCV20 if previously received 1 dose of PCV13 or PPSV23   Hepatitis B Vaccine 3 dose series if at intermediate or high risk (ex: diabetes, end stage renal disease, liver disease)   Respiratory syncytial virus (RSV) Vaccine - COVERED BY MEDICARE PART D  * RSVPreF3 (Arexvy) CDC recommends that adults 60 years of age and older may receive a single dose of RSV vaccine using shared clinical decision-making (SCDM)   Tetanus (Td) Vaccine - COST NOT COVERED BY MEDICARE PART B Following completion of primary series, a booster dose should be given every 10 years to maintain immunity against tetanus. Td may also be given as tetanus wound prophylaxis.   Tdap Vaccine - COST NOT COVERED BY MEDICARE PART B Recommended at least once for all adults. For pregnant patients, recommended with each pregnancy.   Shingles Vaccine (Shingrix) - COST NOT COVERED BY MEDICARE PART B  2 shot series recommended in those 19 years and older who have or will have weakened immune systems or those 50 years and older     Health Maintenance Due:      Topic Date Due   • Colorectal Cancer Screening  01/24/2030   • Hepatitis C Screening  Completed     Immunizations Due:  There are no preventive care reminders to display for this patient.  Advance Directives   What are advance directives?  Advance directives are legal documents that state your wishes and plans for medical care. These plans are made ahead of time in case you lose your ability to make decisions for yourself. Advance directives can apply to any medical decision, such as the treatments you want, and if you want to donate organs.   What are the types of advance directives?  There are many types of advance directives, and each state has rules about how to use them. You may choose a combination of any of the following:  Living will:  This is a  written record of the treatment you want. You can also choose which treatments you do not want, which to limit, and which to stop at a certain time. This includes surgery, medicine, IV fluid, and tube feedings.   Durable power of  for healthcare (DPAHC):  This is a written record that states who you want to make healthcare choices for you when you are unable to make them for yourself. This person, called a proxy, is usually a family member or a friend. You may choose more than 1 proxy.  Do not resuscitate (DNR) order:  A DNR order is used in case your heart stops beating or you stop breathing. It is a request not to have certain forms of treatment, such as CPR. A DNR order may be included in other types of advance directives.  Medical directive:  This covers the care that you want if you are in a coma, near death, or unable to make decisions for yourself. You can list the treatments you want for each condition. Treatment may include pain medicine, surgery, blood transfusions, dialysis, IV or tube feedings, and a ventilator (breathing machine).  Values history:  This document has questions about your views, beliefs, and how you feel and think about life. This information can help others choose the care that you would choose.  Why are advance directives important?  An advance directive helps you control your care. Although spoken wishes may be used, it is better to have your wishes written down. Spoken wishes can be misunderstood, or not followed. Treatments may be given even if you do not want them. An advance directive may make it easier for your family to make difficult choices about your care.   Weight Management   Why it is important to manage your weight:  Being overweight increases your risk of health conditions such as heart disease, high blood pressure, type 2 diabetes, and certain types of cancer. It can also increase your risk for osteoarthritis, sleep apnea, and other respiratory problems. Aim for  a slow, steady weight loss. Even a small amount of weight loss can lower your risk of health problems.  How to lose weight safely:  A safe and healthy way to lose weight is to eat fewer calories and get regular exercise. You can lose up about 1 pound a week by decreasing the number of calories you eat by 500 calories each day.   Healthy meal plan for weight management:  A healthy meal plan includes a variety of foods, contains fewer calories, and helps you stay healthy. A healthy meal plan includes the following:  Eat whole-grain foods more often.  A healthy meal plan should contain fiber. Fiber is the part of grains, fruits, and vegetables that is not broken down by your body. Whole-grain foods are healthy and provide extra fiber in your diet. Some examples of whole-grain foods are whole-wheat breads and pastas, oatmeal, brown rice, and bulgur.  Eat a variety of vegetables every day.  Include dark, leafy greens such as spinach, kale, corey greens, and mustard greens. Eat yellow and orange vegetables such as carrots, sweet potatoes, and winter squash.   Eat a variety of fruits every day.  Choose fresh or canned fruit (canned in its own juice or light syrup) instead of juice. Fruit juice has very little or no fiber.  Eat low-fat dairy foods.  Drink fat-free (skim) milk or 1% milk. Eat fat-free yogurt and low-fat cottage cheese. Try low-fat cheeses such as mozzarella and other reduced-fat cheeses.  Choose meat and other protein foods that are low in fat.  Choose beans or other legumes such as split peas or lentils. Choose fish, skinless poultry (chicken or turkey), or lean cuts of red meat (beef or pork). Before you cook meat or poultry, cut off any visible fat.   Use less fat and oil.  Try baking foods instead of frying them. Add less fat, such as margarine, sour cream, regular salad dressing and mayonnaise to foods. Eat fewer high-fat foods. Some examples of high-fat foods include french fries, doughnuts, ice  "cream, and cakes.  Eat fewer sweets.  Limit foods and drinks that are high in sugar. This includes candy, cookies, regular soda, and sweetened drinks.  Exercise:  Exercise at least 30 minutes per day on most days of the week. Some examples of exercise include walking, biking, dancing, and swimming. You can also fit in more physical activity by taking the stairs instead of the elevator or parking farther away from stores. Ask your healthcare provider about the best exercise plan for you.   Alcohol Use and Your Health    Drinking too much can harm your health.  Excessive alcohol use leads to about 88,000 death in the [place] each year, and shortens the life of those who diet by almost 30 years.  Further, excessive drinking cost the economy $249 billion in 2010.  Most excessive drinkers are not alcohol dependent.    Excessive alcohol use has immediate effects that increase the risk of many harmful health conditions.  These are most often the result of binge drinking.  Over time, excessive alcohol use can lead to the development of chronic diseases and other series health problems.    What is considered a \"drink\"?        Excessive alcohol use includes:  Binge Drinking: For women, 4 or more drinks consumed on one occasion. For men, 5 or more drinks consumed on one occasion.  Heavy Drinking: For women, 8 or more drinks per week. For men, 15 or more drinks per week  Any alcohol used by pregnant women  Any alcohol used by those under the age of 21 years    If you choose to drink, do so in moderation:  Do not drink at all if you are under the age of 21, or if you are or may be pregnant, or have health problems that could be made worse by drinking.  For women, up to 1 drink per day  For men, up to 2 drinks a day    No one should begin drinking or drink more frequently based on potential health benefits    Short-Term Health Risks:  Injuries: motor vehicle crashes, falls, drownings, burns  Violence: homicide, suicide, " sexual assault, intimate partner violence  Alcohol poisoning  Reproductive health: risky sexual behaviors, unintended prengnacy, sexually transmitted diseases, miscarriage, stillbirth, fetal alcohol syndrome    Long-Term Health Risks:  Chronic diseases: high blood pressure, heart disease, stroke, liver disease, digestive problems  Cancers: breast, mouth and throat, liver, colon  Learning and memory problems: dementia, poor school performance  Mental health: depression, anxiety, insomnia  Social problems: lost productivity, family problems, unemployment  Alcohol dependence    For support and more information:  Substance Abuse and Mental Health Services Administration  [address]  [place] [address]  Web Address: http://www.[facility].gov    Alcoholics Anonymous        Web Address: http://www.aa.org    https://www.cdc.gov/alcohol/fact-sheets/alcohol-use.htm     © Copyright [employer] 2018 Information is for End User's use only and may not be sold, redistributed or otherwise used for commercial purposes. All illustrations and images included in CareNotes® are the copyrighted property of [employer] or [employer]

## 2024-11-21 NOTE — PROGRESS NOTES
Name: George Mccormickzybylmelba      : 1955      MRN: [id number]  Encounter Provider: Viviane Resendiz DO  Encounter Date: 2024   Encounter department: [facility]    Assessment & Plan       Preventive health issues were discussed with patient, and age appropriate screening tests were ordered as noted in patient's After Visit Summary. Personalized health advice and appropriate referrals for health education or preventive services given if needed, as noted in patient's After Visit Summary.    History of Present Illness     HPI   Patient Care Team:  Viviane Resendiz DO as PCP - General (Internal Medicine)  MD Balbir King MD Frank Jeremy Tamarkin, MD (Urology)  Eliazar Odell MD (Gastroenterology)    Review of Systems  Medical History Reviewed by provider this encounter:       Annual Wellness Visit Questionnaire   George is here for his Subsequent Wellness visit.     Health Risk Assessment:   Patient rates overall health as fair. Patient feels that their physical health rating is slightly worse. Patient is satisfied with their life. Eyesight was rated as same. Hearing was rated as slightly worse. Patient feels that their emotional and mental health rating is same. Patients states they are never, rarely angry. Patient states they are sometimes unusually tired/fatigued. Pain experienced in the last 7 days has been a lot. Patient's pain rating has been 7/10. Patient states that he has experienced no weight loss or gain in last 6 months.     Fall Risk Screening:   In the past year, patient has experienced: no history of falling in past year      Home Safety:  Patient does not have trouble with stairs inside or outside of their home. Patient has working smoke alarms and has working carbon monoxide detector. Home safety hazards include: none.     Nutrition:   Current diet is Low Cholesterol.     Medications:   Patient is currently taking over-the-counter  supplements. OTC medications include: see medication list. Patient is able to manage medications.     Activities of Daily Living (ADLs)/Instrumental Activities of Daily Living (IADLs):   Walk and transfer into and out of bed and chair?: Yes  Dress and groom yourself?: Yes    Bathe or shower yourself?: Yes    Feed yourself? Yes  Do your laundry/housekeeping?: Yes  Manage your money, pay your bills and track your expenses?: Yes  Make your own meals?: Yes    Do your own shopping?: Yes    Previous Hospitalizations:   Any hospitalizations or ED visits within the last 12 months?: Yes    How many hospitalizations have you had in the last year?: 1-2    Advance Care Planning:   Living will: Yes    Durable POA for healthcare: No    Advanced directive: Yes      PREVENTIVE SCREENINGS      Cardiovascular Screening:    General: Screening Not Indicated and History Lipid Disorder      Diabetes Screening:     General: Screening Current      Colorectal Cancer Screening:     General: Screening Current      Prostate Cancer Screening:    General: Screening Current      Abdominal Aortic Aneurysm (AAA) Screening:    Risk factors include: age between 65-76 yo and tobacco use        Lung Cancer Screening:     General: Screening Not Indicated      Hepatitis C Screening:    General: Screening Current    Screening, Brief Intervention, and Referral to Treatment (SBIRT)    Screening  Typical number of drinks in a day: 1  Typical number of drinks in a week: 1  Interpretation: Low risk drinking behavior.    AUDIT-C Screenin) How often did you have a drink containing alcohol in the past year? 4 or more times a week  2) How many drinks did you have on a typical day when you were drinking in the past year? 1 to 2  3) How often did you have 6 or more drinks on one occasion in the past year? never    AUDIT-C Score: 4  Interpretation: Score 4-12 (male): POSITIVE screen for alcohol misuse    AUDIT Screenin) How often during the last year have  you found that you were not able to stop drinking once you had started? 0 - never  5) How often during the last year have you failed to do what was normally expected from you because of drinking? 0 - never  6) How often during the last year have you needed a first drink in the morning to get yourself going after a heavy drinking session? 0 - never  7) How often during the last year have you had a feeling of guilt or remorse after drinking? 0 - never  8) How often during the last year have you been unable to remember what happened the night before because you had been drinking? 0 - never  9) Have you or someone else been injured as a result of your drinking? 0 - no  10) Has a relative or friend or a doctor or another health worker been concerned about your drinking or suggested you cut down? 0 - no    AUDIT Score: 4  Interpretation: Low risk alcohol consumption    Single Item Drug Screening:  How often have you used an illegal drug (including marijuana) or a prescription medication for non-medical reasons in the past year? never    Single Item Drug Screen Score: 0  Interpretation: Negative screen for possible drug use disorder    Social Drivers of Health     Financial Resource Strain: Low Risk  (11/11/2023)    Overall Financial Resource Strain (CARDIA)     Difficulty of Paying Living Expenses: Not hard at all   Food Insecurity: No Food Insecurity (11/14/2024)    Hunger Vital Sign     Worried About Running Out of Food in the Last Year: Never true     Ran Out of Food in the Last Year: Never true   Transportation Needs: No Transportation Needs (11/14/2024)    PRAPARE - Transportation     Lack of Transportation (Medical): No     Lack of Transportation (Non-Medical): No   Housing Stability: Low Risk  (11/14/2024)    Housing Stability Vital Sign     Unable to Pay for Housing in the Last Year: No     Number of Times Moved in the Last Year: 0     Homeless in the Last Year: No   Utilities: Not At Risk (11/14/2024)    [facility]  "Utilities     Threatened with loss of utilities: No     No results found.    Objective   /66 (BP Location: Left arm, Patient Position: Sitting, Cuff Size: Standard)   Pulse 61   Temp 98.4 °F (36.9 °C) (Temporal)   Ht 5' 7\" (1.702 m)   Wt 101 kg (222 lb)   SpO2 98%   BMI 34.77 kg/m²     Physical Exam    "

## 2024-11-22 ENCOUNTER — OFFICE VISIT (OUTPATIENT)
Dept: CARDIOLOGY CLINIC | Facility: CLINIC | Age: 69
End: 2024-11-22
Payer: COMMERCIAL

## 2024-11-22 VITALS
WEIGHT: 223.8 LBS | SYSTOLIC BLOOD PRESSURE: 112 MMHG | OXYGEN SATURATION: 97 % | DIASTOLIC BLOOD PRESSURE: 60 MMHG | HEART RATE: 69 BPM | HEIGHT: 67 IN | BODY MASS INDEX: 35.12 KG/M2

## 2024-11-22 DIAGNOSIS — I48.91 A-FIB (HCC): ICD-10-CM

## 2024-11-22 DIAGNOSIS — I25.10 CORONARY ARTERY DISEASE INVOLVING NATIVE CORONARY ARTERY OF NATIVE HEART WITHOUT ANGINA PECTORIS: Primary | ICD-10-CM

## 2024-11-22 DIAGNOSIS — E66.811 OBESITY (BMI 30.0-34.9): ICD-10-CM

## 2024-11-22 DIAGNOSIS — Z78.9 STATIN INTOLERANCE: ICD-10-CM

## 2024-11-22 DIAGNOSIS — Z95.5 PRESENCE OF DRUG-ELUTING STENT IN ANTERIOR DESCENDING BRANCH OF LEFT CORONARY ARTERY: ICD-10-CM

## 2024-11-22 DIAGNOSIS — E78.5 DYSLIPIDEMIA: ICD-10-CM

## 2024-11-22 DIAGNOSIS — I48.0 PAROXYSMAL ATRIAL FIBRILLATION (HCC): ICD-10-CM

## 2024-11-22 PROCEDURE — 99214 OFFICE O/P EST MOD 30 MIN: CPT | Performed by: INTERNAL MEDICINE

## 2024-11-22 RX ORDER — DILTIAZEM HYDROCHLORIDE 30 MG/1
TABLET, FILM COATED ORAL
Qty: 15 TABLET | Refills: 3 | Status: SHIPPED | OUTPATIENT
Start: 2024-11-22

## 2024-11-26 ENCOUNTER — APPOINTMENT (OUTPATIENT)
Dept: LAB | Facility: CLINIC | Age: 69
End: 2024-11-26
Payer: COMMERCIAL

## 2024-11-26 DIAGNOSIS — E78.5 HYPERLIPIDEMIA, UNSPECIFIED HYPERLIPIDEMIA TYPE: ICD-10-CM

## 2024-11-26 DIAGNOSIS — E78.5 DYSLIPIDEMIA: ICD-10-CM

## 2024-11-26 LAB
CHOLEST SERPL-MCNC: 150 MG/DL (ref ?–200)
HDLC SERPL-MCNC: 65 MG/DL
LDLC SERPL CALC-MCNC: 68 MG/DL (ref 0–100)
LDLC SERPL DIRECT ASSAY-MCNC: 75 MG/DL (ref 0–100)
NONHDLC SERPL-MCNC: 85 MG/DL
TRIGL SERPL-MCNC: 84 MG/DL (ref ?–150)

## 2024-11-26 PROCEDURE — 83721 ASSAY OF BLOOD LIPOPROTEIN: CPT

## 2024-11-26 PROCEDURE — 80061 LIPID PANEL: CPT

## 2024-11-26 PROCEDURE — 36415 COLL VENOUS BLD VENIPUNCTURE: CPT

## 2024-11-27 ENCOUNTER — RESULTS FOLLOW-UP (OUTPATIENT)
Dept: CARDIOLOGY CLINIC | Facility: HOSPITAL | Age: 69
End: 2024-11-27

## 2024-12-11 ENCOUNTER — TELEPHONE (OUTPATIENT)
Age: 69
End: 2024-12-11

## 2024-12-11 DIAGNOSIS — E78.00 HYPERCHOLESTEROLEMIA: ICD-10-CM

## 2024-12-11 NOTE — TELEPHONE ENCOUNTER
Patient calling requesting to speak to Fernando Boyle RN regarding his Lipid profile that he recently had done 11/26/24. He states since his bloodwork shows improvement he wants to know if he can stop taking colesevelam (welchol). It is time for him to refill this medication but he doesn't want to refill it if he doesn't have to keep taking it. Last office visit 11/22/24 w/ Dr. Castro.

## 2024-12-12 ENCOUNTER — TELEPHONE (OUTPATIENT)
Dept: CARDIOLOGY CLINIC | Facility: CLINIC | Age: 69
End: 2024-12-12

## 2024-12-12 RX ORDER — COLESEVELAM 180 1/1
1250 TABLET ORAL 2 TIMES DAILY WITH MEALS
Qty: 360 TABLET | Refills: 1 | Status: SHIPPED | OUTPATIENT
Start: 2024-12-12

## 2024-12-12 NOTE — TELEPHONE ENCOUNTER
Spoke PT and wife. Updated them that the bills that they received for the Leqvio procedure have been voided. Per billing they will have no financial responsibility. PT and wife verbalized understanding

## 2024-12-31 ENCOUNTER — PROCEDURE VISIT (OUTPATIENT)
Dept: UROLOGY | Facility: AMBULATORY SURGERY CENTER | Age: 69
End: 2024-12-31
Payer: COMMERCIAL

## 2024-12-31 VITALS
BODY MASS INDEX: 33.34 KG/M2 | SYSTOLIC BLOOD PRESSURE: 116 MMHG | HEART RATE: 70 BPM | OXYGEN SATURATION: 99 % | DIASTOLIC BLOOD PRESSURE: 70 MMHG | RESPIRATION RATE: 16 BRPM | WEIGHT: 220 LBS | HEIGHT: 68 IN

## 2024-12-31 DIAGNOSIS — N13.8 BPH WITH OBSTRUCTION/LOWER URINARY TRACT SYMPTOMS: Primary | ICD-10-CM

## 2024-12-31 DIAGNOSIS — N40.1 BPH WITH OBSTRUCTION/LOWER URINARY TRACT SYMPTOMS: Primary | ICD-10-CM

## 2024-12-31 PROCEDURE — 76872 US TRANSRECTAL: CPT | Performed by: UROLOGY

## 2024-12-31 PROCEDURE — 52000 CYSTOURETHROSCOPY: CPT | Performed by: UROLOGY

## 2024-12-31 PROCEDURE — 81002 URINALYSIS NONAUTO W/O SCOPE: CPT | Performed by: UROLOGY

## 2024-12-31 RX ORDER — SODIUM CHLORIDE 9 MG/ML
125 INJECTION, SOLUTION INTRAVENOUS CONTINUOUS
OUTPATIENT
Start: 2024-12-31

## 2024-12-31 RX ORDER — LEVOFLOXACIN 5 MG/ML
500 INJECTION, SOLUTION INTRAVENOUS ONCE
OUTPATIENT
Start: 2024-12-31 | End: 2024-12-31

## 2024-12-31 NOTE — PROGRESS NOTES
Cystoscopy     Date/Time  12/31/2024 8:00 AM     Performed by  Balbir Trivedi MD   Authorized by  Balbir Trivedi MD         Procedure Details:  Procedure type: cystoscopy      Kristopher is a 69-year-old male with history of BPH.  He has previously trialed tamsulosin as well as oxybutynin.  Urodynamics was recommended by the advanced practitioner.  Bladder capacity was up to 456 cc.  He was then able to void 144 cc with a peak detrusor pressure of 62 cm water.  He had multiple small voids until his PVR was down to 41 cc.  He presents today for cystoscopy to assess his bladder outlet.  He states he is voiding almost every 1-2 hours and does need to strain to evacuate his bladder completely.      Male cystoscopy procedure note:  Risk and benefits of flexible cystoscopy were discussed. Informed consent was obtained. The patient was placed in the supine position. His genitalia was prepped and draped in a sterile fashion. Viscous lidocaine jelly was instilled into the urethra and flexible cystoscopy was then performed. The urethra, prostatic urethra, and bladder were all thoroughly inspected there was no evidence of mucosal abnormalities or lesions. Both ureteral orifices were visualized with clear efflux of urine.  Retroflexion reveals moderate intravesical protrusion.  There is no discrete median lobe.  There is lateral lobe hyperplasia and coaptation noted.  Overall this was a negative cystoscopy for urothelial carcinoma.    The bladder was left full and the patient was placed in the left lateral decubitus position.  Transrectal ultrasonography was then performed revealing a 69 g prostate.    Impression: BPH with bladder outlet obstruction    Plan: I feel that the patient would be a good candidate for TURP based on his anatomy.  In February 2024 he underwent coronary artery stenting of his left main artery.  He is currently on Eliquis and Plavix and states that this will be continued at least through  February 2025.  I recommend cardiac clearance by Dr. Castro for permission to hold the Plavix and Eliquis after he has been treated for at least a year per the patient.  I would consider TURP in March 2025.  I feel that based on his anatomy that TURP would offer a better outcome than laser ablation of the prostate or a minimally invasive procedure such as Rezum or UroLift.  The patient is amenable with the plan and will return 2 weeks prior to surgery for his preop H&P.

## 2024-12-31 NOTE — LETTER
December 31, 2024     Viviane Resendiz DO  [address]  [address]  [facility] [address]    Patient: George Ramirez   YOB: 1955   Date of Visit: 12/31/2024       Dear Dr. Resendiz:    Thank you for referring George Ramirez to me for evaluation. Below are my notes for this consultation.    If you have questions, please do not hesitate to call me. I look forward to following your patient along with you.         Sincerely,        Balbir Trivedi MD        CC: DO Loretta Monaco MA Frank Jeremy Tamarkin, MD  12/31/2024  8:45 AM  Sign when Signing Visit     Cystoscopy     Date/Time  12/31/2024 8:00 AM     Performed by  Balbir Trivedi MD   Authorized by  Balbir Trivedi MD         Procedure Details:  Procedure type: cystoscopy      Kristopher is a 69-year-old male with history of BPH.  He has previously trialed tamsulosin as well as oxybutynin.  Urodynamics was recommended by the advanced practitioner.  Bladder capacity was up to 456 cc.  He was then able to void 144 cc with a peak detrusor pressure of 62 cm water.  He had multiple small voids until his PVR was down to 41 cc.  He presents today for cystoscopy to assess his bladder outlet.  He states he is voiding almost every 1-2 hours and does need to strain to evacuate his bladder completely.      Male cystoscopy procedure note:  Risk and benefits of flexible cystoscopy were discussed. Informed consent was obtained. The patient was placed in the supine position. His genitalia was prepped and draped in a sterile fashion. Viscous lidocaine jelly was instilled into the urethra and flexible cystoscopy was then performed. The urethra, prostatic urethra, and bladder were all thoroughly inspected there was no evidence of mucosal abnormalities or lesions. Both ureteral orifices were visualized with clear efflux of urine.  Retroflexion reveals moderate intravesical protrusion.  There is no discrete median lobe.  There  is lateral lobe hyperplasia and coaptation noted.  Overall this was a negative cystoscopy for urothelial carcinoma.    The bladder was left full and the patient was placed in the left lateral decubitus position.  Transrectal ultrasonography was then performed revealing a 69 g prostate.    Impression: BPH with bladder outlet obstruction    Plan: I feel that the patient would be a good candidate for TURP based on his anatomy.  In February 2024 he underwent coronary artery stenting of his left main artery.  He is currently on Eliquis and Plavix and states that this will be continued at least through February 2025.  I recommend cardiac clearance by Dr. Castro for permission to hold the Plavix and Eliquis after he has been treated for at least a year per the patient.  I would consider TURP in March 2025.  I feel that based on his anatomy that TURP would offer a better outcome than laser ablation of the prostate or a minimally invasive procedure such as Rezum or UroLift.  The patient is amenable with the plan and will return 2 weeks prior to surgery for his preop H&P.

## 2025-01-03 ENCOUNTER — TELEPHONE (OUTPATIENT)
Dept: UROLOGY | Facility: AMBULATORY SURGERY CENTER | Age: 70
End: 2025-01-03

## 2025-01-03 NOTE — TELEPHONE ENCOUNTER
Hemartina Tab, I have a  and wife that FT saw and he would like them to have a double book apt together.  I found 3/18 at 11:30 30 min spot that I thought would work.  This is also the time frame he wanted.  It will not let me do it.  THANKS !!    George SEAMAN And    Aurora Ramirez MRN: [id number]

## 2025-01-06 ENCOUNTER — TELEPHONE (OUTPATIENT)
Age: 70
End: 2025-01-06

## 2025-01-06 NOTE — TELEPHONE ENCOUNTER
Patient called in. At last appt w/ Dr. Shikha DOUGLASS was planned. Patient saw Cardiology who advised patient that he should only be off Eliquis for 2 days prior to procedure as well as if cardiologist states that if he absolutely needs to hold Plavix he would need to be on Aspirin before during and after procedure.

## 2025-01-06 NOTE — TELEPHONE ENCOUNTER
Verbally confirmed with the patient the following information:    Date: 3/18/2025   Arrival Time: 11:15 AM   Visit Type: FOLLOW UP [facility]   Provider: Balbir Trivedi MD   Department: [facility] Simon's appointment will follow.

## 2025-01-06 NOTE — TELEPHONE ENCOUNTER
I spoke with the patient. He will speak with Dr. Trivedi concerning the aspirin and will let us know.

## 2025-01-06 NOTE — TELEPHONE ENCOUNTER
Pt is s/p cardiac cath with stent placement on 2/13/2024. Received call from pt stating he needs to have a TURP procedure with urology and is wanting cardiac clearance. He also stated they are requesting him hold his eliquis and plavix for a total of one week. The procedure is not currently scheduled. Advised pt will send a message to the provider to review and advise.

## 2025-01-08 ENCOUNTER — PREP FOR PROCEDURE (OUTPATIENT)
Dept: UROLOGY | Facility: AMBULATORY SURGERY CENTER | Age: 70
End: 2025-01-08

## 2025-01-08 DIAGNOSIS — R39.89 SUSPECTED UTI: ICD-10-CM

## 2025-01-08 DIAGNOSIS — N40.1 BPH WITH OBSTRUCTION/LOWER URINARY TRACT SYMPTOMS: Primary | ICD-10-CM

## 2025-01-08 DIAGNOSIS — Z01.812 PRE-OPERATIVE LABORATORY EXAMINATION: ICD-10-CM

## 2025-01-08 DIAGNOSIS — Z01.818 PREOP EXAMINATION: ICD-10-CM

## 2025-01-08 DIAGNOSIS — N13.8 BPH WITH OBSTRUCTION/LOWER URINARY TRACT SYMPTOMS: Primary | ICD-10-CM

## 2025-01-08 DIAGNOSIS — Z01.810 PRE-OPERATIVE CARDIOVASCULAR EXAMINATION: ICD-10-CM

## 2025-01-08 NOTE — TELEPHONE ENCOUNTER
Spoke with patient and confirmed surgery date of: 3/14/2025  Type of surgery: Cysto/TURP  Operating physician: Dr. Trivedi  Location of surgery: [facility]    Verbally went over prep with patient on: 1/8/2025  NPO  Bowel prep? No  Hospital calls afternoon prior with arrival time -Calls Friday afternoon for Monday surgeries  Patient needs ride to and from surgery outpatient w/ 23 hr hold for observation  Pre-op testing to be done 2 weeks prior to surgery  CBC, BMP, Type and Screen, Urine Culture and EKG  Blood thinners:   Eliquis and Plavix  Clearances needed: Cardiac    Mailed to patient on:  Copy of packet scanned into Media on:  Labs in packet  Soap / Bowel prep in packet  Pre-op & Post-op in packet  Dates of H&P and post-op if needed    Consent: in Media  If needed:    Medical/Cardiac Clearance: Cardiac  Appt with:  Appt date and time:  Date clearance form faxed:  Best fax number:    Medication Suspension of: Eliquis 2 days prior and Plavix 1 week prior  Ordering provider: Dr. Castro  Faxed Medication Suspension form on: Sending message through [employer] Private fax number:

## 2025-01-15 ENCOUNTER — NURSE TRIAGE (OUTPATIENT)
Age: 70
End: 2025-01-15

## 2025-01-15 NOTE — TELEPHONE ENCOUNTER
Patient and wife called in. Patient received surgical packet had some questions about blood thinners and holds because the patient already confirmed with Dr. Trivedi and Dr. Castro about holding Eliquis 2 days and Plavix 7, but replace plavix with Aspirin. Patient concerned because of form that states PAT will call and tell patient when to hold Aspirin and he wants to make sure they understand he was told not to hold Aspirin. Advised they would but patient would feel better speaking with SS once more.

## 2025-01-15 NOTE — TELEPHONE ENCOUNTER
Pt was contacted, no answer message left on machine as follow    He is on very low-dose metoprolol the purpose of which is for his atrial fibrillation.  He is not on any other blood pressure lowering drugs.  I would ask him to attempt to continue the metoprolol as prescribed and continue to monitor his blood pressure.

## 2025-01-15 NOTE — TELEPHONE ENCOUNTER
"Reason for Conversation: received call from pt.  He says his BP has been low and he's wondering if the Metoprolol can be stopped.  Yesterday BP after working out it was 106/63.  He held his evening dose last night.  This morning his BP was 100/55.  He says in the morning he feels \"fuzzy\".      VS/Weight: (Note: Please include date/time vitals/weight were measured)  106/63, 100/55    Pain: No    Risk Factors: Arrhythmia- afib, CAD- stent to LAD, dyslipidemia     Recent relevant testing and date of testing: Echo 2/13/24, C 2/13/24    Medication: metoprolol tartrate 12.5 mg bid, colesevelam 1250 mg bid, diltiazem 30 mg prn, eliquis 5 mg bid, zetia 10 mg qd, plavix 75 mg qd    Upcoming Office Visit: Yes    Last Office Visit: 11/22/24      Reason for Disposition   Systolic BP  while taking blood pressure medications    Answer Assessment - Initial Assessment Questions  1. BLOOD PRESSURE: \"What is your blood pressure?\" \"Did you take at least two measurements 5 minutes apart?\"      100/55  2. ONSET: \"When did you take your blood pressure?\"      This morning  3. HOW: \"How did you take your blood pressure?\" (e.g., visiting nurse, automatic home BP monitor)      Home BP monitor   4. HISTORY: \"Do you have a history of low blood pressure?\" \"What is your blood pressure normally?\"      Has been trending low, concerned with medications   5. MEDICINES: \"Are you taking any medicines for blood pressure?\" If Yes, ask: \"Have they been changed recently?\"      Yes  6. PULSE RATE: \"Do you know what your pulse rate is?\"       60's  7. OTHER SYMPTOMS: \"Have you been sick recently?\" \"Have you had a recent injury?\"      \"Fuzzy\" feeling in the morning    Protocols used: Blood Pressure - Low-Adult-OH    "

## 2025-01-22 ENCOUNTER — TELEPHONE (OUTPATIENT)
Age: 70
End: 2025-01-22

## 2025-01-22 NOTE — TELEPHONE ENCOUNTER
Received call from [facility]une[facility] stating the paperwork they received for the pt's [employer] application is missing information.  They need the form to have the quantity and refill section filled out as well as frequency information. Please update the pt's form and refax to [phone].      Call back to pharmacist [phone] opt [phone]

## 2025-01-23 DIAGNOSIS — I25.10 CORONARY ARTERY DISEASE INVOLVING NATIVE CORONARY ARTERY OF NATIVE HEART WITHOUT ANGINA PECTORIS: ICD-10-CM

## 2025-01-23 RX ORDER — METOPROLOL TARTRATE 25 MG/1
12.5 TABLET, FILM COATED ORAL EVERY 12 HOURS
Qty: 100 TABLET | Refills: 0 | Status: SHIPPED | OUTPATIENT
Start: 2025-01-23

## 2025-01-23 RX ORDER — CLOPIDOGREL BISULFATE 75 MG/1
75 TABLET ORAL DAILY
Qty: 100 TABLET | Refills: 0 | Status: SHIPPED | OUTPATIENT
Start: 2025-01-23

## 2025-02-15 PROBLEM — Z01.810 PREOPERATIVE CARDIOVASCULAR EXAMINATION: Status: ACTIVE | Noted: 2019-08-28

## 2025-02-15 NOTE — ASSESSMENT & PLAN NOTE
--CAD with history of stents to the LAD in 2011 and 2017  --2/13/2024 echocardiogram: LVEF 60%.  Grade 1 diastolic dysfunction.  Mild LVH.  Aortic root normal in size at 3.6 cm with mild fibrocalcific change.  Ascending aorta mildly dilated at 3.7 cm  --2/13/2024 cardiac catheterization following admission for chest pain: Ostial LM 20%, proximal LAD 30%, mid LAD 80%, proximal circumflex 50%.  Subsequent stent (WIL) of mid LAD 80% lesion to 0% residual.

## 2025-02-15 NOTE — ASSESSMENT & PLAN NOTE
Patient with history of coronary artery disease and 3 interventions with stent placement, paroxysmal atrial fibrillation on Eliquis 5 mg twice daily, hyperlipidemia on Leqvio. and ezetimibe presents for preoperative cardiovascular examination for surgical treatment of BPH with obstruction/urinary tract symptoms.  Surgery is scheduled for 3/14/2025    Recommend proceeding with surgery as planned  Patient is at least a moderate risk and that risk will increase if Plavix and anticoagulation cannot be resumed promptly on the day following surgery  Recommend Plavix be stopped 5 days prior to surgery and resumed as soon as possible after surgery  Recommend Eliquis be stopped 2 days prior to surgery and be resumed as soon as possible after surgery.

## 2025-02-15 NOTE — ASSESSMENT & PLAN NOTE
--Paroxysmal atrial fibrillation on Eliquis 5 Mg twice daily for stroke prevention.  Patient takes diltiazem 30 mg p.o. when he has a bout of atrial fibrillation which usually results in conversion to sinus rhythm but patient often develops hypotension subsequently.

## 2025-02-21 ENCOUNTER — OFFICE VISIT (OUTPATIENT)
Dept: CARDIOLOGY CLINIC | Facility: CLINIC | Age: 70
End: 2025-02-21
Payer: COMMERCIAL

## 2025-02-21 VITALS
WEIGHT: 221 LBS | SYSTOLIC BLOOD PRESSURE: 132 MMHG | HEIGHT: 68 IN | HEART RATE: 63 BPM | DIASTOLIC BLOOD PRESSURE: 74 MMHG | BODY MASS INDEX: 33.49 KG/M2

## 2025-02-21 DIAGNOSIS — Z01.810 PREOPERATIVE CARDIOVASCULAR EXAMINATION: Primary | ICD-10-CM

## 2025-02-21 DIAGNOSIS — E78.2 MIXED HYPERLIPIDEMIA: ICD-10-CM

## 2025-02-21 DIAGNOSIS — I25.10 CORONARY ARTERY DISEASE INVOLVING NATIVE CORONARY ARTERY OF NATIVE HEART WITHOUT ANGINA PECTORIS: ICD-10-CM

## 2025-02-21 DIAGNOSIS — I48.0 PAROXYSMAL ATRIAL FIBRILLATION (HCC): ICD-10-CM

## 2025-02-21 DIAGNOSIS — I67.9 CEREBRAL VASCULAR DISEASE: ICD-10-CM

## 2025-02-21 PROCEDURE — 93000 ELECTROCARDIOGRAM COMPLETE: CPT | Performed by: INTERNAL MEDICINE

## 2025-02-21 PROCEDURE — 99214 OFFICE O/P EST MOD 30 MIN: CPT | Performed by: INTERNAL MEDICINE

## 2025-02-21 NOTE — PATIENT INSTRUCTIONS
Recommend Plavix be stopped 5 days prior to surgery and resumed as soon as possible after surgery  Recommend Eliquis be stopped 2 days prior to surgery and be resumed as soon as possible after surgery.

## 2025-02-21 NOTE — PROGRESS NOTES
[facility]  [address] [place] [address]  Phone#  [phone]   Fax#  [phone]  *-*-*-*-*-*-*-*-*-*-*-*-*-*-*-*-*-*-*-*-*-*-*-*-*-*-*-*-*-*-*-*-*-*-*-*-*-*-*-*-*-*-*-*-*-*-*-*-*-*-*-*-*-*                                   Cardiology Follow Up      ENCOUNTER DATE: 25 10:32 AM  PATIENT NAME: George Ramirez   : 1955    MRN: [id number]  AGE:69 y.o.      SEX: male  ENCOUNTER PROVIDER:Prosper Capone MD     PRIMARY CARE PHYSICIAN: Viviane Resendiz DO    ACTIVE DIAGNOSIS AND PLAN    1. Preoperative cardiovascular examination  Assessment & Plan:  Patient with history of coronary artery disease and 3 interventions with stent placement, paroxysmal atrial fibrillation on Eliquis 5 mg twice daily, hyperlipidemia on Leqvio. and ezetimibe presents for preoperative cardiovascular examination for surgical treatment of BPH with obstruction/urinary tract symptoms.  Surgery is scheduled for 3/14/2025    Recommend proceeding with surgery as planned  Patient is at least a moderate risk and that risk will increase if Plavix and anticoagulation cannot be resumed promptly on the day following surgery  Recommend Plavix be stopped 5 days prior to surgery and resumed as soon as possible after surgery  Recommend Eliquis be stopped 2 days prior to surgery and be resumed as soon as possible after surgery.    Orders:  -     POCT ECG  2. Coronary artery disease involving native coronary artery of native heart without angina pectoris  Assessment & Plan:  --CAD with history of stents to the LAD in  and   --2024 echocardiogram: LVEF 60%.  Grade 1 diastolic dysfunction.  Mild LVH.  Aortic root normal in size at 3.6 cm with mild fibrocalcific change.  Ascending aorta mildly dilated at 3.7 cm  --2024 cardiac catheterization following admission for chest pain: Ostial LM 20%, proximal LAD 30%, mid LAD 80%, proximal circumflex 50%.  Subsequent stent (WIL) of mid LAD 80% lesion to 0%  residual.  Orders:  -     POCT ECG  3. Paroxysmal atrial fibrillation (HCC)  Assessment & Plan:  --Paroxysmal atrial fibrillation on Eliquis 5 Mg twice daily for stroke prevention.  Patient takes diltiazem 30 mg p.o. when he has a bout of atrial fibrillation which usually results in conversion to sinus rhythm but patient often develops hypotension subsequently.  4. Mixed hyperlipidemia  Assessment & Plan:  --Hyperlipidemia intolerant of statins on ezetimibe and subsequently started on Leqvio  5. Cerebral vascular disease  Assessment & Plan:  --PFO with CVA, status post PFO closure     INTERVAL HISTORY:        Patient has intermittent episodes of atrial fibrillation.  When he has an episode of atrial fibrillation he has a routine he goes along.  He lays on the floor facedown with his left chest on a ball.  He stretches and relaxes.  Eventually the atrial fibrillation appears to go away.  However, the day following his left chest is sore on the ribs from laying on the ball.  Other times he takes diltiazem and an extra dose.  He used to take 30 mg but that would make him very lightheaded and dropped his blood pressure.  Presently he takes a half a tablet or 15 mg and if that does not take the atrial fibrillation away he repeats it later.  He is on Eliquis anticoagulation.    He has no symptoms from his coronary artery disease.  He is stent placed in the mid LAD 1 year ago.  It should be safe to stop his Plavix at this time for short.    DISCUSSION/PLAN:          Recommend proceeding with surgery as planned  Patient is at least a moderate risk and that risk will increase if Plavix and anticoagulation cannot be resumed promptly on the day following surgery  Recommend Plavix be stopped 5 days prior to surgery and resumed as soon as possible after surgery  Recommend Eliquis be stopped 2 days prior to surgery and be resumed as soon as possible after surgery.  Patient will follow-up with Dr. Castro, his normal  cardiologist.    CARDIOLOGY HISTORY AND TESTING  Referral to cardiology by Balbir Trivedi MD for preoperative cardiovascular examination for BPH with obstruction/lower urinary tract symptoms.    --Hyperlipidemia intolerant of statins on ezetimibe and subsequently started on Leqvio  --Paroxysmal atrial fibrillation on Eliquis 5 Mg twice daily for stroke prevention.  Patient takes diltiazem 30 mg p.o. when he has a bout of atrial fibrillation which usually results in conversion to sinus rhythm but patient often develops hypotension subsequently.  --PFO with CVA, status post PFO closure  --CAD with history of stents to the LAD in 2011 and 2017  --2/13/2024 echocardiogram: LVEF 60%.  Grade 1 diastolic dysfunction.  Mild LVH.  Aortic root normal in size at 3.6 cm with mild fibrocalcific change.  Ascending aorta mildly dilated at 3.7 cm  --2/13/2024 cardiac catheterization following admission for chest pain: Ostial LM 20%, proximal LAD 30%, mid LAD 80%, proximal circumflex 50%.  Subsequent stent of mid LAD 80% lesion to 0% residual    ACTIVE PROBLEM LIST  Patient Active Problem List   Diagnosis    Seasonal allergic rhinitis    Osteoarthritis    Nephrolithiasis    Irritable bowel syndrome    Hyperlipidemia    Coronary artery disease involving native coronary artery    Alpha-1-antitrypsin deficiency (HCC)    BPH with obstruction/lower urinary tract symptoms    Preoperative cardiovascular examination    Pancreatic cyst    Lumbar radiculopathy    Low back pain with sciatica    DDD (degenerative disc disease), lumbar    Lumbar spondylosis    Tinea corporis    Chronic pain syndrome    Paroxysmal atrial fibrillation (HCC)    Skin lesion    BMI 34.0-34.9,adult    Symptomatic varicose veins of both lower extremities    Chest pain    Presence of drug-eluting stent in anterior descending branch of left coronary artery    Statin intolerance    Cerebral vascular disease       TODAY'S EKG  Results for orders placed or performed  in visit on 02/21/25   POCT ECG    Narrative    Normal sinus rhythm at a rate of 63 bpm.  Normal EKG.         Lab Studies:    Lab Results   Component Value Date    CHOLESTEROL 150 11/26/2024    CHOLESTEROL 217 (H) 05/08/2024    CHOLESTEROL 219 (H) 11/03/2023     Lab Results   Component Value Date    TRIG 84 11/26/2024    TRIG 98 05/08/2024    TRIG 82 11/03/2023     Lab Results   Component Value Date    HDL 65 11/26/2024    HDL 65 05/08/2024    HDL 63 11/03/2023     Lab Results   Component Value Date    LDLCALC 68 11/26/2024    LDLCALC 132 (H) 05/08/2024    LDLCALC 140 (H) 11/03/2023     Lab Results   Component Value Date    LDLDIRECT 75 11/26/2024    LDLDIRECT 149 (H) 05/08/2024    LDLDIRECT 175 (H) 08/23/2019     Lab Results   Component Value Date    HGBA1C 5.3 02/13/2024      Lab Results   Component Value Date    EGFR 87 02/14/2024    EGFR 87 02/13/2024    EGFR 77 02/12/2024    SODIUM 138 02/14/2024    SODIUM 138 02/13/2024    SODIUM 137 02/12/2024    K 4.4 02/14/2024    K 4.5 02/13/2024    K 4.4 02/12/2024     02/14/2024     (H) 02/13/2024     02/12/2024    CO2 27 02/14/2024    CO2 24 02/13/2024    CO2 27 02/12/2024    ANIONGAP 8 06/11/2015    ANIONGAP 5 05/12/2015    ANIONGAP 0 (L) 08/14/2014    BUN 13 02/14/2024    BUN 15 02/13/2024    BUN 18 02/12/2024    CREATININE 0.86 02/14/2024    CREATININE 0.88 02/13/2024    CREATININE 0.98 02/12/2024    MG 2.0 02/14/2024    MG 2.2 02/13/2024    MG 2.3 02/06/2017     Lab Results   Component Value Date    WBC 8.43 02/14/2024    WBC 5.46 02/13/2024    WBC 6.23 02/12/2024    HGB 16.6 02/14/2024    HGB 14.9 02/13/2024    HGB 15.4 02/12/2024    HCT 49.1 02/14/2024    HCT 46.0 02/13/2024    HCT 46.6 02/12/2024    MCV 94 02/14/2024    MCV 96 02/13/2024    MCV 97 02/12/2024    MCH 31.9 02/14/2024    MCH 31.2 02/13/2024    MCH 32.2 02/12/2024    MCHC 33.8 02/14/2024    MCHC 32.4 02/13/2024    MCHC 33.0 02/12/2024     02/14/2024     02/13/2024      02/12/2024      Lab Results   Component Value Date    GLUCOSE 92 06/11/2015    GLUCOSE 88 05/12/2015    GLUCOSE 101 08/14/2014    CALCIUM 9.3 02/14/2024    CALCIUM 8.8 02/13/2024    CALCIUM 9.2 02/12/2024    ALB 4.6 02/23/2024    ALB 4.1 02/13/2024    ALB 4.6 02/12/2024    TP 6.7 02/23/2024    TP 6.7 02/13/2024    TP 7.3 02/12/2024    AST 22 02/23/2024    AST 27 02/13/2024    AST 33 02/12/2024    ALT 34 02/23/2024    ALT 35 02/13/2024    ALT 45 02/12/2024    ALKPHOS 78 02/23/2024    ALKPHOS 67 02/13/2024    ALKPHOS 74 02/12/2024    BILITOT 0.3 01/30/2016    BILITOT 0.38 05/12/2015    BILITOT 0.46 08/14/2014     Lab Results   Component Value Date    FREET4 0.8 06/11/2015    FREET4 0.8 05/12/2015       Current Outpatient Medications:     apixaban (Eliquis) 5 mg, TAKE ONE TABLET BY MOUTH TWICE A DAY, Disp: 180 tablet, Rfl: 1    ascorbic acid (VITAMIN C) 250 mg tablet, Take 250 mg by mouth 2 (two) times a day, Disp: , Rfl:     chlorpheniramine (CHLOR-TRIMETON) 4 MG tablet, Take 1 tablet (4 mg total) by mouth every 6 (six) hours as needed for rhinitis, Disp: 30 tablet, Rfl: 0    clobetasol (TEMOVATE) 0.05 % cream, Apply 1 application topically 2 (two) times a day as needed Apply sparingly to affected areas, Disp: , Rfl:     clopidogrel (PLAVIX) 75 mg tablet, TAKE ONE TABLET BY MOUTH EVERY DAY, Disp: 100 tablet, Rfl: 0    Coenzyme Q10 (COQ10) 200 MG CAPS, Take 1 capsule by mouth daily, Disp: , Rfl:     colesevelam (WELCHOL) 625 mg tablet, TAKE TWO TABLETS BY MOUTH TWICE A DAY WITH MEALS, Disp: 360 tablet, Rfl: 1    diltiazem (CARDIZEM) 30 mg tablet, TAKE ONE TABLET BY MOUTH IF NEEDED FOR AFIB, Disp: 15 tablet, Rfl: 3    ezetimibe (ZETIA) 10 mg tablet, Take 1 tablet (10 mg total) by mouth daily, Disp: 90 tablet, Rfl: 1    Glucosamine-Chondroit-Vit C-Mn (GLUCOSAMINE CHONDR 1500 COMPLX PO), Take 1 tablet by mouth 2 (two) times a day , Disp: , Rfl:     hyoscyamine (LEVSIN/SL) 0.125 mg SL tablet, PLACE ONE TABLET UNDER  THE TONGUE THREE TIMES A DAY, Disp: , Rfl:     metoprolol tartrate (LOPRESSOR) 25 mg tablet, TAKE 1/2 TABLET BY MOUTH EVERY 12 HOURS, Disp: 100 tablet, Rfl: 0    montelukast (SINGULAIR) 10 mg tablet, TAKE ONE TABLET BY MOUTH EVERY EVENING AT BEDTIME, Disp: 90 tablet, Rfl: 1    multivitamin (THERAGRAN) TABS, Take 1 tablet by mouth 2 (two) times a day  , Disp: , Rfl:     nitroglycerin (NITROSTAT) 0.4 mg SL tablet, Place 1 tablet (0.4 mg total) under the tongue every 5 (five) minutes as needed for chest pain - do not take if you have recently taken Sildenafil (Revatio), Disp: 30 tablet, Rfl: 0    NON FORMULARY, CBD CAPSULE  AND CREAM USE DAILY PRIN, Disp: , Rfl:     OXcarbazepine (TRILEPTAL) 300 mg tablet, Take 1 tablet (300 mg total) by mouth 2 (two) times a day, Disp: 180 tablet, Rfl: 1    sildenafil (REVATIO) 20 mg tablet, Take 1-5 tablets on empty stomach one hour prior to sexual activity if needed, Disp: 30 tablet, Rfl: 3    tiZANidine (ZANAFLEX) 2 mg tablet, Take 1 tablet (2 mg total) by mouth 2 (two) times a day as needed for muscle spasms, Disp: 180 tablet, Rfl: 1    Inclisiran Sodium (Leqvio) subcutaneous injection, Inject 1.5 mL (284 mg total) under the skin once for 1 dose, Disp: 1.5 mL, Rfl: 0    Current Facility-Administered Medications:     Inclisiran Sodium (LEQVIO) subcutaneous injection 284 mg, 284 mg, Subcutaneous, Q6 Months,   Allergies   Allergen Reactions    Augmentin [Amoxicillin-Pot Clavulanate] Hives and Itching    Molds & Smuts Allergic Rhinitis     Category: Allergy;     Monascus Purpureus Went Yeast Fatigue     Category: Adverse Reaction;     Niacin Fatigue     Category: Adverse Reaction;     Pollen Extract Allergic Rhinitis     Category: Allergy;     Pregabalin Fatigue     Category: Adverse Reaction;     Statins Fatigue and GI Intolerance     Category: Adverse Reaction;     Atorvastatin GI Intolerance and Fatigue     Other reaction(s): Muscle pain, constipation, sleepiness  Category: Adverse  Reaction;   Other reaction(s): Muscle pain, constipation, sleepiness       Past Medical History:   Diagnosis Date    A-fib (HCC)     Allergic     Allergic rhinitis     Arthritis     Benign cyst of kidney     Cancer (HCC) 2015    Cat bite 2020    Cerebral vascular disease     Chronic sinusitis     Coronary artery disease     Cryptogenic stroke (HCC) 2020    Hyperlipidemia     Kidney stone     Melanoma (HCC)     Pancreas cyst     Stroke (HCC) 2006     Social History     Socioeconomic History    Marital status: /Civil Union     Spouse name: Not on file    Number of children: Not on file    Years of education: Not on file    Highest education level: Not on file   Occupational History    Occupation: massage therapist   Tobacco Use    Smoking status: Former     Current packs/day: 0.00     Types: Cigars, Cigarettes     Start date: 1975     Quit date: 1986     Years since quittin.1    Smokeless tobacco: Former     Types: Chew     Quit date:     Tobacco comments:     Cigars    Vaping Use    Vaping status: Never Used   Substance and Sexual Activity    Alcohol use: Yes     Alcohol/week: 1.0 standard drink of alcohol     Types: 1 Glasses of wine per week     Comment: 8 oz. Daily    Drug use: Not Currently     Types: Marijuana    Sexual activity: Yes     Partners: Female     Birth control/protection: Male Sterilization   Other Topics Concern    Not on file   Social History Narrative    Caffeine: drinks 1 cup coffee/ tea a day.     Social Drivers of Health     Financial Resource Strain: Low Risk  (2023)    Overall Financial Resource Strain (CARDIA)     Difficulty of Paying Living Expenses: Not hard at all   Food Insecurity: No Food Insecurity (2024)    Hunger Vital Sign     Worried About Running Out of Food in the Last Year: Never true     Ran Out of Food in the Last Year: Never true   Transportation Needs: No Transportation Needs (2024)    PRAPARE - Transportation     Lack of  Transportation (Medical): No     Lack of Transportation (Non-Medical): No   Physical Activity: Not on file   Stress: Not on file   Social Connections: Not on file   Intimate Partner Violence: Not on file   Housing Stability: Low Risk  (11/14/2024)    Housing Stability Vital Sign     Unable to Pay for Housing in the Last Year: No     Number of Times Moved in the Last Year: 0     Homeless in the Last Year: No      Family History   Problem Relation Age of Onset    Diabetes type II Mother     Cancer Father         angioma    Cancer Sister     Hearing loss Sister      Past Surgical History:   Procedure Laterality Date    CARDIAC CATHETERIZATION N/A 2/13/2024    Procedure: Cardiac catheterization;  Surgeon: Tripp Cross MD;  Location: [facility];  Service: Cardiology    CARDIAC CATHETERIZATION N/A 2/13/2024    Procedure: Cardiac Coronary Angiogram;  Surgeon: Tripp Cross MD;  Location: [facility];  Service: Cardiology    CARDIAC CATHETERIZATION N/A 2/13/2024    Procedure: Cardiac pci;  Surgeon: Tripp Cross MD;  Location: [facility];  Service: Cardiology    CARDIAC CATHETERIZATION Left 2/13/2024    Procedure: Cardiac Left Heart Cath;  Surgeon: Tripp Cross MD;  Location: [facility];  Service: Cardiology    COLONOSCOPY      OTHER SURGICAL HISTORY  2003    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    PATENT FORAMEN OVALE CLOSURE         PREVIOUS WEIGHTS:   Wt Readings from Last 10 Encounters:   02/21/25 100 kg (221 lb)   12/31/24 99.8 kg (220 lb)   11/22/24 102 kg (223 lb 12.8 oz)   11/21/24 101 kg (222 lb)   11/07/24 100 kg (221 lb)   10/03/24 99 kg (218 lb 3.2 oz)   07/09/24 98.4 kg (217 lb)   05/22/24 99.8 kg (220 lb)   05/16/24 101 kg (221 lb 12.8 oz)   02/21/24 99.8 kg (220 lb 1.6 oz)        Review of Systems:  Review of Systems   Constitutional:  Negative for activity change.   Respiratory:  Negative for cough, chest tightness, shortness of breath and wheezing.    Cardiovascular:   "Negative for chest pain, palpitations and leg swelling.   Musculoskeletal:  Negative for gait problem.   Skin:  Negative for color change.   Neurological:  Negative for dizziness, tremors, syncope, weakness, light-headedness and headaches.   Psychiatric/Behavioral:  Negative for agitation and confusion.        Physical Exam:  /74   Pulse 63   Ht 5' 7.5\" (1.715 m)   Wt 100 kg (221 lb)   BMI 34.10 kg/m²     Physical Exam  Vitals reviewed.   Constitutional:       General: He is not in acute distress.     Appearance: He is well-developed.   HENT:      Head: Normocephalic and atraumatic.   Neck:      Thyroid: No thyromegaly.      Vascular: No carotid bruit or JVD.      Trachea: No tracheal deviation.   Cardiovascular:      Rate and Rhythm: Normal rate and regular rhythm.      Pulses: Normal pulses.      Heart sounds: Normal heart sounds. No murmur heard.     No friction rub. No gallop.   Pulmonary:      Effort: Pulmonary effort is normal. No respiratory distress.      Breath sounds: Normal breath sounds. No wheezing, rhonchi or rales.   Chest:      Chest wall: No tenderness.   Abdominal:      General: Bowel sounds are normal. There is no distension.      Palpations: Abdomen is soft.      Tenderness: There is no abdominal tenderness.   Musculoskeletal:      Cervical back: Normal range of motion and neck supple.      Right lower leg: No edema.      Left lower leg: No edema.   Skin:     General: Skin is warm and dry.   Neurological:      General: No focal deficit present.      Mental Status: He is alert and oriented to person, place, and time.      Gait: Gait normal.   Psychiatric:         Mood and Affect: Mood normal.         Behavior: Behavior normal.         Thought Content: Thought content normal.         Judgment: Judgment normal.         ======================================================  Imaging:   Results Review Statement: No pertinent imaging studies reviewed.    Portions of the record may have been " "created with voice recognition software. Occasional wrong word or \"sound a like\" substitutions may have occurred due to the inherent limitations of voice recognition software. Read the chart carefully and recognize, using context, where substitutions have occurred.    SIGNATURES:   Prosper Capone MD   "

## 2025-02-28 ENCOUNTER — APPOINTMENT (OUTPATIENT)
Dept: LAB | Facility: CLINIC | Age: 70
End: 2025-02-28
Payer: COMMERCIAL

## 2025-02-28 DIAGNOSIS — N13.8 BPH WITH OBSTRUCTION/LOWER URINARY TRACT SYMPTOMS: ICD-10-CM

## 2025-02-28 DIAGNOSIS — Z01.818 PREOP EXAMINATION: ICD-10-CM

## 2025-02-28 DIAGNOSIS — Z01.812 PRE-OPERATIVE LABORATORY EXAMINATION: ICD-10-CM

## 2025-02-28 DIAGNOSIS — R39.89 SUSPECTED UTI: ICD-10-CM

## 2025-02-28 DIAGNOSIS — N40.1 BPH WITH OBSTRUCTION/LOWER URINARY TRACT SYMPTOMS: ICD-10-CM

## 2025-02-28 LAB
ABO GROUP BLD: NORMAL
ALBUMIN SERPL BCG-MCNC: 4.3 G/DL (ref 3.5–5)
ALP SERPL-CCNC: 90 U/L (ref 34–104)
ALT SERPL W P-5'-P-CCNC: 27 U/L (ref 7–52)
ANION GAP SERPL CALCULATED.3IONS-SCNC: 8 MMOL/L (ref 4–13)
AST SERPL W P-5'-P-CCNC: 21 U/L (ref 13–39)
BASOPHILS # BLD AUTO: 0.06 THOUSANDS/ÂΜL (ref 0–0.1)
BASOPHILS NFR BLD AUTO: 1 % (ref 0–1)
BILIRUB SERPL-MCNC: 0.37 MG/DL (ref 0.2–1)
BLD GP AB SCN SERPL QL: NEGATIVE
BUN SERPL-MCNC: 19 MG/DL (ref 5–25)
CALCIUM SERPL-MCNC: 9.3 MG/DL (ref 8.4–10.2)
CHLORIDE SERPL-SCNC: 104 MMOL/L (ref 96–108)
CO2 SERPL-SCNC: 26 MMOL/L (ref 21–32)
CREAT SERPL-MCNC: 0.93 MG/DL (ref 0.6–1.3)
EOSINOPHIL # BLD AUTO: 0.13 THOUSAND/ÂΜL (ref 0–0.61)
EOSINOPHIL NFR BLD AUTO: 2 % (ref 0–6)
ERYTHROCYTE [DISTWIDTH] IN BLOOD BY AUTOMATED COUNT: 12.1 % (ref 11.6–15.1)
GFR SERPL CREATININE-BSD FRML MDRD: 82 ML/MIN/1.73SQ M
GLUCOSE SERPL-MCNC: 93 MG/DL (ref 65–140)
HCT VFR BLD AUTO: 44.5 % (ref 36.5–49.3)
HGB BLD-MCNC: 14.8 G/DL (ref 12–17)
IMM GRANULOCYTES # BLD AUTO: 0.03 THOUSAND/UL (ref 0–0.2)
IMM GRANULOCYTES NFR BLD AUTO: 0 % (ref 0–2)
LYMPHOCYTES # BLD AUTO: 1.99 THOUSANDS/ÂΜL (ref 0.6–4.47)
LYMPHOCYTES NFR BLD AUTO: 29 % (ref 14–44)
MCH RBC QN AUTO: 31.8 PG (ref 26.8–34.3)
MCHC RBC AUTO-ENTMCNC: 33.3 G/DL (ref 31.4–37.4)
MCV RBC AUTO: 96 FL (ref 82–98)
MONOCYTES # BLD AUTO: 0.6 THOUSAND/ÂΜL (ref 0.17–1.22)
MONOCYTES NFR BLD AUTO: 9 % (ref 4–12)
NEUTROPHILS # BLD AUTO: 4 THOUSANDS/ÂΜL (ref 1.85–7.62)
NEUTS SEG NFR BLD AUTO: 59 % (ref 43–75)
NRBC BLD AUTO-RTO: 0 /100 WBCS
PLATELET # BLD AUTO: 208 THOUSANDS/UL (ref 149–390)
PMV BLD AUTO: 9.4 FL (ref 8.9–12.7)
POTASSIUM SERPL-SCNC: 4.1 MMOL/L (ref 3.5–5.3)
PROT SERPL-MCNC: 6.9 G/DL (ref 6.4–8.4)
RBC # BLD AUTO: 4.65 MILLION/UL (ref 3.88–5.62)
RH BLD: POSITIVE
SODIUM SERPL-SCNC: 138 MMOL/L (ref 135–147)
SPECIMEN EXPIRATION DATE: NORMAL
WBC # BLD AUTO: 6.81 THOUSAND/UL (ref 4.31–10.16)

## 2025-02-28 PROCEDURE — 80053 COMPREHEN METABOLIC PANEL: CPT

## 2025-02-28 PROCEDURE — 86900 BLOOD TYPING SEROLOGIC ABO: CPT | Performed by: UROLOGY

## 2025-02-28 PROCEDURE — 36415 COLL VENOUS BLD VENIPUNCTURE: CPT

## 2025-02-28 PROCEDURE — 86850 RBC ANTIBODY SCREEN: CPT | Performed by: UROLOGY

## 2025-02-28 PROCEDURE — 85025 COMPLETE CBC W/AUTO DIFF WBC: CPT

## 2025-02-28 PROCEDURE — 86901 BLOOD TYPING SEROLOGIC RH(D): CPT | Performed by: UROLOGY

## 2025-02-28 PROCEDURE — 87086 URINE CULTURE/COLONY COUNT: CPT

## 2025-03-01 LAB — BACTERIA UR CULT: NORMAL

## 2025-03-05 ENCOUNTER — OFFICE VISIT (OUTPATIENT)
Dept: UROLOGY | Facility: CLINIC | Age: 70
End: 2025-03-05
Payer: COMMERCIAL

## 2025-03-05 VITALS
DIASTOLIC BLOOD PRESSURE: 80 MMHG | SYSTOLIC BLOOD PRESSURE: 126 MMHG | HEART RATE: 60 BPM | BODY MASS INDEX: 33.95 KG/M2 | WEIGHT: 224 LBS | HEIGHT: 68 IN | OXYGEN SATURATION: 96 %

## 2025-03-05 DIAGNOSIS — R39.16 BENIGN PROSTATIC HYPERPLASIA (BPH) WITH STRAINING ON URINATION: Primary | ICD-10-CM

## 2025-03-05 DIAGNOSIS — N40.1 BENIGN PROSTATIC HYPERPLASIA (BPH) WITH STRAINING ON URINATION: Primary | ICD-10-CM

## 2025-03-05 PROCEDURE — 99213 OFFICE O/P EST LOW 20 MIN: CPT | Performed by: UROLOGY

## 2025-03-05 NOTE — H&P (VIEW-ONLY)
Name: George Ramirez      : 1955      MRN: [id number]  Encounter Provider: Balbir Trivedi MD  Encounter Date: 3/5/2025   Encounter department: [facility] [place] [facility]  :  Assessment & Plan  Benign prostatic hyperplasia (BPH) with straining on urination           Impression: Symptomatic BPH, history of coronary artery stenting as well as paroxysmal atrial fibrillation    Plan: The patient is scheduled for transurethral resection of the prostate on 2024.  Risk of the procedure including, not limited to bleeding, infection, electrolyte abnormalities, retrograde ejaculation were discussed and reviewed.  Informed consent obtained.  Patient received cardiac clearance and will hold Plavix 5 days prior to surgery and Eliquis 2 days prior to surgery.  History of Present Illness   George Ramirez is a 69 y.o. male who presents in follow-up for his history of BPH.  I recently performed cystoscopy on 2024.  Cystoscopy revealed lateral lobe hyperplasia of the prostate with moderate intravesical protrusion without a discrete median lobe.  Transrectal ultrasound sizing of the prostate revealed a 69 g prostate.  I have recommended a TURP.  He was previously on Eliquis and Plavix as he underwent coronary artery stenting of his left main coronary artery in 2024.  AUA SYMPTOM SCORE      Flowsheet Row Most Recent Value   AUA SYMPTOM SCORE    How often have you had a sensation of not emptying your bladder completely after you finished urinating? 5 (P)     How often have you had to urinate again less than two hours after you finished urinating? 5 (P)     How often have you found you stopped and started again several times when you urinate? 5 (P)     How often have you found it difficult to postpone urination? 4 (P)     How often have you had a weak urinary stream? 5 (P)     How often have you had to push or strain to begin urination? 0 (P)     How many times did  "you most typically get up to urinate from the time you went to bed at night until the time you got up in the morning? 5 (P)     Quality of Life: If you were to spend the rest of your life with your urinary condition just the way it is now, how would you feel about that? 5 (P)     AUA SYMPTOM SCORE 29 (P)            Review of Systems       Objective   /80 (BP Location: Left arm, Patient Position: Sitting, Cuff Size: Adult)   Pulse 60   Ht 5' 7.5\" (1.715 m)   Wt 102 kg (224 lb)   SpO2 96%   BMI 34.57 kg/m²     Physical Exam on examination he is in no acute distress.  Gait normal.  Affect normal    Results   Lab Results   Component Value Date    PSA 2.779 10/14/2024    PSA 3.21 11/03/2023    PSA 2.4 10/13/2022     Lab Results   Component Value Date    GLUCOSE 92 06/11/2015    CALCIUM 9.3 02/28/2025     01/30/2016    K 4.1 02/28/2025    CO2 26 02/28/2025     02/28/2025    BUN 19 02/28/2025    CREATININE 0.93 02/28/2025     Lab Results   Component Value Date    WBC 6.81 02/28/2025    HGB 14.8 02/28/2025    HCT 44.5 02/28/2025    MCV 96 02/28/2025     02/28/2025       Office Urine Dip  No results found for this or any previous visit (from the past hour).      "

## 2025-03-05 NOTE — PRE-PROCEDURE INSTRUCTIONS
Pre-Surgery Instructions:   Medication Instructions    apixaban (Eliquis) 5 mg Instructions provided by MD stop 2 days prior    ascorbic acid (VITAMIN C) 250 mg tablet Stop taking 7 days prior to surgery.    chlorpheniramine (CHLOR-TRIMETON) 4 MG tablet Hold day of surgery.    clobetasol (TEMOVATE) 0.05 % cream Hold day of surgery.    clopidogrel (PLAVIX) 75 mg tablet Instructions provided by MD stop 5 days prior    Coenzyme Q10 (COQ10) 200 MG CAPS Stop taking 7 days prior to surgery.    colesevelam (WELCHOL) 625 mg tablet Take day of surgery.    diltiazem (CARDIZEM) 30 mg tablet Uses PRN- OK to take day of surgery    ezetimibe (ZETIA) 10 mg tablet Take day of surgery.    Glucosamine-Chondroit-Vit C-Mn (GLUCOSAMINE CHONDR 1500 COMPLX PO) Stop taking 7 days prior to surgery.    hyoscyamine (LEVSIN/SL) 0.125 mg SL tablet Take day of surgery.    metoprolol tartrate (LOPRESSOR) 25 mg tablet Take day of surgery.    montelukast (SINGULAIR) 10 mg tablet Take night before surgery    multivitamin (THERAGRAN) TABS Stop taking 7 days prior to surgery.    NON FORMULARY Hold day of surgery.    OXcarbazepine (TRILEPTAL) 300 mg tablet Take day of surgery.    sildenafil (REVATIO) 20 mg tablet Hold day of surgery.    tiZANidine (ZANAFLEX) 2 mg tablet Uses PRN- OK to take day of surgery   Medication instructions for day of surgery reviewed. Please take all instructed medications with only a sip of water.       You will receive a call one business day prior to surgery with an arrival time and hospital directions. If your surgery is scheduled on a Monday, the hospital will be calling you on the Friday prior to your surgery. If you have not heard from anyone by 8pm, please call the hospital supervisor through the hospital  at [phone]. ([place] [phone] or [place] [phone]).    Do not eat or drink anything after midnight the night before your surgery, including candy, mints, lifesavers, or chewing gum. Do not drink  alcohol 24hrs before your surgery. Try not to smoke at least 24hrs before your surgery.       Follow the pre surgery showering instructions as listed in the “My Surgical Experience Booklet” or otherwise provided by your surgeon's office. Do not use a blade to shave the surgical area 1 week before surgery. It is okay to use a clean electric clippers up to 24 hours before surgery. Do not apply any lotions, creams, including makeup, cologne, deodorant, or perfumes after showering on the day of your surgery. Do not use dry shampoo, hair spray, hair gel, or any type of hair products.     No contact lenses, eye make-up, or artificial eyelashes. Remove nail polish, including gel polish, and any artificial, gel, or acrylic nails if possible. Remove all jewelry including rings and body piercing jewelry.     Wear causal clothing that is easy to take on and off. Consider your type of surgery.    Keep any valuables, jewelry, piercings at home. Please bring any specially ordered equipment (sling, braces) if indicated.    Arrange for a responsible person to drive you to and from the hospital on the day of your surgery. Please confirm the visitor policy for the day of your procedure when you receive your phone call with an arrival time.     Call the surgeon's office with any new illnesses, exposures, or additional questions prior to surgery.    Please reference your “My Surgical Experience Booklet” for additional information to prepare for your upcoming surgery.  Advise AMB Referral to Sleep Medicine not interested.

## 2025-03-05 NOTE — LETTER
2025     Viviane Resendiz DO  [address]  [address]  [facility] [address]    Patient: George Ramirez   YOB: 1955   Date of Visit: 3/5/2025       Dear Dr. Resendiz:    Thank you for referring George Ramirez to me for evaluation. Below are my notes for this consultation.    If you have questions, please do not hesitate to call me. I look forward to following your patient along with you.         Sincerely,        Balbir Trivedi MD        CC: MD Balbir Anderson MD  3/5/2025 11:11 AM  Sign when Signing Visit  Name: George Ramirez      : 1955      MRN: [id number]  Encounter Provider: Balbir Trivedi MD  Encounter Date: 3/5/2025   Encounter department: [facility] [place] END  :  Assessment & Plan  Benign prostatic hyperplasia (BPH) with straining on urination           Impression: Symptomatic BPH, history of coronary artery stenting as well as paroxysmal atrial fibrillation    Plan: The patient is scheduled for transurethral resection of the prostate on 2024.  Risk of the procedure including, not limited to bleeding, infection, electrolyte abnormalities, retrograde ejaculation were discussed and reviewed.  Informed consent obtained.  Patient received cardiac clearance and will hold Plavix 5 days prior to surgery and Eliquis 2 days prior to surgery.  History of Present Illness  George Ramirez is a 69 y.o. male who presents in follow-up for his history of BPH.  I recently performed cystoscopy on 2024.  Cystoscopy revealed lateral lobe hyperplasia of the prostate with moderate intravesical protrusion without a discrete median lobe.  Transrectal ultrasound sizing of the prostate revealed a 69 g prostate.  I have recommended a TURP.  He was previously on Eliquis and Plavix as he underwent coronary artery stenting of his left main coronary artery in 2024.  AUA SYMPTOM SCORE   "    Flowsheet Row Most Recent Value   AUA SYMPTOM SCORE    How often have you had a sensation of not emptying your bladder completely after you finished urinating? 5 (P)     How often have you had to urinate again less than two hours after you finished urinating? 5 (P)     How often have you found you stopped and started again several times when you urinate? 5 (P)     How often have you found it difficult to postpone urination? 4 (P)     How often have you had a weak urinary stream? 5 (P)     How often have you had to push or strain to begin urination? 0 (P)     How many times did you most typically get up to urinate from the time you went to bed at night until the time you got up in the morning? 5 (P)     Quality of Life: If you were to spend the rest of your life with your urinary condition just the way it is now, how would you feel about that? 5 (P)     AUA SYMPTOM SCORE 29 (P)            Review of Systems       Objective  /80 (BP Location: Left arm, Patient Position: Sitting, Cuff Size: Adult)   Pulse 60   Ht 5' 7.5\" (1.715 m)   Wt 102 kg (224 lb)   SpO2 96%   BMI 34.57 kg/m²     Physical Exam on examination he is in no acute distress.  Gait normal.  Affect normal    Results   Lab Results   Component Value Date    PSA 2.779 10/14/2024    PSA 3.21 11/03/2023    PSA 2.4 10/13/2022     Lab Results   Component Value Date    GLUCOSE 92 06/11/2015    CALCIUM 9.3 02/28/2025     01/30/2016    K 4.1 02/28/2025    CO2 26 02/28/2025     02/28/2025    BUN 19 02/28/2025    CREATININE 0.93 02/28/2025     Lab Results   Component Value Date    WBC 6.81 02/28/2025    HGB 14.8 02/28/2025    HCT 44.5 02/28/2025    MCV 96 02/28/2025     02/28/2025       Office Urine Dip  No results found for this or any previous visit (from the past hour).       "

## 2025-03-05 NOTE — PROGRESS NOTES
Name: George Ramirez      : 1955      MRN: [id number]  Encounter Provider: Balbir Trivedi MD  Encounter Date: 3/5/2025   Encounter department: [facility] [place] END  :  Assessment & Plan  Benign prostatic hyperplasia (BPH) with straining on urination           Impression: Symptomatic BPH, history of coronary artery stenting as well as paroxysmal atrial fibrillation    Plan: The patient is scheduled for transurethral resection of the prostate on 2024.  Risk of the procedure including, not limited to bleeding, infection, electrolyte abnormalities, retrograde ejaculation were discussed and reviewed.  Informed consent obtained.  Patient received cardiac clearance and will hold Plavix 5 days prior to surgery and Eliquis 2 days prior to surgery.  History of Present Illness   George Ramirez is a 69 y.o. male who presents in follow-up for his history of BPH.  I recently performed cystoscopy on 2024.  Cystoscopy revealed lateral lobe hyperplasia of the prostate with moderate intravesical protrusion without a discrete median lobe.  Transrectal ultrasound sizing of the prostate revealed a 69 g prostate.  I have recommended a TURP.  He was previously on Eliquis and Plavix as he underwent coronary artery stenting of his left main coronary artery in 2024.  AUA SYMPTOM SCORE      Flowsheet Row Most Recent Value   AUA SYMPTOM SCORE    How often have you had a sensation of not emptying your bladder completely after you finished urinating? 5 (P)     How often have you had to urinate again less than two hours after you finished urinating? 5 (P)     How often have you found you stopped and started again several times when you urinate? 5 (P)     How often have you found it difficult to postpone urination? 4 (P)     How often have you had a weak urinary stream? 5 (P)     How often have you had to push or strain to begin urination? 0 (P)     How many times did  "you most typically get up to urinate from the time you went to bed at night until the time you got up in the morning? 5 (P)     Quality of Life: If you were to spend the rest of your life with your urinary condition just the way it is now, how would you feel about that? 5 (P)     AUA SYMPTOM SCORE 29 (P)            Review of Systems       Objective   /80 (BP Location: Left arm, Patient Position: Sitting, Cuff Size: Adult)   Pulse 60   Ht 5' 7.5\" (1.715 m)   Wt 102 kg (224 lb)   SpO2 96%   BMI 34.57 kg/m²     Physical Exam on examination he is in no acute distress.  Gait normal.  Affect normal    Results   Lab Results   Component Value Date    PSA 2.779 10/14/2024    PSA 3.21 11/03/2023    PSA 2.4 10/13/2022     Lab Results   Component Value Date    GLUCOSE 92 06/11/2015    CALCIUM 9.3 02/28/2025     01/30/2016    K 4.1 02/28/2025    CO2 26 02/28/2025     02/28/2025    BUN 19 02/28/2025    CREATININE 0.93 02/28/2025     Lab Results   Component Value Date    WBC 6.81 02/28/2025    HGB 14.8 02/28/2025    HCT 44.5 02/28/2025    MCV 96 02/28/2025     02/28/2025       Office Urine Dip  No results found for this or any previous visit (from the past hour).      "

## 2025-03-07 DIAGNOSIS — M54.16 LUMBAR RADICULOPATHY: ICD-10-CM

## 2025-03-07 RX ORDER — OXCARBAZEPINE 300 MG/1
300 TABLET, FILM COATED ORAL 2 TIMES DAILY
Qty: 180 TABLET | Refills: 1 | Status: SHIPPED | OUTPATIENT
Start: 2025-03-07

## 2025-03-14 ENCOUNTER — HOSPITAL ENCOUNTER (OUTPATIENT)
Facility: HOSPITAL | Age: 70
Setting detail: OUTPATIENT SURGERY
Discharge: HOME | End: 2025-03-15
Attending: UROLOGY | Admitting: UROLOGY
Payer: COMMERCIAL

## 2025-03-14 ENCOUNTER — ANESTHESIA EVENT (OUTPATIENT)
Dept: PERIOP | Facility: HOSPITAL | Age: 70
End: 2025-03-14
Payer: COMMERCIAL

## 2025-03-14 ENCOUNTER — ANESTHESIA (OUTPATIENT)
Dept: PERIOP | Facility: HOSPITAL | Age: 70
End: 2025-03-14
Payer: COMMERCIAL

## 2025-03-14 DIAGNOSIS — N13.8 BPH WITH OBSTRUCTION/LOWER URINARY TRACT SYMPTOMS: ICD-10-CM

## 2025-03-14 DIAGNOSIS — Z01.812 PRE-OPERATIVE LABORATORY EXAMINATION: ICD-10-CM

## 2025-03-14 DIAGNOSIS — N40.1 BPH WITH OBSTRUCTION/LOWER URINARY TRACT SYMPTOMS: ICD-10-CM

## 2025-03-14 DIAGNOSIS — Z01.818 PREOP EXAMINATION: ICD-10-CM

## 2025-03-14 DIAGNOSIS — E78.5 DYSLIPIDEMIA: ICD-10-CM

## 2025-03-14 LAB
ABO GROUP BLD: NORMAL
ANION GAP SERPL CALCULATED.3IONS-SCNC: 5 MMOL/L (ref 4–13)
BUN SERPL-MCNC: 15 MG/DL (ref 5–25)
CALCIUM SERPL-MCNC: 8.6 MG/DL (ref 8.4–10.2)
CHLORIDE SERPL-SCNC: 107 MMOL/L (ref 96–108)
CO2 SERPL-SCNC: 26 MMOL/L (ref 21–32)
CREAT SERPL-MCNC: 0.75 MG/DL (ref 0.6–1.3)
ERYTHROCYTE [DISTWIDTH] IN BLOOD BY AUTOMATED COUNT: 12 % (ref 11.6–15.1)
GFR SERPL CREATININE-BSD FRML MDRD: 92 ML/MIN/1.73SQ M
GLUCOSE P FAST SERPL-MCNC: 99 MG/DL (ref 65–99)
GLUCOSE SERPL-MCNC: 127 MG/DL (ref 65–140)
GLUCOSE SERPL-MCNC: 99 MG/DL (ref 65–140)
HCT VFR BLD AUTO: 45.6 % (ref 36.5–49.3)
HGB BLD-MCNC: 15 G/DL (ref 12–17)
MCH RBC QN AUTO: 31.7 PG (ref 26.8–34.3)
MCHC RBC AUTO-ENTMCNC: 32.9 G/DL (ref 31.4–37.4)
MCV RBC AUTO: 96 FL (ref 82–98)
PLATELET # BLD AUTO: 186 THOUSANDS/UL (ref 149–390)
PMV BLD AUTO: 8.7 FL (ref 8.9–12.7)
POTASSIUM SERPL-SCNC: 4.4 MMOL/L (ref 3.5–5.3)
RBC # BLD AUTO: 4.73 MILLION/UL (ref 3.88–5.62)
RH BLD: POSITIVE
SODIUM SERPL-SCNC: 138 MMOL/L (ref 135–147)
WBC # BLD AUTO: 6.61 THOUSAND/UL (ref 4.31–10.16)

## 2025-03-14 PROCEDURE — 87086 URINE CULTURE/COLONY COUNT: CPT | Performed by: UROLOGY

## 2025-03-14 PROCEDURE — 82948 REAGENT STRIP/BLOOD GLUCOSE: CPT

## 2025-03-14 PROCEDURE — 80048 BASIC METABOLIC PNL TOTAL CA: CPT | Performed by: UROLOGY

## 2025-03-14 PROCEDURE — 52601 PROSTATECTOMY (TURP): CPT | Performed by: UROLOGY

## 2025-03-14 PROCEDURE — 88305 TISSUE EXAM BY PATHOLOGIST: CPT | Performed by: STUDENT IN AN ORGANIZED HEALTH CARE EDUCATION/TRAINING PROGRAM

## 2025-03-14 PROCEDURE — 85027 COMPLETE CBC AUTOMATED: CPT | Performed by: UROLOGY

## 2025-03-14 RX ORDER — FENTANYL CITRATE/PF 50 MCG/ML
25 SYRINGE (ML) INJECTION
Status: DISCONTINUED | OUTPATIENT
Start: 2025-03-14 | End: 2025-03-14

## 2025-03-14 RX ORDER — SODIUM CHLORIDE, SODIUM LACTATE, POTASSIUM CHLORIDE, CALCIUM CHLORIDE 600; 310; 30; 20 MG/100ML; MG/100ML; MG/100ML; MG/100ML
INJECTION, SOLUTION INTRAVENOUS CONTINUOUS PRN
Status: DISCONTINUED | OUTPATIENT
Start: 2025-03-14 | End: 2025-03-14

## 2025-03-14 RX ORDER — MULTIVIT WITH MINERALS/LUTEIN
250 TABLET ORAL 2 TIMES DAILY
Status: DISCONTINUED | OUTPATIENT
Start: 2025-03-14 | End: 2025-03-15 | Stop reason: HOSPADM

## 2025-03-14 RX ORDER — COLESEVELAM 180 1/1
1250 TABLET ORAL 2 TIMES DAILY WITH MEALS
Status: DISCONTINUED | OUTPATIENT
Start: 2025-03-14 | End: 2025-03-15 | Stop reason: HOSPADM

## 2025-03-14 RX ORDER — FENTANYL CITRATE/PF 50 MCG/ML
50 SYRINGE (ML) INJECTION
Status: DISCONTINUED | OUTPATIENT
Start: 2025-03-14 | End: 2025-03-14

## 2025-03-14 RX ORDER — UBIDECARENONE 30 MG
200 CAPSULE ORAL DAILY
Status: DISCONTINUED | OUTPATIENT
Start: 2025-03-14 | End: 2025-03-15 | Stop reason: HOSPADM

## 2025-03-14 RX ORDER — EPHEDRINE SULFATE 50 MG/ML
INJECTION INTRAVENOUS AS NEEDED
Status: DISCONTINUED | OUTPATIENT
Start: 2025-03-14 | End: 2025-03-14

## 2025-03-14 RX ORDER — SODIUM CHLORIDE 9 MG/ML
125 INJECTION, SOLUTION INTRAVENOUS CONTINUOUS
Status: DISCONTINUED | OUTPATIENT
Start: 2025-03-14 | End: 2025-03-14

## 2025-03-14 RX ORDER — LIDOCAINE HYDROCHLORIDE 10 MG/ML
INJECTION, SOLUTION EPIDURAL; INFILTRATION; INTRACAUDAL; PERINEURAL AS NEEDED
Status: DISCONTINUED | OUTPATIENT
Start: 2025-03-14 | End: 2025-03-14

## 2025-03-14 RX ORDER — GLYCINE 1.5 G/100ML
SOLUTION IRRIGATION AS NEEDED
Status: DISCONTINUED | OUTPATIENT
Start: 2025-03-14 | End: 2025-03-14 | Stop reason: HOSPADM

## 2025-03-14 RX ORDER — DIPHENHYDRAMINE HYDROCHLORIDE 50 MG/ML
12.5 INJECTION, SOLUTION INTRAMUSCULAR; INTRAVENOUS ONCE AS NEEDED
Status: DISCONTINUED | OUTPATIENT
Start: 2025-03-14 | End: 2025-03-14

## 2025-03-14 RX ORDER — HYOSCYAMINE SULFATE 0.12 MG/1
0.12 TABLET SUBLINGUAL EVERY 4 HOURS PRN
Status: DISCONTINUED | OUTPATIENT
Start: 2025-03-14 | End: 2025-03-15 | Stop reason: HOSPADM

## 2025-03-14 RX ORDER — EZETIMIBE 10 MG/1
10 TABLET ORAL DAILY
Status: DISCONTINUED | OUTPATIENT
Start: 2025-03-15 | End: 2025-03-15 | Stop reason: HOSPADM

## 2025-03-14 RX ORDER — FENTANYL CITRATE 50 UG/ML
INJECTION, SOLUTION INTRAMUSCULAR; INTRAVENOUS AS NEEDED
Status: DISCONTINUED | OUTPATIENT
Start: 2025-03-14 | End: 2025-03-14

## 2025-03-14 RX ORDER — HYDROXYZINE HYDROCHLORIDE 10 MG/5ML
4 SYRUP ORAL EVERY 6 HOURS PRN
Status: DISCONTINUED | OUTPATIENT
Start: 2025-03-14 | End: 2025-03-15 | Stop reason: HOSPADM

## 2025-03-14 RX ORDER — ONDANSETRON 2 MG/ML
INJECTION INTRAMUSCULAR; INTRAVENOUS AS NEEDED
Status: DISCONTINUED | OUTPATIENT
Start: 2025-03-14 | End: 2025-03-14

## 2025-03-14 RX ORDER — LEVOFLOXACIN 5 MG/ML
500 INJECTION, SOLUTION INTRAVENOUS ONCE
Status: COMPLETED | OUTPATIENT
Start: 2025-03-14 | End: 2025-03-14

## 2025-03-14 RX ORDER — OXCARBAZEPINE 300 MG/1
300 TABLET, FILM COATED ORAL 2 TIMES DAILY
Status: DISCONTINUED | OUTPATIENT
Start: 2025-03-14 | End: 2025-03-15 | Stop reason: HOSPADM

## 2025-03-14 RX ORDER — HYDROMORPHONE HCL/PF 1 MG/ML
0.5 SYRINGE (ML) INJECTION
Status: DISCONTINUED | OUTPATIENT
Start: 2025-03-14 | End: 2025-03-14

## 2025-03-14 RX ORDER — HYDROMORPHONE HCL IN WATER/PF 6 MG/30 ML
0.2 PATIENT CONTROLLED ANALGESIA SYRINGE INTRAVENOUS
Status: DISCONTINUED | OUTPATIENT
Start: 2025-03-14 | End: 2025-03-14 | Stop reason: HOSPADM

## 2025-03-14 RX ORDER — DEXTROSE, SODIUM CHLORIDE, SODIUM LACTATE, POTASSIUM CHLORIDE, AND CALCIUM CHLORIDE 5; .6; .31; .03; .02 G/100ML; G/100ML; G/100ML; G/100ML; G/100ML
125 INJECTION, SOLUTION INTRAVENOUS CONTINUOUS
Status: DISCONTINUED | OUTPATIENT
Start: 2025-03-14 | End: 2025-03-15 | Stop reason: ALTCHOICE

## 2025-03-14 RX ORDER — ONDANSETRON 2 MG/ML
4 INJECTION INTRAMUSCULAR; INTRAVENOUS ONCE AS NEEDED
Status: DISCONTINUED | OUTPATIENT
Start: 2025-03-14 | End: 2025-03-14 | Stop reason: HOSPADM

## 2025-03-14 RX ORDER — MAGNESIUM HYDROXIDE 1200 MG/15ML
3000 LIQUID ORAL CONTINUOUS
Status: DISCONTINUED | OUTPATIENT
Start: 2025-03-14 | End: 2025-03-15 | Stop reason: ALTCHOICE

## 2025-03-14 RX ORDER — NITROGLYCERIN 0.4 MG/1
0.4 TABLET SUBLINGUAL
Status: DISCONTINUED | OUTPATIENT
Start: 2025-03-14 | End: 2025-03-15 | Stop reason: HOSPADM

## 2025-03-14 RX ORDER — PROPOFOL 10 MG/ML
INJECTION, EMULSION INTRAVENOUS AS NEEDED
Status: DISCONTINUED | OUTPATIENT
Start: 2025-03-14 | End: 2025-03-14

## 2025-03-14 RX ORDER — SODIUM CHLORIDE, SODIUM LACTATE, POTASSIUM CHLORIDE, CALCIUM CHLORIDE 600; 310; 30; 20 MG/100ML; MG/100ML; MG/100ML; MG/100ML
100 INJECTION, SOLUTION INTRAVENOUS CONTINUOUS
Status: DISCONTINUED | OUTPATIENT
Start: 2025-03-14 | End: 2025-03-14

## 2025-03-14 RX ORDER — MONTELUKAST SODIUM 10 MG/1
10 TABLET ORAL
Status: DISCONTINUED | OUTPATIENT
Start: 2025-03-14 | End: 2025-03-15 | Stop reason: HOSPADM

## 2025-03-14 RX ORDER — ONDANSETRON 2 MG/ML
4 INJECTION INTRAMUSCULAR; INTRAVENOUS ONCE AS NEEDED
Status: DISCONTINUED | OUTPATIENT
Start: 2025-03-14 | End: 2025-03-14

## 2025-03-14 RX ORDER — HYDROCORTISONE ACETATE 0.5 %
CREAM (GRAM) TOPICAL 2 TIMES DAILY
Status: DISCONTINUED | OUTPATIENT
Start: 2025-03-14 | End: 2025-03-15 | Stop reason: HOSPADM

## 2025-03-14 RX ORDER — TIZANIDINE 2 MG/1
2 TABLET ORAL 2 TIMES DAILY PRN
Status: DISCONTINUED | OUTPATIENT
Start: 2025-03-14 | End: 2025-03-15 | Stop reason: HOSPADM

## 2025-03-14 RX ORDER — DEXAMETHASONE SODIUM PHOSPHATE 10 MG/ML
INJECTION, SOLUTION INTRAMUSCULAR; INTRAVENOUS AS NEEDED
Status: DISCONTINUED | OUTPATIENT
Start: 2025-03-14 | End: 2025-03-14

## 2025-03-14 RX ORDER — ENOXAPARIN SODIUM 100 MG/ML
40 INJECTION SUBCUTANEOUS DAILY
Status: DISCONTINUED | OUTPATIENT
Start: 2025-03-14 | End: 2025-03-15 | Stop reason: HOSPADM

## 2025-03-14 RX ORDER — FENTANYL CITRATE/PF 50 MCG/ML
25 SYRINGE (ML) INJECTION
Refills: 0 | Status: DISCONTINUED | OUTPATIENT
Start: 2025-03-14 | End: 2025-03-14 | Stop reason: HOSPADM

## 2025-03-14 RX ADMIN — FENTANYL CITRATE 50 MCG: 50 INJECTION INTRAMUSCULAR; INTRAVENOUS at 09:00

## 2025-03-14 RX ADMIN — MONTELUKAST 10 MG: 10 TABLET, FILM COATED ORAL at 22:07

## 2025-03-14 RX ADMIN — Medication 200 MG: at 15:24

## 2025-03-14 RX ADMIN — EPHEDRINE SULFATE 5 MG: 50 INJECTION, SOLUTION INTRAVENOUS at 09:10

## 2025-03-14 RX ADMIN — SODIUM CHLORIDE, SODIUM LACTATE, POTASSIUM CHLORIDE, AND CALCIUM CHLORIDE: .6; .31; .03; .02 INJECTION, SOLUTION INTRAVENOUS at 08:35

## 2025-03-14 RX ADMIN — SODIUM CHLORIDE, SODIUM LACTATE, POTASSIUM CHLORIDE, AND CALCIUM CHLORIDE: .6; .31; .03; .02 INJECTION, SOLUTION INTRAVENOUS at 09:18

## 2025-03-14 RX ADMIN — LIDOCAINE HYDROCHLORIDE 50 MG: 10 INJECTION, SOLUTION EPIDURAL; INFILTRATION; INTRACAUDAL; PERINEURAL at 08:40

## 2025-03-14 RX ADMIN — OXCARBAZEPINE 300 MG: 300 TABLET, FILM COATED ORAL at 17:18

## 2025-03-14 RX ADMIN — FENTANYL CITRATE 25 MCG: 50 INJECTION INTRAMUSCULAR; INTRAVENOUS at 09:47

## 2025-03-14 RX ADMIN — Medication 250 MG: at 17:18

## 2025-03-14 RX ADMIN — DEXAMETHASONE SODIUM PHOSPHATE 10 MG: 10 INJECTION, SOLUTION INTRAMUSCULAR; INTRAVENOUS at 08:42

## 2025-03-14 RX ADMIN — Medication 12.5 MG: at 18:28

## 2025-03-14 RX ADMIN — DEXTROSE, SODIUM CHLORIDE, SODIUM LACTATE, POTASSIUM CHLORIDE, AND CALCIUM CHLORIDE 125 ML/HR: 5; .6; .31; .03; .02 INJECTION, SOLUTION INTRAVENOUS at 15:27

## 2025-03-14 RX ADMIN — ONDANSETRON 4 MG: 2 INJECTION INTRAMUSCULAR; INTRAVENOUS at 09:37

## 2025-03-14 RX ADMIN — EPHEDRINE SULFATE 10 MG: 50 INJECTION, SOLUTION INTRAVENOUS at 08:51

## 2025-03-14 RX ADMIN — LEVOFLOXACIN: 500 INJECTION, SOLUTION INTRAVENOUS at 08:35

## 2025-03-14 RX ADMIN — EPHEDRINE SULFATE 5 MG: 50 INJECTION, SOLUTION INTRAVENOUS at 08:49

## 2025-03-14 RX ADMIN — ENOXAPARIN SODIUM 40 MG: 40 INJECTION SUBCUTANEOUS at 15:24

## 2025-03-14 RX ADMIN — SODIUM CHLORIDE, SODIUM LACTATE, POTASSIUM CHLORIDE, AND CALCIUM CHLORIDE 100 ML/HR: .6; .31; .03; .02 INJECTION, SOLUTION INTRAVENOUS at 08:15

## 2025-03-14 RX ADMIN — EPHEDRINE SULFATE 10 MG: 50 INJECTION, SOLUTION INTRAVENOUS at 09:07

## 2025-03-14 RX ADMIN — B-COMPLEX W/ C & FOLIC ACID TAB 1 TABLET: TAB at 15:24

## 2025-03-14 RX ADMIN — PROPOFOL 200 MG: 10 INJECTION, EMULSION INTRAVENOUS at 08:40

## 2025-03-14 RX ADMIN — FENTANYL CITRATE 25 MCG: 50 INJECTION INTRAMUSCULAR; INTRAVENOUS at 09:28

## 2025-03-14 NOTE — RESPIRATORY THERAPY NOTE
RT Protocol Note  George Ramirez 69 y.o. male MRN: [id number]  [facility] Encounter: [id number]    Assessment    Principal Problem:    BPH with obstruction/lower urinary tract symptoms      Home Pulmonary Medications:  none       Past Medical History:   Diagnosis Date    A-fib (HCC)     Allergic     Allergic rhinitis     Arthritis     Benign cyst of kidney     Cancer (HCC) 2015    Cat bite 2020    Cerebral vascular disease     Chronic sinusitis     Coronary artery disease     Cryptogenic stroke (HCC) 2020    Hyperlipidemia     Kidney stone     Melanoma (HCC)     Pancreas cyst     Stroke (HCC)      Social History     Socioeconomic History    Marital status: /Civil Union     Spouse name: None    Number of children: None    Years of education: None    Highest education level: None   Occupational History    Occupation: massage therapist   Tobacco Use    Smoking status: Former     Current packs/day: 0.00     Types: Cigars, Cigarettes     Start date: 1975     Quit date: 1986     Years since quittin.2    Smokeless tobacco: Former     Types: Chew     Quit date:     Tobacco comments:     Cigars    Vaping Use    Vaping status: Never Used   Substance and Sexual Activity    Alcohol use: Yes     Alcohol/week: 1.0 standard drink of alcohol     Types: 1 Glasses of wine per week     Comment: 8 oz. Daily    Drug use: Not Currently     Types: Marijuana    Sexual activity: Yes     Partners: Female     Birth control/protection: Male Sterilization   Other Topics Concern    None   Social History Narrative    Caffeine: drinks 1 cup coffee/ tea a day.     Social Drivers of Health     Financial Resource Strain: Low Risk  (2023)    Overall Financial Resource Strain (CARDIA)     Difficulty of Paying Living Expenses: Not hard at all   Food Insecurity: No Food Insecurity (3/14/2025)    Nursing - Inadequate Food Risk Classification     Worried About Running Out of Food in the Last  "Year: Never true     Ran Out of Food in the Last Year: Never true     Ran Out of Food in the Last Year: Never true   Transportation Needs: No Transportation Needs (3/14/2025)    Nursing - Transportation Risk Classification     Lack of Transportation: Not on file     Lack of Transportation: No   Physical Activity: Not on file   Stress: Not on file   Social Connections: Not on file   Intimate Partner Violence: Unknown (3/14/2025)    Nursing IPS     Feels Physically and Emotionally Safe: Not on file     Physically Hurt by Someone: Not on file     Humiliated or Emotionally Abused by Someone: Not on file     Physically Hurt by Someone: No     Hurt or Threatened by Someone: No   Housing Stability: Unknown (3/14/2025)    Nursing: Inadequate Housing Risk Classification     Has Housing: Not on file     Worried About Losing Housing: Not on file     Unable to Get Utilities: Not on file     Unable to Pay for Housing in the Last Year: No     Has Housin       Subjective         Objective    Physical Exam:   Assessment Type: Assess only  General Appearance: Awake  Respiratory Pattern: Normal  Chest Assessment: Chest expansion symmetrical  Bilateral Breath Sounds: Clear    Vitals:  Blood pressure 132/80, pulse 94, temperature 98.4 °F (36.9 °C), resp. rate 17, height 5' 7.5\" (1.715 m), weight 102 kg (224 lb), SpO2 95%.          Imaging and other studies: Results Review Statement: I reviewed radiology reports from this admission including: chest xray.          Plan    Respiratory Plan: Discontinue Protocol, No distress/Pulmonary history        Resp Comments: (P) pt orderd on respiratory protocol n pt does not have any pulmonary hx or takes any respiratory meds @ home. bs clear. chest xray shows Clear lungs. No pneumothorax or pleural effusion.     Normal cardiomediastinal silhouette.     Bones are unremarkable for age.     Normal upper abdomen.     IMPRESSION:     No acute cardiopulmonary disease.   pt not admitted for " respiratory issues pt  is post op no other respiraotry intervetnion is needed at thist ben will d/c from protocol

## 2025-03-14 NOTE — ANESTHESIA POSTPROCEDURE EVALUATION
Post-Op Assessment Note    CV Status:  Stable  Pain Score: 0    Pain management: adequate       Mental Status:  Awake   Hydration Status:  Stable   PONV Controlled:  None   Airway Patency:  Patent     Post Op Vitals Reviewed: Yes    No anethesia notable event occurred.    Staff: Anesthesiologist, CRNA           Last Filed PACU Vitals:  Vitals Value Taken Time   Temp 97.9 °F (36.6 °C) 03/14/25 0951   Pulse 72    /74    Resp 16    SpO2 97

## 2025-03-14 NOTE — PLAN OF CARE
Problem: PAIN - ADULT  Goal: Verbalizes/displays adequate comfort level or baseline comfort level  Description: Interventions:  - Encourage patient to monitor pain and request assistance  - Assess pain using appropriate pain scale  - Administer analgesics based on type and severity of pain and evaluate response  - Implement non-pharmacological measures as appropriate and evaluate response  - Consider cultural and social influences on pain and pain management  - Notify physician/advanced practitioner if interventions unsuccessful or patient reports new pain  Outcome: Progressing     Problem: INFECTION - ADULT  Goal: Absence or prevention of progression during hospitalization  Description: INTERVENTIONS:  - Assess and monitor for signs and symptoms of infection  - Monitor lab/diagnostic results  - Monitor all insertion sites, i.e. indwelling lines, tubes, and drains  - Monitor endotracheal if appropriate and nasal secretions for changes in amount and color  - Era appropriate cooling/warming therapies per order  - Administer medications as ordered  - Instruct and encourage patient and family to use good hand hygiene technique  - Identify and instruct in appropriate isolation precautions for identified infection/condition  Outcome: Progressing  Goal: Absence of fever/infection during neutropenic period  Description: INTERVENTIONS:  - Monitor WBC    Outcome: Progressing     Problem: DISCHARGE PLANNING  Goal: Discharge to home or other facility with appropriate resources  Description: INTERVENTIONS:  - Identify barriers to discharge w/patient and caregiver  - Arrange for needed discharge resources and transportation as appropriate  - Identify discharge learning needs (meds, wound care, etc.)  - Arrange for interpretive services to assist at discharge as needed  - Refer to Case Management Department for coordinating discharge planning if the patient needs post-hospital services based on physician/advanced  practitioner order or complex needs related to functional status, cognitive ability, or social support system  Outcome: Progressing     Problem: Knowledge Deficit  Goal: Patient/family/caregiver demonstrates understanding of disease process, treatment plan, medications, and discharge instructions  Description: Complete learning assessment and assess knowledge base.  Interventions:  - Provide teaching at level of understanding  - Provide teaching via preferred learning methods  Outcome: Progressing

## 2025-03-14 NOTE — INTERVAL H&P NOTE
H&P reviewed. After examining the patient I find no changes in the patients condition since the H&P had been written.    Vitals:    03/14/25 0752   BP: 130/71   Pulse: 56   Resp: 18   Temp: (!) 97 °F (36.1 °C)   SpO2: 95%       On examination he is in no acute distress.  Cardiac regular.  Respiratory no distress.  Abdomen soft nontender nondistended.  Skin warm.  Extremities without edema.    Impression: 69 g prostate with lower urinary tract symptoms despite medical management    Plan: I recommend proceeding with transurethral resection of the prostate.  Risk of the procedure including, not limited to bleeding, infection, electrolyte abnormalities, incontinence, retrograde ejaculation, and sexual dysfunction were discussed.  Informed consent obtained.

## 2025-03-14 NOTE — OP NOTE
OPERATIVE REPORT  PATIENT NAME: George Ramirez    :  1955  MRN: [id number]  Pt Location: [facility] ROOM 01    SURGERY DATE: 3/14/2025    Surgeons and Role:     * Balbir Trivedi MD - Primary    Preop Diagnosis:  BPH with obstruction/lower urinary tract symptoms [N40.1, N13.8]    Post-Op Diagnosis Codes:     * BPH with obstruction/lower urinary tract symptoms [N40.1, N13.8]    Procedure(s):  TRANSURETHRAL RESECTION OF PROSTATE (TURP)    Specimen(s):  ID Type Source Tests Collected by Time Destination   1 : prostate chips Tissue Prostate TISSUE EXAM Balbir Trivedi MD 3/14/2025 0902    A : urine culture Urine Urine, Cystoscopic URINE CULTURE Balbir Trivedi MD 3/14/2025 0857        Estimated Blood Loss:   25 mL    Drains:  Urethral Catheter Latex;Three way;Other (Comment) 24 Fr. (Active)   Number of days: 0       Anesthesia Type:   General    Operative Indications:  BPH with obstruction/lower urinary tract symptoms [N40.1, N13.8]      Operative Findings:  Standard TURP      Complications:   None    Procedure and Technique:  Kristopher is a 69-year-old male with intractable lower urinary tract symptoms despite medical management. Risk and benefits of TURP were discussed and reviewed. Informed consent was obtained. The patient is brought to the operative room on 3/14/25. After the smooth induction of general LMA anesthesia, the patient was placed in the dorsal lithotomy position. Intravenous anabiotic's were administered. Venodyne boots were applied. A timeout was performed with all members of the operative team confirming the patient's identity and procedure to be performed.    A 22 Macedonian rigid cystoscope with 30° lens was inserted. The bladder was thoroughly inspected. There was lateral lobe hyperplasia of the prostate. A small median lobe with intravesical protrusion of the prostate was noted. The bladder was entered. A urine culture was obtained. The bladder was thick-walled and  trabeculated. There was no evidence of mucosal abnormalities or lesions. Ureteral orifices were visualized along the trigonal ridge.    The cystoscope was exchanged for the continuous-flow resectoscope. Both ureteral orifices were marked just distal to the opening. A standard TURP was performed. I first took down the median lobe followed by floor tissue from the bladder neck proximally to the verumontanum distally. At no point during the procedure did any resection occur distal to the vera montanum. Next I took down both lateral lobes of the prostate from the bladder neck proximally to the verumontanum distally. A moderate amount of anterior tissue then fell into the urethra and this was taken down as well. The Ellik last was utilized to remove all chips. The bladder was thoroughly reinspected. There were no chips remaining. Both ureteral orifices were visualized intact. Cautery was used for hemostasis.     A 24 Maori three-way hematuria coudé-tip Kemp catheter was then inserted. 30 cc were placed into the balloon. Continuous bladder irrigation was initiated and was noted to be clear upon departure from the operating room.    Overall the patient tolerated the procedure well and there were no complications. The patient was extubated in the operating room transferred to the PACU in stable condition at the conclusion of the case.    Patient Disposition:  PACU              SIGNATURE: Balbir Trivedi MD  DATE: March 14, 2025  TIME: 9:59 AM

## 2025-03-15 VITALS
SYSTOLIC BLOOD PRESSURE: 118 MMHG | HEART RATE: 65 BPM | BODY MASS INDEX: 33.95 KG/M2 | DIASTOLIC BLOOD PRESSURE: 68 MMHG | WEIGHT: 224 LBS | RESPIRATION RATE: 18 BRPM | OXYGEN SATURATION: 97 % | HEIGHT: 68 IN | TEMPERATURE: 99.4 F

## 2025-03-15 LAB
ANION GAP SERPL CALCULATED.3IONS-SCNC: 7 MMOL/L (ref 4–13)
BUN SERPL-MCNC: 13 MG/DL (ref 5–25)
CALCIUM SERPL-MCNC: 9 MG/DL (ref 8.4–10.2)
CHLORIDE SERPL-SCNC: 105 MMOL/L (ref 96–108)
CO2 SERPL-SCNC: 27 MMOL/L (ref 21–32)
CREAT SERPL-MCNC: 0.81 MG/DL (ref 0.6–1.3)
ERYTHROCYTE [DISTWIDTH] IN BLOOD BY AUTOMATED COUNT: 12.1 % (ref 11.6–15.1)
GFR SERPL CREATININE-BSD FRML MDRD: 89 ML/MIN/1.73SQ M
GLUCOSE SERPL-MCNC: 168 MG/DL (ref 65–140)
HCT VFR BLD AUTO: 38.8 % (ref 36.5–49.3)
HGB BLD-MCNC: 12.9 G/DL (ref 12–17)
MCH RBC QN AUTO: 32 PG (ref 26.8–34.3)
MCHC RBC AUTO-ENTMCNC: 33.2 G/DL (ref 31.4–37.4)
MCV RBC AUTO: 96 FL (ref 82–98)
PLATELET # BLD AUTO: 181 THOUSANDS/UL (ref 149–390)
PMV BLD AUTO: 9.3 FL (ref 8.9–12.7)
POTASSIUM SERPL-SCNC: 4.2 MMOL/L (ref 3.5–5.3)
RBC # BLD AUTO: 4.03 MILLION/UL (ref 3.88–5.62)
SODIUM SERPL-SCNC: 139 MMOL/L (ref 135–147)
WBC # BLD AUTO: 10.99 THOUSAND/UL (ref 4.31–10.16)

## 2025-03-15 PROCEDURE — 80048 BASIC METABOLIC PNL TOTAL CA: CPT | Performed by: UROLOGY

## 2025-03-15 PROCEDURE — 99024 POSTOP FOLLOW-UP VISIT: CPT | Performed by: UROLOGY

## 2025-03-15 PROCEDURE — 85027 COMPLETE CBC AUTOMATED: CPT | Performed by: UROLOGY

## 2025-03-15 RX ORDER — SULFAMETHOXAZOLE AND TRIMETHOPRIM 800; 160 MG/1; MG/1
1 TABLET ORAL EVERY 12 HOURS SCHEDULED
Qty: 6 TABLET | Refills: 0 | Status: SHIPPED | OUTPATIENT
Start: 2025-03-15 | End: 2025-03-18

## 2025-03-15 RX ORDER — AMOXICILLIN 250 MG
1 CAPSULE ORAL DAILY
Qty: 14 TABLET | Refills: 0 | Status: SHIPPED | OUTPATIENT
Start: 2025-03-15 | End: 2025-03-29

## 2025-03-15 RX ADMIN — OXCARBAZEPINE 300 MG: 300 TABLET, FILM COATED ORAL at 08:16

## 2025-03-15 RX ADMIN — Medication 12.5 MG: at 05:28

## 2025-03-15 RX ADMIN — B-COMPLEX W/ C & FOLIC ACID TAB 1 TABLET: TAB at 08:13

## 2025-03-15 RX ADMIN — Medication 250 MG: at 08:16

## 2025-03-15 RX ADMIN — DEXTROSE, SODIUM CHLORIDE, SODIUM LACTATE, POTASSIUM CHLORIDE, AND CALCIUM CHLORIDE 125 ML/HR: 5; .6; .31; .03; .02 INJECTION, SOLUTION INTRAVENOUS at 01:14

## 2025-03-15 RX ADMIN — EZETIMIBE 10 MG: 10 TABLET ORAL at 08:13

## 2025-03-15 RX ADMIN — Medication 200 MG: at 08:13

## 2025-03-15 RX ADMIN — ENOXAPARIN SODIUM 40 MG: 40 INJECTION SUBCUTANEOUS at 08:13

## 2025-03-15 NOTE — ASSESSMENT & PLAN NOTE
Postop day 1 TURP, progressing well  Patient afebrile hemodynamically stable  Postoperative labs within normal limits hemoglobin 15 repeat 12.9 likely dilutional  CBI clamped this a.m. upon evaluation urine is light salmon in color transparent no clots CBI had been clamped early this a.m.  Pain well-controlled  Ambulating without difficulty  Tolerating advance diet  Plan for discharge with Kemp catheter and void trial on outpatient pending lab results this a.m.

## 2025-03-15 NOTE — DISCHARGE SUMMARY
Discharge Summary - Urology   Name: George Ramirez 69 y.o. male I MRN: [id number]  Unit/Bed#: [facility] I Date of Admission: 3/14/2025   Date of Service: 3/15/2025 I Hospital Day: 0  { ?Quick Links I Problem List I PORCH I Billing Tip:15850}  {sl ip specialty discharge summaries:83629}   after visit summary for information related to follow-up care and any pertinent home health orders.      PCP: Viviane Resendiz DO    Disposition: Home    Planned Readmission: No     Discharge Medications:  See after visit summary for reconciled discharge medications provided to patient and family.      Discharge Statement:  I have spent a total time of 20 minutes in caring for this patient on the day of the visit/encounter.     Renee Barney PA-C

## 2025-03-15 NOTE — PROGRESS NOTES
Progress Note - Urology   Name: George Ramirez 69 y.o. male I MRN: [id number]  Unit/Bed#: [facility] 825-01 I Date of Admission: 3/14/2025   Date of Service: 3/15/2025 I Hospital Day: 0     Assessment & Plan  BPH with obstruction/lower urinary tract symptoms  Postop day 1 TURP, progressing well  Patient afebrile hemodynamically stable  Postoperative labs within normal limits hemoglobin 15 repeat 12.9 likely dilutional  CBI clamped this a.m. upon evaluation urine is light salmon in color transparent no clots CBI had been clamped early this a.m.  Pain well-controlled  Ambulating without difficulty  Tolerating advance diet  Plan for discharge with Kemp catheter and void trial on outpatient pending lab results this a.m.    Plan for discharge later today    Subjective   Patient sitting comfortably no acute distress.  He denies any nausea vomiting tolerating advance diet denies any abdominal pain.    Objective :  Temp:  [97 °F (36.1 °C)-99.4 °F (37.4 °C)] 99.4 °F (37.4 °C)  HR:  [56-94] 80  BP: (109-156)/(63-81) 109/67  Resp:  [16-21] 18  SpO2:  [93 %-99 %] 93 %  O2 Device: None (Room air)    I/O         03/13 0701  03/14 0700 03/14 0701  03/15 0700    P.O.  660    I.V. (mL/kg)  2222.9 (21.8)    IV Piggyback  100    Total Intake(mL/kg)  2982.9 (29.2)    Urine (mL/kg/hr)  4450    Blood  25    Total Output  4475    Net  -1492.1                Lines/Drains/Airways       Active Status       Name Placement date Placement time Site Days    Urethral Catheter Latex;Three way;Other (Comment) 24 Fr. 03/14/25  0935  Latex;Three way;Other (Comment)  less than 1                  Physical Exam  Constitutional:       General: He is not in acute distress.     Appearance: He is normal weight. He is not ill-appearing, toxic-appearing or diaphoretic.   HENT:      Head: Normocephalic and atraumatic.      Right Ear: External ear normal.      Left Ear: External ear normal.      Nose: Nose normal.      Mouth/Throat:      Pharynx: Oropharynx is  clear.   Eyes:      General: No scleral icterus.     Conjunctiva/sclera: Conjunctivae normal.   Cardiovascular:      Rate and Rhythm: Normal rate and regular rhythm.      Pulses: Normal pulses.      Heart sounds: No murmur heard.     No friction rub. No gallop.   Pulmonary:      Effort: Pulmonary effort is normal. No respiratory distress.      Breath sounds: No wheezing, rhonchi or rales.   Abdominal:      General: Bowel sounds are normal. There is no distension.      Tenderness: There is no abdominal tenderness.   Genitourinary:     Comments: Urethra Kemp catheter with CBI clamped urine is salmon-colored to clear transparent no clots  Skin:     General: Skin is warm and dry.   Neurological:      General: No focal deficit present.      Mental Status: He is alert and oriented to person, place, and time.   Psychiatric:         Mood and Affect: Mood normal.         Behavior: Behavior normal.         Thought Content: Thought content normal.         Judgment: Judgment normal.           Lab Results: I have reviewed the following results:  Recent Labs     03/14/25  1014   WBC 6.61   HGB 15.0   HCT 45.6      SODIUM 138   K 4.4      CO2 26   BUN 15   CREATININE 0.75   GLUC 99         VTE Pharmacologic Prophylaxis: Enoxaparin (Lovenox)  VTE Mechanical Prophylaxis: sequential compression device      Renee Barney PA-C

## 2025-03-15 NOTE — PLAN OF CARE
Problem: PAIN - ADULT  Goal: Verbalizes/displays adequate comfort level or baseline comfort level  Description: Interventions:  - Encourage patient to monitor pain and request assistance  - Assess pain using appropriate pain scale  - Administer analgesics based on type and severity of pain and evaluate response  - Implement non-pharmacological measures as appropriate and evaluate response  - Consider cultural and social influences on pain and pain management  - Notify physician/advanced practitioner if interventions unsuccessful or patient reports new pain  Outcome: Completed     Problem: INFECTION - ADULT  Goal: Absence or prevention of progression during hospitalization  Description: INTERVENTIONS:  - Assess and monitor for signs and symptoms of infection  - Monitor lab/diagnostic results  - Monitor all insertion sites, i.e. indwelling lines, tubes, and drains  - Monitor endotracheal if appropriate and nasal secretions for changes in amount and color  - [place] appropriate cooling/warming therapies per order  - Administer medications as ordered  - Instruct and encourage patient and family to use good hand hygiene technique  - Identify and instruct in appropriate isolation precautions for identified infection/condition  Outcome: Completed  Goal: Absence of fever/infection during neutropenic period  Description: INTERVENTIONS:  - Monitor WBC    Outcome: Completed     Problem: SAFETY ADULT  Goal: Patient will remain free of falls  Description: INTERVENTIONS:  - Educate patient/family on patient safety including physical limitations  - Instruct patient to call for assistance with activity   - Consult OT/PT to assist with strengthening/mobility   - Keep Call bell within reach  - Keep bed low and locked with side rails adjusted as appropriate  - Keep care items and personal belongings within reach  - Initiate and maintain comfort rounds  - Make Fall Risk Sign visible to staff  - Apply yellow socks and bracelet for  high fall risk patients  - Consider moving patient to room near nurses station  Outcome: Completed  Goal: Maintain or return to baseline ADL function  Description: INTERVENTIONS:  -  Assess patient's ability to carry out ADLs; assess patient's baseline for ADL function and identify physical deficits which impact ability to perform ADLs (bathing, care of mouth/teeth, toileting, grooming, dressing, etc.)  - Assess/evaluate cause of self-care deficits   - Assess range of motion  - Assess patient's mobility; develop plan if impaired  - Assess patient's need for assistive devices and provide as appropriate  - Encourage maximum independence but intervene and supervise when necessary  - Involve family in performance of ADLs  - Assess for home care needs following discharge   - Consider OT consult to assist with ADL evaluation and planning for discharge  - Provide patient education as appropriate  Outcome: Completed  Goal: Maintains/Returns to pre admission functional level  Description: INTERVENTIONS:  - Perform AM-PAC 6 Click Basic Mobility/ Daily Activity assessment daily.  - Set and communicate daily mobility goal to care team and patient/family/caregiver.   - Collaborate with rehabilitation services on mobility goals if consulted  - Out of bed for toileting  - Record patient progress and toleration of activity level   Outcome: Completed     Problem: DISCHARGE PLANNING  Goal: Discharge to home or other facility with appropriate resources  Description: INTERVENTIONS:  - Identify barriers to discharge w/patient and caregiver  - Arrange for needed discharge resources and transportation as appropriate  - Identify discharge learning needs (meds, wound care, etc.)  - Arrange for interpretive services to assist at discharge as needed  - Refer to Case Management Department for coordinating discharge planning if the patient needs post-hospital services based on physician/advanced practitioner order or complex needs related to  functional status, cognitive ability, or social support system  Outcome: Completed     Problem: Knowledge Deficit  Goal: Patient/family/caregiver demonstrates understanding of disease process, treatment plan, medications, and discharge instructions  Description: Complete learning assessment and assess knowledge base.  Interventions:  - Provide teaching at level of understanding  - Provide teaching via preferred learning methods  Outcome: Completed

## 2025-03-16 LAB — BACTERIA UR CULT: NORMAL

## 2025-03-17 ENCOUNTER — TELEPHONE (OUTPATIENT)
Dept: UROLOGY | Facility: CLINIC | Age: 70
End: 2025-03-17

## 2025-03-17 ENCOUNTER — PROCEDURE VISIT (OUTPATIENT)
Dept: UROLOGY | Facility: AMBULATORY SURGERY CENTER | Age: 70
End: 2025-03-17

## 2025-03-17 VITALS
HEART RATE: 70 BPM | DIASTOLIC BLOOD PRESSURE: 90 MMHG | BODY MASS INDEX: 32.29 KG/M2 | OXYGEN SATURATION: 96 % | HEIGHT: 69 IN | WEIGHT: 218 LBS | SYSTOLIC BLOOD PRESSURE: 150 MMHG

## 2025-03-17 DIAGNOSIS — N13.8 BPH WITH OBSTRUCTION/LOWER URINARY TRACT SYMPTOMS: Primary | ICD-10-CM

## 2025-03-17 DIAGNOSIS — N40.1 BPH WITH OBSTRUCTION/LOWER URINARY TRACT SYMPTOMS: Primary | ICD-10-CM

## 2025-03-17 PROCEDURE — 99024 POSTOP FOLLOW-UP VISIT: CPT

## 2025-03-17 NOTE — TELEPHONE ENCOUNTER
Post Op Note    George Villegasyeniken is a 69 y.o. male s/p TRANSURETHRAL RESECTION OF PROSTATE (TURP) performed [id number]/14/2025.  George Villegasmilemelba is a patient of Dr. Trivedi and is seen at the [facility].      for patient seeing how he is doing after his procedure the other day. Informed on follow up appt 4/14.

## 2025-03-17 NOTE — PROGRESS NOTES
"3/17/2025    George Ramirez is a 69 y.o. male  [id number]    Diagnosis:  Chief Complaint    Urinary Retention         Patient presents for sol  removal s/p TURP on 3/14/25 with Dr. Trivedi    Plan:  Patient as follow up in 1 month with provider    Procedure:  Patient  supine. Deflated 30 ml balloon. Removed 24 f catheter with wire. Came out  without issues. Minimum bleeding. Patient given pads and is aware to do kegel exercises    Vitals:    03/17/25 1335   BP: 150/90   Pulse: 70   SpO2: 96%   Weight: 98.9 kg (218 lb)   Height: 5' 8.5\" (1.74 m)         Haylee Carias RN   " No

## 2025-03-18 NOTE — TELEPHONE ENCOUNTER
Pt returning call to clinical. Pt sts he is feeling good and no questions or concerns at this time.

## 2025-03-18 NOTE — TELEPHONE ENCOUNTER
Post Op Note     George Villegasyeniken is a 69 y.o. male s/p TRANSURETHRAL RESECTION OF PROSTATE (TURP) performed [id number]/14/2025.  George Villegasmilemelba is a patient of Dr. Trivedi and is seen at the [facility].       for patient seeing how he is doing after his procedure the other day. Informed on follow up appt 4/14.

## 2025-03-19 NOTE — ANESTHESIA PREPROCEDURE EVALUATION
Procedure:  TRANSURETHRAL RESECTION OF PROSTATE (TURP) (Abdomen)    Relevant Problems   CARDIO   (+) Chest pain   (+) Coronary artery disease involving native coronary artery   (+) Hyperlipidemia   (+) Paroxysmal atrial fibrillation (HCC)   (+) Presence of drug-eluting stent in anterior descending branch of left coronary artery   (+) Symptomatic varicose veins of both lower extremities      GI/HEPATIC   (+) Alpha-1-antitrypsin deficiency (HCC)   (+) Pancreatic cyst      /RENAL   (+) BPH with obstruction/lower urinary tract symptoms   (+) Nephrolithiasis      MUSCULOSKELETAL   (+) DDD (degenerative disc disease), lumbar   (+) Low back pain with sciatica   (+) Lumbar spondylosis   (+) Osteoarthritis      NEURO/PSYCH   (+) Chronic pain syndrome        Physical Exam    Airway    Mallampati score: III  TM Distance: >3 FB  Neck ROM: full     Dental       Cardiovascular      Pulmonary      Other Findings        Anesthesia Plan  ASA Score- 3     Anesthesia Type- general with ASA Monitors.         Additional Monitors:     Airway Plan: LMA.           Plan Factors-Exercise tolerance (METS): >4 METS.    Chart reviewed.                      Induction- intravenous.    Postoperative Plan- Plan for postoperative opioid use. Planned trial extubation    Perioperative Resuscitation Plan - Level 1 - Full Code.       Informed Consent- Anesthetic plan and risks discussed with patient.  I personally reviewed this patient with the CRNA. Discussed and agreed on the Anesthesia Plan with the CRNA..      NPO Status:  Vitals Value Taken Time   Date of last liquid 03/14/25 03/14/25 0759   Time of last liquid 0700 03/14/25 0759   Date of last solid 03/13/25 03/14/25 0759   Time of last solid 2330 03/14/25 0759          Spontaneous vaginal delivery live female.  placed on mother's abdomen for bonding. No

## 2025-03-19 NOTE — ANESTHESIA POSTPROCEDURE EVALUATION
Post-Op Assessment Note    CV Status:  Stable  Pain Score: 0    Pain management: adequate       Mental Status:  Awake   Hydration Status:  Stable   PONV Controlled:  None   Airway Patency:  Patent     Post Op Vitals Reviewed: Yes    No anethesia notable event occurred.    Staff: Anesthesiologist,      Post-Op Assessment Note    Last Filed PACU Vitals:  Vitals Value Taken Time   Temp 98.1 °F (36.7 °C) 03/14/25 1200   Pulse 88 03/14/25 1343   /76 03/14/25 1330   Resp 19 03/14/25 1343   SpO2 96 % 03/14/25 1343   Vitals shown include unfiled device data.    Modified Bronson:     Vitals Value Taken Time   Activity 2 03/14/25 1200   Respiration 2 03/14/25 1200   Circulation 2 03/14/25 1200   Consciousness 2 03/14/25 1200   Oxygen Saturation 2 03/14/25 1200     Modified Bronson Score: 10

## 2025-03-20 ENCOUNTER — NURSE TRIAGE (OUTPATIENT)
Dept: OTHER | Facility: OTHER | Age: 70
End: 2025-03-20

## 2025-03-20 ENCOUNTER — APPOINTMENT (OUTPATIENT)
Dept: RADIOLOGY | Facility: HOSPITAL | Age: 70
End: 2025-03-20
Payer: COMMERCIAL

## 2025-03-20 ENCOUNTER — HOSPITAL ENCOUNTER (INPATIENT)
Facility: HOSPITAL | Age: 70
LOS: 1 days | Discharge: HOME | End: 2025-03-23
Attending: EMERGENCY MEDICINE | Admitting: UROLOGY
Payer: COMMERCIAL

## 2025-03-20 DIAGNOSIS — R31.0 GROSS HEMATURIA: ICD-10-CM

## 2025-03-20 DIAGNOSIS — N13.8 BPH WITH OBSTRUCTION/LOWER URINARY TRACT SYMPTOMS: Primary | ICD-10-CM

## 2025-03-20 DIAGNOSIS — R31.9 HEMATURIA: ICD-10-CM

## 2025-03-20 DIAGNOSIS — N40.1 BPH WITH OBSTRUCTION/LOWER URINARY TRACT SYMPTOMS: Primary | ICD-10-CM

## 2025-03-20 LAB
ANION GAP SERPL CALCULATED.3IONS-SCNC: 8 MMOL/L (ref 4–13)
APTT PPP: 28 SECONDS (ref 23–34)
BACTERIA UR QL AUTO: ABNORMAL /HPF
BASOPHILS # BLD AUTO: 0.04 THOUSANDS/ÂΜL (ref 0–0.1)
BASOPHILS NFR BLD AUTO: 1 % (ref 0–1)
BILIRUB UR QL STRIP: ABNORMAL
BUN SERPL-MCNC: 15 MG/DL (ref 5–25)
CALCIUM SERPL-MCNC: 9.5 MG/DL (ref 8.4–10.2)
CHLORIDE SERPL-SCNC: 105 MMOL/L (ref 96–108)
CLARITY UR: ABNORMAL
CO2 SERPL-SCNC: 25 MMOL/L (ref 21–32)
COLOR UR: ABNORMAL
CREAT SERPL-MCNC: 0.89 MG/DL (ref 0.6–1.3)
EOSINOPHIL # BLD AUTO: 0.13 THOUSAND/ÂΜL (ref 0–0.61)
EOSINOPHIL NFR BLD AUTO: 2 % (ref 0–6)
ERYTHROCYTE [DISTWIDTH] IN BLOOD BY AUTOMATED COUNT: 12.1 % (ref 11.6–15.1)
GFR SERPL CREATININE-BSD FRML MDRD: 86 ML/MIN/1.73SQ M
GLUCOSE SERPL-MCNC: 101 MG/DL (ref 65–140)
GLUCOSE UR STRIP-MCNC: ABNORMAL MG/DL
HCT VFR BLD AUTO: 42.6 % (ref 36.5–49.3)
HCT VFR BLD AUTO: 43.2 % (ref 36.5–49.3)
HGB BLD-MCNC: 13.7 G/DL (ref 12–17)
HGB BLD-MCNC: 14.4 G/DL (ref 12–17)
HGB UR QL STRIP.AUTO: ABNORMAL
IMM GRANULOCYTES # BLD AUTO: 0.01 THOUSAND/UL (ref 0–0.2)
IMM GRANULOCYTES NFR BLD AUTO: 0 % (ref 0–2)
INR PPP: 1.04 (ref 0.85–1.19)
KETONES UR STRIP-MCNC: ABNORMAL MG/DL
LEUKOCYTE ESTERASE UR QL STRIP: ABNORMAL
LYMPHOCYTES # BLD AUTO: 1.65 THOUSANDS/ÂΜL (ref 0.6–4.47)
LYMPHOCYTES NFR BLD AUTO: 21 % (ref 14–44)
MCH RBC QN AUTO: 31.8 PG (ref 26.8–34.3)
MCHC RBC AUTO-ENTMCNC: 33.3 G/DL (ref 31.4–37.4)
MCV RBC AUTO: 95 FL (ref 82–98)
MONOCYTES # BLD AUTO: 0.53 THOUSAND/ÂΜL (ref 0.17–1.22)
MONOCYTES NFR BLD AUTO: 7 % (ref 4–12)
NEUTROPHILS # BLD AUTO: 5.69 THOUSANDS/ÂΜL (ref 1.85–7.62)
NEUTS SEG NFR BLD AUTO: 69 % (ref 43–75)
NITRITE UR QL STRIP: ABNORMAL
NON-SQ EPI CELLS URNS QL MICRO: ABNORMAL /HPF
NRBC BLD AUTO-RTO: 0 /100 WBCS
PH UR STRIP.AUTO: ABNORMAL [PH]
PLATELET # BLD AUTO: 232 THOUSANDS/UL (ref 149–390)
PMV BLD AUTO: 9.1 FL (ref 8.9–12.7)
POTASSIUM SERPL-SCNC: 4 MMOL/L (ref 3.5–5.3)
PROT UR STRIP-MCNC: ABNORMAL MG/DL
PROTHROMBIN TIME: 13.9 SECONDS (ref 12.3–15)
RBC # BLD AUTO: 4.53 MILLION/UL (ref 3.88–5.62)
RBC #/AREA URNS AUTO: ABNORMAL /HPF
SODIUM SERPL-SCNC: 138 MMOL/L (ref 135–147)
SP GR UR STRIP.AUTO: 1.01 (ref 1–1.03)
WBC # BLD AUTO: 8.05 THOUSAND/UL (ref 4.31–10.16)
WBC #/AREA URNS AUTO: ABNORMAL /HPF

## 2025-03-20 PROCEDURE — 88305 TISSUE EXAM BY PATHOLOGIST: CPT | Performed by: STUDENT IN AN ORGANIZED HEALTH CARE EDUCATION/TRAINING PROGRAM

## 2025-03-20 PROCEDURE — 85025 COMPLETE CBC W/AUTO DIFF WBC: CPT | Performed by: PHYSICIAN ASSISTANT

## 2025-03-20 PROCEDURE — 85610 PROTHROMBIN TIME: CPT | Performed by: PHYSICIAN ASSISTANT

## 2025-03-20 PROCEDURE — 99284 EMERGENCY DEPT VISIT MOD MDM: CPT

## 2025-03-20 PROCEDURE — 72192 CT PELVIS W/O DYE: CPT

## 2025-03-20 PROCEDURE — 85730 THROMBOPLASTIN TIME PARTIAL: CPT | Performed by: PHYSICIAN ASSISTANT

## 2025-03-20 PROCEDURE — 85014 HEMATOCRIT: CPT | Performed by: NURSE PRACTITIONER

## 2025-03-20 PROCEDURE — 99285 EMERGENCY DEPT VISIT HI MDM: CPT | Performed by: PHYSICIAN ASSISTANT

## 2025-03-20 PROCEDURE — 85018 HEMOGLOBIN: CPT | Performed by: NURSE PRACTITIONER

## 2025-03-20 PROCEDURE — 81001 URINALYSIS AUTO W/SCOPE: CPT | Performed by: PHYSICIAN ASSISTANT

## 2025-03-20 PROCEDURE — NC001 PR NO CHARGE: Performed by: UROLOGY

## 2025-03-20 PROCEDURE — 36415 COLL VENOUS BLD VENIPUNCTURE: CPT | Performed by: PHYSICIAN ASSISTANT

## 2025-03-20 PROCEDURE — 80048 BASIC METABOLIC PNL TOTAL CA: CPT | Performed by: PHYSICIAN ASSISTANT

## 2025-03-20 RX ORDER — SODIUM CHLORIDE 9 MG/ML
50 INJECTION, SOLUTION INTRAVENOUS CONTINUOUS
Status: DISCONTINUED | OUTPATIENT
Start: 2025-03-20 | End: 2025-03-22

## 2025-03-20 RX ORDER — OXCARBAZEPINE 300 MG/1
300 TABLET, FILM COATED ORAL EVERY 12 HOURS SCHEDULED
Status: DISCONTINUED | OUTPATIENT
Start: 2025-03-20 | End: 2025-03-23 | Stop reason: HOSPADM

## 2025-03-20 RX ORDER — ACETAMINOPHEN 325 MG/1
650 TABLET ORAL EVERY 4 HOURS PRN
Status: DISCONTINUED | OUTPATIENT
Start: 2025-03-20 | End: 2025-03-23 | Stop reason: HOSPADM

## 2025-03-20 RX ORDER — MONTELUKAST SODIUM 10 MG/1
10 TABLET ORAL
Status: DISCONTINUED | OUTPATIENT
Start: 2025-03-20 | End: 2025-03-23 | Stop reason: HOSPADM

## 2025-03-20 RX ORDER — COLESEVELAM 180 1/1
1250 TABLET ORAL 2 TIMES DAILY WITH MEALS
Status: DISCONTINUED | OUTPATIENT
Start: 2025-03-20 | End: 2025-03-20 | Stop reason: CLARIF

## 2025-03-20 RX ORDER — CLOPIDOGREL BISULFATE 75 MG/1
75 TABLET ORAL DAILY
Status: DISCONTINUED | OUTPATIENT
Start: 2025-03-20 | End: 2025-03-23 | Stop reason: HOSPADM

## 2025-03-20 RX ORDER — CHOLESTYRAMINE LIGHT 4 G/5.7G
4 POWDER, FOR SUSPENSION ORAL 2 TIMES DAILY WITH MEALS
Status: DISCONTINUED | OUTPATIENT
Start: 2025-03-20 | End: 2025-03-23 | Stop reason: HOSPADM

## 2025-03-20 RX ORDER — OXYBUTYNIN CHLORIDE 5 MG/1
5 TABLET ORAL 3 TIMES DAILY
Status: DISCONTINUED | OUTPATIENT
Start: 2025-03-20 | End: 2025-03-20

## 2025-03-20 RX ORDER — HYDROXYZINE HYDROCHLORIDE 10 MG/5ML
4 SYRUP ORAL EVERY 6 HOURS PRN
Status: DISCONTINUED | OUTPATIENT
Start: 2025-03-20 | End: 2025-03-20 | Stop reason: RX

## 2025-03-20 RX ORDER — LIDOCAINE HYDROCHLORIDE 20 MG/ML
1 JELLY TOPICAL ONCE
Status: COMPLETED | OUTPATIENT
Start: 2025-03-20 | End: 2025-03-20

## 2025-03-20 RX ORDER — AMOXICILLIN 250 MG
1 CAPSULE ORAL DAILY
Status: DISCONTINUED | OUTPATIENT
Start: 2025-03-20 | End: 2025-03-23 | Stop reason: HOSPADM

## 2025-03-20 RX ORDER — HYOSCYAMINE SULFATE 0.12 MG/1
0.12 TABLET SUBLINGUAL
Status: DISCONTINUED | OUTPATIENT
Start: 2025-03-20 | End: 2025-03-23 | Stop reason: HOSPADM

## 2025-03-20 RX ORDER — UBIDECARENONE 30 MG
200 CAPSULE ORAL DAILY
Status: DISCONTINUED | OUTPATIENT
Start: 2025-03-20 | End: 2025-03-23 | Stop reason: HOSPADM

## 2025-03-20 RX ORDER — HYDROCORTISONE ACETATE 0.5 %
CREAM (GRAM) TOPICAL 2 TIMES DAILY
Status: DISCONTINUED | OUTPATIENT
Start: 2025-03-20 | End: 2025-03-20 | Stop reason: RX

## 2025-03-20 RX ORDER — MAGNESIUM HYDROXIDE 1200 MG/15ML
3000 LIQUID ORAL CONTINUOUS
Status: DISCONTINUED | OUTPATIENT
Start: 2025-03-20 | End: 2025-03-22

## 2025-03-20 RX ORDER — CALCIUM CARBONATE 500 MG/1
1000 TABLET, CHEWABLE ORAL DAILY PRN
Status: DISCONTINUED | OUTPATIENT
Start: 2025-03-20 | End: 2025-03-23 | Stop reason: HOSPADM

## 2025-03-20 RX ORDER — EZETIMIBE 10 MG/1
10 TABLET ORAL DAILY
Status: DISCONTINUED | OUTPATIENT
Start: 2025-03-20 | End: 2025-03-23 | Stop reason: HOSPADM

## 2025-03-20 RX ORDER — CLOBETASOL PROPIONATE 0.5 MG/G
1 CREAM TOPICAL 2 TIMES DAILY PRN
Status: DISCONTINUED | OUTPATIENT
Start: 2025-03-20 | End: 2025-03-23 | Stop reason: HOSPADM

## 2025-03-20 RX ORDER — ONDANSETRON 2 MG/ML
4 INJECTION INTRAMUSCULAR; INTRAVENOUS EVERY 6 HOURS PRN
Status: DISCONTINUED | OUTPATIENT
Start: 2025-03-20 | End: 2025-03-23 | Stop reason: HOSPADM

## 2025-03-20 RX ORDER — NITROGLYCERIN 0.4 MG/1
0.4 TABLET SUBLINGUAL
Status: DISCONTINUED | OUTPATIENT
Start: 2025-03-20 | End: 2025-03-23 | Stop reason: HOSPADM

## 2025-03-20 RX ORDER — ASCORBIC ACID 500 MG
250 TABLET ORAL 2 TIMES DAILY
Status: DISCONTINUED | OUTPATIENT
Start: 2025-03-20 | End: 2025-03-23 | Stop reason: HOSPADM

## 2025-03-20 RX ORDER — PHENAZOPYRIDINE HYDROCHLORIDE 100 MG/1
200 TABLET, FILM COATED ORAL
Status: DISCONTINUED | OUTPATIENT
Start: 2025-03-20 | End: 2025-03-23 | Stop reason: HOSPADM

## 2025-03-20 RX ADMIN — LIDOCAINE HYDROCHLORIDE 1 APPLICATION: 20 JELLY TOPICAL at 06:49

## 2025-03-20 RX ADMIN — EZETIMIBE 10 MG: 10 TABLET ORAL at 11:27

## 2025-03-20 RX ADMIN — B-COMPLEX W/ C & FOLIC ACID TAB 1 TABLET: TAB at 11:26

## 2025-03-20 RX ADMIN — Medication 200 MG: at 11:26

## 2025-03-20 RX ADMIN — HYOSCYAMINE SULFATE 0.12 MG: 0.12 TABLET SUBLINGUAL at 16:41

## 2025-03-20 RX ADMIN — PHENAZOPYRIDINE HYDROCHLORIDE 200 MG: 100 TABLET ORAL at 16:32

## 2025-03-20 RX ADMIN — OXCARBAZEPINE 300 MG: 300 TABLET, FILM COATED ORAL at 11:27

## 2025-03-20 RX ADMIN — HYOSCYAMINE SULFATE 0.12 MG: 0.12 TABLET SUBLINGUAL at 11:27

## 2025-03-20 RX ADMIN — OXCARBAZEPINE 300 MG: 300 TABLET, FILM COATED ORAL at 21:38

## 2025-03-20 RX ADMIN — Medication 12.5 MG: at 11:25

## 2025-03-20 RX ADMIN — Medication 250 MG: at 11:27

## 2025-03-20 RX ADMIN — MONTELUKAST 10 MG: 10 TABLET, FILM COATED ORAL at 21:38

## 2025-03-20 RX ADMIN — CHOLESTYRAMINE 4 G: 4 POWDER, FOR SUSPENSION ORAL at 16:43

## 2025-03-20 RX ADMIN — Medication 12.5 MG: at 21:38

## 2025-03-20 RX ADMIN — Medication 250 MG: at 21:38

## 2025-03-20 RX ADMIN — PHENAZOPYRIDINE HYDROCHLORIDE 200 MG: 100 TABLET ORAL at 11:26

## 2025-03-20 RX ADMIN — SODIUM CHLORIDE 125 ML/HR: 0.9 INJECTION, SOLUTION INTRAVENOUS at 13:24

## 2025-03-20 RX ADMIN — SODIUM CHLORIDE FOR IRRIGATION 3000 ML: 0.9 SOLUTION IRRIGATION at 11:55

## 2025-03-20 NOTE — QUICK NOTE
Patient resting in bed.  He reports seeing clots passing through his Kemp catheter since CBI was started.  Patient's urine is currently Pyridium colored to pale yellow.  Patient was manually irrigated again by myself with return of only specks of blood.  Patient continues to have significant bladder spasms with manual irrigation.    Repeat H&H 13.7/42.6  Vital signs stable      Plan:  House diet ordered  N.p.o. after midnight for reevaluation in a.m.  Continue CBI, titrate to keep urine light orange color  Continue with manual irrigation every 6 hours  Continue with every 6 hour bladder scans  Plan discussed with patient, family, Dr. Membreno and Dr. Trivedi

## 2025-03-20 NOTE — ASSESSMENT & PLAN NOTE
On WelChol 1250 mg twice daily  Zetia 10 mg daily  Takes Leqvio every 6 month (due May 2025)  Intolerance to statins

## 2025-03-20 NOTE — TELEPHONE ENCOUNTER
"FOLLOW UP: Patient will likely need follow-up, as he is going to the ER for evaluation as recommended by on-call provider.    REASON FOR CONVERSATION: Post-op, Hematuria, and Urinary Frequency    SYMPTOMS: Hematuria and urinary frequency and urgency since 0130, mild burning when starting urine stream    OTHER: Pt is S/P TURP by Dr. Trivedi on 3/14/2025.  Catheter was removed on Monday. Plavix and Eliquis restarted on Tuesday.  Hematuria had subsided but started again last evening- bright red blood (one 3/8\" clot), and voiding every 5-10 minutes with mild burning when beginning urine stream.  On-call provider recommended ER evaluation.  Patient is agreeable.    DISPOSITION: Go to ED Now (overriding Call PCP Now)    Patient will be going to [facility] ER shortly for evaluation.    Reason for Disposition   [1] Caller has URGENT question AND [2] triager unable to answer question    Answer Assessment - Initial Assessment Questions  1. SYMPTOM: \"What's the main symptom you're concerned about?\" (e.g., pain, fever, vomiting)     Patient reports that he passed a small amount of blood last evening and his urine, but now he is passing bright red bloody urine (noticed one 3/8\" clot).  He reports that he can't tell if it is pure blood or not, but reports it is the consistency of urine.  Also reports that he is urinating every 5 to 10 minutes since [id number] today.    2. SURGERY: \"What surgery did you have?\"        TURP    3. DATE of SURGERY: \"When was the surgery?\"         3/14/25    4. PAIN: \"Is there any pain?\" If Yes, ask: \"How bad is it?\"  (Scale 1-10; or mild, moderate, severe)        Mild burning when starting urine stream    5. FEVER: \"Do you have a fever?\" If Yes, ask: \"What is your temperature, how was it measured, and when did it start?\"        Denies     6. VOMITING: \"Is there any vomiting?\" If Yes, ask: \"How many times?\"        Denies     7. BLEEDING: \"Is there any bleeding?\" If Yes, ask: \"How much?\" and " "\"Where?\"          As noted above    8. OTHER SYMPTOMS: \"Do you have any other symptoms?\" (e.g., drainage from wound, painful urination, constipation)          Denies     Patient reports that he had his catheter removed on Monday and was started back on Plavix and Eliquis on Tuesday.   He is hydrating a lot and is urinating a stream, not just a few drops.    Protocols used: Post-Op Symptoms and Questions-Adult-    "

## 2025-03-20 NOTE — ED NOTES
Night shift gave report to this RN. Care assumed at this time.     Lorraine Swain RN  03/20/25 0785

## 2025-03-20 NOTE — QUICK NOTE
Received secure chat message from primary nurse in the emergency department that patient was complaining of bladder fullness and his Kemp catheter was no longer draining.  Patient appeared uncomfortable on my arrival.  I attached James syringe and was able to pull back a small amount of clot at which point patient reported relief.  Kemp catheter began draining.  After Kemp catheter was done draining he was manually irrigated but no additional clots were removed.  Patient again having bladder spasms with irrigation.  CBI was resumed and Kemp catheter appeared to be draining appropriately

## 2025-03-20 NOTE — ASSESSMENT & PLAN NOTE
S/p TURP 3/14 with Kemp catheter removal 3/17 and resumed Eliquis and Plavix on 318  S/p placement of 22 French three-way Kemp catheter in emergency department  Hgb 14.4  WBC 8.05  Afebrile, vital signs stable  Plan:  Admit to urology   Hold Plavix and Eliquis  Keep n.p.o.   check CT of pelvis to evaluate clot burden  CBI titrated to keep urine light pink color  Manual irrigation every 6 hours and as needed to keep catheter patent  Bladder scan every 6 hours and as needed  Keep Kemp catheter on traction  Oxybutynin for bladder spasms  Trend H&H

## 2025-03-20 NOTE — CONSULTS
Consultation - Urology   Name: George Ramirez 69 y.o. male I MRN: [id number]  Unit/Bed#: ED 16 I Date of Admission: 3/20/2025   Date of Service: 3/20/2025 I Hospital Day: 0   Inpatient consult to Urology  Consult performed by: MARGIE Lucas  Consult ordered by: Avelina Castro PA-C        Physician Requesting Evaluation: Marty Membreno MD   Reason for Evaluation / Principal Problem: Gross hematuria status post TURP    Assessment & Plan  Seasonal allergic rhinitis  Singulair 10 mg nightly  Hyperlipidemia  On WelChol 1250 mg twice daily  Zetia 10 mg daily  Takes Leqvio every 6 month (due May 2025)  Intolerance to statins  Alpha-1-antitrypsin deficiency (HCC)  Singulair 10 mg daily  Chlor-Trimeton 4 mg every 6 hours as needed  Irritable bowel syndrome    BPH with obstruction/lower urinary tract symptoms  S/p TURP 3/14  S/p removal of Kemp catheter 3/17  Resumed Eliquis and Plavix on 3/18  Plan:  Avoid heavy lifting  Senokot S daily  Paroxysmal atrial fibrillation (HCC)  On as needed Cardizem and Plavix  Metoprolol 12.5 mg twice daily  Plan:  Hold Plavix  Gross hematuria  S/p TURP 3/14 with Kemp catheter removal 3/17 and resumed Eliquis and Plavix on 318  S/p placement of 22 French three-way Kemp catheter in emergency department  Hgb 14.4  WBC 8.05  Afebrile, vital signs stable  Plan:  Admit to urology   Hold Plavix and Eliquis  Keep n.p.o.   check CT of pelvis to evaluate clot burden  CBI titrated to keep urine light pink color  Manual irrigation every 6 hours and as needed to keep catheter patent  Bladder scan every 6 hours and as needed  Keep Kemp catheter on traction  Oxybutynin for bladder spasms  Trend H&H      History of Present Illness   George Ramirez is a 69 y.o. male who presents with gross hematuria.  Patient had transurethral section of the prostate on 3/14 with Dr. Trivedi.  His Kemp catheter was removed 3/17 at which time he reports his urine had been clearing.  Patient resumed  his Eliquis and Plavix on 3/18.  Patient developed hematuria last evening and reports needing to urinate every it is all night long.  Patient reports passage of some clots.  He denies any fevers or chills.  Has some abdominal pressure with palpation.Received 6 L of irrigant in the emergency department.  He was gently irrigated by myself without return of clots but had significant bladder spasms and pressure with palpation only.  His Kemp catheter is draining adequately.  His Kemp catheter was placed on traction and CT was ordered for further evaluation.  Dr. Trivedi and Dr. Membreno were made aware.    Review of Systems   Constitutional:  Negative for activity change, appetite change, chills, fatigue, fever and unexpected weight change.   HENT:  Negative for facial swelling.    Eyes:  Negative for discharge.   Respiratory: Negative.  Negative for cough and shortness of breath.    Cardiovascular:  Negative for chest pain and leg swelling.   Gastrointestinal:  Positive for abdominal distention. Negative for abdominal pain, constipation, diarrhea, nausea and vomiting.   Endocrine: Negative.    Genitourinary:  Positive for frequency, hematuria and urgency. Negative for decreased urine volume, difficulty urinating, dysuria, enuresis, flank pain and genital sores.   Musculoskeletal:  Negative for back pain and myalgias.   Skin:  Negative for pallor and rash.   Allergic/Immunologic: Negative.  Negative for immunocompromised state.   Neurological:  Negative for facial asymmetry and speech difficulty.   Psychiatric/Behavioral:  Negative for agitation and confusion.      Medical History Review: I have reviewed the patient's PMH, PSH, Social History, Family History, Meds, and Allergies     Objective :  Temp:  [98 °F (36.7 °C)] 98 °F (36.7 °C)  HR:  [73-81] 81  BP: (146-150)/(80-92) 146/92  Resp:  [18-20] 20  SpO2:  [98 %] 98 %  O2 Device: None (Room air)    I/O         03/18 0701 03/19 0700 03/19 0701 03/20 0700 03/20  0701  03/21 0700    Urine   -300    Total Output   -300    Net   +300                 Lines/Drains/Airways       Active Status       Name Placement date Placement time Site Days    Urethral Catheter Three way 03/20/25  --  Three way  less than 1                  Physical Exam  Vitals reviewed.   Constitutional:       General: He is not in acute distress.     Appearance: Normal appearance. He is not ill-appearing, toxic-appearing or diaphoretic.   HENT:      Head: Normocephalic and atraumatic.   Eyes:      General: No scleral icterus.  Cardiovascular:      Rate and Rhythm: Normal rate and regular rhythm.      Heart sounds: Normal heart sounds.   Pulmonary:      Effort: Pulmonary effort is normal. No respiratory distress.      Breath sounds: Normal breath sounds. No wheezing, rhonchi or rales.   Abdominal:      General: Abdomen is flat. There is distension.      Palpations: Abdomen is soft.      Tenderness: There is abdominal tenderness. There is no guarding or rebound.   Genitourinary:     Comments: Urine punch colored with CBI running wide open.  Manually irrigated without return of clot  Musculoskeletal:         General: No swelling.   Skin:     General: Skin is warm and dry.      Coloration: Skin is not jaundiced or pale.      Findings: No rash.   Neurological:      General: No focal deficit present.      Mental Status: He is alert and oriented to person, place, and time.      Gait: Gait normal.   Psychiatric:         Mood and Affect: Mood normal.         Behavior: Behavior normal.         Thought Content: Thought content normal.         Judgment: Judgment normal.            Lab Results: I have reviewed the following results:CBC/BMP:   .     03/20/25  0656   WBC 8.05   HGB 14.4   HCT 43.2      SODIUM 138   K 4.0      CO2 25   BUN 15   CREATININE 0.89   GLUC 101    , Urinalysis:   Lab Results   Component Value Date    COLORU Red 03/20/2025    CLARITYU Turbid 03/20/2025    SPECGRAV 1.014 03/20/2025     PHUR Interference-unable to analyze (A) 03/20/2025    LEUKOCYTESUR Interference- unable to analyze (A) 03/20/2025    NITRITE Interference- unable to analyze 03/20/2025    GLUCOSEU Interference- unable to analyze (A) 03/20/2025    KETONESU Interference- unable to analyze (A) 03/20/2025    BILIRUBINUR Interference- unable to analyze (A) 03/20/2025    BLOODU Interference- unable to analyze (A) 03/20/2025     Recent Labs     03/20/25  0656   WBC 8.05   HGB 14.4   HCT 43.2      SODIUM 138   K 4.0      CO2 25   BUN 15   CREATININE 0.89   GLUC 101   PTT 28   INR 1.04     Lab Results   Component Value Date    COLORU Red 03/20/2025    COLORU Yellow 05/26/2016    CLARITYU Turbid 03/20/2025    CLARITYU Transparent 05/26/2016    SPECGRAV 1.014 03/20/2025    SPECGRAV 1.025 05/26/2016    PHUR Interference-unable to analyze (A) 03/20/2025    PHUR 7.0 01/05/2017    PHUR 6 05/26/2016    LEUKOCYTESUR Interference- unable to analyze (A) 03/20/2025    LEUKOCYTESUR - 05/26/2016    NITRITE Interference- unable to analyze 03/20/2025    NITRITE - 05/26/2016    PROTEINUA - 05/26/2016    GLUCOSEU Interference- unable to analyze (A) 03/20/2025    KETONESU Interference- unable to analyze (A) 03/20/2025    KETONESU - 05/26/2016    BILIRUBINUR Interference- unable to analyze (A) 03/20/2025    BILIRUBINUR - 05/26/2016    BLOODU Interference- unable to analyze (A) 03/20/2025    BLOODU - 05/26/2016   ,   Lab Results   Component Value Date    URINECX No Growth <100 cfu/mL 03/14/2025       Imaging Results Review: No pertinent imaging studies reviewed.  Other Study Results Review: No additional pertinent studies reviewed.    VTE Prophylaxis: VTE covered by:    None       Administrative Statements   I have spent a total time of 30 minutes in caring for this patient on the day of the visit/encounter including Diagnostic results, Prognosis, Risks and benefits of tx options, Instructions for management, Patient and family education,  Importance of tx compliance, Risk factor reductions, Impressions, Counseling / Coordination of care, Documenting in the medical record, Reviewing/placing orders in the medical record (including tests, medications, and/or procedures), Obtaining or reviewing history  , and Communicating with other healthcare professionals .

## 2025-03-20 NOTE — Clinical Note
Case was discussed with Urology and the patient's admission status was agreed to be Admission Status: observation status to the service of Dr. Trivedi .

## 2025-03-20 NOTE — ASSESSMENT & PLAN NOTE
S/p TURP 3/14  S/p removal of Kemp catheter 3/17  Resumed Eliquis and Plavix on 3/18  Plan:  Avoid heavy lifting  Senokot S daily

## 2025-03-20 NOTE — ED PROVIDER NOTES
Time reflects when diagnosis was documented in both MDM as applicable and the Disposition within this note       Time User Action Codes Description Comment    3/20/2025  6:36 AM Avelina Castro Add [R31.9] Hematuria           ED Disposition       ED Disposition   Admit    Condition   Stable    Date/Time   Thu Mar 20, 2025  8:41 AM    Comment   Case was discussed with Urology and the patient's admission status was agreed to be Admission Status: observation status to the service of Dr. Membreno.               Assessment & Plan       Medical Decision Making  Differential diagnosis includes but not limited to: Urinary retention, bladder outlet obstruction, gross hematuria, anemia, UTI    Problems Addressed:  Hematuria: acute illness or injury    Amount and/or Complexity of Data Reviewed  Labs: ordered. Decision-making details documented in ED Course.    Risk  Prescription drug management.  Decision regarding hospitalization.        ED Course as of 03/20/25 1203   Thu Mar 20, 2025   0625 Message sent to urology regarding case.   0632 Discussed case with urology.  Would use several Uro-Jet's around with the three-way and start CBI.       Medications   sodium chloride 0.9 % irrigation solution 3,000 mL (3,000 mL Irrigation New Bag 3/20/25 1155)   acetaminophen (TYLENOL) tablet 650 mg (650 mg Oral Not Given 3/20/25 1126)   ondansetron (ZOFRAN) injection 4 mg (has no administration in time range)   calcium carbonate (TUMS) chewable tablet 1,000 mg (1,000 mg Oral Not Given 3/20/25 1126)   phenazopyridine (PYRIDIUM) tablet 200 mg (200 mg Oral Given 3/20/25 1126)   apixaban (ELIQUIS) tablet 5 mg ( Oral Held Dose 3/23/25 1800)   ascorbic acid (VITAMIN C) tablet 250 mg (250 mg Oral Given 3/20/25 1127)   clobetasol (TEMOVATE) 0.05 % cream 1 Application (has no administration in time range)   clopidogrel (PLAVIX) tablet 75 mg ( Oral Held Dose 3/23/25 0900)   co-enzyme Q-10 capsule 200 mg (200 mg Oral Given 3/20/25 1126)   ezetimibe  (ZETIA) tablet 10 mg (10 mg Oral Given 3/20/25 1127)   hyoscyamine (LEVSIN/SL) SL tablet 0.125 mg (0.125 mg Sublingual Given 3/20/25 1127)   metoprolol tartrate (LOPRESSOR) partial tablet 12.5 mg (12.5 mg Oral Given 3/20/25 1125)   montelukast (SINGULAIR) tablet 10 mg (has no administration in time range)   multivitamin stress formula tablet 1 tablet (1 tablet Oral Given 3/20/25 1126)   nitroglycerin (NITROSTAT) SL tablet 0.4 mg (0.4 mg Sublingual Not Given 3/20/25 1123)   OXcarbazepine (TRILEPTAL) tablet 300 mg (300 mg Oral Given 3/20/25 1127)   senna-docusate sodium (SENOKOT S) 8.6-50 mg per tablet 1 tablet (1 tablet Oral Not Given 3/20/25 1122)   tiZANidine (ZANAFLEX) tablet 2 mg (has no administration in time range)   cholestyramine sugar free (QUESTRAN LIGHT) packet 4 g (4 g Oral Not Given 3/20/25 1133)   lidocaine (URO-JET) 2 % urethral/mucosal gel 1 Application (1 Application Urethral Given 3/20/25 0649)   lidocaine (URO-JET) 2 % urethral/mucosal gel 1 Application (1 Application Urethral Given 3/20/25 0649)       ED Risk Strat Scores                            SBIRT 22yo+      Flowsheet Row Most Recent Value   Initial Alcohol Screen: US AUDIT-C     1. How often do you have a drink containing alcohol? 0 Filed at: 03/20/2025 0603   2. How many drinks containing alcohol do you have on a typical day you are drinking?  0 Filed at: 03/20/2025 0603   3a. Male UNDER 65: How often do you have five or more drinks on one occasion? 0 Filed at: 03/20/2025 0603   3b. FEMALE Any Age, or MALE 65+: How often do you have 4 or more drinks on one occassion? 0 Filed at: 03/20/2025 0603   Audit-C Score 0 Filed at: 03/20/2025 0603   [place]: How many times in the past year have you...    Used an illegal drug or used a prescription medication for non-medical reasons? Never Filed at: 03/20/2025 0603                            History of Present Illness       Chief Complaint   Patient presents with    Blood in Urine     Pt had TURP  procedure done last week. Reports blood in urine all night. Denies pain but states urgency is every 10 minutes. Also reports multiple clots        Past Medical History:   Diagnosis Date    A-fib (HCC)     Allergic     Allergic rhinitis     Arthritis     Benign cyst of kidney     Cancer (HCC) 2015    Cat bite 2020    Cerebral vascular disease     Chronic sinusitis     Coronary artery disease     Cryptogenic stroke (HCC) 2020    Hyperlipidemia     Kidney stone     Melanoma (HCC)     Pancreas cyst     Stroke (HCC)       Past Surgical History:   Procedure Laterality Date    CARDIAC CATHETERIZATION N/A 2024    Procedure: Cardiac catheterization;  Surgeon: Tripp Cross MD;  Location: [facility];  Service: Cardiology    CARDIAC CATHETERIZATION N/A 2024    Procedure: Cardiac Coronary Angiogram;  Surgeon: Tripp Cross MD;  Location: [facility];  Service: Cardiology    CARDIAC CATHETERIZATION N/A 2024    Procedure: Cardiac pci;  Surgeon: Tripp Cross MD;  Location: [facility];  Service: Cardiology    CARDIAC CATHETERIZATION Left 2024    Procedure: Cardiac Left Heart Cath;  Surgeon: Tripp Cross MD;  Location: [facility];  Service: Cardiology    COLONOSCOPY      OTHER SURGICAL HISTORY      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    PATENT FORAMEN OVALE CLOSURE      DC TRURL ELECTROSURG RESCJ PROSTATE BLEED COMPLETE N/A 3/14/2025    Procedure: TRANSURETHRAL RESECTION OF PROSTATE (TURP);  Surgeon: Balbir Trivedi MD;  Location: [facility];  Service: Urology    VASCULAR SURGERY        Family History   Problem Relation Age of Onset    Diabetes type II Mother     Cancer Father         angioma    Cancer Sister     Hearing loss Sister       Social History     Tobacco Use    Smoking status: Former     Current packs/day: 0.00     Types: Cigars, Cigarettes     Start date: 1975     Quit date: 1986     Years since quittin.2    Smokeless  tobacco: Former     Types: Chew     Quit date: 1986    Tobacco comments:     Cigars    Vaping Use    Vaping status: Never Used   Substance Use Topics    Alcohol use: Yes     Alcohol/week: 1.0 standard drink of alcohol     Types: 1 Glasses of wine per week     Comment: 8 oz. Daily    Drug use: Not Currently     Types: Marijuana      E-Cigarette/Vaping    E-Cigarette Use Never User       E-Cigarette/Vaping Substances    Nicotine No     THC No     CBD No     Flavoring No     Other No     Unknown No       I have reviewed and agree with the history as documented.     69-year-old male presents emergency department with complaints of hematuria.  Patient is postop day 6 from a TURP with Dr. Trivedi on 3/14/2025.  States that he restarted his Plavix and Eliquis 2 days ago as instructed.  Notes that yesterday evening started noticing some blood in his urine along with the frequency of urination.  Notes that overnight his symptoms seem to worsen and he passed a moderate-sized clot.  He denies abdominal pain, fevers, nausea, vomiting, or flank pain.      History provided by:  Patient   used: No    Blood in Urine  This is a new problem. The current episode started yesterday. The problem has been gradually worsening since onset. He describes the hematuria as gross hematuria. The hematuria occurs throughout his entire urinary stream. He describes his urine color as dark red. Irritative symptoms include frequency. Irritative symptoms do not include urgency. Pertinent negatives include no abdominal pain, bladder pain, bone pain, chills, dysuria, facial swelling, fever, flank pain, genital pain, hematospermia, hesitancy, inability to urinate, nausea, urinary retention, vomiting or weight loss.       Review of Systems   Constitutional:  Negative for activity change, appetite change, chills, fever and weight loss.   HENT:  Negative for congestion, dental problem, drooling, ear discharge, ear pain, facial swelling,  mouth sores, nosebleeds, rhinorrhea, sore throat and trouble swallowing.    Eyes:  Negative for pain, discharge and itching.   Respiratory:  Negative for cough, chest tightness, shortness of breath and wheezing.    Cardiovascular:  Negative for chest pain and palpitations.   Gastrointestinal:  Negative for abdominal pain, blood in stool, constipation, diarrhea, nausea and vomiting.   Endocrine: Negative for cold intolerance and heat intolerance.   Genitourinary:  Positive for frequency and hematuria. Negative for difficulty urinating, dysuria, flank pain, hesitancy and urgency.   Skin:  Negative for rash and wound.   Allergic/Immunologic: Negative for food allergies and immunocompromised state.   Neurological:  Negative for dizziness, seizures, syncope, weakness, numbness and headaches.   Psychiatric/Behavioral:  Negative for agitation, behavioral problems and confusion.            Objective       ED Triage Vitals [03/20/25 0601]   Temperature Pulse Blood Pressure Respirations SpO2 Patient Position - Orthostatic VS   98 °F (36.7 °C) 73 150/80 18 98 % Sitting      Temp Source Heart Rate Source BP Location FiO2 (%) Pain Score    Oral Monitor Left arm -- --      Vitals      Date and Time Temp Pulse SpO2 Resp BP Pain Score FACES Pain Rating User   03/20/25 1130 -- 78 97 % 20 127/67 -- -- KM   03/20/25 1125 -- -- -- -- 127/67 -- -- KM   03/20/25 0730 -- 81 98 % 20 146/92 -- -- KM   03/20/25 0601 98 °F (36.7 °C) 73 98 % 18 150/80 -- --             Physical Exam  Vitals and nursing note reviewed.   Constitutional:       General: He is not in acute distress.     Appearance: He is not diaphoretic.   HENT:      Head: Normocephalic and atraumatic.      Right Ear: External ear normal.      Left Ear: External ear normal.      Mouth/Throat:      Mouth: Mucous membranes are moist.   Eyes:      Conjunctiva/sclera: Conjunctivae normal.   Neck:      Vascular: No JVD.      Trachea: No tracheal deviation.   Cardiovascular:      Rate  and Rhythm: Normal rate and regular rhythm.      Heart sounds: Normal heart sounds. No murmur heard.     No friction rub. No gallop.   Pulmonary:      Effort: Pulmonary effort is normal. No respiratory distress.      Breath sounds: Normal breath sounds. No wheezing or rales.   Chest:      Chest wall: No tenderness.   Abdominal:      General: Bowel sounds are normal. There is no distension.      Palpations: Abdomen is soft.      Tenderness: There is no abdominal tenderness. There is no guarding.   Musculoskeletal:         General: No tenderness or deformity. Normal range of motion.   Lymphadenopathy:      Cervical: No cervical adenopathy.   Skin:     General: Skin is warm and dry.      Findings: No erythema or rash.   Neurological:      Mental Status: He is alert and oriented to person, place, and time.   Psychiatric:         Mood and Affect: Mood normal.         Behavior: Behavior normal.         Results Reviewed       Procedure Component Value Units Date/Time    Hemoglobin and hematocrit, blood [[id number]]     Lab Status: No result Specimen: Blood     Urine Microscopic [[id number]]  (Abnormal) Collected: 03/20/25 0741    Lab Status: Final result Specimen: Urine, Indwelling Kemp Catheter Updated: 03/20/25 0849     RBC, UA Innumerable /hpf      WBC, UA None Seen /hpf      Epithelial Cells None Seen /hpf      Bacteria, UA None Seen /hpf     UA w Reflex to Microscopic w Reflex to Culture [[id number]]  (Abnormal) Collected: 03/20/25 0741    Lab Status: Final result Specimen: Urine, Indwelling Kemp Catheter Updated: 03/20/25 0848     Color, UA Red     Clarity, UA Turbid     Specific Gravity, UA 1.014     pH, UA Interference-unable to analyze     Leukocytes, UA Interference- unable to analyze     Nitrite, UA Interference- unable to analyze     Protein, UA       Interference- unable to analyze     mg/dl     Glucose, UA       Interference- unable to analyze     mg/dl     Ketones, UA       Interference- unable to analyze      mg/dl     Urobilinogen, UA --     Bilirubin, UA Interference- unable to analyze     Occult Blood, UA Interference- unable to analyze    Basic metabolic panel [[id number]] Collected: 03/20/25 0656    Lab Status: Final result Specimen: Blood from Arm, Left Updated: 03/20/25 0733     Sodium 138 mmol/L      Potassium 4.0 mmol/L      Chloride 105 mmol/L      CO2 25 mmol/L      ANION GAP 8 mmol/L      BUN 15 mg/dL      Creatinine 0.89 mg/dL      Glucose 101 mg/dL      Calcium 9.5 mg/dL      eGFR 86 ml/min/1.73sq m     Narrative:      [employer] guidelines for Chronic Kidney Disease (CKD):     Stage 1 with normal or high GFR (GFR > 90 mL/min/1.73 square meters)    Stage 2 Mild CKD (GFR = 60-89 mL/min/1.73 square meters)    Stage 3A Moderate CKD (GFR = 45-59 mL/min/1.73 square meters)    Stage 3B Moderate CKD (GFR = 30-44 mL/min/1.73 square meters)    Stage 4 Severe CKD (GFR = 15-29 mL/min/1.73 square meters)    Stage 5 End Stage CKD (GFR <15 mL/min/1.73 square meters)  Note: GFR calculation is accurate only with a steady state creatinine    Protime-INR [[id number]]  (Normal) Collected: 03/20/25 0656    Lab Status: Final result Specimen: Blood from Arm, Left Updated: 03/20/25 0722     Protime 13.9 seconds      INR 1.04    Narrative:      INR Therapeutic Range    Indication                                             INR Range      Atrial Fibrillation                                               2.0-3.0  Hypercoagulable State                                    2.0.2.3  Left Ventricular Asist Device                            2.0-3.0  Mechanical Heart Valve                                  -    Aortic(with afib, MI, embolism, HF, LA enlargement,    and/or coagulopathy)                                     2.0-3.0 (2.5-3.5)     Mitral                                                             2.5-3.5  Prosthetic/Bioprosthetic Heart Valve               2.0-3.0  Venous thromboembolism (VTE: VT, PE         2.0-3.0    APTT [[id number]]  (Normal) Collected: 03/20/25 0656    Lab Status: Final result Specimen: Blood from Arm, Left Updated: 03/20/25 0722     PTT 28 seconds     CBC and differential [[id number]] Collected: 03/20/25 0656    Lab Status: Final result Specimen: Blood from Arm, Left Updated: 03/20/25 0705     WBC 8.05 Thousand/uL      RBC 4.53 Million/uL      Hemoglobin 14.4 g/dL      Hematocrit 43.2 %      MCV 95 fL      MCH 31.8 pg      MCHC 33.3 g/dL      RDW 12.1 %      MPV 9.1 fL      Platelets 232 Thousands/uL      nRBC 0 /100 WBCs      Segmented % 69 %      Immature Grans % 0 %      Lymphocytes % 21 %      Monocytes % 7 %      Eosinophils Relative 2 %      Basophils Relative 1 %      Absolute Neutrophils 5.69 Thousands/µL      Absolute Immature Grans 0.01 Thousand/uL      Absolute Lymphocytes 1.65 Thousands/µL      Absolute Monocytes 0.53 Thousand/µL      Eosinophils Absolute 0.13 Thousand/µL      Basophils Absolute 0.04 Thousands/µL             CT pelvis wo contrast   Final Interpretation by Kaelyn Membreno MD (03/20 0958)      Large amount of clot in the urinary bladder      Perivesical infiltration seen      Workstation performed: [id number]             Procedures    ED Medication and Procedure Management   Prior to Admission Medications   Prescriptions Last Dose Informant Patient Reported? Taking?   Coenzyme Q10 (COQ10) 200 MG CAPS 3/19/2025 Evening Self Yes Yes   Sig: Take 1 capsule by mouth daily   Glucosamine-Chondroit-Vit C-Mn (GLUCOSAMINE CHONDR 1500 COMPLX PO) 3/19/2025 Evening Self Yes Yes   Sig: Take 1 tablet by mouth 2 (two) times a day    Inclisiran Sodium (Leqvio) subcutaneous injection  Self No No   Sig: Inject 1.5 mL (284 mg total) under the skin once for 1 dose   NON FORMULARY Unknown Self Yes No   Sig: CBD CAPSULE  AND CREAM USE DAILY PRIN   OXcarbazepine (TRILEPTAL) 300 mg tablet 3/19/2025 Evening  No Yes   Sig: TAKE ONE TABLET BY MOUTH TWICE A DAY   apixaban (Eliquis) 5 mg 3/19/2025  Evening Self No Yes   Sig: TAKE ONE TABLET BY MOUTH TWICE A DAY   ascorbic acid (VITAMIN C) 250 mg tablet 3/19/2025 Evening Self Yes Yes   Sig: Take 250 mg by mouth 2 (two) times a day   chlorpheniramine (CHLOR-TRIMETON) 4 MG tablet Past Week Self No Yes   Sig: Take 1 tablet (4 mg total) by mouth every 6 (six) hours as needed for rhinitis   clobetasol (TEMOVATE) 0.05 % cream Past Week Self Yes Yes   Sig: Apply 1 application topically 2 (two) times a day as needed Apply sparingly to affected areas   clopidogrel (PLAVIX) 75 mg tablet 3/19/2025 Morning Self No Yes   Sig: TAKE ONE TABLET BY MOUTH EVERY DAY   colesevelam (WELCHOL) 625 mg tablet 3/19/2025 Evening Self No Yes   Sig: TAKE TWO TABLETS BY MOUTH TWICE A DAY WITH MEALS   diltiazem (CARDIZEM) 30 mg tablet More than a month Self No No   Sig: TAKE ONE TABLET BY MOUTH IF NEEDED FOR AFIB   Patient taking differently: Take 30 mg by mouth if needed (afib)   ezetimibe (ZETIA) 10 mg tablet 3/19/2025 Morning Self No Yes   Sig: Take 1 tablet (10 mg total) by mouth daily   hyoscyamine (LEVSIN/SL) 0.125 mg SL tablet 3/19/2025 Self Yes Yes   Sig: PLACE ONE TABLET UNDER THE TONGUE THREE TIMES A DAY   metoprolol tartrate (LOPRESSOR) 25 mg tablet 3/19/2025 Evening Self No Yes   Sig: TAKE 1/2 TABLET BY MOUTH EVERY 12 HOURS   montelukast (SINGULAIR) 10 mg tablet 3/19/2025 Bedtime Self No Yes   Sig: TAKE ONE TABLET BY MOUTH EVERY EVENING AT BEDTIME   multivitamin (THERAGRAN) TABS 3/19/2025 Evening Self Yes Yes   Sig: Take 1 tablet by mouth 2 (two) times a day     nitroglycerin (NITROSTAT) 0.4 mg SL tablet Unknown Self No No   Sig: Place 1 tablet (0.4 mg total) under the tongue every 5 (five) minutes as needed for chest pain - do not take if you have recently taken Sildenafil (Revatio)   senna-docusate sodium (SENOKOT S) 8.6-50 mg per tablet 3/19/2025 Evening  No Yes   Sig: Take 1 tablet by mouth daily for 14 days   sildenafil (REVATIO) 20 mg tablet Past Month Self No Yes   Sig:  Take 1-5 tablets on empty stomach one hour prior to sexual activity if needed   tiZANidine (ZANAFLEX) 2 mg tablet Past Week Self No Yes   Sig: Take 1 tablet (2 mg total) by mouth 2 (two) times a day as needed for muscle spasms      Facility-Administered Medications Last Administration Doses Remaining   Inclisiran Sodium (LEQVIO) subcutaneous injection 284 mg None recorded 3        Patient's Medications   Discharge Prescriptions    No medications on file     No discharge procedures on file.  ED SEPSIS DOCUMENTATION   Time reflects when diagnosis was documented in both MDM as applicable and the Disposition within this note       Time User Action Codes Description Comment    3/20/2025  6:36 AM Avelina Castro Add [R31.9] Hematuria                  Avelina Castro PA-C  03/20/25 0842       Avelina Castro PA-C  03/20/25 1209

## 2025-03-21 LAB
ANION GAP SERPL CALCULATED.3IONS-SCNC: 7 MMOL/L (ref 4–13)
BASOPHILS # BLD AUTO: 0.04 THOUSANDS/ÂΜL (ref 0–0.1)
BASOPHILS NFR BLD AUTO: 1 % (ref 0–1)
BUN SERPL-MCNC: 15 MG/DL (ref 5–25)
CALCIUM SERPL-MCNC: 9 MG/DL (ref 8.4–10.2)
CHLORIDE SERPL-SCNC: 107 MMOL/L (ref 96–108)
CO2 SERPL-SCNC: 24 MMOL/L (ref 21–32)
CREAT SERPL-MCNC: 0.72 MG/DL (ref 0.6–1.3)
EOSINOPHIL # BLD AUTO: 0.22 THOUSAND/ÂΜL (ref 0–0.61)
EOSINOPHIL NFR BLD AUTO: 3 % (ref 0–6)
ERYTHROCYTE [DISTWIDTH] IN BLOOD BY AUTOMATED COUNT: 12.2 % (ref 11.6–15.1)
GFR SERPL CREATININE-BSD FRML MDRD: 93 ML/MIN/1.73SQ M
GLUCOSE SERPL-MCNC: 101 MG/DL (ref 65–140)
HCT VFR BLD AUTO: 40.8 % (ref 36.5–49.3)
HGB BLD-MCNC: 13.4 G/DL (ref 12–17)
IMM GRANULOCYTES # BLD AUTO: 0.03 THOUSAND/UL (ref 0–0.2)
IMM GRANULOCYTES NFR BLD AUTO: 0 % (ref 0–2)
LYMPHOCYTES # BLD AUTO: 2.05 THOUSANDS/ÂΜL (ref 0.6–4.47)
LYMPHOCYTES NFR BLD AUTO: 25 % (ref 14–44)
MCH RBC QN AUTO: 31.8 PG (ref 26.8–34.3)
MCHC RBC AUTO-ENTMCNC: 32.8 G/DL (ref 31.4–37.4)
MCV RBC AUTO: 97 FL (ref 82–98)
MONOCYTES # BLD AUTO: 0.67 THOUSAND/ÂΜL (ref 0.17–1.22)
MONOCYTES NFR BLD AUTO: 8 % (ref 4–12)
NEUTROPHILS # BLD AUTO: 5.05 THOUSANDS/ÂΜL (ref 1.85–7.62)
NEUTS SEG NFR BLD AUTO: 63 % (ref 43–75)
NRBC BLD AUTO-RTO: 0 /100 WBCS
PLATELET # BLD AUTO: 218 THOUSANDS/UL (ref 149–390)
PMV BLD AUTO: 9.2 FL (ref 8.9–12.7)
POTASSIUM SERPL-SCNC: 4.1 MMOL/L (ref 3.5–5.3)
RBC # BLD AUTO: 4.21 MILLION/UL (ref 3.88–5.62)
SODIUM SERPL-SCNC: 138 MMOL/L (ref 135–147)
WBC # BLD AUTO: 8.06 THOUSAND/UL (ref 4.31–10.16)

## 2025-03-21 PROCEDURE — 80048 BASIC METABOLIC PNL TOTAL CA: CPT | Performed by: NURSE PRACTITIONER

## 2025-03-21 PROCEDURE — 99222 1ST HOSP IP/OBS MODERATE 55: CPT | Performed by: INTERNAL MEDICINE

## 2025-03-21 PROCEDURE — NC001 PR NO CHARGE: Performed by: UROLOGY

## 2025-03-21 PROCEDURE — 85025 COMPLETE CBC W/AUTO DIFF WBC: CPT | Performed by: NURSE PRACTITIONER

## 2025-03-21 RX ADMIN — Medication 12.5 MG: at 22:04

## 2025-03-21 RX ADMIN — SODIUM CHLORIDE 50 ML/HR: 0.9 INJECTION, SOLUTION INTRAVENOUS at 22:06

## 2025-03-21 RX ADMIN — HYOSCYAMINE SULFATE 0.12 MG: 0.12 TABLET SUBLINGUAL at 16:57

## 2025-03-21 RX ADMIN — Medication 250 MG: at 17:02

## 2025-03-21 RX ADMIN — CHOLESTYRAMINE 4 G: 4 POWDER, FOR SUSPENSION ORAL at 16:57

## 2025-03-21 RX ADMIN — SODIUM CHLORIDE 50 ML/HR: 0.9 INJECTION, SOLUTION INTRAVENOUS at 08:30

## 2025-03-21 RX ADMIN — MONTELUKAST 10 MG: 10 TABLET, FILM COATED ORAL at 22:04

## 2025-03-21 RX ADMIN — OXCARBAZEPINE 300 MG: 300 TABLET, FILM COATED ORAL at 22:04

## 2025-03-21 RX ADMIN — PHENAZOPYRIDINE HYDROCHLORIDE 200 MG: 100 TABLET ORAL at 16:57

## 2025-03-21 NOTE — CONSULTS
Consultation - Cardiology   Name: George Ramirez 69 y.o. male I MRN: [id number]  Unit/Bed#: -01 I Date of Admission: 3/20/2025   Date of Service: 3/21/2025 I Hospital Day: 0   Inpatient consult to Cardiology  Consult performed by: Ricci Todd MD  Consult ordered by: MARGIE Lucas        Physician Requesting Evaluation: Marty Membreno MD   Reason for Evaluation / Principal Problem: CAD, A-fib, gross hematuria        Assessment/Plan:    CAD: remote hx of stents placed to LAD in 2011 and 2017. More recently had a WIL placed in proximal LAD (80% lesion) in 2024, and the prior stent on prox LAD is 30% stenosed. Follows with Dr. Capone and Dr. Castro. Completed 2 weeks of DAPT therapy with ASA, Plavix and has been on Plavix monotherapy for over a year.  Continue to hold Plavix for now in the setting of gross hematuria  When bleeding is resolved, will attempt to switch over to aspirin instead of Plavix if the patient tolerates being on Eliquis without any subsequent bleeding as detailed below  Hyperlipidemia: intolerant of statins. Currently on Zetia and then started on Leqvio  Continue Zetia  Afib: Currently in sinus rhythm, although not on telemetry.  Patient states that he feels when he goes into A-fib as he has palpitations and lightheadedness.  He states that he goes into A-fib when he is dehydrated.  He is currently rate controlled on metoprolol tartrate 12.5 mg twice daily.  At home he is on Eliquis  Hold Eliquis for now in the setting of gross hematuria  Discussed with urology that when the bleeding is resolved and pending no urological procedures, we would like to challenge the patient with Eliquis.  If he tolerates Eliquis, then we will sequentially introduce aspirin  If the patient bleeds with Eliquis, then he may have to be on aspirin monotherapy  Urology cleared the patient to start Eliquis tomorrow morning  Gross hematuria: TURP procedure on 3/14, with removal of sol catheter on  3/17 and patient put back on his Plavix and Eliquis. Re-presented to the ED with hematuria and a sol catheter was placed in the ED on 3/21.   Currently on continuous CBI, possible urological intervention  BPH with obstruction and lower urinary tract symptoms    History of Present Illness   George Ramirez is a 69 y.o. male past medical history of CAD status post drug-eluting stent placed February 2024, paroxysmal A-fib on Eliquis, hypertension, hyperlipidemia, BPH with obstruction who presents with gross hematuria.  Patient had a TURP procedure March 14 for severe BPH with obstruction and lower urinary tract symptoms.  Prior to the surgery, he discontinued Plavix and Eliquis appropriately.  After the surgery he did have some episodes of bleeding which resolved.  He was restarted on Plavix and Eliquis.  He states that on Tuesday he restarted the Plavix and Eliquis.  On Wednesday he started having gross hematuria, which worsened severely overnight Thursday morning with clots and bright red blood every 10 minutes which prompted him to come to the hospital.    From a cardiac standpoint, he denies any symptoms of A-fib.  He is euvolemic.  No chest pain or shortness of breath.    Review of Systems   Constitutional:  Negative for chills and fever.   HENT:  Negative for ear pain and sore throat.    Eyes:  Negative for pain and visual disturbance.   Respiratory:  Negative for cough and shortness of breath.    Cardiovascular:  Negative for chest pain and palpitations.   Gastrointestinal:  Negative for abdominal pain and vomiting.   Genitourinary:  Positive for hematuria. Negative for dysuria.   Musculoskeletal:  Negative for arthralgias and back pain.   Skin:  Negative for color change and rash.   Neurological:  Negative for seizures and syncope.   All other systems reviewed and are negative.    Historical Information   Past Medical History:   Diagnosis Date    A-fib ([facility])     Allergic     Allergic rhinitis      Arthritis     Benign cyst of kidney     Cancer (HCC) 2015    Cat bite 2020    Cerebral vascular disease     Chronic sinusitis     Coronary artery disease     Cryptogenic stroke (HCC) 2020    Hyperlipidemia     Kidney stone     Melanoma (HCC)     Pancreas cyst     Stroke (HCC)      Past Surgical History:   Procedure Laterality Date    CARDIAC CATHETERIZATION N/A 2024    Procedure: Cardiac catheterization;  Surgeon: Tripp Cross MD;  Location: [facility];  Service: Cardiology    CARDIAC CATHETERIZATION N/A 2024    Procedure: Cardiac Coronary Angiogram;  Surgeon: Tripp Cross MD;  [facility]: [facility];  Service: Cardiology    CARDIAC CATHETERIZATION N/A 2024    Procedure: Cardiac pci;  Surgeon: Tripp Cross MD;  [facility]: [facility];  Service: Cardiology    CARDIAC CATHETERIZATION Left 2024    Procedure: Cardiac Left Heart Cath;  Surgeon: Tripp Cross MD;  [facility]: [facility];  Service: Cardiology    COLONOSCOPY      OTHER SURGICAL HISTORY      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    PATENT FORAMEN OVALE CLOSURE      OR TRURL ELECTROSURG RESCJ PROSTATE BLEED COMPLETE N/A 3/14/2025    Procedure: TRANSURETHRAL RESECTION OF PROSTATE (TURP);  Surgeon: Balbir Trivedi MD;  Location: [facility] MAIN [facility];  Service: Urology    VASCULAR SURGERY       Social History     Tobacco Use    Smoking status: Former     Current packs/day: 0.00     Types: Cigars, Cigarettes     Start date: 1975     Quit date: 1986     Years since quittin.2    Smokeless tobacco: Former     Types: Chew     Quit date:     Tobacco comments:     Cigars    Vaping Use    Vaping status: Never Used   Substance and Sexual Activity    Alcohol use: Yes     Alcohol/week: 1.0 standard drink of alcohol     Types: 1 Glasses of wine per week     Comment: 8 oz. Daily    Drug use: Not Currently     Types: Marijuana    Sexual activity: Yes     Partners: Female     Birth  control/protection: Male Sterilization     E-Cigarette/Vaping    E-Cigarette Use Never User      E-Cigarette/Vaping Substances    Nicotine No     THC No     CBD No     Flavoring No     Other No     Unknown No      Family History   Problem Relation Age of Onset    Diabetes type II Mother     Cancer Father         angioma    Cancer Sister     Hearing loss Sister      Social History     Tobacco Use    Smoking status: Former     Current packs/day: 0.00     Types: Cigars, Cigarettes     Start date: 1975     Quit date: 1986     Years since quittin.2    Smokeless tobacco: Former     Types: Chew     Quit date:     Tobacco comments:     Cigars    Vaping Use    Vaping status: Never Used   Substance and Sexual Activity    Alcohol use: Yes     Alcohol/week: 1.0 standard drink of alcohol     Types: 1 Glasses of wine per week     Comment: 8 oz. Daily    Drug use: Not Currently     Types: Marijuana    Sexual activity: Yes     Partners: Female     Birth control/protection: Male Sterilization       Current Facility-Administered Medications:     acetaminophen (TYLENOL) tablet 650 mg, Q4H PRN    [Held by provider] apixaban (ELIQUIS) tablet 5 mg, BID    ascorbic acid (VITAMIN C) tablet 250 mg, BID    calcium carbonate (TUMS) chewable tablet 1,000 mg, Daily PRN    cholestyramine sugar free (QUESTRAN LIGHT) packet 4 g, BID With Meals    clobetasol (TEMOVATE) 0.05 % cream 1 Application, BID PRN    [Held by provider] clopidogrel (PLAVIX) tablet 75 mg, Daily    co-enzyme Q-10 capsule 200 mg, Daily    ezetimibe (ZETIA) tablet 10 mg, Daily    hyoscyamine (LEVSIN/SL) SL tablet 0.125 mg, TID AC    metoprolol tartrate (LOPRESSOR) partial tablet 12.5 mg, Q12H    montelukast (SINGULAIR) tablet 10 mg, HS    multivitamin stress formula tablet 1 tablet, Daily    nitroglycerin (NITROSTAT) SL tablet 0.4 mg, Q5 Min PRN    ondansetron (ZOFRAN) injection 4 mg, Q6H PRN    OXcarbazepine (TRILEPTAL) tablet 300 mg, Q12H MILES    phenazopyridine  (PYRIDIUM) tablet 200 mg, TID With Meals    senna-docusate sodium (SENOKOT S) 8.6-50 mg per tablet 1 tablet, Daily    sodium chloride 0.9 % infusion, Continuous, Last Rate: 50 mL/hr (03/21/25 0830)    sodium chloride 0.9 % irrigation solution 3,000 mL, Continuous    tiZANidine (ZANAFLEX) tablet 2 mg, BID PRN  Prior to Admission Medications   Prescriptions Last Dose Informant Patient Reported? Taking?   Coenzyme Q10 (COQ10) 200 MG CAPS 3/19/2025 Evening Self Yes Yes   Sig: Take 1 capsule by mouth daily   Glucosamine-Chondroit-Vit C-Mn (GLUCOSAMINE CHONDR 1500 COMPLX PO) 3/19/2025 Evening Self Yes Yes   Sig: Take 1 tablet by mouth 2 (two) times a day    Inclisiran Sodium (Leqvio) subcutaneous injection  Self No No   Sig: Inject 1.5 mL (284 mg total) under the skin once for 1 dose   NON FORMULARY Unknown Self Yes No   Sig: CBD CAPSULE  AND CREAM USE DAILY PRIN   OXcarbazepine (TRILEPTAL) 300 mg tablet 3/19/2025 Evening  No Yes   Sig: TAKE ONE TABLET BY MOUTH TWICE A DAY   apixaban (Eliquis) 5 mg 3/19/2025 Evening Self No Yes   Sig: TAKE ONE TABLET BY MOUTH TWICE A DAY   ascorbic acid (VITAMIN C) 250 mg tablet 3/19/2025 Evening Self Yes Yes   Sig: Take 250 mg by mouth 2 (two) times a day   chlorpheniramine (CHLOR-TRIMETON) 4 MG tablet Past Week Self No Yes   Sig: Take 1 tablet (4 mg total) by mouth every 6 (six) hours as needed for rhinitis   clobetasol (TEMOVATE) 0.05 % cream Past Week Self Yes Yes   Sig: Apply 1 application topically 2 (two) times a day as needed Apply sparingly to affected areas   clopidogrel (PLAVIX) 75 mg tablet 3/19/2025 Morning Self No Yes   Sig: TAKE ONE TABLET BY MOUTH EVERY DAY   colesevelam (WELCHOL) 625 mg tablet 3/19/2025 Evening Self No Yes   Sig: TAKE TWO TABLETS BY MOUTH TWICE A DAY WITH MEALS   diltiazem (CARDIZEM) 30 mg tablet More than a month Self No No   Sig: TAKE ONE TABLET BY MOUTH IF NEEDED FOR AFIB   Patient taking differently: Take 30 mg by mouth if needed (afib)   ezetimibe  (ZETIA) 10 mg tablet 3/19/2025 Morning Self No Yes   Sig: Take 1 tablet (10 mg total) by mouth daily   hyoscyamine (LEVSIN/SL) 0.125 mg SL tablet 3/19/2025 Self Yes Yes   Sig: PLACE ONE TABLET UNDER THE TONGUE THREE TIMES A DAY   metoprolol tartrate (LOPRESSOR) 25 mg tablet 3/19/2025 Evening Self No Yes   Sig: TAKE 1/2 TABLET BY MOUTH EVERY 12 HOURS   montelukast (SINGULAIR) 10 mg tablet 3/19/2025 Bedtime Self No Yes   Sig: TAKE ONE TABLET BY MOUTH EVERY EVENING AT BEDTIME   multivitamin (THERAGRAN) TABS 3/19/2025 Evening Self Yes Yes   Sig: Take 1 tablet by mouth 2 (two) times a day     nitroglycerin (NITROSTAT) 0.4 mg SL tablet Unknown Self No No   Sig: Place 1 tablet (0.4 mg total) under the tongue every 5 (five) minutes as needed for chest pain - do not take if you have recently taken Sildenafil (Revatio)   senna-docusate sodium (SENOKOT S) 8.6-50 mg per tablet 3/19/2025 Evening  No Yes   Sig: Take 1 tablet by mouth daily for 14 days   sildenafil (REVATIO) 20 mg tablet Past Month Self No Yes   Sig: Take 1-5 tablets on empty stomach one hour prior to sexual activity if needed   tiZANidine (ZANAFLEX) 2 mg tablet Past Week Self No Yes   Sig: Take 1 tablet (2 mg total) by mouth 2 (two) times a day as needed for muscle spasms      Facility-Administered Medications Last Administration Doses Remaining   Inclisiran Sodium (LEQVIO) subcutaneous injection 284 mg None recorded 3          Objective :  Temp:  [98 °F (36.7 °C)-98.5 °F (36.9 °C)] 98.2 °F (36.8 °C)  HR:  [64-78] 71  BP: ()/(60-81) 98/60  Resp:  [16-20] 16  SpO2:  [97 %-98 %] 98 %  O2 Device: None (Room air)  Orthostatic Blood Pressures      Flowsheet Row Most Recent Value   Blood Pressure 98/60 filed at 03/21/2025 0726   Patient Position - Orthostatic VS Sitting filed at 03/20/2025 1549          First Weight: Weight - Scale: 97.5 kg (215 lb) (03/20/25 1549)  Vitals:    03/20/25 1549   Weight: 97.5 kg (215 lb)       Physical Exam  Vitals and nursing note  reviewed.   Constitutional:       General: He is not in acute distress.     Appearance: He is well-developed. He is obese. He is not ill-appearing.   HENT:      Head: Normocephalic and atraumatic.   Eyes:      General: No scleral icterus.     Conjunctiva/sclera: Conjunctivae normal.   Cardiovascular:      Rate and Rhythm: Normal rate and regular rhythm.      Pulses: Normal pulses.      Heart sounds: Normal heart sounds. No murmur heard.     No friction rub. No gallop.   Pulmonary:      Effort: Pulmonary effort is normal. No respiratory distress.      Breath sounds: Normal breath sounds. No wheezing or rales.   Abdominal:      General: There is no distension.      Palpations: Abdomen is soft.      Tenderness: There is no abdominal tenderness. There is no guarding.   Musculoskeletal:         General: No swelling.      Cervical back: Neck supple.      Right lower leg: No edema.      Left lower leg: No edema.   Skin:     General: Skin is warm and dry.      Capillary Refill: Capillary refill takes less than 2 seconds.   Neurological:      Mental Status: He is alert and oriented to person, place, and time.   Psychiatric:         Mood and Affect: Mood normal.           Lab Results: I have reviewed the following results:CBC/BMP:   .     03/21/25 0455   WBC 8.06   HGB 13.4   HCT 40.8      SODIUM 138   K 4.1      CO2 24   BUN 15   CREATININE 0.72   GLUC 101      Results from last 7 days   Lab Units 03/21/25  0455 03/20/25  1213 03/20/25  0656 03/15/25  0549   WBC Thousand/uL 8.06  --  8.05 10.99*   HEMOGLOBIN g/dL 13.4 13.7 14.4 12.9   HEMATOCRIT % 40.8 42.6 43.2 38.8   PLATELETS Thousands/uL 218  --  232 181     Results from last 7 days   Lab Units 03/21/25  0455 03/20/25  0656 03/15/25  0549   POTASSIUM mmol/L 4.1 4.0 4.2   CHLORIDE mmol/L 107 105 105   CO2 mmol/L 24 25 27   BUN mg/dL 15 15 13   CREATININE mg/dL 0.72 0.89 0.81   CALCIUM mg/dL 9.0 9.5 9.0     Results from last 7 days   Lab Units 03/20/25  0656  "  INR  1.04   PTT seconds 28     Lab Results   Component Value Date    HGBA1C 5.3 02/13/2024     No results found for: \"CKTOTAL\", \"CKMB\", \"CKMBINDEX\", \"TROPONINI\"    Imaging Results Review: No pertinent imaging studies reviewed.  Other Study Results Review: EKG was reviewed.     VTE Prophylaxis: VTE covered by:  [Held by provider] apixaban, Oral         "

## 2025-03-21 NOTE — CASE MANAGEMENT
Case Management Assessment & Discharge Planning Note    Patient name George Ramirez  Location /-01 MRN [id number]  : 1955 Date 3/21/2025       Current Admission Date: 3/20/2025  Current Admission Diagnosis:Gross hematuria   Patient Active Problem List    Diagnosis Date Noted Date Diagnosed    Gross hematuria 2025     Cerebral vascular disease      Statin intolerance 2024     Presence of drug-eluting stent in anterior descending branch of left coronary artery 2024     Chest pain 2024     Symptomatic varicose veins of both lower extremities 2023     Skin lesion 2023     BMI 34.0-34.9,adult 2023     Paroxysmal atrial fibrillation (HCC) 2021     Chronic pain syndrome 2021     Tinea corporis 10/12/2020     Lumbar radiculopathy 10/11/2019     Low back pain with sciatica 10/11/2019     DDD (degenerative disc disease), lumbar 10/11/2019     Lumbar spondylosis 10/11/2019     Pancreatic cyst 2019     Preoperative cardiovascular examination 2019     BPH with obstruction/lower urinary tract symptoms 2018     Alpha-1-antitrypsin deficiency (HCC) 2017     Coronary artery disease involving native coronary artery 2017     Hyperlipidemia 2014     Seasonal allergic rhinitis 2014     Nephrolithiasis 2014     Osteoarthritis 2014     Irritable bowel syndrome 2014       LOS (days): 0  Geometric Mean LOS (GMLOS) (days):   Days to GMLOS:     OBJECTIVE:              Current admission status: Observation       Preferred Pharmacy:   [id number] - [place], [place] - [address]  [address]  [place] [address]  Phone: [phone] Fax: [phone]    [id number] - [place], [place] - [address]  [address]  [facility] [address]  Phone: [phone] Fax: [phone]    [facility] - [place] - [address]  [address]  [place] [address]  Phone: [phone] Fax: [phone]    Primary Care Provider: Viviane Resendiz DO    Primary Insurance: [facility]  Secondary Insurance:     ASSESSMENT:  Active Health Care Proxies    There are no active Health Care Proxies on file.       Advance Directives  Does patient have a Health Care POA?: No  Does patient have Advance Directives?: No  Primary Contact: wife              Patient Information  Admitted from:: Home  Mental Status: Alert  During Assessment patient was accompanied by: Spouse  Assessment information provided by:: Patient  Primary Caregiver: Self  Support Systems: Spouse/significant other, Family members  What city do you live in?: [place]  Home entry access options. Select all that apply.: Stairs  Number of steps to enter home.: 1  Do the steps have railings?: No  Type of Current Residence: [facility]    Activities of Daily Living Prior to Admission  Functional Status: Independent  Completes ADLs independently?: Yes  Ambulates independently?: Yes  Does patient use assisted devices?: No  Does patient currently own DME?: No  Does patient have a history of Outpatient Therapy (PT/OT)?: No  Does the patient have a history of Short-Term Rehab?: No  Does patient have a history of HHC?: No         Patient Information Continued  Income Source: Pension/FPC  Does patient have prescription coverage?: Yes  Can the patient afford their medications and any related supplies (such as glucometers or test strips)?: Yes  Does patient receive dialysis treatments?: No  Does patient have a history of substance abuse?: No  Does patient have a history of Mental Health Diagnosis?: No         Means of Transportation  Means of Transport to \[facility]\"":: Drives Self          DISCHARGE DETAILS:    Discharge planning discussed with:: pt and wife                      Contacts  Patient Contacts: wife Aurora  Relationship to Patient:: Family  Contact Method: Phone  Phone Number:  [phone]  Reason/Outcome: Continuity of Care, Emergency Contact, Discharge Planning                   Would you like to participate in our [facility] service program?  : No - Declined                                                 Additional Comments: resides with wife, I PTA, drives

## 2025-03-21 NOTE — ASSESSMENT & PLAN NOTE
S/p TURP 3/14  S/p removal of Kemp catheter 3/17  Resumed Eliquis and Plavix on 3/18, now back on hold   Plan:  Avoid heavy lifting  Senokot S daily

## 2025-03-21 NOTE — PLAN OF CARE
Problem: PAIN - ADULT  Goal: Verbalizes/displays adequate comfort level or baseline comfort level  Description: Interventions:  - Encourage patient to monitor pain and request assistance  - Assess pain using appropriate pain scale  - Administer analgesics based on type and severity of pain and evaluate response  - Implement non-pharmacological measures as appropriate and evaluate response  - Consider cultural and social influences on pain and pain management  - Notify physician/advanced practitioner if interventions unsuccessful or patient reports new pain  Outcome: Progressing     Problem: INFECTION - ADULT  Goal: Absence or prevention of progression during hospitalization  Description: INTERVENTIONS:  - Assess and monitor for signs and symptoms of infection  - Monitor lab/diagnostic results  - Monitor all insertion sites, i.e. indwelling lines, tubes, and drains  - Monitor endotracheal if appropriate and nasal secretions for changes in amount and color  - Harrison appropriate cooling/warming therapies per order  - Administer medications as ordered  - Instruct and encourage patient and family to use good hand hygiene technique  - Identify and instruct in appropriate isolation precautions for identified infection/condition  Outcome: Progressing     Problem: SAFETY ADULT  Goal: Patient will remain free of falls  Description: INTERVENTIONS:  - Educate patient/family on patient safety including physical limitations  - Instruct patient to call for assistance with activity   - Consult OT/PT to assist with strengthening/mobility   - Keep Call bell within reach  - Keep bed low and locked with side rails adjusted as appropriate  - Keep care items and personal belongings within reach  - Initiate and maintain comfort rounds  - Make Fall Risk Sign visible to staff  - Offer Toileting every 2 Hours, in advance of need  - Initiate/Maintain bed alarm  - Apply yellow socks and bracelet for high fall risk patients  - Consider  moving patient to room near nurses station  Outcome: Progressing     Problem: Knowledge Deficit  Goal: Patient/family/caregiver demonstrates understanding of disease process, treatment plan, medications, and discharge instructions  Description: Complete learning assessment and assess knowledge base.  Interventions:  - Provide teaching at level of understanding  - Provide teaching via preferred learning methods  Outcome: Progressing

## 2025-03-21 NOTE — UTILIZATION REVIEW
Initial Clinical Review    WAS OBSERVATION 3/20 CONVERTED TO INPATIENT ADMISSION 322 DUE TO CONTINUED STAY REQUIRED TO CARE FOR PATIENT WITH Gross Hematuria, CBI and manual irrigation, workup and treat.   Monitor resuming anticoagulation    Admission: Date/Time/Statement:   Admission Orders (From admission, onward)       Ordered        03/22/25 0911  INPATIENT ADMISSION  Once            03/20/25 0842  Place in Observation  Once                          Orders Placed This Encounter   Procedures    INPATIENT ADMISSION     Standing Status:   Standing     Number of Occurrences:   1     Level of Care:   Med Surg [16]     Estimated length of stay:   More than 2 Midnights     Certification:   I certify that inpatient services are medically necessary for this patient for a duration of greater than two midnights. See H&P and MD Progress Notes for additional information about the patient's course of treatment.     ED Arrival Information       Expected   -    Arrival   3/20/2025 05:54    Acuity   Urgent              Means of arrival   Walk-In    Escorted by   Self    Service   Urology    Admission type   Emergency              Arrival complaint   Blood in Urine             Chief Complaint   Patient presents with    Blood in Urine     Pt had TURP procedure done last week. Reports blood in urine all night. Denies pain but states urgency is every 10 minutes. Also reports multiple clots        Initial Presentation: 69 y.o. male to ED presents for gross hematuria. S/p transurethral section of the prostate on 3/14. Kemp catheter was removed 3/17 at which time he reports his urine had been clearing. Pt resumed his Eliquis and Plavix on 3/18. Pt developed hematuria last evening and reports needing to urinate every it is all night long. Reports passage of some clots. Notes some abdominal pressure with palpation. Given 6L irrigant in the ED. He was gently irrigated by myself without return of clots but had significant bladder spasms  and pressure with palpation only.  His Kemp catheter is draining adequately.  His Kemp catheter was placed on traction   Admit to Observation Dx; Gross Hematuria, S/p TURP 3/14  S/p placement of 22 German three-way Kemp catheter in ED  Plan; Hold Plavix and Eliquis. NPO. CT Pelvis to check clot burden. Bladder scan q6h.  Keep Kemp cath on traction. Oxybutynin for bladder spasms. Trend H&H  CBI titrated to keep urine light pink color     3/20  Quick note; Urine is currently Pyridium colored to pale yellow. Pt manually irrigated again with return of only specks of blood.   He continues to have significant bladder spasms with manual irrigation.   NPO after MN for reevaluation in am. Continue CBI, titrate to keep urine light orange color.  Continue with manual irrigation every 6 hours. Bladder scans q6h.      3/21  Progress notes; Hgb 14.4--13.7--13.4. Keep NPO. Hold Plavix and Eliquis.  CBI continuous. Urine blush this am, had not been irrigated, initially difficulty to irrigate, 500 cc then clot return and additional 300 cc of irrigant withdrawn.   Manual irrigation q4h and prn to catheter patent. Keep Kemp cath on traction. Oxybutynin for bladder spasms. Hold Eliquis and Plavix.  Pt had not been manually irrigated overnight and unsure of last time. Initially was unable to withdrawal any irrigant with manual irrigations, would instill 60 mL, then 60 mL return once reconnected. Continue to flush and finally was able to get out moderate amount of old clot. Reconnected to CBI, urine clear. Continue manual irrigation and CBI.   3/20 CT; Large amount of clot in the urinary bladder     Cardiology cons; Currently on continuous CBI, possible urological intervention.   Hold Eliquis for now.   When bleeding is resolved and pending no urological procedures, we would like to challenge the patient with Eliquis. If he tolerates Eliquis, then we will sequentially introduce aspirin   If pt bleeds with Eliquis, then he may have  to be on aspirin monotherapy.   Urology cleared pt to start Eliquis tomorrow am.     3/22 Changed to Inpatient status  Date: 3/22  Day 3: Has surpassed a 2nd midnight with active treatments and services.  Urine improving on CBI yesterday clamped. Start Eliquis and evaluate over the next 24 hours if urine remains clear hopeful discharge 3/23,  If tolerating can start aspirin to follow. Keep sol cath on traction. Oxybutynin for bladder spasms. Urine clear yellow.  Hold Plavix.       ED Treatment-Medication Administration from 03/20/2025 0554 to 03/20/2025 1533         Date/Time Order Dose Route Action     03/20/2025 0649 lidocaine (URO-JET) 2 % urethral/mucosal gel 1 Application 1 Application Urethral Given     03/20/2025 0649 lidocaine (URO-JET) 2 % urethral/mucosal gel 1 Application 1 Application Urethral Given     03/20/2025 1155 sodium chloride 0.9 % irrigation solution 3,000 mL 3,000 mL Irrigation New Bag     03/20/2025 1126 phenazopyridine (PYRIDIUM) tablet 200 mg 200 mg Oral Given     03/20/2025 0930 apixaban (ELIQUIS) tablet 5 mg -- Oral Held Dose     03/20/2025 1127 ascorbic acid (VITAMIN C) tablet 250 mg 250 mg Oral Given     03/20/2025 0930 clopidogrel (PLAVIX) tablet 75 mg -- Oral Held Dose     03/20/2025 1126 co-enzyme Q-10 capsule 200 mg 200 mg Oral Given     03/20/2025 1127 ezetimibe (ZETIA) tablet 10 mg 10 mg Oral Given     03/20/2025 1127 hyoscyamine (LEVSIN/SL) SL tablet 0.125 mg 0.125 mg Sublingual Given     03/20/2025 1125 metoprolol tartrate (LOPRESSOR) partial tablet 12.5 mg 12.5 mg Oral Given     03/20/2025 1126 multivitamin stress formula tablet 1 tablet 1 tablet Oral Given     03/20/2025 1127 OXcarbazepine (TRILEPTAL) tablet 300 mg 300 mg Oral Given     03/20/2025 1324 sodium chloride 0.9 % infusion 125 mL/hr Intravenous New Bag            Scheduled Medications:  apixaban, 5 mg, Oral, BID  ascorbic acid, 250 mg, Oral, BID  cholestyramine sugar free, 4 g, Oral, BID With Meals  [Held by  provider] clopidogrel, 75 mg, Oral, Daily  co-enzyme Q-10, 200 mg, Oral, Daily  ezetimibe, 10 mg, Oral, Daily  hyoscyamine, 0.125 mg, Sublingual, TID AC  metoprolol tartrate, 12.5 mg, Oral, Q12H  montelukast, 10 mg, Oral, HS  multivitamin stress formula, 1 tablet, Oral, Daily  OXcarbazepine, 300 mg, Oral, Q12H MILES  phenazopyridine, 200 mg, Oral, TID With Meals  senna-docusate sodium, 1 tablet, Oral, Daily      Continuous IV Infusions:   sodium chloride 0.9 % infusion  Rate: 50 mL/hr Dose: 50 mL/hr  Freq: Continuous Route: IV  Indications of Use: IV Hydration  Last Dose: Stopped (03/22/25 0853)  Start: 03/20/25 1300 End: 03/22/25 0740    sodium chloride 0.9 % irrigation solution 3,000 mL  Dose: 3000 mL  Freq: Continuous Route: IR  Last Dose: Stopped (03/22/25 0853)  Start: 03/20/25 0930 End: 03/22/25 0740    PRN Meds:  acetaminophen, 650 mg, Oral, Q4H PRN  calcium carbonate, 1,000 mg, Oral, Daily PRN  clobetasol, 1 Application, Topical, BID PRN  nitroglycerin, 0.4 mg, Sublingual, Q5 Min PRN  ondansetron, 4 mg, Intravenous, Q6H PRN  tiZANidine, 2 mg, Oral, BID PRN      ED Triage Vitals   Temperature Pulse Respirations Blood Pressure SpO2 Pain Score   03/20/25 0601 03/20/25 0601 03/20/25 0601 03/20/25 0601 03/20/25 0601 03/20/25 1251   98 °F (36.7 °C) 73 18 150/80 98 % 2     Weight (last 2 days)       Date/Time Weight    03/20/25 15:49:18 97.5 (215)            Vital Signs (last 3 days)       Date/Time Temp Pulse Resp BP MAP (mmHg) SpO2 O2 Device Patient Position - Orthostatic VS Pain    03/22/25 07:31:50 98 °F (36.7 °C) 70 16 141/80 100 98 % -- -- --    03/21/25 2300 -- -- -- -- -- -- -- -- No Pain    03/21/25 22:02:26 97.6 °F (36.4 °C) 75 18 117/88 98 99 % -- -- --    03/21/25 1700 -- -- -- -- -- 95 % None (Room air) -- No Pain    03/21/25 15:17:55 98.3 °F (36.8 °C) -- -- 128/71 90 -- -- -- --    03/21/25 07:26:04 98.2 °F (36.8 °C) -- 16 98/60 73 -- -- -- --    03/21/25 0300 -- -- -- -- -- -- -- -- No Pain    03/20/25  21:40:03 -- -- 18 152/81 105 -- -- -- --    03/20/25 2138 -- 71 -- 152/81 -- -- -- -- --    03/20/25 15:49:18 98.5 °F (36.9 °C) -- 18 130/75 93 -- None (Room air) Sitting --    03/20/25 1530 -- -- -- -- -- -- -- -- 1    03/20/25 1310 98 °F (36.7 °C) 64 18 126/74 -- 98 % None (Room air) -- --    03/20/25 1251 -- -- -- -- -- -- -- -- 2    03/20/25 1130 -- 78 20 127/67 91 97 % None (Room air) -- --    03/20/25 1125 -- -- -- 127/67 -- -- -- -- --    03/20/25 0730 -- 81 20 146/92 114 98 % None (Room air) -- --    03/20/25 0601 98 °F (36.7 °C) 73 18 150/80 105 98 % None (Room air) Sitting --            Pertinent Labs/Diagnostic Test Results:   Radiology:  CT pelvis wo contrast   Final Interpretation by Kaelyn Membreno MD (03/20 0958)      Large amount of clot in the urinary bladder      Perivesical infiltration seen      Workstation performed: [id number]           Cardiology:  No orders to display     GI:  No orders to display           Results from last 7 days   Lab Units 03/22/25  0817 03/21/25  0455 03/20/25  1213 03/20/25  0656   WBC Thousand/uL 9.32 8.06  --  8.05   HEMOGLOBIN g/dL 14.5 13.4 13.7 14.4   HEMATOCRIT % 43.6 40.8 42.6 43.2   PLATELETS Thousands/uL 262 218  --  232   TOTAL NEUT ABS Thousands/µL  --  5.05  --  5.69         Results from last 7 days   Lab Units 03/21/25  0455 03/20/25  0656   SODIUM mmol/L 138 138   POTASSIUM mmol/L 4.1 4.0   CHLORIDE mmol/L 107 105   CO2 mmol/L 24 25   ANION GAP mmol/L 7 8   BUN mg/dL 15 15   CREATININE mg/dL 0.72 0.89   EGFR ml/min/1.73sq m 93 86   CALCIUM mg/dL 9.0 9.5               Results from last 7 days   Lab Units 03/21/25  0455 03/20/25  0656   GLUCOSE RANDOM mg/dL 101 101       Results from last 7 days   Lab Units 03/20/25  0656   PROTIME seconds 13.9   INR  1.04   PTT seconds 28       Results from last 7 days   Lab Units 03/20/25  0741   CLARITY UA  Turbid   COLOR UA  Red   SPEC GRAV UA  1.014   PH UA  Interference-unable to analyze*   GLUCOSE UA mg/dl  Interference- unable to analyze*   KETONES UA mg/dl Interference- unable to analyze*   BLOOD UA  Interference- unable to analyze*   PROTEIN UA mg/dl Interference- unable to analyze*   NITRITE UA  Interference- unable to analyze   BILIRUBIN UA  Interference- unable to analyze*   LEUKOCYTES UA  Interference- unable to analyze*   WBC UA /hpf None Seen   RBC UA /hpf Innumerable*   BACTERIA UA /hpf None Seen   EPITHELIAL CELLS WET PREP /hpf None Seen         Past Medical History:   Diagnosis Date    A-fib (HCC)     Allergic     Allergic rhinitis     Arthritis     Benign cyst of kidney     Cancer (HCC) 2015    Cat bite 07/08/2020    Cerebral vascular disease     Chronic sinusitis     Coronary artery disease     Cryptogenic stroke (HCC) 03/02/2020    Hyperlipidemia     Kidney stone     Melanoma (HCC)     Pancreas cyst     Stroke (HCC) 2006     Present on Admission:   Seasonal allergic rhinitis   Hyperlipidemia   Alpha-1-antitrypsin deficiency (HCC)   Irritable bowel syndrome   BPH with obstruction/lower urinary tract symptoms   Paroxysmal atrial fibrillation (HCC)      Admitting Diagnosis: Hematuria [R31.9]  Gross hematuria [R31.0]  Age/Sex: 69 y.o. male    Network Utilization Review Department  ATTENTION: Please call with any questions or concerns to [phone] and carefully listen to the prompts so that you are directed to the right person. All voicemails are confidential.   For Discharge needs, contact Care Management DC Support Team at [phone] opt. [phone]  Send all requests for admission clinical reviews, approved or denied determinations and any other requests to dedicated fax number below belonging to the campus where the patient is receiving treatment. List of dedicated fax numbers for the Facilities:  FACILITY NAME UR FAX NUMBER   ADMISSION DENIALS (Administrative/Medical Necessity) [phone]   DISCHARGE SUPPORT TEAM (NETWORK) [phone]   PARENT CHILD HEALTH (Maternity/NICU/Pediatrics) [phone]   [facility]  [facility] [phone]   [facility] [phone]   [facility] [phone]   [facility] [phone]   [facility] [phone]   [facility] [phone]   [facility] [phone]   [facility] [phone]   [facility] [phone]   [facility] [phone]   [facility] [phone]   [facility] [phone]

## 2025-03-21 NOTE — PROGRESS NOTES
Progress Note - Urology   Name: George Ramirez 69 y.o. male I MRN: [id number]  Unit/Bed#: -01 I Date of Admission: 3/20/2025   Date of Service: 3/21/2025 I Hospital Day: 0     Assessment & Plan  Gross hematuria  S/p TURP 3/14 with Kemp catheter removal 3/17 and resumed Eliquis and Plavix on 318  S/p placement of 22 French three-way Kmep catheter in emergency department  Hgb 14.4--13.7--13.4   WBC 8.05  Afebrile, vital signs stable  Plan:  Hold Plavix and Eliquis  Keep n.p.o.   CT 3/20: Large amount of clot in the urinary bladder   CBI continuous.  Urine blush this am, had not been irrigated, initially difficulty to irrigate, 500 cc then clot return and additional 300 cc of irrigant withdrawn.  Urine clear, instructed nursing on manual flushes, had not been done in likely over 12 hours  Re evaluate later this am, keep NPO   Manual irrigation every 4 hours and as needed to keep catheter patent  Bladder scan every 6 hours and as needed  Keep Kemp catheter on traction  Oxybutynin for bladder spasms  BPH with obstruction/lower urinary tract symptoms  S/p TURP 3/14  S/p removal of Kemp catheter 3/17  Resumed Eliquis and Plavix on 3/18, now back on hold   Plan:  Avoid heavy lifting  Senokot S daily  Seasonal allergic rhinitis  Singulair 10 mg nightly  Paroxysmal atrial fibrillation (HCC)  On as needed Cardizem and Plavix  Metoprolol 12.5 mg twice daily  Plan:  Hold Plavix  Hyperlipidemia  On WelChol 1250 mg twice daily  Zetia 10 mg daily  Takes Leqvio every 6 month (due May 2025)  Intolerance to statins  Alpha-1-antitrypsin deficiency (HCC)  Singulair 10 mg daily  Chlor-Trimeton 4 mg every 6 hours as needed      Subjective : Patient states he slept better last night.  Urine was light to medium blush colored this morning.  Nursing states each time they slow down rate, patient became uncomfortable.  Patient had not been manually irrigated overnight and unsure of last time.  Initially was unable to withdrawal  any irrigant with manual irrigations, would instill 60 mL, then 60 mL return once reconnected.  Continue to flush and finally was able to get out moderate amount of old clot.  Reconnected to CBI, urine clear.  Continue manual irrigation and CBI.  Patient denies any suprapubic tenderness.    Objective :  Temp:  [98 °F (36.7 °C)-98.5 °F (36.9 °C)] 98.5 °F (36.9 °C)  HR:  [64-81] 71  BP: (126-152)/(67-92) 152/81  Resp:  [18-20] 18  SpO2:  [97 %-98 %] 98 %  O2 Device: None (Room air)    Physical Exam  Vitals reviewed.   Constitutional:       Appearance: Normal appearance. He is obese.   HENT:      Head: Normocephalic and atraumatic.   Cardiovascular:      Rate and Rhythm: Normal rate.   Pulmonary:      Effort: Pulmonary effort is normal.   Abdominal:      General: Bowel sounds are normal.      Palpations: Abdomen is soft.   Genitourinary:     Penis: Normal.       Comments: Blushed colored on arrival, manually irrigated 7 to 50 cc with some clot return.  Reconnected CBI, running clear  Musculoskeletal:         General: Normal range of motion.      Cervical back: Normal range of motion.   Skin:     General: Skin is warm and dry.   Neurological:      Mental Status: He is alert and oriented to person, place, and time.   Psychiatric:         Mood and Affect: Mood normal.         Behavior: Behavior normal.         Thought Content: Thought content normal.         Judgment: Judgment normal.         Lab Results: I have reviewed the following results:CBC/BMP:   .     03/21/25  0455   WBC 8.06   HGB 13.4   HCT 40.8      SODIUM 138   K 4.1      CO2 24   BUN 15   CREATININE 0.72   GLUC 101    , Creatinine Clearance: Estimated Creatinine Clearance: 107.5 mL/min (by C-G formula based on SCr of 0.72 mg/dL).        VTE Pharmacologic Prophylaxis: Sequential compression device (Venodyne)   VTE Mechanical Prophylaxis: sequential compression device

## 2025-03-21 NOTE — TREATMENT PLAN
George Ramirez  1955  [id number]  03/21/25                 Urology Interim Treatment Plan        George Ramirez is a 69-year-old male known to our service for BPH status post TURP by Dr. Balbir Trivedi 3/14.  He has substantial cardiovascular history and is on both Eliquis and Plavix outpatient.  He resumed medications postoperatively and returned with gross hematuria.    Three-way Kemp-CBI and manual irrigation around-the-clock.      Hemoglobin stable at 13.4.    Seen in reevaluation later this a.m.  Urine is as below.        Vitals:    03/21/25 0726   BP: 98/60   Pulse:    Resp: 16   Temp: 98.2 °F (36.8 °C)   SpO2:        Lab Results   Component Value Date    WBC 8.06 03/21/2025    HGB 13.4 03/21/2025    HCT 40.8 03/21/2025    MCV 97 03/21/2025     03/21/2025     Lab Results   Component Value Date    SODIUM 138 03/21/2025    K 4.1 03/21/2025     03/21/2025    CO2 24 03/21/2025    BUN 15 03/21/2025    CREATININE 0.72 03/21/2025    GLUC 101 03/21/2025    CALCIUM 9.0 03/21/2025       Image Report    CT pelvis wo contrast [[id number]] Collected: 03/20/25 0946   Order Status: Completed Updated: 03/20/25 0959   Narrative:     CT PELVIS WITHOUT IV CONTRAST    INDICATION: gross hematuria, evaluate clot retention. abdominal pressure. s/p TURP 3/14 on eliquis.    COMPARISON: None.    TECHNIQUE: CT examination of the pelvis was performed without intravenous contrast. Multiplanar 2D reformatted images were created from the source data. This examination, like all CT scans performed in the [facility], was performed  utilizing techniques to minimize radiation dose exposure, including the use of iterative reconstruction and automated exposure control. Radiation dose length product (DLP) for this visit: 638 mGy-cm .    Enteric Contrast: Not administered.    FINDINGS:    VISUALIZED KIDNEYS/URETERS: Left renal cyst seen measuring about 3.3 cm 1.3 cm right renal cyst seen  Nonobstructing  calculus lower pole left kidney  REPRODUCTIVE ORGANS: Enlarged prostate seen    URINARY BLADDER: A Kemp catheter is seen within the bladder. Large amount of clot is seen the largest measures 4 cm x 3.3 cm x 5 cm  Perivesical infiltration  VISUALIZED BOWEL: Unremarkable.    APPENDIX: No findings to suggest appendicitis.    ABDOMINOPELVIC CAVITY: No ascites. No pneumoperitoneum. No lymphadenopathy.    VESSELS: Unremarkable for patient's age.    ABDOMINOPELVIC WALL/INGUINAL REGIONS: Umbilical hernia seen containing fat    BONES: No acute fracture or suspicious osseous lesion.   Impression:       Large amount of clot in the urinary bladder    Perivesical infiltration seen    Workstation performed: [id number]       Plan:    Resume diet  Clamp CBI  Reevaluate in AM.  Discussed with internal medicine--resume Eliquis tomorrow 3/22 followed by aspirin at a later interval.  Hopefully discharge Regino 3/23 if urine remains clear on AC for 24 hours.          MARGIE Alvarez

## 2025-03-21 NOTE — ASSESSMENT & PLAN NOTE
S/p TURP 3/14 with Kemp catheter removal 3/17 and resumed Eliquis and Plavix on 318  S/p placement of 22 French three-way Kemp catheter in emergency department  Hgb 14.4--13.7--13.4   WBC 8.05  Afebrile, vital signs stable  Plan:  Hold Plavix and Eliquis  Keep n.p.o.   CT 3/20: Large amount of clot in the urinary bladder   CBI continuous.  Urine blush this am, had not been irrigated, initially difficulty to irrigate, 500 cc then clot return and additional 300 cc of irrigant withdrawn.  Urine clear, instructed nursing on manual flushes, had not been done in likely over 12 hours  Re evaluate later this am, keep NPO   Manual irrigation every 4 hours and as needed to keep catheter patent  Bladder scan every 6 hours and as needed  Keep Kemp catheter on traction  Oxybutynin for bladder spasms

## 2025-03-22 PROBLEM — I25.118 CORONARY ARTERY DISEASE OF NATIVE ARTERY OF NATIVE HEART WITH STABLE ANGINA PECTORIS (HCC): Status: ACTIVE | Noted: 2017-02-02

## 2025-03-22 LAB
ERYTHROCYTE [DISTWIDTH] IN BLOOD BY AUTOMATED COUNT: 12.1 % (ref 11.6–15.1)
HCT VFR BLD AUTO: 43.6 % (ref 36.5–49.3)
HGB BLD-MCNC: 14.5 G/DL (ref 12–17)
MCH RBC QN AUTO: 31.9 PG (ref 26.8–34.3)
MCHC RBC AUTO-ENTMCNC: 33.3 G/DL (ref 31.4–37.4)
MCV RBC AUTO: 96 FL (ref 82–98)
PLATELET # BLD AUTO: 262 THOUSANDS/UL (ref 149–390)
PMV BLD AUTO: 8.9 FL (ref 8.9–12.7)
RBC # BLD AUTO: 4.54 MILLION/UL (ref 3.88–5.62)
WBC # BLD AUTO: 9.32 THOUSAND/UL (ref 4.31–10.16)

## 2025-03-22 PROCEDURE — 99232 SBSQ HOSP IP/OBS MODERATE 35: CPT | Performed by: INTERNAL MEDICINE

## 2025-03-22 PROCEDURE — 85027 COMPLETE CBC AUTOMATED: CPT | Performed by: PHYSICIAN ASSISTANT

## 2025-03-22 PROCEDURE — NC001 PR NO CHARGE: Performed by: UROLOGY

## 2025-03-22 PROCEDURE — 3E1K78Z IRRIGATION OF GENITOURINARY TRACT USING IRRIGATING SUBSTANCE, VIA NATURAL OR ARTIFICIAL OPENING: ICD-10-PCS | Performed by: UROLOGY

## 2025-03-22 RX ADMIN — CHOLESTYRAMINE 4 G: 4 POWDER, FOR SUSPENSION ORAL at 16:53

## 2025-03-22 RX ADMIN — PHENAZOPYRIDINE HYDROCHLORIDE 200 MG: 100 TABLET ORAL at 11:54

## 2025-03-22 RX ADMIN — MONTELUKAST 10 MG: 10 TABLET, FILM COATED ORAL at 21:58

## 2025-03-22 RX ADMIN — APIXABAN 5 MG: 5 TABLET, FILM COATED ORAL at 16:55

## 2025-03-22 RX ADMIN — EZETIMIBE 10 MG: 10 TABLET ORAL at 08:50

## 2025-03-22 RX ADMIN — HYOSCYAMINE SULFATE 0.12 MG: 0.12 TABLET SUBLINGUAL at 11:55

## 2025-03-22 RX ADMIN — HYOSCYAMINE SULFATE 0.12 MG: 0.12 TABLET SUBLINGUAL at 08:51

## 2025-03-22 RX ADMIN — OXCARBAZEPINE 300 MG: 300 TABLET, FILM COATED ORAL at 21:57

## 2025-03-22 RX ADMIN — Medication 250 MG: at 08:49

## 2025-03-22 RX ADMIN — HYOSCYAMINE SULFATE 0.12 MG: 0.12 TABLET SUBLINGUAL at 16:53

## 2025-03-22 RX ADMIN — SENNOSIDES AND DOCUSATE SODIUM 1 TABLET: 50; 8.6 TABLET ORAL at 08:50

## 2025-03-22 RX ADMIN — OXCARBAZEPINE 300 MG: 300 TABLET, FILM COATED ORAL at 08:50

## 2025-03-22 RX ADMIN — B-COMPLEX W/ C & FOLIC ACID TAB 1 TABLET: TAB at 08:52

## 2025-03-22 RX ADMIN — PHENAZOPYRIDINE HYDROCHLORIDE 200 MG: 100 TABLET ORAL at 08:50

## 2025-03-22 RX ADMIN — Medication 200 MG: at 08:50

## 2025-03-22 RX ADMIN — Medication 12.5 MG: at 08:54

## 2025-03-22 RX ADMIN — PHENAZOPYRIDINE HYDROCHLORIDE 200 MG: 100 TABLET ORAL at 16:53

## 2025-03-22 RX ADMIN — APIXABAN 5 MG: 5 TABLET, FILM COATED ORAL at 08:50

## 2025-03-22 NOTE — PROGRESS NOTES
Progress Note - Urology   Name: George Ramirez 69 y.o. male I MRN: [id number]  Unit/Bed#: -01 I Date of Admission: 3/20/2025   Date of Service: 3/22/2025 I Hospital Day: 0     Assessment & Plan  Gross hematuria  S/p TURP 3/14 with Kemp catheter removal 3/17 and resumed Eliquis and Plavix on 318  S/p placement of 22 French three-way Kemp catheter in emergency department  Hgb 14.4--13.7--13.4 -->awaiting repeat today   WBC 8.05  Afebrile, vital signs stable  Plan:  CT 3/20: Large amount of clot in the urinary bladder   Urine improving on CBI yesterday clamped  On evaluation today urine clear yellow.   Will start Eliquis and evaluate over the next 24 hours if urine remains clear hopeful discharge 3/23, if tolerating can start aspirin to follow. See cardiology documentation from 3/21.  Appreciate their input and assistance managing anticoagulation.  Manual irrigation every 4 hours and as needed to keep catheter patent  Bladder scan every 6 hours as needed  Keep Kemp catheter on traction  Oxybutynin for bladder spasms  Kemp to be removed on outpatient for void trial  BPH with obstruction/lower urinary tract symptoms  S/p TURP 3/14  S/p removal of Kemp catheter 3/17  Resumed Eliquis and Plavix rebleed and held. No restarting eliquis   Plan:  Avoid heavy lifting  Senokot S daily  Seasonal allergic rhinitis  Singulair 10 mg nightly  Paroxysmal atrial fibrillation (HCC)  On as needed Cardizem and Plavix  Metoprolol 12.5 mg twice daily  Plan:  Hold Plavix  Hyperlipidemia  On WelChol 1250 mg twice daily  Zetia 10 mg daily  Takes Leqvio every 6 month (due May 2025)  Intolerance to statins  Alpha-1-antitrypsin deficiency (HCC)  Singulair 10 mg daily  Chlor-Trimeton 4 mg every 6 hours as needed      Subjective   Patient sitting comfortably no acute distress.  Only asking for shower.  Overall his urine is improved and he is grateful.    Objective :  Temp:  [97.6 °F (36.4 °C)-98.3 °F (36.8 °C)] 98 °F (36.7 °C)  HR:   [70-75] 70  BP: (117-141)/(71-88) 141/80  Resp:  [16-18] 16  SpO2:  [95 %-99 %] 98 %  O2 Device: None (Room air)    I/O         03/20 0701 03/21 0700 03/21 0701 03/22 0700    P.O.  480    I.V. (mL/kg)  1654.2 (17)    Total Intake(mL/kg)  2134.2 (21.9)    Urine (mL/kg/hr) 4000 (1.7) 2520 (1.1)    Total Output 4000 2520    Net -4000 -385.8                Lines/Drains/Airways       Active Status       Name Placement date Placement time Site Days    Urethral Catheter Three way 03/20/25  --  Three way  2                  Physical Exam  Constitutional:       General: He is not in acute distress.     Appearance: He is normal weight. He is not ill-appearing, toxic-appearing or diaphoretic.   HENT:      Head: Normocephalic and atraumatic.      Right Ear: External ear normal.      Left Ear: External ear normal.      Nose: Nose normal.      Mouth/Throat:      Pharynx: Oropharynx is clear.   Eyes:      General: No scleral icterus.     Conjunctiva/sclera: Conjunctivae normal.   Cardiovascular:      Rate and Rhythm: Normal rate and regular rhythm.      Pulses: Normal pulses.   Pulmonary:      Effort: Pulmonary effort is normal. No respiratory distress.      Breath sounds: No wheezing, rhonchi or rales.   Abdominal:      General: Bowel sounds are normal. There is no distension.      Tenderness: There is no abdominal tenderness.   Genitourinary:     Comments: Yellow urine within Kemp catheter tubing  Neurological:      General: No focal deficit present.      Mental Status: He is alert and oriented to person, place, and time.           Lab Results: I have reviewed the following results:  Recent Labs     03/20/25  0656 03/20/25  1213 03/21/25  0455   WBC 8.05  --  8.06   HGB 14.4   < > 13.4   HCT 43.2   < > 40.8     --  218   SODIUM 138  --  138   K 4.0  --  4.1     --  107   CO2 25  --  24   BUN 15  --  15   CREATININE 0.89  --  0.72   GLUC 101  --  101   PTT 28  --   --    INR 1.04  --   --     < > = values in this  interval not displayed.       VTE Pharmacologic Prophylaxis: Eliquis  VTE Mechanical Prophylaxis: sequential compression device    Renee Barney PA-C

## 2025-03-22 NOTE — ASSESSMENT & PLAN NOTE
Most recent PCI early 2024. Will transition from Plavix to aspirin. On Zetia. Gets Leqvio injections in the office

## 2025-03-22 NOTE — ASSESSMENT & PLAN NOTE
S/p TURP 3/14 with Kemp catheter removal 3/17 and resumed Eliquis and Plavix on 318  S/p placement of 22 French three-way Kemp catheter in emergency department  Hgb 14.4--13.7--13.4 -->awaiting repeat today   WBC 8.05  Afebrile, vital signs stable  Plan:  CT 3/20: Large amount of clot in the urinary bladder   Urine improving on CBI yesterday clamped  On evaluation today urine clear yellow.   Will start Eliquis and evaluate over the next 24 hours if urine remains clear hopeful discharge 3/23, if tolerating can start aspirin to follow. See cardiology documentation from 3/21.  Appreciate their input and assistance managing anticoagulation.  Manual irrigation every 4 hours and as needed to keep catheter patent  Bladder scan every 6 hours as needed  Keep Kemp catheter on traction  Oxybutynin for bladder spasms  Kemp to be removed on outpatient for void trial

## 2025-03-22 NOTE — PLAN OF CARE
Problem: PAIN - ADULT  Goal: Verbalizes/displays adequate comfort level or baseline comfort level  Description: Interventions:  - Encourage patient to monitor pain and request assistance  - Assess pain using appropriate pain scale  - Administer analgesics based on type and severity of pain and evaluate response  - Implement non-pharmacological measures as appropriate and evaluate response  - Consider cultural and social influences on pain and pain management  - Notify physician/advanced practitioner if interventions unsuccessful or patient reports new pain  Outcome: Progressing     Problem: INFECTION - ADULT  Goal: Absence or prevention of progression during hospitalization  Description: INTERVENTIONS:  - Assess and monitor for signs and symptoms of infection  - Monitor lab/diagnostic results  - Monitor all insertion sites, i.e. indwelling lines, tubes, and drains  - Monitor endotracheal if appropriate and nasal secretions for changes in amount and color  - [place] appropriate cooling/warming therapies per order  - Administer medications as ordered  - Instruct and encourage patient and family to use good hand hygiene technique  - Identify and instruct in appropriate isolation precautions for identified infection/condition  Outcome: Progressing  Goal: Absence of fever/infection during neutropenic period  Description: INTERVENTIONS:  - Monitor WBC    Outcome: Progressing     Problem: SAFETY ADULT  Goal: Patient will remain free of falls  Description: INTERVENTIONS:  - Educate patient/family on patient safety including physical limitations  - Instruct patient to call for assistance with activity   - Consult OT/PT to assist with strengthening/mobility   - Keep Call bell within reach  - Keep bed low and locked with side rails adjusted as appropriate  - Keep care items and personal belongings within reach  - Initiate and maintain comfort rounds  - Make Fall Risk Sign visible to staff  - Offer Toileting every  Hours,  in advance of need  - Initiate/Maintain alarm  - Obtain necessary fall risk management equipment:   - Apply yellow socks and bracelet for high fall risk patients  - Consider moving patient to room near nurses station  Outcome: Progressing  Goal: Maintain or return to baseline ADL function  Description: INTERVENTIONS:  -  Assess patient's ability to carry out ADLs; assess patient's baseline for ADL function and identify physical deficits which impact ability to perform ADLs (bathing, care of mouth/teeth, toileting, grooming, dressing, etc.)  - Assess/evaluate cause of self-care deficits   - Assess range of motion  - Assess patient's mobility; develop plan if impaired  - Assess patient's need for assistive devices and provide as appropriate  - Encourage maximum independence but intervene and supervise when necessary  - Involve family in performance of ADLs  - Assess for home care needs following discharge   - Consider OT consult to assist with ADL evaluation and planning for discharge  - Provide patient education as appropriate  Outcome: Progressing  Goal: Maintains/Returns to pre admission functional level  Description: INTERVENTIONS:  - Perform AM-PAC 6 Click Basic Mobility/ Daily Activity assessment daily.  - Set and communicate daily mobility goal to care team and patient/family/caregiver.   - Collaborate with rehabilitation services on mobility goals if consulted  - Perform Range of Motion  times a day.  - Reposition patient every  hours.  - Dangle patient  times a day  - Stand patient  times a day  - Ambulate patient  times a day  - Out of bed to chair  times a day   - Out of bed for meals  times a day  - Out of bed for toileting  - Record patient progress and toleration of activity level   Outcome: Progressing     Problem: DISCHARGE PLANNING  Goal: Discharge to home or other facility with appropriate resources  Description: INTERVENTIONS:  - Identify barriers to discharge w/patient and caregiver  - Arrange for  needed discharge resources and transportation as appropriate  - Identify discharge learning needs (meds, wound care, etc.)  - Arrange for interpretive services to assist at discharge as needed  - Refer to Case Management Department for coordinating discharge planning if the patient needs post-hospital services based on physician/advanced practitioner order or complex needs related to functional status, cognitive ability, or social support system  Outcome: Progressing     Problem: Knowledge Deficit  Goal: Patient/family/caregiver demonstrates understanding of disease process, treatment plan, medications, and discharge instructions  Description: Complete learning assessment and assess knowledge base.  Interventions:  - Provide teaching at level of understanding  - Provide teaching via preferred learning methods  Outcome: Progressing

## 2025-03-22 NOTE — ASSESSMENT & PLAN NOTE
S/p TURP 3/14  S/p removal of Kemp catheter 3/17  Resumed Eliquis and Plavix rebleed and held. No restarting eliquis   Plan:  Avoid heavy lifting  Senokot S daily

## 2025-03-22 NOTE — PROGRESS NOTES
Progress Note - Cardiology   Name: George Ramirez 69 y.o. male I MRN: [id number]  Unit/Bed#: -01 I Date of Admission: 3/20/2025   Date of Service: 3/22/2025 I Hospital Day: 0     Assessment & Plan  Gross hematuria  After recent urologic surgery, he was restarted on Plavix and Eliquis.  Had bloody urine.    Recommend resuming his Eliquis today.  Aspirin will be resumed next. Can wait a few days if necessary.  Paroxysmal atrial fibrillation (HCC)  Currently in sinus rhythm. Eliquis being resumed today. Low-dose metoprolol.  Coronary artery disease of native artery of native heart with stable angina pectoris (HCC)  Most recent PCI early 2024. Will transition from Plavix to aspirin. On Zetia. Gets Leqvio injections in the office    Subjective     He was started on Eliquis this morning. Urine yesterday was clearing    Objective :  Temp:  [97.6 °F (36.4 °C)-98.3 °F (36.8 °C)] 98 °F (36.7 °C)  HR:  [70-75] 70  BP: (117-141)/(71-88) 141/80  Resp:  [16-18] 16  SpO2:  [95 %-99 %] 98 %  O2 Device: None (Room air)  Orthostatic Blood Pressures      Flowsheet Row Most Recent Value   Blood Pressure 141/80 filed at 03/22/2025 0731   Patient Position - Orthostatic VS Sitting filed at 03/20/2025 1549          First Weight: Weight - Scale: 97.5 kg (215 lb) (03/20/25 1549)  Vitals:    03/20/25 1549   Weight: 97.5 kg (215 lb)     Physical Exam  Constitutional:       General: He is not in acute distress.     Appearance: He is well-developed.   Eyes:      General: No scleral icterus.     Conjunctiva/sclera: Conjunctivae normal.   Neck:      Vascular: No JVD.   Cardiovascular:      Rate and Rhythm: Normal rate and regular rhythm.      Heart sounds: No murmur heard.     No friction rub. No gallop.   Pulmonary:      Effort: Pulmonary effort is normal.      Breath sounds: Normal breath sounds. No wheezing or rales.   Chest:      Chest wall: No tenderness.   Abdominal:      General: Bowel sounds are normal. There is no distension.  "     Palpations: Abdomen is soft.      Tenderness: There is no abdominal tenderness.   Genitourinary:     Comments: Kemp with bloody urine draining  Musculoskeletal:         General: Normal range of motion.      Cervical back: Normal range of motion and neck supple.   Skin:     General: Skin is warm and dry.      Findings: No erythema or rash.   Neurological:      Mental Status: He is alert and oriented to person, place, and time.   Psychiatric:         Behavior: Behavior normal.           Lab Results: I have reviewed the following results:CBC/BMP:   .     03/22/25  0817   WBC 9.32   HGB 14.5   HCT 43.6         Results from last 7 days   Lab Units 03/22/25  0817 03/21/25  0455 03/20/25  1213 03/20/25  0656   WBC Thousand/uL 9.32 8.06  --  8.05   HEMOGLOBIN g/dL 14.5 13.4 13.7 14.4   HEMATOCRIT % 43.6 40.8 42.6 43.2   PLATELETS Thousands/uL 262 218  --  232     Results from last 7 days   Lab Units 03/21/25  0455 03/20/25  0656   POTASSIUM mmol/L 4.1 4.0   CHLORIDE mmol/L 107 105   CO2 mmol/L 24 25   BUN mg/dL 15 15   CREATININE mg/dL 0.72 0.89   CALCIUM mg/dL 9.0 9.5     Results from last 7 days   Lab Units 03/20/25  0656   INR  1.04   PTT seconds 28     Lab Results   Component Value Date    HGBA1C 5.3 02/13/2024     No results found for: \"CKTOTAL\", \"CKMB\", \"CKMBINDEX\", \"TROPONINI\"    Imaging Results Review: I reviewed radiology reports from this admission including: Echocardiogram.  "

## 2025-03-22 NOTE — ASSESSMENT & PLAN NOTE
After recent urologic surgery, he was restarted on Plavix and Eliquis.  Had bloody urine.    Recommend resuming his Eliquis today.  Aspirin will be resumed next. Can wait a few days if necessary.

## 2025-03-23 ENCOUNTER — NURSE TRIAGE (OUTPATIENT)
Dept: UROLOGY | Facility: CLINIC | Age: 70
End: 2025-03-23

## 2025-03-23 VITALS
SYSTOLIC BLOOD PRESSURE: 102 MMHG | RESPIRATION RATE: 16 BRPM | WEIGHT: 215 LBS | TEMPERATURE: 97.6 F | HEART RATE: 73 BPM | OXYGEN SATURATION: 99 % | DIASTOLIC BLOOD PRESSURE: 60 MMHG | BODY MASS INDEX: 32.22 KG/M2

## 2025-03-23 DIAGNOSIS — N13.8 BPH WITH OBSTRUCTION/LOWER URINARY TRACT SYMPTOMS: ICD-10-CM

## 2025-03-23 DIAGNOSIS — N40.1 BPH WITH OBSTRUCTION/LOWER URINARY TRACT SYMPTOMS: ICD-10-CM

## 2025-03-23 LAB
ERYTHROCYTE [DISTWIDTH] IN BLOOD BY AUTOMATED COUNT: 11.9 % (ref 11.6–15.1)
HCT VFR BLD AUTO: 40.7 % (ref 36.5–49.3)
HGB BLD-MCNC: 13.3 G/DL (ref 12–17)
MCH RBC QN AUTO: 31.3 PG (ref 26.8–34.3)
MCHC RBC AUTO-ENTMCNC: 32.7 G/DL (ref 31.4–37.4)
MCV RBC AUTO: 96 FL (ref 82–98)
PLATELET # BLD AUTO: 255 THOUSANDS/UL (ref 149–390)
PMV BLD AUTO: 9.1 FL (ref 8.9–12.7)
RBC # BLD AUTO: 4.25 MILLION/UL (ref 3.88–5.62)
WBC # BLD AUTO: 10.09 THOUSAND/UL (ref 4.31–10.16)

## 2025-03-23 PROCEDURE — 85027 COMPLETE CBC AUTOMATED: CPT | Performed by: PHYSICIAN ASSISTANT

## 2025-03-23 PROCEDURE — NC001 PR NO CHARGE: Performed by: UROLOGY

## 2025-03-23 PROCEDURE — 99232 SBSQ HOSP IP/OBS MODERATE 35: CPT | Performed by: INTERNAL MEDICINE

## 2025-03-23 RX ORDER — PHENAZOPYRIDINE HYDROCHLORIDE 200 MG/1
200 TABLET, FILM COATED ORAL
Qty: 6 TABLET | Refills: 0 | Status: SHIPPED | OUTPATIENT
Start: 2025-03-23 | End: 2025-03-25

## 2025-03-23 RX ORDER — OXYBUTYNIN CHLORIDE 10 MG/1
10 TABLET, EXTENDED RELEASE ORAL
Qty: 3 TABLET | Refills: 0 | Status: SHIPPED | OUTPATIENT
Start: 2025-03-23 | End: 2025-03-28

## 2025-03-23 RX ADMIN — HYOSCYAMINE SULFATE 0.12 MG: 0.12 TABLET SUBLINGUAL at 08:12

## 2025-03-23 RX ADMIN — SENNOSIDES AND DOCUSATE SODIUM 1 TABLET: 50; 8.6 TABLET ORAL at 08:11

## 2025-03-23 RX ADMIN — OXCARBAZEPINE 300 MG: 300 TABLET, FILM COATED ORAL at 08:12

## 2025-03-23 RX ADMIN — EZETIMIBE 10 MG: 10 TABLET ORAL at 08:11

## 2025-03-23 RX ADMIN — B-COMPLEX W/ C & FOLIC ACID TAB 1 TABLET: TAB at 08:12

## 2025-03-23 RX ADMIN — APIXABAN 5 MG: 5 TABLET, FILM COATED ORAL at 08:11

## 2025-03-23 RX ADMIN — Medication 200 MG: at 08:11

## 2025-03-23 RX ADMIN — Medication 250 MG: at 08:11

## 2025-03-23 RX ADMIN — PHENAZOPYRIDINE HYDROCHLORIDE 200 MG: 100 TABLET ORAL at 08:12

## 2025-03-23 NOTE — PLAN OF CARE
Problem: INFECTION - ADULT  Goal: Absence or prevention of progression during hospitalization  Description: INTERVENTIONS:  - Assess and monitor for signs and symptoms of infection  - Monitor lab/diagnostic results  - Monitor all insertion sites, i.e. indwelling lines, tubes, and drains  - Monitor endotracheal if appropriate and nasal secretions for changes in amount and color  - [place] appropriate cooling/warming therapies per order  - Administer medications as ordered  - Instruct and encourage patient and family to use good hand hygiene technique  - Identify and instruct in appropriate isolation precautions for identified infection/condition  3/23/2025 0036 by Sharri Gaspar RN  Outcome: Progressing  3/23/2025 0036 by Sharri Gaspar RN  Outcome: Progressing  Goal: Absence of fever/infection during neutropenic period  Description: INTERVENTIONS:  - Monitor WBC    3/23/2025 0036 by Sharri Gaspar RN  Outcome: Progressing  3/23/2025 0036 by Sharri Gaspar RN  Outcome: Progressing     Problem: SAFETY ADULT  Goal: Patient will remain free of falls  Description: INTERVENTIONS:  - Educate patient/family on patient safety including physical limitations  - Instruct patient to call for assistance with activity   - Consult OT/PT to assist with strengthening/mobility   - Keep Call bell within reach  - Keep bed low and locked with side rails adjusted as appropriate  - Keep care items and personal belongings within reach  - Initiate and maintain comfort rounds  - Make Fall Risk Sign visible to staff  - Apply yellow socks and bracelet for high fall risk patients  - Consider moving patient to room near nurses station  3/23/2025 0036 by Sharri Gaspar RN  Outcome: Progressing  3/23/2025 0036 by Sharri Gaspar RN  Outcome: Progressing  Goal: Maintain or return to baseline ADL function  Description: INTERVENTIONS:  -  Assess patient's ability to carry out ADLs; assess patient's baseline for ADL function and identify physical deficits  which impact ability to perform ADLs (bathing, care of mouth/teeth, toileting, grooming, dressing, etc.)  - Assess/evaluate cause of self-care deficits   - Assess range of motion  - Assess patient's mobility; develop plan if impaired  - Assess patient's need for assistive devices and provide as appropriate  - Encourage maximum independence but intervene and supervise when necessary  - Involve family in performance of ADLs  - Assess for home care needs following discharge   - Consider OT consult to assist with ADL evaluation and planning for discharge  - Provide patient education as appropriate  3/23/2025 0036 by Sharri Gaspar RN  Outcome: Progressing  3/23/2025 0036 by Sharri Gaspar RN  Outcome: Progressing  Goal: Maintains/Returns to pre admission functional level  Description: INTERVENTIONS:  - Perform AM-PAC 6 Click Basic Mobility/ Daily Activity assessment daily.  - Set and communicate daily mobility goal to care team and patient/family/caregiver.   - Collaborate with rehabilitation services on mobility goals if consulted  - Out of bed for toileting  - Record patient progress and toleration of activity level   3/23/2025 0036 by Sharri Gaspar RN  Outcome: Progressing  3/23/2025 0036 by Sharri Gaspar RN  Outcome: Progressing

## 2025-03-23 NOTE — PROGRESS NOTES
Progress Note - Cardiology   Name: George Ramirez 69 y.o. male I MRN: [id number]  Unit/Bed#: -01 I Date of Admission: 3/20/2025   Date of Service: 3/23/2025 I Hospital Day: 1     Assessment & Plan  Gross hematuria  After recent urologic surgery, he was restarted on Plavix and Eliquis.  Had bloody urine.    Now only eliquis is resuved  Aspirin will be resumed next. Would plan to do this later in the week when he presents to the urology office for follow up appt. If needs to be held for longer, would alert his outpatient cardiologist, Dr. Castro to make sure this is being addressed.  Paroxysmal atrial fibrillation (HCC)  Currently in sinus rhythm. Eliquis resumed. Low-dose metoprolol.  Coronary artery disease of native artery of native heart with stable angina pectoris (HCC)  Most recent PCI early 2024. Will transition from Plavix to aspirin. On Zetia. Gets Leqvio injections in the office    Subjective     D/w urology. He is going to have the sol removed later this week.  H/H stable.      Objective :  Temp:  [97.3 °F (36.3 °C)-97.7 °F (36.5 °C)] 97.6 °F (36.4 °C)  HR:  [60-78] 73  BP: (102-115)/(60-68) 102/60  Resp:  [16-17] 16  SpO2:  [94 %-99 %] 99 %  O2 Device: None (Room air)  Orthostatic Blood Pressures      Flowsheet Row Most Recent Value   Blood Pressure 102/60 filed at 03/23/2025 0724   Patient Position - Orthostatic VS Lying filed at 03/22/2025 2159          First Weight: Weight - Scale: 97.5 kg (215 lb) (03/20/25 1549)  Vitals:    03/20/25 1549   Weight: 97.5 kg (215 lb)     Physical Exam  Constitutional:       General: He is not in acute distress.     Appearance: He is well-developed.   Eyes:      General: No scleral icterus.     Conjunctiva/sclera: Conjunctivae normal.   Neck:      Vascular: No JVD.   Cardiovascular:      Rate and Rhythm: Normal rate and regular rhythm.      Heart sounds: No murmur heard.     No friction rub. No gallop.   Pulmonary:      Effort: Pulmonary effort is normal.      " Breath sounds: Normal breath sounds. No wheezing or rales.   Chest:      Chest wall: No tenderness.   Abdominal:      General: Bowel sounds are normal. There is no distension.      Palpations: Abdomen is soft.      Tenderness: There is no abdominal tenderness.   Genitourinary:     Comments: Kemp with bloody urine draining  Musculoskeletal:         General: Normal range of motion.      Cervical back: Normal range of motion and neck supple.   Skin:     General: Skin is warm and dry.      Findings: No erythema or rash.   Neurological:      Mental Status: He is alert and oriented to person, place, and time.   Psychiatric:         Behavior: Behavior normal.         Lab Results: I have reviewed the following results:CBC/BMP:   .     03/23/25  0520   WBC 10.09   HGB 13.3   HCT 40.7         Results from last 7 days   Lab Units 03/23/25  0520 03/22/25  0817 03/21/25  0455   WBC Thousand/uL 10.09 9.32 8.06   HEMOGLOBIN g/dL 13.3 14.5 13.4   HEMATOCRIT % 40.7 43.6 40.8   PLATELETS Thousands/uL 255 262 218     Results from last 7 days   Lab Units 03/21/25  0455 03/20/25  0656   POTASSIUM mmol/L 4.1 4.0   CHLORIDE mmol/L 107 105   CO2 mmol/L 24 25   BUN mg/dL 15 15   CREATININE mg/dL 0.72 0.89   CALCIUM mg/dL 9.0 9.5     Results from last 7 days   Lab Units 03/20/25  0656   INR  1.04   PTT seconds 28     Lab Results   Component Value Date    HGBA1C 5.3 02/13/2024     No results found for: \"CKTOTAL\", \"CKMB\", \"CKMBINDEX\", \"TROPONINI\"    Imaging Results Review: I reviewed radiology reports from this admission including: CT pelvix.  Other Study Results Review: EKG was reviewed.     "

## 2025-03-23 NOTE — TELEPHONE ENCOUNTER
Patient of Dr. Trivedi recently readmitted with hematuria after TURP    Please schedule Kemp removal and return for PVR this coming Thursday at the [facility]

## 2025-03-23 NOTE — PLAN OF CARE
Problem: GENITOURINARY - ADULT  Goal: Maintains or returns to baseline urinary function  Description: INTERVENTIONS:- Assess urinary function- Encourage oral fluids to ensure adequate hydration if ordered- Administer IV fluids as ordered to ensure adequate hydration- Administer ordered medications as needed- Offer frequent toileting- Follow urinary retention protocol if ordered  Outcome: Adequate for Discharge     Problem: DISCHARGE PLANNING  Goal: Discharge to home or other facility with appropriate resources  Description: INTERVENTIONS:  - Identify barriers to discharge w/patient and caregiver  - Arrange for needed discharge resources and transportation as appropriate  - Identify discharge learning needs (meds, wound care, etc.)  - Arrange for interpretive services to assist at discharge as needed  - Refer to Case Management Department for coordinating discharge planning if the patient needs post-hospital services based on physician/advanced practitioner order or complex needs related to functional status, cognitive ability, or social support system  Outcome: Adequate for Discharge

## 2025-03-23 NOTE — PROGRESS NOTES
Progress Note - Urology   Name: George Ramirez 69 y.o. male I MRN: [id number]  Unit/Bed#: -01 I Date of Admission: 3/20/2025   Date of Service: 3/23/2025 I Hospital Day: 1     Assessment & Plan  Gross hematuria  S/p TURP 3/14 with Kemp catheter removal 3/17 and resumed Eliquis and Plavix on 318  S/p placement of 22 French three-way Kemp catheter in emergency department  Hgb 14.4--13.7--13.4 -->14.5-->13.3 hemoglobin continues to be stable  WBC 8.05  Afebrile, vital signs stable  Plan:  CT 3/20: Large amount of clot in the urinary bladder   Urine [place] in color, patient with Pyridium difficult to tell true color of urine.  Manual irrigation revealed light pink-tinged colored urine.  Will watch closely this a.m. if continues to be mild we will consider discharge today with Kemp catheter.  Eliquis restarted  if tolerating can start aspirin to follow. See cardiology documentation from 3/21.  Appreciate their input and assistance managing anticoagulation.  Manual irrigation every 4 hours and as needed to keep catheter patent  Bladder scan every 6 hours as needed  Keep Kemp catheter on traction  Oxybutynin for bladder spasms  Kemp to be removed on outpatient for void trial  BPH with obstruction/lower urinary tract symptoms  S/p TURP 3/14  S/p removal of Kemp catheter 3/17  Resumed Eliquis and Plavix rebleed and held. Now restarting eliquis   Plan:  Avoid heavy lifting  Senokot S daily  Seasonal allergic rhinitis  Singulair 10 mg nightly  Paroxysmal atrial fibrillation (HCC)  On as needed Cardizem and Plavix  Metoprolol 12.5 mg twice daily  Plan:  Hold Plavix  Hyperlipidemia  On WelChol 1250 mg twice daily  Zetia 10 mg daily  Takes Leqvio every 6 month (due May 2025)  Intolerance to statins  Alpha-1-antitrypsin deficiency (HCC)  Singulair 10 mg daily  Chlor-Trimeton 4 mg every 6 hours as needed        Subjective   Patient sitting comfortably in bed no acute distress denies any nausea vomiting fevers  chills chest pain or shortness of breath.  He thinks that his urine is more bloody today.    Objective :  Temp:  [97.3 °F (36.3 °C)-98 °F (36.7 °C)] 97.3 °F (36.3 °C)  HR:  [60-78] 69  BP: (108-141)/(60-80) 108/60  Resp:  [16-17] 17  SpO2:  [94 %-98 %] 95 %  O2 Device: None (Room air)    I/O         03/21 0701  03/22 0700 03/22 0701  03/23 0700    P.O. 480     I.V. (mL/kg) 1654.2 (17)     Total Intake(mL/kg) 2134.2 (21.9)     Urine (mL/kg/hr) 3860 (1.6) 2380 (1)    Total Output 3860 2380    Net -1725.8 -2380                Lines/Drains/Airways       Active Status       Name Placement date Placement time Site Days    Urethral Catheter Three way 03/20/25  --  Three way  3                  Physical Exam  Constitutional:       General: He is not in acute distress.     Appearance: He is normal weight. He is not ill-appearing, toxic-appearing or diaphoretic.   HENT:      Head: Normocephalic and atraumatic.      Right Ear: External ear normal.      Left Ear: External ear normal.      Nose: Nose normal.      Mouth/Throat:      Pharynx: Oropharynx is clear.   Eyes:      General: No scleral icterus.     Conjunctiva/sclera: Conjunctivae normal.   Cardiovascular:      Rate and Rhythm: Normal rate and regular rhythm.   Pulmonary:      Effort: Pulmonary effort is normal. No respiratory distress.      Breath sounds: No wheezing, rhonchi or rales.   Abdominal:      General: Bowel sounds are normal. There is no distension.      Tenderness: There is no abdominal tenderness.   Genitourinary:     Comments: Patient manual irrigation reveals light pink-tinged colored urine, urine in self and Kemp catheter bag orange from Pyridium  Musculoskeletal:      Cervical back: Normal range of motion.   Neurological:      Mental Status: He is alert.           Lab Results: I have reviewed the following results:  Recent Labs     03/20/25  0656 03/20/25  1213 03/21/25  0455 03/22/25  0817   WBC 8.05  --  8.06 9.32   HGB 14.4   < > 13.4 14.5   HCT  43.2   < > 40.8 43.6     --  218 262   SODIUM 138  --  138  --    K 4.0  --  4.1  --      --  107  --    CO2 25  --  24  --    BUN 15  --  15  --    CREATININE 0.89  --  0.72  --    GLUC 101  --  101  --    PTT 28  --   --   --    INR 1.04  --   --   --     < > = values in this interval not displayed.         VTE Pharmacologic Prophylaxis: Eliquis  VTE Mechanical Prophylaxis: sequential compression device    Renee Barney PA-C

## 2025-03-23 NOTE — ASSESSMENT & PLAN NOTE
After recent urologic surgery, he was restarted on Plavix and Eliquis.  Had bloody urine.    Now only eliquis is resuved  Aspirin will be resumed next. Would plan to do this later in the week when he presents to the urology office for follow up appt. If needs to be held for longer, would alert his outpatient cardiologist, Dr. Castro to make sure this is being addressed.

## 2025-03-23 NOTE — ASSESSMENT & PLAN NOTE
S/p TURP 3/14 with Kemp catheter removal 3/17 and resumed Eliquis and Plavix on 318  S/p placement of 22 French three-way Kemp catheter in emergency department  Hgb 14.4--13.7--13.4 -->14.5-->13.3 hemoglobin continues to be stable  WBC 8.05  Afebrile, vital signs stable  Plan:  CT 3/20: Large amount of clot in the urinary bladder   Urine [place] in color, patient with Pyridium difficult to tell true color of urine.  Manual irrigation revealed light pink-tinged colored urine.  Will watch closely this a.m. if continues to be mild we will consider discharge today with Kemp catheter.  Eliquis restarted  if tolerating can start aspirin to follow. See cardiology documentation from 3/21.  Appreciate their input and assistance managing anticoagulation.  Manual irrigation every 4 hours and as needed to keep catheter patent  Bladder scan every 6 hours as needed  Keep Kemp catheter on traction  Oxybutynin for bladder spasms  Kemp to be removed on outpatient for void trial

## 2025-03-23 NOTE — ASSESSMENT & PLAN NOTE
S/p TURP 3/14  S/p removal of Kemp catheter 3/17  Resumed Eliquis and Plavix rebleed and held. Now restarting eliquis   Plan:  Avoid heavy lifting  Senokot S daily

## 2025-03-24 NOTE — TELEPHONE ENCOUNTER
Pt called to schedule TOV per MD instructions I was unable to schedule Thurs as nurse schedule full pt inquired about Fri which is a day not normally done,please review

## 2025-03-24 NOTE — UTILIZATION REVIEW
NOTIFICATION OF ADMISSION DISCHARGE   This is a Notification of Discharge from [facility]. Please be advised that this patient has been discharge from our facility. Below you will find the admission and discharge date and time including the patient’s disposition.   UTILIZATION REVIEW CONTACT:  America Gauthier  Utilization   Network Utilization Review Department  Phone: [phone] x carefully listen to the prompts. All voicemails are confidential.  Email: [email or web address]     ADMISSION INFORMATION  PRESENTATION DATE: 3/20/2025  6:03 AM  OBERVATION ADMISSION DATE: 03/20/2025 0842  INPATIENT ADMISSION DATE: 3/22/25  9:11 AM   DISCHARGE DATE: 3/23/2025 11:57 AM   DISPOSITION:Home/Self Care    Network Utilization Review Department  ATTENTION: Please call with any questions or concerns to [phone] and carefully listen to the prompts so that you are directed to the right person. All voicemails are confidential.   For Discharge needs, contact Care Management DC Support Team at [phone] opt. 2  Send all requests for admission clinical reviews, approved or denied determinations and any other requests to dedicated fax number below belonging to the campus where the patient is receiving treatment. List of dedicated fax numbers for the Facilities:  FACILITY NAME UR FAX NUMBER   ADMISSION DENIALS (Administrative/Medical Necessity) [phone]   DISCHARGE SUPPORT TEAM ([facility]) [phone]   PARENT CHILD HEALTH (Maternity/NICU/Pediatrics) [phone]   [facility] [phone]   [facility] [phone]   [facility] [phone]   [facility] [phone]   [facility] [phone]   [facility] [phone]   [facility] [phone]   [facility]  [phone]   [facility] [phone]   [facility] [phone]   [facility] [phone]   [facility] [phone]

## 2025-03-26 DIAGNOSIS — E78.00 HYPERCHOLESTEROLEMIA: ICD-10-CM

## 2025-03-26 NOTE — TELEPHONE ENCOUNTER
FOLLOW UP: Hospitalized for three days for gross hematuria, patient wants to be prescribed Miralax as a back up as he is concerned the bleeding is from his bowel movement. He is asking for another prescription for pyridium. I verified [facility].    REASON FOR CONVERSATION: Blood in Urine    SYMPTOMS: Blood in urine, one episode, cranberry colored, burning sensation at the tip of his penis    OTHER: This morning, minimal strain with bowel movement, last took Senna last night. He runs out of the prescription in 3 days.  Is currently taking Eliquis twice a day.     DISPOSITION: Discuss with Provider and Call Back Patient (overriding See Within 2 Weeks in Office)    Reason for Disposition   Patient presents with gross hematuria with small clots and able to urinate, new onset, with or without history of bladder cancer    Answer Assessment - Initial Assessment Questions  1. When did you start with blood in your urine, and can you describe things in detail? Do you have any blood clots, is the hematuria continuous or intermittent and can you describe the color of the blood in your urine in relation to a beverage?   This morning, one episode, no blood clots, cranberry colored urine    2. Do you have lower back, flank, or lower abdominal/bladder pain?   Denies    3. Are you experiencing urinary frequency, urgency, pain or burning or any other changes in your urination like being unable to urinate?   A little bit of burning on the tip of penis    4. Do you take any blood thinners?   Eliquis, twice of day    Protocols used: Urology-Gross Hemaruria-ADULT-[place]

## 2025-03-26 NOTE — TELEPHONE ENCOUNTER
Reason for call:   [x] Refill   [] Prior Auth  [] Other:     Office:   [x] PCP/Provider -  Viviane Resendiz, / len Shen   [] Specialty/Provider -     Medication: ezetimibe (ZETIA) 10 mg tablet     Dose/Frequency: Take 1 tablet (10 mg total) by mouth daily,     Quantity: 90    Pharmacy: [address] [place] [facility] [address] [facility]   Does the patient have enough for 3 days?   [x] Yes   [] No - Send as HP to POD

## 2025-03-27 RX ORDER — EZETIMIBE 10 MG/1
10 TABLET ORAL DAILY
Qty: 90 TABLET | Refills: 1 | Status: SHIPPED | OUTPATIENT
Start: 2025-03-27

## 2025-03-28 ENCOUNTER — PROCEDURE VISIT (OUTPATIENT)
Dept: UROLOGY | Facility: AMBULATORY SURGERY CENTER | Age: 70
End: 2025-03-28
Payer: COMMERCIAL

## 2025-03-28 ENCOUNTER — OFFICE VISIT (OUTPATIENT)
Dept: INTERNAL MEDICINE CLINIC | Age: 70
End: 2025-03-28
Payer: COMMERCIAL

## 2025-03-28 VITALS
BODY MASS INDEX: 32.43 KG/M2 | WEIGHT: 214 LBS | HEART RATE: 66 BPM | HEIGHT: 68 IN | SYSTOLIC BLOOD PRESSURE: 130 MMHG | DIASTOLIC BLOOD PRESSURE: 68 MMHG

## 2025-03-28 VITALS
TEMPERATURE: 97.3 F | WEIGHT: 215.6 LBS | SYSTOLIC BLOOD PRESSURE: 120 MMHG | OXYGEN SATURATION: 97 % | BODY MASS INDEX: 32.67 KG/M2 | HEIGHT: 68 IN | DIASTOLIC BLOOD PRESSURE: 60 MMHG | HEART RATE: 69 BPM

## 2025-03-28 DIAGNOSIS — Z76.89 ENCOUNTER FOR SUPPORT AND COORDINATION OF TRANSITION OF CARE: Primary | ICD-10-CM

## 2025-03-28 DIAGNOSIS — I48.0 PAROXYSMAL ATRIAL FIBRILLATION (HCC): ICD-10-CM

## 2025-03-28 DIAGNOSIS — N13.8 BPH WITH OBSTRUCTION/LOWER URINARY TRACT SYMPTOMS: ICD-10-CM

## 2025-03-28 DIAGNOSIS — N13.8 BPH WITH OBSTRUCTION/LOWER URINARY TRACT SYMPTOMS: Primary | ICD-10-CM

## 2025-03-28 DIAGNOSIS — N40.1 BPH WITH OBSTRUCTION/LOWER URINARY TRACT SYMPTOMS: ICD-10-CM

## 2025-03-28 DIAGNOSIS — N40.1 BPH WITH OBSTRUCTION/LOWER URINARY TRACT SYMPTOMS: Primary | ICD-10-CM

## 2025-03-28 DIAGNOSIS — R31.0 GROSS HEMATURIA: ICD-10-CM

## 2025-03-28 LAB — POST-VOID RESIDUAL VOLUME, ML POC: 19 ML

## 2025-03-28 PROCEDURE — 99496 TRANSJ CARE MGMT HIGH F2F 7D: CPT | Performed by: INTERNAL MEDICINE

## 2025-03-28 PROCEDURE — 51798 US URINE CAPACITY MEASURE: CPT

## 2025-03-28 PROCEDURE — 99024 POSTOP FOLLOW-UP VISIT: CPT

## 2025-03-28 NOTE — DISCHARGE SUMMARY
Discharge Summary - Urology   Name: George Ramirez 69 y.o. male I MRN: [id number]  Unit/Bed#: -01 I Date of Admission: 3/20/2025   Date of Service: 3/28/2025 I Hospital Day: 1    Admission Date: 3/20/2025 0603  Discharge Date: 03/28/25  Admitting Diagnosis: Hematuria [R31.9]  Gross hematuria [R31.0]  Discharge Diagnosis: Gross hematuria  Medical problems resolved gross hematuria  HPI:   George Ramirez is a 69 y.o. male who presents with gross hematuria.  Patient had transurethral section of the prostate on 3/14 with Dr. Trivedi.  His Kemp catheter was removed 3/17 at which time he reports his urine had been clearing.  Patient resumed his Eliquis and Plavix on 3/18.  Patient developed hematuria last evening and reports needing to urinate every it is all night long.  Patient reports passage of some clots.  He denies any fevers or chills.  Has some abdominal pressure with palpation.Received 6 L of irrigant in the emergency department.  He was gently irrigated by myself without return of clots but had significant bladder spasms and pressure with palpation only.  His Kemp catheter is draining adequately.  His Kemp catheter was placed on traction and CT was ordered for further evaluation.  Dr. Trivedi and Dr. Membreno were made aware.     Procedures Performed: none    Summary of Hospital Course:   Patient presented to the hospital due to gross hematuria status post TURP and after reinitiation of anticoagulation on outpatient.  He was admitted for manual irrigation and CBI and holding of anticoagulation.  During patient's hospitalization he was weaned off of CBI, hemoglobin continue to be stable on serial H&H checks.  Eliquis and Plavix were held cardiology consulted for assistance with anticoagulation management.  Eliquis restarted 24 hours prior to discharge.  And patient to discontinue Plavix and start aspirin per cardiology.  Patient's urine continued to be clear/orange in color due to Pyridium.   But no signs of additional bleeding post initiation of Eliquis.  Plan for discharge with Kemp catheter and outpatient follow-up during the week for void trial.  Aspirin to be started later this week after catheter has been removed.    Significant Findings, Care, Treatment and Services Provided: Cardiology consultation, CBI    Complications: None  Condition at Discharge: stable       Discharge instructions/Information to patient and family:   See After Visit Summary (AVS) for information provided to patient and family.      Provisions for Follow-Up Care:  See after visit summary for information related to follow-up care and any pertinent home health orders.      PCP: Viviane Resendiz DO    Disposition: Home    Planned Readmission: No     Discharge Medications:  See after visit summary for reconciled discharge medications provided to patient and family.      Discharge Statement:  I have spent a total time of 20 minutes in caring for this patient on the day of the visit/encounter.     Renee Barney PA-C

## 2025-03-28 NOTE — ASSESSMENT & PLAN NOTE
-Improving.  -He still has not voided since removal of Kemp catheter earlier today but will follow-up with urology in their office for postvoid residual assessment and a review of his record prior to completion of this note shows that he did void at the urology office this afternoon.  -We will continue to monitor patient clinically and he knows to go to the emergency department for any urinary retention  -He will continue to follow with urology

## 2025-03-28 NOTE — PROGRESS NOTES
Name: George Ramirez      : 1955      MRN: [id number]  Encounter Provider: Viviane Resendiz DO  Encounter Date: 3/28/2025   Encounter department: [facility]  :  Assessment & Plan  Encounter for support and coordination of transition of care  -Done 6 days post discharge  -Patient is improving  -He will continue to follow with urology       Gross hematuria  -Improving.  -He still has not voided since removal of Kemp catheter earlier today but will follow-up with urology in their office for postvoid residual assessment and a review of his record prior to completion of this note shows that he did void at the urology office this afternoon.  -We will continue to monitor patient clinically and he knows to go to the emergency department for any urinary retention  -He will continue to follow with urology       BPH with obstruction/lower urinary tract symptoms  -Status post TURP  -Continue with bowel regimen to avoid constipation  -Continue to follow with urology       Paroxysmal atrial fibrillation (HCC)  -Rate controlled  -Continue with diltiazem and Eliquis             Depression Screening and Follow-up Plan: Patient was screened for depression during today's encounter. They screened negative with a PHQ-2 score of 0.        History of Present Illness   HPI  Presents for a transitional care management visit.  He was admitted at [facility] from 3/20/2025 with gross hematuria after recent urologic surgery.  Of note, he was restarted on Plavix and Eliquis after his surgery and developed hematuria.    He states that after his TURP  sx they removed his Kemp catheter and he was started on his plavix and elquis and about day 5 he noticed blood in his urine and the next day he had frequency every 5 mins with bloody urine   So he called the urologist and was told to go to the ED  He had cbi placed and stayed a few days and was discharged when the bleeding improved.  He  states that he has been home for 5 days and then went and got his catheter out this am at the urologist office.  He has not urinated yet but will go back to the office later today.  He states that he still occasionally has blood in his urine especially with bowel movement   He uses senokot and now got miralax to avoid constipation  He states that he is currently drinking 100 oz of water daily and- 10-12 oz of coffee daily and no soda or iced tea or alcohol   No chest pain and no palpitations - last time he had a fib was last month.  He admits to chills and occasional headaches but denies any  fever,  night sweats, dizziness, nasal congestion, runny nose, pnd, sore throat, ear ache, sinus pain or pressure, wheezing, cough, chest pain, sob, palpitations, nausea, vomiting, diarrhea, constipation, hematochezia, melena stools, arthralgias, myalgias.      TCM Call (since 3/14/2025)     Date and time call was made  3/24/2025  8:37 AM    Hospital care reviewed  Records reviewed    Patient was hospitialized at  [facility]    Date of Admission  03/20/25    Date of discharge  03/23/25    Diagnosis  gross hematuria    Disposition  Home    Were the patients medications reviewed and updated  No    Current Symptoms  Fatigue      TCM Call (since 3/14/2025)     Post hospital issues  None    Scheduled for follow up?  Yes    Did you obtain your prescribed medications  Yes    Do you need help managing your prescriptions or medications  No    Is transportation to your appointment needed  No    I have advised the patient to call PCP with any new or worsening symptoms  Evans Colvin CMA    Living Arrangements  Spouse or Significiant other    Support System  Spouse    Do you have social support  Yes, as much as I need          Review of Systems   Constitutional:  Positive for chills (occasional chills). Negative for activity change, fatigue, fever and unexpected weight change.   HENT:  Negative for ear pain, postnasal drip,  "rhinorrhea, sinus pressure and sore throat.    Eyes:  Negative for pain.   Respiratory:  Negative for cough, choking, chest tightness, shortness of breath and wheezing.    Cardiovascular:  Negative for chest pain, palpitations and leg swelling.   Gastrointestinal:  Negative for abdominal pain, constipation, diarrhea, nausea and vomiting.   Genitourinary:  Positive for hematuria. Negative for dysuria.   Musculoskeletal:  Negative for arthralgias, back pain, gait problem, joint swelling, myalgias and neck stiffness.   Skin:  Negative for pallor and rash.   Neurological:  Positive for headaches (occasional headache). Negative for dizziness, tremors, seizures, syncope and light-headedness.   Hematological:  Negative for adenopathy.   Psychiatric/Behavioral:  Negative for behavioral problems.        Objective   /60   Pulse 69   Temp (!) 97.3 °F (36.3 °C)   Ht 5' 8\" (1.727 m)   Wt 97.8 kg (215 lb 9.6 oz)   SpO2 97%   BMI 32.78 kg/m²      Physical Exam  Constitutional:       General: He is not in acute distress.     Appearance: He is well-developed. He is not diaphoretic.   HENT:      Head: Normocephalic and atraumatic.      Right Ear: External ear normal.      Left Ear: External ear normal.      Nose: Nose normal.      Mouth/Throat:      Mouth: Mucous membranes are dry.      Pharynx: Posterior oropharyngeal erythema present. No oropharyngeal exudate.   Eyes:      General: No scleral icterus.        Right eye: No discharge.         Left eye: No discharge.      Conjunctiva/sclera: Conjunctivae normal.      Pupils: Pupils are equal, round, and reactive to light.   Neck:      Thyroid: No thyromegaly.      Vascular: No JVD.      Trachea: No tracheal deviation.   Cardiovascular:      Rate and Rhythm: Normal rate and regular rhythm.      Heart sounds: Normal heart sounds. No murmur heard.     No friction rub. No gallop.   Pulmonary:      Effort: Pulmonary effort is normal. No respiratory distress.      Breath sounds: " Normal breath sounds. No wheezing or rales.   Chest:      Chest wall: No tenderness.   Abdominal:      General: Bowel sounds are normal. There is no distension.      Palpations: Abdomen is soft. There is no mass.      Tenderness: There is no abdominal tenderness. There is no guarding or rebound.   Musculoskeletal:         General: No tenderness or deformity. Normal range of motion.      Cervical back: Normal range of motion and neck supple.      Left lower leg: Pitting Edema (trace pitting pedal edema on the LLE) present.   Lymphadenopathy:      Cervical: No cervical adenopathy.   Skin:     General: Skin is warm and dry.      Coloration: Skin is not pale.      Findings: No erythema or rash.   Neurological:      Mental Status: He is alert and oriented to person, place, and time.      Cranial Nerves: No cranial nerve deficit.      Motor: No abnormal muscle tone.      Coordination: Coordination normal.      Deep Tendon Reflexes: Reflexes are normal and symmetric.   Psychiatric:         Behavior: Behavior normal.

## 2025-03-28 NOTE — ASSESSMENT & PLAN NOTE
-Status post TURP  -Continue with bowel regimen to avoid constipation  -Continue to follow with urology

## 2025-03-28 NOTE — TELEPHONE ENCOUNTER
Can use Vaseline around the tip of the penis for comfort. Make sure the sol is not being pulling and there is slack on the sol tubing.     Miralax is OTC- can use if constipated.     Sol removal for today.

## 2025-04-04 ENCOUNTER — TELEPHONE (OUTPATIENT)
Age: 70
End: 2025-04-04

## 2025-04-04 NOTE — TELEPHONE ENCOUNTER
Spoke with Nadira from [employer] my med[employer]  provided mailing info. Medication will be shipped -ETA 4/9/2025

## 2025-04-04 NOTE — TELEPHONE ENCOUNTER
Caller: Yamile/ pharmacy dispensing team called cover my meds on behalf of Balbir     Doctor: Dr. Castro     Reason for call: received a call from Yamile who advised that patient got approved for PAT (patient assistance program) for patient's meds being covered. Requested to speak w/ provider in regards to when to dispense Leqvio 280mg quantity of 1 over 180 days.They need to know where product will be going to (mailing info). Their hours are Mon - Friday 8-7pm central time     Call back#: [phone] option 2 (live for agent)

## 2025-04-14 ENCOUNTER — OFFICE VISIT (OUTPATIENT)
Dept: UROLOGY | Facility: AMBULATORY SURGERY CENTER | Age: 70
End: 2025-04-14
Payer: COMMERCIAL

## 2025-04-14 VITALS
DIASTOLIC BLOOD PRESSURE: 72 MMHG | WEIGHT: 219 LBS | OXYGEN SATURATION: 98 % | SYSTOLIC BLOOD PRESSURE: 132 MMHG | HEART RATE: 70 BPM | BODY MASS INDEX: 33.19 KG/M2 | HEIGHT: 68 IN

## 2025-04-14 DIAGNOSIS — N40.1 BPH WITH OBSTRUCTION/LOWER URINARY TRACT SYMPTOMS: Primary | ICD-10-CM

## 2025-04-14 DIAGNOSIS — N13.8 BPH WITH OBSTRUCTION/LOWER URINARY TRACT SYMPTOMS: Primary | ICD-10-CM

## 2025-04-14 LAB — POST-VOID RESIDUAL VOLUME, ML POC: 14 ML

## 2025-04-14 PROCEDURE — 51798 US URINE CAPACITY MEASURE: CPT

## 2025-04-14 PROCEDURE — 99024 POSTOP FOLLOW-UP VISIT: CPT

## 2025-04-14 NOTE — ASSESSMENT & PLAN NOTE
Patient is status post TURP with Dr. Trivedi on 3/14/2025, complicated by postoperative Kemp catheter blockage with blood clots that required CBI and hospital admission.    Final pathology demonstrated benign prostatic tissue.    He continues with some feelings of incomplete bladder emptying, we did discuss trying timed voiding to help with this.  I discussed with him that his PVR is very low and he is emptying out his bladder very effectively.    Will plan to follow-up in 6 to 8 weeks to reassess his symptoms.  Orders:    POCT Measure PVR

## 2025-04-14 NOTE — PROGRESS NOTES
Name: George Ramirez      : 1955      MRN: [id number]  Encounter Provider: MARGIE Gómez  Encounter Date: 2025   Encounter department: [facility] [place]  :  Assessment & Plan  BPH with obstruction/lower urinary tract symptoms  Patient is status post TURP with Dr. Trivedi on 3/14/2025, complicated by postoperative Kemp catheter blockage with blood clots that required CBI and hospital admission.    Final pathology demonstrated benign prostatic tissue.    He continues with some feelings of incomplete bladder emptying, we did discuss trying timed voiding to help with this.  I discussed with him that his PVR is very low and he is emptying out his bladder very effectively.    Will plan to follow-up in 6 to 8 weeks to reassess his symptoms.  Orders:    POCT Measure PVR        History of Present Illness   George Ramirez is a 69 y.o. male who presents today to the office for a postoperative follow-up.  He underwent a TURP by Dr. Trivedi on 3/14 which was complicated by a Kemp catheter blockage due to blood clots that required CBI.  His Kemp catheter was then removed on 3/28.    Today in the office he states that he is overall doing very well.  He denies any recent gross hematuria.  He states that he feels as though his urinary symptoms are starting to improve but he feels as though he does not empty out his bladder effectively or all the way.  He states that sometimes he will sit in the bathroom for 30 minutes to finish urinating.  He does report that he feels as though his stream is pretty strong.  Besides the feelings of incomplete bladder emptying he states he is overall doing very well.    AUA SYMPTOM SCORE      Flowsheet Row Most Recent Value   AUA SYMPTOM SCORE    How often have you had a sensation of not emptying your bladder completely after you finished urinating? 5 (P)     How often have you had to urinate again less than two hours after you finished  "urinating? 5 (P)     How often have you found you stopped and started again several times when you urinate? 5 (P)     How often have you found it difficult to postpone urination? 3 (P)     How often have you had a weak urinary stream? 5 (P)     How often have you had to push or strain to begin urination? 3 (P)     How many times did you most typically get up to urinate from the time you went to bed at night until the time you got up in the morning? 5 (P)     Quality of Life: If you were to spend the rest of your life with your urinary condition just the way it is now, how would you feel about that? 6 (P)     AUA SYMPTOM SCORE 31 (P)            Review of Systems   Constitutional:  Negative for chills and fever.   Respiratory: Negative.  Negative for cough and shortness of breath.    Cardiovascular: Negative.  Negative for chest pain.   Gastrointestinal: Negative.  Negative for abdominal distention, abdominal pain, nausea and vomiting.   Genitourinary:  Negative for decreased urine volume, difficulty urinating, dysuria, flank pain, frequency, hematuria, penile discharge, penile pain, penile swelling, scrotal swelling, testicular pain and urgency.   Skin: Negative.  Negative for rash.   Neurological: Negative.    Hematological:  Negative for adenopathy. Does not bruise/bleed easily.          Objective   /72 (BP Location: Left arm, Patient Position: Sitting, Cuff Size: Adult)   Pulse 70   Ht 5' 8\" (1.727 m)   Wt 99.3 kg (219 lb)   SpO2 98%   BMI 33.30 kg/m²     Physical Exam  Vitals reviewed.   Constitutional:       Appearance: Normal appearance.   HENT:      Head: Normocephalic and atraumatic.   Eyes:      Pupils: Pupils are equal, round, and reactive to light.   Cardiovascular:      Rate and Rhythm: Normal rate.   Pulmonary:      Effort: Pulmonary effort is normal.   Abdominal:      General: Abdomen is flat.      Palpations: Abdomen is soft.   Musculoskeletal:      Cervical back: Normal range of motion. "   Skin:     General: Skin is warm and dry.   Neurological:      General: No focal deficit present.      Mental Status: He is alert and oriented to person, place, and time.   Psychiatric:         Mood and Affect: Mood normal.         Behavior: Behavior normal.         Thought Content: Thought content normal.         Judgment: Judgment normal.           Results   Lab Results   Component Value Date    PSA 2.779 10/14/2024    PSA 3.21 11/03/2023    PSA 2.4 10/13/2022     Lab Results   Component Value Date    GLUCOSE 92 06/11/2015    CALCIUM 9.0 03/21/2025     01/30/2016    K 4.1 03/21/2025    CO2 24 03/21/2025     03/21/2025    BUN 15 03/21/2025    CREATININE 0.72 03/21/2025     Lab Results   Component Value Date    WBC 10.09 03/23/2025    HGB 13.3 03/23/2025    HCT 40.7 03/23/2025    MCV 96 03/23/2025     03/23/2025       Office Urine Dip  Recent Results (from the past hour)   POCT Measure PVR    Collection Time: 04/14/25 12:52 PM   Result Value Ref Range    POST-VOID RESIDUAL VOLUME, ML POC 14 mL

## 2025-04-18 DIAGNOSIS — J30.2 SEASONAL ALLERGIC RHINITIS, UNSPECIFIED TRIGGER: ICD-10-CM

## 2025-04-18 RX ORDER — MONTELUKAST SODIUM 10 MG/1
10 TABLET ORAL
Qty: 90 TABLET | Refills: 1 | Status: SHIPPED | OUTPATIENT
Start: 2025-04-18

## 2025-04-21 DIAGNOSIS — I48.0 PAROXYSMAL ATRIAL FIBRILLATION (HCC): ICD-10-CM

## 2025-04-22 RX ORDER — APIXABAN 5 MG/1
5 TABLET, FILM COATED ORAL 2 TIMES DAILY
Qty: 180 TABLET | Refills: 1 | Status: SHIPPED | OUTPATIENT
Start: 2025-04-22

## 2025-05-09 DIAGNOSIS — M47.816 SPONDYLOSIS OF LUMBAR REGION WITHOUT MYELOPATHY OR RADICULOPATHY: ICD-10-CM

## 2025-05-09 RX ORDER — TIZANIDINE 2 MG/1
2 TABLET ORAL 2 TIMES DAILY PRN
Qty: 180 TABLET | Refills: 0 | Status: CANCELLED | OUTPATIENT
Start: 2025-05-09

## 2025-05-09 NOTE — TELEPHONE ENCOUNTER
Med prescribed by [facility]. Patient needs to call them for refill. Message sent to patient.

## 2025-05-09 NOTE — TELEPHONE ENCOUNTER
Reason for call:   [x] Refill   [] Prior Auth  [x] Other: Patient stated that [facility] get this from PCP     Office:   [x] PCP/Provider - [facility]   [] Specialty/Provider -     Medication: tiZANidine (ZANAFLEX) 2 mg tablet     Dose/Frequency: 2 mg, 2 times daily PRN     Quantity: 180    Pharmacy: [id number]    Local Pharmacy   Does the patient have enough for 3 days?   [x] Yes   [] No - Send as HP to POD    Mail Away Pharmacy   Does the patient have enough for 10 days?   [] Yes   [] No - Send as HP to POD

## 2025-05-13 ENCOUNTER — TELEPHONE (OUTPATIENT)
Dept: PAIN MEDICINE | Facility: CLINIC | Age: 70
End: 2025-05-13

## 2025-05-13 NOTE — TELEPHONE ENCOUNTER
Naeem Resendiz I am reaching out to see if you would be willing to take over Kristopher's tizanidine.  In order for our office to continue prescribing this medication the patient would need to be seen within the last year. Pt was last seen 12/2023.  Pt was seen by you on 3/8/2025.  Please advise if you would be willing to take this prescription over for the patient.  If not, we will reach out to the patient to let them know they would need to at least be seen yearly with out office to fill this medication moving forward. Thank you.

## 2025-05-14 NOTE — TELEPHONE ENCOUNTER
Good morning Dr. Resendiz;  He is not currently being treated by us. He has not been seen since 2023. Thank you

## 2025-05-15 ENCOUNTER — RA CDI HCC (OUTPATIENT)
Dept: OTHER | Facility: HOSPITAL | Age: 70
End: 2025-05-15

## 2025-05-15 DIAGNOSIS — M47.816 SPONDYLOSIS OF LUMBAR REGION WITHOUT MYELOPATHY OR RADICULOPATHY: ICD-10-CM

## 2025-05-15 RX ORDER — TIZANIDINE 2 MG/1
2 TABLET ORAL 2 TIMES DAILY PRN
Qty: 60 TABLET | Refills: 0 | Status: SHIPPED | OUTPATIENT
Start: 2025-05-15 | End: 2025-06-14

## 2025-05-15 NOTE — TELEPHONE ENCOUNTER
Patient is calling  the [facility] for an updated on the tizanidine prescription. Patient would like a call back at [phone]

## 2025-05-17 ENCOUNTER — OFFICE VISIT (OUTPATIENT)
Dept: URGENT CARE | Age: 70
End: 2025-05-17
Payer: COMMERCIAL

## 2025-05-17 ENCOUNTER — NURSE TRIAGE (OUTPATIENT)
Dept: OTHER | Facility: OTHER | Age: 70
End: 2025-05-17

## 2025-05-17 VITALS
DIASTOLIC BLOOD PRESSURE: 84 MMHG | TEMPERATURE: 99.4 F | RESPIRATION RATE: 18 BRPM | SYSTOLIC BLOOD PRESSURE: 138 MMHG | OXYGEN SATURATION: 98 % | HEART RATE: 84 BPM

## 2025-05-17 DIAGNOSIS — R39.9 UTI SYMPTOMS: Primary | ICD-10-CM

## 2025-05-17 LAB
SL AMB  POCT GLUCOSE, UA: ABNORMAL
SL AMB LEUKOCYTE ESTERASE,UA: ABNORMAL
SL AMB POCT BILIRUBIN,UA: ABNORMAL
SL AMB POCT BLOOD,UA: ABNORMAL
SL AMB POCT CLARITY,UA: ABNORMAL
SL AMB POCT COLOR,UA: YELLOW
SL AMB POCT KETONES,UA: 15
SL AMB POCT NITRITE,UA: ABNORMAL
SL AMB POCT PH,UA: 6
SL AMB POCT SPECIFIC GRAVITY,UA: 1.01
SL AMB POCT URINE PROTEIN: 30
SL AMB POCT UROBILINOGEN: ABNORMAL

## 2025-05-17 PROCEDURE — 87086 URINE CULTURE/COLONY COUNT: CPT

## 2025-05-17 PROCEDURE — 87186 SC STD MICRODIL/AGAR DIL: CPT

## 2025-05-17 PROCEDURE — 87077 CULTURE AEROBIC IDENTIFY: CPT

## 2025-05-17 PROCEDURE — 99213 OFFICE O/P EST LOW 20 MIN: CPT

## 2025-05-17 PROCEDURE — 81002 URINALYSIS NONAUTO W/O SCOPE: CPT

## 2025-05-17 RX ORDER — SULFAMETHOXAZOLE AND TRIMETHOPRIM 800; 160 MG/1; MG/1
1 TABLET ORAL EVERY 12 HOURS SCHEDULED
Qty: 14 TABLET | Refills: 0 | Status: SHIPPED | OUTPATIENT
Start: 2025-05-17 | End: 2025-05-24

## 2025-05-17 RX ORDER — SULFAMETHOXAZOLE AND TRIMETHOPRIM 800; 160 MG/1; MG/1
1 TABLET ORAL EVERY 12 HOURS SCHEDULED
Qty: 14 TABLET | Refills: 0 | Status: SHIPPED | OUTPATIENT
Start: 2025-05-17 | End: 2025-05-17

## 2025-05-17 NOTE — PROGRESS NOTES
[facility] Now  Name: George Ramirez      : 1955      MRN: [id number]  Encounter Provider: MARGIE Richards  Encounter Date: 2025   Encounter department: [facility] [place]  :  Urine dip performed in office suggests possible infection, urine culture results pending and should be available in MyChart within 48-72 hours.   Discussed option of going to emergency department for chills, weakness. Patient would prefer to trial outpatient treatment first. Patient strongly recommended to follow up with Urology on Monday.   Please begin antibiotics as directed.   Stay well hydrated, may use Azo or cranberry juice products as needed for symptoms relief.   Strict ED precautions reviewed. Go to ED for fever or flank pain, worsening chills/symptoms.    Assessment & Plan  UTI symptoms    Orders:    POCT urine dip    Urine culture    sulfamethoxazole-trimethoprim (BACTRIM DS) 800-160 mg per tablet; Take 1 tablet by mouth every 12 (twelve) hours for 7 days        Patient Instructions  Patient Education     Urinary Tract Infection, Adult ED   General Information   You came to the Emergency Department (ED) for a urinary tract infection or UTI. Most UTIs are infections in either your bladder or your kidneys. Bladder infections are more common and may also be called cystitis. Kidney infections are more serious and may also be called pyelonephritis. You need antibiotics to treat a UTI. It is important to take all of your antibiotics even if you start to feel better.  What care is needed at home?   Call your regular doctor to let them know you were in the ED. Make a follow-up appointment if you were told to.  For the first day or so, you may want to take an over-the-counter medicine, like phenazopyridine. This will help to numb your bladder. You will also not have the strong urge to urinate. This medicine causes your urine and tears to look orange. If you have kidney disease, talk to your doctor  before taking this medicine.  To lower your chance of getting a UTI in the future, you can:  Drink extra fluids.  If you have sex, urinate right afterwards.  When do I need to get emergency help?   Return to the ED if:   You have very bad pain in your back, shoulder, or belly.  You have a fever of 102.2°F (39°C); shaking chills or sweats even though you are taking antibiotics.  When do I need to call the doctor?   You have a fever up to 100.4°F (38°C).  You notice more blood in your urine.  Your signs get worse or do not improve within 24 hours of starting treatment.  You are not able to urinate for more than 8 hours  Your signs come back after treatment has stopped.  You have new or worsening symptoms.  Last Reviewed Date   2021-03-12  Consumer Information Use and Disclaimer   This generalized information is a limited summary of diagnosis, treatment, and/or medication information. It is not meant to be comprehensive and should be used as a tool to help the user understand and/or assess potential diagnostic and treatment options. It does NOT include all information about conditions, treatments, medications, side effects, or risks that may apply to a specific patient. It is not intended to be medical advice or a substitute for the medical advice, diagnosis, or treatment of a health care provider based on the health care provider's examination and assessment of a patient’s specific and unique circumstances. Patients must speak with a health care provider for complete information about their health, medical questions, and treatment options, including any risks or benefits regarding use of medications. This information does not endorse any treatments or medications as safe, effective, or approved for treating a specific patient. [employer] and its affiliates disclaim any warranty or liability relating to this information or the use thereof. The use of this information is governed by the Terms of Use, available at  https://www.[employer].com/en/know/clinical-effectiveness-terms   Copyright   Follow up with PCP in 3-5 days.  Proceed to  ER if symptoms worsen.    If tests are performed, our office will contact you with results only if changes need to made to the care plan discussed with you at the visit. You can review your full results on [facility].    Chief Complaint:   Chief Complaint   Patient presents with    Fatigue    Generalized Body Aches    Fever    Possible UTI     Symptoms started on Thursday with flu like symptoms. Since march 14 patient has have burning with urination. Lower ABD pain.      History of Present Illness   Urinary Frequency   This is a new problem. The current episode started more than 1 month ago (x 2 months, since having TURP in March of this year). The problem occurs every urination. The problem has been gradually worsening. The quality of the pain is described as burning. The pain is moderate. There has been no fever (Patient reports fluctuating temperature, with T. max of 99.4). He is Sexually active (Monogamous-denies concern for STI). There is No history of pyelonephritis. Associated symptoms include chills, frequency, hesitancy and urgency. Pertinent negatives include no discharge, flank pain, hematuria, nausea, possible pregnancy, sweats or vomiting. He has tried increased fluids for the symptoms. The treatment provided no relief. His past medical history is significant for urinary stasis and a urological procedure. There is no history of catheterization, kidney stones, recurrent UTIs or a single kidney. BPH, recent TURP         Review of Systems   Constitutional:  Positive for chills. Negative for fatigue and fever.   HENT:  Negative for congestion, ear discharge, ear pain, postnasal drip, rhinorrhea, sinus pressure, sinus pain, sneezing and sore throat.    Eyes: Negative.  Negative for pain, discharge, redness and itching.   Respiratory: Negative.  Negative for apnea, cough,  choking, chest tightness, shortness of breath and stridor.    Cardiovascular: Negative.  Negative for chest pain and palpitations.   Gastrointestinal: Negative.  Negative for diarrhea, nausea and vomiting.   Endocrine: Negative.  Negative for polydipsia, polyphagia and polyuria.   Genitourinary:  Positive for decreased urine volume, dysuria, frequency, hesitancy and urgency. Negative for difficulty urinating, enuresis, flank pain, genital sores, hematuria, penile discharge, penile pain, penile swelling, scrotal swelling and testicular pain.   Musculoskeletal: Negative.  Negative for arthralgias, back pain, myalgias, neck pain and neck stiffness.   Skin: Negative.  Negative for color change and rash.   Allergic/Immunologic: Negative.  Negative for environmental allergies.   Neurological: Negative.  Negative for dizziness, facial asymmetry, light-headedness, numbness and headaches.   Hematological: Negative.  Negative for adenopathy.   Psychiatric/Behavioral: Negative.       Past Medical History   Past Medical History:   Diagnosis Date    A-fib (HCC)     Allergic     Allergic rhinitis     Arthritis     Benign cyst of kidney     Cancer (HCC) 2015    Cat bite 07/08/2020    Cerebral vascular disease     Chronic sinusitis     Coronary artery disease     Cryptogenic stroke (HCC) 03/02/2020    Hyperlipidemia     Kidney stone     Melanoma (HCC)     Pancreas cyst     Stroke (HCC) 2006     Past Surgical History:   Procedure Laterality Date    CARDIAC CATHETERIZATION N/A 02/13/2024    Procedure: Cardiac catheterization;  Surgeon: Tripp Cross MD;  Location: [facility];  Service: Cardiology    CARDIAC CATHETERIZATION N/A 02/13/2024    Procedure: Cardiac Coronary Angiogram;  Surgeon: Tripp Cross MD;  Location: [facility];  Service: Cardiology    CARDIAC CATHETERIZATION N/A 02/13/2024    Procedure: Cardiac pci;  Surgeon: Tripp Cross MD;  Location: [facility];  Service: Cardiology    CARDIAC  CATHETERIZATION Left 02/13/2024    Procedure: Cardiac Left Heart Cath;  Surgeon: Tripp Cross MD;  Location: [facility]ARD[facility];  Service: Cardiology    COLONOSCOPY      OTHER SURGICAL HISTORY  2003    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    PATENT FORAMEN OVALE CLOSURE      AZ TRURL ELECTROSURG RESCJ PROSTATE BLEED COMPLETE N/A 3/14/2025    Procedure: TRANSURETHRAL RESECTION OF PROSTATE (TURP);  Surgeon: Balbir Trivedi MD;  Location: [facility] MAIN [facility];  Service: Urology    VASCULAR SURGERY       Family History   Problem Relation Age of Onset    Diabetes type II Mother     Cancer Father         angioma    Cancer Sister     Hearing loss Sister      he reports that he quit smoking about 39 years ago. His smoking use included cigars and cigarettes. He started smoking about 50 years ago. He quit smokeless tobacco use about 39 years ago.  His smokeless tobacco use included chew. He reports current alcohol use of about 1.0 standard drink of alcohol per week. He reports that he does not currently use drugs after having used the following drugs: Marijuana.  Current Outpatient Medications   Medication Instructions    ascorbic acid (VITAMIN C) 250 mg, 2 times daily    chlorpheniramine (CHLOR-TRIMETON) 4 mg, Oral, Every 6 hours PRN    clobetasol (TEMOVATE) 0.05 % cream 2 times daily PRN    Coenzyme Q10 (COQ10) 200 MG CAPS 1 capsule, Daily    colesevelam (WELCHOL) 1,250 mg, Oral, 2 times daily with meals    diltiazem (CARDIZEM) 30 mg tablet TAKE ONE TABLET BY MOUTH IF NEEDED FOR AFIB    Eliquis 5 mg, Oral, 2 times daily    ezetimibe (ZETIA) 10 mg, Oral, Daily    Glucosamine-Chondroit-Vit C-Mn (GLUCOSAMINE CHONDR 1500 COMPLX PO) 1 tablet, 2 times daily    hyoscyamine (LEVSIN/SL) 0.125 mg SL tablet     Leqvio 284 mg, Subcutaneous, Once    metoprolol tartrate (LOPRESSOR) 12.5 mg, Oral, Every 12 hours    montelukast (SINGULAIR) 10 mg, Oral, Daily at bedtime    multivitamin (THERAGRAN) TABS 1 tablet, 2 times daily (diuretic)     nitroglycerin (NITROSTAT) 0.4 mg, Sublingual, Every 5 minutes PRN, - do not take if you have recently taken Sildenafil (Revatio)    NON FORMULARY CBD CAPSULE  AND CREAM USE DAILY PRIN    OXcarbazepine (TRILEPTAL) 300 mg, Oral, 2 times daily    sildenafil (REVATIO) 20 mg tablet Take 1-5 tablets on empty stomach one hour prior to sexual activity if needed    sulfamethoxazole-trimethoprim (BACTRIM DS) 800-160 mg per tablet 1 tablet, Oral, Every 12 hours scheduled    tiZANidine (ZANAFLEX) 2 mg, Oral, 2 times daily PRN   Allergies[1]     Objective   /84   Pulse 84   Temp 99.4 °F (37.4 °C)   Resp 18   SpO2 98%      Physical Exam  Vitals and nursing note reviewed.   Constitutional:       General: He is not in acute distress.     Appearance: He is well-developed. He is not ill-appearing, toxic-appearing or diaphoretic.      Interventions: He is not intubated.  HENT:      Head: Normocephalic and atraumatic.      Right Ear: External ear normal.      Left Ear: External ear normal.     Eyes:      Conjunctiva/sclera: Conjunctivae normal.       Cardiovascular:      Rate and Rhythm: Normal rate and regular rhythm.      Pulses: Normal pulses.      Heart sounds: Normal heart sounds, S1 normal and S2 normal. Heart sounds not distant. No murmur heard.     No friction rub. No gallop.   Pulmonary:      Effort: Pulmonary effort is normal. No tachypnea, bradypnea, accessory muscle usage, prolonged expiration, respiratory distress or retractions. He is not intubated.      Breath sounds: Normal breath sounds. No stridor, decreased air movement or transmitted upper airway sounds. No decreased breath sounds, wheezing or rhonchi.   Abdominal:      General: Abdomen is flat. Bowel sounds are normal.      Palpations: Abdomen is soft.      Tenderness: There is no abdominal tenderness. There is no right CVA tenderness or left CVA tenderness.     Musculoskeletal:         General: No swelling.      Cervical back: Neck supple.     Skin:      "General: Skin is warm and dry.      Capillary Refill: Capillary refill takes less than 2 seconds.     Neurological:      Mental Status: He is alert.     Psychiatric:         Mood and Affect: Mood normal.         Portions of the record may have been created with voice recognition software.  Occasional wrong word or \"sound a like\" substitutions may have occurred due to the inherent limitations of voice recognition software.  Read the chart carefully and recognize, using context, where substitutions have occurred.         [1]   Allergies  Allergen Reactions    Augmentin [Amoxicillin-Pot Clavulanate] Hives and Itching    Molds & Smuts Allergic Rhinitis     Category: Allergy;     Monascus Purpureus Went Yeast Fatigue     Category: Adverse Reaction;     Niacin Fatigue     Category: Adverse Reaction;     Pollen Extract Allergic Rhinitis     Category: Allergy;     Pregabalin Fatigue     Category: Adverse Reaction;     Statins Fatigue and GI Intolerance     Category: Adverse Reaction;     Atorvastatin GI Intolerance and Fatigue     Other reaction(s): Muscle pain, constipation, sleepiness  Category: Adverse Reaction;   Other reaction(s): Muscle pain, constipation, sleepiness     "

## 2025-05-17 NOTE — PATIENT INSTRUCTIONS
Urine dip performed in office suggests possible infection, urine culture results pending and [facility]   Discussed option of going to emergency department for chills, weakness. Patient would prefer to trial outpatient treatment first. Patient strongly recommended to follow up with Urology on Monday.   Please begin antibiotics as directed.   Stay well hydrated, may use Azo or cranberry juice products as needed for symptoms relief.   Strict ED precautions reviewed. Go to ED for fever or flank pain, worsening chills/symptoms.

## 2025-05-17 NOTE — TELEPHONE ENCOUNTER
"Regarding: frequent urination / chills / fever  ----- Message from Kalani THEODORE sent at 5/17/2025  5:43 PM EDT -----  \"I had surgery back in march, everything was going fine until about Thursday I've been in the bathroom a lot more often and I've been having the chills and a fever\"    "

## 2025-05-17 NOTE — TELEPHONE ENCOUNTER
"FOLLOW UP: N/A    REASON FOR CONVERSATION: Urinary Frequency    SYMPTOMS: Urinary frequency since Friday AM, lowe grade temp and shaking    OTHER: N/A    DISPOSITION: See PCP Within 24 Hours - Referred to urgent care      Reason for Disposition   Urinating more frequently than usual (i.e., frequency) OR new-onset of the feeling of an urgent need to urinate (i.e., urgency)    Answer Assessment - Initial Assessment Questions  1. SYMPTOM: \"What's the main symptom you're concerned about?\" (e.g., frequency, incontinence)        Urinary frequency, going every 5-10 minutes    2. ONSET: \"When did the  frequency  start?\"        Friday AM    3. PAIN: \"Is there any pain?\" If Yes, ask: \"How bad is it?\" (Scale: 1-10; mild, moderate, severe)        With initiating urine stream    4. CAUSE: \"What do you think is causing the symptoms?\"        Concern for UTI    5. OTHER SYMPTOMS: \"Do you have any other symptoms?\" (e.g., blood in urine, fever, flank pain, pain with urination)        Body aches  Temp 99.3 - 99.7; currently normal  Shaking chills    Protocols used: Urinary Symptoms-Adult-    "

## 2025-05-19 ENCOUNTER — TELEPHONE (OUTPATIENT)
Age: 70
End: 2025-05-19

## 2025-05-19 ENCOUNTER — TELEPHONE (OUTPATIENT)
Dept: CARDIOLOGY CLINIC | Facility: CLINIC | Age: 70
End: 2025-05-19

## 2025-05-19 PROBLEM — I25.118 CORONARY ARTERY DISEASE OF NATIVE ARTERY OF NATIVE HEART WITH STABLE ANGINA PECTORIS (HCC): Status: RESOLVED | Noted: 2017-02-02 | Resolved: 2025-05-19

## 2025-05-19 NOTE — TELEPHONE ENCOUNTER
Patient and wife called in. Patient was in urgent care over the weekend and is being treated for a UTI with Bactrim. Culture is not yet resulted. Patient reporting still having urgency, frequency and burning. States in Urgent care, temp was down to 91. Now 97.4 while on phone. Wife stating that in urgent care patient was advised to go to ED w/ worries of sepsis, but patient and wife declined. Did advise we will review culture and make adjustments if needed once resulted. Patient is requesting to be seen by an MD this week. States that the provider in urgent care stated he needs to be seen this week. They do not want to see an AP. Patient does have AP appt on 6/30, but they feel the AP they saw in April OV missed these symptoms and that is why he has such a bad infection now. They want to be seen this week by any MD. Reviewed Hydration, avoidance of bladder irritants and ED precautions including severe pain, persistent nausea and vomiting, inability to urinate, fever over 101. Please advise.    First name:                        Date of Birth: [birth year]	Time of Birth:     Birth Weight: 790     Admission Date and Time:  19 @ 14:29         Gestational Age: 28      Source of admission [ x__ ] Inborn     [ __ ]Transport from    [facility]:  28.3 week infant born to a , O neg mother. PNL neg/nr/immune, GBS negative. [facility] secondary to worsening pre-eclampsia. H/o reverse end diastolic velocity. Received betamethasone on , magnesium intermittently which was given during delivery. C/s due to PEC, IUGR, category II tracings. APGARS 8/9. Infant started developing respiratory distress so was started on CPAP 5/21% was tolerated well until arriving to NICU, then was increased to CPAP of 6/30%. Will observe for PDA persistent in next few days, continue starter TPN/ regular TPN until trophic feeds start, continue caffeine for apnea of prematurity. Will give CuroSurf x 1 for RDS secondary to prematurity. Currently no indication for sepsis r/o, antibiotics, but will continue to monitor for fevers. BLT in AM.     Social History: No history of alcohol/tobacco exposure obtained  FHx: non-contributory to the condition being treated or details of FH documented here  ROS: unable to obtain ()       PHYSICAL EXAM:    General:	         Awake and active;   Head:		AFOF  Eyes:		Normally set bilaterally  Ears:		Patent bilaterally, no deformities  Nose/Mouth:	Nares patent, palate intact  Neck:		No masses, intact clavicles  Chest/Lungs:      Breath sounds equal to auscultation. Mild retractions  CV:		3/6 murmur appreciated, normal pulses bilaterally  Abdomen:          Soft nontender nondistended, no masses, bowel sounds present  :		Penile hypospadias  Back:		Intact skin, no sacral dimples or tags  Anus:		Grossly patent  Extremities:	FROM, no hip clicks  Skin:		Pink, no lesions  Neuro exam:	Appropriate tone, activity    **************************************************************************************************  Age:47d    LOS:47d    Vital Signs:  T(C): 36.9 ( @ 09:00), Max: 37.2 (02-15 @ 14:00)  HR: 152 ( @ 09:00) (146 - 195)  BP: 88/40 ( @ 08:00) (59/35 - 88/40)  RR: 79 ( @ 09:00) (34 - 87)  SpO2: 96% ( @ 09:00) (88% - 100%)    ferrous sulfate Oral Liquid - Peds 3.1 milliGRAM(s) Elemental Iron daily  glycerin  Pediatric Rectal Suppository - Peds 0.25 Suppository(s) daily  hepatitis B IntraMuscular Vaccine - Peds 0.5 milliLiter(s) once  multivitamin Oral Drops - Peds 1 milliLiter(s) daily      LABS:                                   0   0 )-----------( 0             [02-10 @ 02:05]                  28.9  S 0%  B 0%  [place] 0%  Myelo 0%  Promyelo 0%  Blasts 0%  Lymph 0%  Mono 0%  Eos 0%  Baso 0%  Retic 7.1%                        6.3   8.97 )-----------( 378             [ @ 02:30]                  20.5  S 14.0%  B 0%  [place] 0%  Myelo 0%  Promyelo 0%  Blasts 0%  Lymph 58.0%  Mono 15.0%  Eos 3.0%  Baso 0%  Retic 8.1%        N/A  |N/A  | 10     ------------------<N/A  Ca 10.6 Mg N/A  Ph 6.6   [02-10 @ 02:15]  N/A   | N/A  | N/A         138  |103  | 12     ------------------<53   Ca 10.1 Mg 2.2  Ph 5.8   [ @ 17:45]  5.5   | 22   | 0.34                   Alkaline Phosphatase [02-10]  249, Alkaline Phosphatase []  219  Albumin [02-10] 3.5, Albumin [] 3.4  []    AST 85, ALT 29, GGT  N/A    POCT Glucose:   TFT's [-]    TSH: 0.57 T4: 8.31 fT4: 1.36                           Culture - Nose (collected 20 @ 05:40)  Final Report:    No Growth of Methicillin-Resistant Staphylococcus aureus    No Growth of Methicillin-Susceptible Staphylocuccus aureus                     **************************************************************************************************		  DISCHARGE PLANNING (date and status):  Hep B Vacc:  CCHD:			  :					  Hearing:    screen:	  Circumcision:  Hip US rec:  	  Synagis: 			  Other Immunizations (with dates):    		  Neurodevelop eval?	  CPR class done?  	  PVS at DC?  Vit D at DC?	  FE at DC?	    PMD:          Name:  ______________ _             Contact information:  ______________ _  Pharmacy: Name:  ______________ _              Contact information:  ______________ _    Follow-up appointments (list):      Time spent on the total subsequent encounter with >50% of the visit spent on counseling and/or coordination of care:[ _ ] 15 min[ _ ] 25 min[ _ ] 35 min  [ _ ] Discharge time spent >30 min   [ __ ] Car seat oximetry reviewed.

## 2025-05-19 NOTE — PROGRESS NOTES
Cardiology Follow Up    George Ramirez  1955  [id number]  [facility]  [address]  [address]  [facility] [address]  Phone#  [phone]  Fax#  [phone]        1. Coronary artery disease involving native coronary artery of native heart without angina pectoris  POCT ECG      2. Presence of drug-eluting stent in anterior descending branch of left coronary artery        3. Paroxysmal atrial fibrillation (HCC)  POCT ECG      4. Dyslipidemia  Lipid Panel With Direct LDL      5. Statin intolerance          Discussion/Summary: Mr. Ramirez is a pleasant 69-year-old gentleman who presents to the office today for routine follow-up.    His blood pressure is well-controlled in the office today on beta-blockade alone.    His most recent lipids do reveal an improved LDL which is still slightly suboptimal over 70 in the setting of his known coronary artery disease.  He questions the need for WelChol.  I will attempt to stop this and continue Zetia as prescribed.  I will reassess his lipids in a few months.  If his numbers remain suboptimal bempedoic acid can be initiated.    Otherwise he is maintaining sinus rhythm with a few episodes of symptomatic recurrence of atrial fibrillation since his last visit with me.  He is under the care of one of our electrophysiologists whom he has not seen in the recent past.  We discussed follow-up and he wishes to hold off.  He is maintained on systemic anticoagulation with Eliquis.      Plavix was discontinued during a hospital stay in the setting of hematuria.  I have asked that he begin 81 mg of aspirin in the setting of his known CAD.    I will see him back in the office in six months or sooner if deemed necessary.    Interval History: Mr. Ramirez is a 69-year-old gentleman who presents to the office today for follow-up.      Since his last visit he feels well.  He currently has a UTI which is  being treated with antibiotics by his urologist.  In the setting he has noted some shortness of breath.  He notes this if he goes up a flight of steps but states that it has been improving.  He denies any recurrent symptoms of chest pain.  He denies any signs or symptoms of congestive heart failure including lower extremity edema, paroxysmal nocturnal dyspnea, orthopnea, acute weight gain or increasing abdominal girth.  He denies lightheadedness, syncope or presyncope.  He denies symptoms of claudication.      He does report that he has had two symptomatic recurrences of his atrial fibrillation since his last visit with me.  The longest lasted six to eight hours.  He does take diltiazem 30 mg as needed which he did on those two occasions.  He also feels the episodes were precipitated by dehydration.    He is maintained on Eliquis    He continues on Leqvio and is tolerating this without injection site reactions or side effects.    Medical Problems       Problem List       Seasonal allergic rhinitis    Osteoarthritis    Nephrolithiasis    Irritable bowel syndrome    Hypercholesterolemia    Coronary atherosclerosis    Overview Signed 2/2/2018  3:18 PM by Asad Molina MD     Overview:   s/p WIL  PLACEMENT         Coronary artery disease involving native coronary artery    Alpha-1-antitrypsin deficiency (HCC)    BPH with obstruction/lower urinary tract symptoms    Pancreatic cyst    Lumbar radiculopathy    Low back pain with sciatica    DDD (degenerative disc disease), lumbar    Lumbar spondylosis    Tinea corporis    Chronic pain syndrome    Paroxysmal atrial fibrillation (HCC)    Skin lesion    BMI 34.0-34.9,adult    Symptomatic varicose veins of both lower extremities    Chest pain    Stented coronary artery        Past Medical History:   Diagnosis Date    A-fib (HCC)     Allergic     Allergic rhinitis     Arthritis     Benign cyst of kidney     Cancer (HCC) 2015    Cat bite 07/08/2020    Cerebral vascular  disease     Chronic sinusitis     Coronary artery disease     Cryptogenic stroke (HCC) 2020    Hyperlipidemia     Kidney stone     Melanoma (HCC)     Pancreas cyst     Stroke (HCC) 2006     Social History     Socioeconomic History    Marital status: /Civil Union     Spouse name: Not on file    Number of children: Not on file    Years of education: Not on file    Highest education level: Not on file   Occupational History    Occupation: massage therapist   Tobacco Use    Smoking status: Former     Current packs/day: 0.00     Types: Cigars, Cigarettes     Start date: 1975     Quit date: 1986     Years since quittin.4    Smokeless tobacco: Former     Types: Chew     Quit date:     Tobacco comments:     Cigars    Vaping Use    Vaping status: Never Used   Substance and Sexual Activity    Alcohol use: Yes     Alcohol/week: 1.0 standard drink of alcohol     Types: 1 Glasses of wine per week     Comment: 8 oz. Daily    Drug use: Not Currently     Types: Marijuana    Sexual activity: Yes     Partners: Female     Birth control/protection: Male Sterilization   Other Topics Concern    Not on file   Social History Narrative    Caffeine: drinks 1 cup coffee/ tea a day.     Social Drivers of Health     Financial Resource Strain: Low Risk  (2023)    Overall Financial Resource Strain (CARDIA)     Difficulty of Paying Living Expenses: Not hard at all   Food Insecurity: No Food Insecurity (3/20/2025)    Nursing - Inadequate Food Risk Classification     Worried About Running Out of Food in the Last Year: Never true     Ran Out of Food in the Last Year: Never true     Ran Out of Food in the Last Year: Never true   Transportation Needs: No Transportation Needs (3/20/2025)    Nursing - Transportation Risk Classification     Lack of Transportation: Not on file     Lack of Transportation: No   Physical Activity: Not on file   Stress: Not on file   Social Connections: Not on file   Intimate Partner  Violence: Unknown (3/20/2025)    Nursing IPS     Feels Physically and Emotionally Safe: Not on file     Physically Hurt by Someone: Not on file     Humiliated or Emotionally Abused by Someone: Not on file     Physically Hurt by Someone: No     Hurt or Threatened by Someone: No   Housing Stability: Unknown (3/20/2025)    Nursing: Inadequate Housing Risk Classification     Has Housing: Not on file     Worried About Losing Housing: Not on file     Unable to Get Utilities: Not on file     Unable to Pay for Housing in the Last Year: No     Has Housin      Family History   Problem Relation Age of Onset    Diabetes type II Mother     Cancer Father         angioma    Cancer Sister     Hearing loss Sister      Past Surgical History:   Procedure Laterality Date    CARDIAC CATHETERIZATION N/A 2024    Procedure: Cardiac catheterization;  Surgeon: Tripp Cross MD;  Location: [facility];  Service: Cardiology    CARDIAC CATHETERIZATION N/A 2024    Procedure: Cardiac Coronary Angiogram;  Surgeon: Tripp Cross MD;  [facility]: [facility];  Service: Cardiology    CARDIAC CATHETERIZATION N/A 2024    Procedure: Cardiac pci;  Surgeon: Tripp Cross MD;  [facility]: [facility];  Service: Cardiology    CARDIAC CATHETERIZATION Left 2024    Procedure: Cardiac Left Heart Cath;  Surgeon: Tripp Cross MD;  Location: [facility];  Service: Cardiology    COLONOSCOPY      OTHER SURGICAL HISTORY  2003    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    PATENT FORAMEN OVALE CLOSURE      NM TRURL ELECTROSURG RESCJ PROSTATE BLEED COMPLETE N/A 3/14/2025    Procedure: TRANSURETHRAL RESECTION OF PROSTATE (TURP);  Surgeon: Balbir Trivedi MD;  Location: [facility];  Service: Urology    VASCULAR SURGERY         Current Outpatient Medications:     ascorbic acid (VITAMIN C) 250 mg tablet, Take 250 mg by mouth in the morning and 250 mg in the evening., Disp: , Rfl:     chlorpheniramine  (CHLOR-TRIMETON) 4 MG tablet, Take 1 tablet (4 mg total) by mouth every 6 (six) hours as needed for rhinitis, Disp: 30 tablet, Rfl: 0    clobetasol (TEMOVATE) 0.05 % cream, Apply 1 application. topically as needed in the morning and 1 application. as needed in the evening. Apply sparingly to affected areas., Disp: , Rfl:     Coenzyme Q10 (COQ10) 200 MG CAPS, Take 1 capsule by mouth in the morning., Disp: , Rfl:     colesevelam (WELCHOL) 625 mg tablet, TAKE TWO TABLETS BY MOUTH TWICE A DAY WITH MEALS, Disp: 360 tablet, Rfl: 1    diltiazem (CARDIZEM) 30 mg tablet, TAKE ONE TABLET BY MOUTH IF NEEDED FOR AFIB, Disp: 15 tablet, Rfl: 3    Eliquis 5 MG, TAKE ONE TABLET BY MOUTH TWICE A DAY, Disp: 180 tablet, Rfl: 1    ezetimibe (ZETIA) 10 mg tablet, Take 1 tablet (10 mg total) by mouth daily, Disp: 90 tablet, Rfl: 1    Glucosamine-Chondroit-Vit C-Mn (GLUCOSAMINE CHONDR 1500 COMPLX PO), Take 1 tablet by mouth in the morning and 1 tablet in the evening., Disp: , Rfl:     hyoscyamine (LEVSIN/SL) 0.125 mg SL tablet, , Disp: , Rfl:     metoprolol tartrate (LOPRESSOR) 25 mg tablet, TAKE 1/2 TABLET BY MOUTH EVERY 12 HOURS, Disp: 100 tablet, Rfl: 0    montelukast (SINGULAIR) 10 mg tablet, TAKE ONE TABLET BY MOUTH EVERY EVENING AT BEDTIME, Disp: 90 tablet, Rfl: 1    multivitamin (THERAGRAN) TABS, Take 1 tablet by mouth in the morning and 1 tablet in the evening., Disp: , Rfl:     nitroglycerin (NITROSTAT) 0.4 mg SL tablet, Place 1 tablet (0.4 mg total) under the tongue every 5 (five) minutes as needed for chest pain - do not take if you have recently taken Sildenafil (Revatio), Disp: 30 tablet, Rfl: 0    OXcarbazepine (TRILEPTAL) 300 mg tablet, TAKE ONE TABLET BY MOUTH TWICE A DAY, Disp: 180 tablet, Rfl: 1    sildenafil (REVATIO) 20 mg tablet, Take 1-5 tablets on empty stomach one hour prior to sexual activity if needed, Disp: 30 tablet, Rfl: 3    sulfamethoxazole-trimethoprim (BACTRIM DS) 800-160 mg per tablet, Take 1 tablet by  mouth every 12 (twelve) hours for 7 days, Disp: 14 tablet, Rfl: 0    tiZANidine (ZANAFLEX) 2 mg tablet, Take 1 tablet (2 mg total) by mouth 2 (two) times a day as needed for muscle spasms, Disp: 60 tablet, Rfl: 0    Inclisiran Sodium (Leqvio) subcutaneous injection, Inject 1.5 mL (284 mg total) under the skin once for 1 dose, Disp: 1.5 mL, Rfl: 0    NON FORMULARY, CBD CAPSULE  AND CREAM USE DAILY PRIN (Patient not taking: Reported on 3/28/2025), Disp: , Rfl:     Current Facility-Administered Medications:     Inclisiran Sodium (LEQVIO) subcutaneous injection 284 mg, 284 mg, Subcutaneous, Q6 Months,   Allergies   Allergen Reactions    Augmentin [Amoxicillin-Pot Clavulanate] Hives and Itching    Molds & Smuts Allergic Rhinitis     Category: Allergy;     Monascus Purpureus Went Yeast Fatigue     Category: Adverse Reaction;     Niacin Fatigue     Category: Adverse Reaction;     Pollen Extract Allergic Rhinitis     Category: Allergy;     Pregabalin Fatigue     Category: Adverse Reaction;     Statins Fatigue and GI Intolerance     Category: Adverse Reaction;     Atorvastatin GI Intolerance and Fatigue     Other reaction(s): Muscle pain, constipation, sleepiness  Category: Adverse Reaction;   Other reaction(s): Muscle pain, constipation, sleepiness       Labs:     Chemistry        Component Value Date/Time     01/30/2016 0740    K 4.1 03/21/2025 0455    K 4.3 01/30/2016 0740     03/21/2025 0455     01/30/2016 0740    CO2 24 03/21/2025 0455    CO2 26 01/30/2016 0740    BUN 15 03/21/2025 0455    BUN 18 01/30/2016 0740    CREATININE 0.72 03/21/2025 0455    CREATININE 0.91 01/30/2016 0740        Component Value Date/Time    CALCIUM 9.0 03/21/2025 0455    CALCIUM 9.0 01/30/2016 0740    ALKPHOS 90 02/28/2025 1335    ALKPHOS 78 01/30/2016 0740    AST 21 02/28/2025 1335    AST 22 01/30/2016 0740    ALT 27 02/28/2025 1335    ALT 24 01/30/2016 0740    BILITOT 0.3 01/30/2016 0740            Lab Results   Component  "Value Date    CHOL 197 01/30/2016    CHOL 216 05/12/2015    CHOL 207 08/14/2014     Lab Results   Component Value Date    HDL 65 11/26/2024    HDL 65 05/08/2024    HDL 63 11/03/2023     Lab Results   Component Value Date    LDLCALC 68 11/26/2024    LDLCALC 132 (H) 05/08/2024    LDLCALC 140 (H) 11/03/2023     Lab Results   Component Value Date    TRIG 84 11/26/2024    TRIG 98 05/08/2024    TRIG 82 11/03/2023     No results found for: \"CHOLHDL\"    Imaging: No results found.        Review of Systems   Cardiovascular:  Positive for dyspnea on exertion and palpitations. Negative for chest pain, claudication, orthopnea, paroxysmal nocturnal dyspnea and syncope.   All other systems reviewed and are negative.      Vitals:    05/20/25 1507   BP: 118/70   SpO2: 97%         There were no vitals filed for this visit.      Height: 5' 8\" (172.7 cm)   Body mass index is 33.36 kg/m².    Physical Exam:  General:  Alert and cooperative, appears stated age  HEENT:  PERRLA, EOMI, no scleral icterus, no conjunctival pallor  Neck:  No lymphadenopathy, no thyromegaly, no carotid bruits, no elevated JVP  Heart:  Regular rate and rhythm, normal S1/S2, no S3/S4, no murmur  Lungs:  Clear to auscultation bilaterally   Abdomen:  Soft, non-tender, positive bowel sounds, no rebound or guarding,   no organomegaly   Extremities:  No clubbing, cyanosis or edema   Vascular:  2+ pedal pulses  Skin:  No rashes or lesions on exposed skin  Neurologic:  Cranial nerves II-XII grossly intact without focal deficits     "

## 2025-05-19 NOTE — TELEPHONE ENCOUNTER
Called and spoke with patient. Informed on cancellation tomorrow with Dr. Trivedi. Patient took appt.

## 2025-05-19 NOTE — TELEPHONE ENCOUNTER
Caller: Patient    Doctor: Dr. Castro    Call back #: [phone]    Reason for call: Patient is scheduled for a Leqvio injection tomorrow and wanted to ask Fernando if he is still able to receive the injection as patient does have a UTI and is on the medication, Bactrim.

## 2025-05-19 NOTE — TELEPHONE ENCOUNTER
Received a message in regards to receiving Leqvio injection with a UTI and on Bactrim. I check with Heike and no interactions.    LM that PT can receive injection as scheduled on 05/20. If PT has any other concerns please call office.

## 2025-05-19 NOTE — TELEPHONE ENCOUNTER
Urine culture still in process.  Recommend continuing the course of antibiotics that was prescribed at urgent care.  Regarding his urinary symptoms that he was having back in April his main complaint was feelings of incomplete bladder emptying, his PVR at that office visit was extremely low at 14 mL.  He was not having any other symptoms or complaints at that time.  The symptoms that he was experiencing are very common to have following his TURP procedure.  He is known to Dr. Trivedi, unsure if Dr. Trivedi has any availability this week to be seen in the office.

## 2025-05-20 ENCOUNTER — TELEPHONE (OUTPATIENT)
Dept: UROLOGY | Facility: AMBULATORY SURGERY CENTER | Age: 70
End: 2025-05-20

## 2025-05-20 ENCOUNTER — OFFICE VISIT (OUTPATIENT)
Dept: CARDIOLOGY CLINIC | Facility: CLINIC | Age: 70
End: 2025-05-20
Payer: COMMERCIAL

## 2025-05-20 ENCOUNTER — OFFICE VISIT (OUTPATIENT)
Dept: UROLOGY | Facility: AMBULATORY SURGERY CENTER | Age: 70
End: 2025-05-20

## 2025-05-20 ENCOUNTER — CLINICAL SUPPORT (OUTPATIENT)
Dept: CARDIOLOGY CLINIC | Facility: CLINIC | Age: 70
End: 2025-05-20
Payer: COMMERCIAL

## 2025-05-20 VITALS
DIASTOLIC BLOOD PRESSURE: 70 MMHG | WEIGHT: 219.4 LBS | BODY MASS INDEX: 33.25 KG/M2 | SYSTOLIC BLOOD PRESSURE: 118 MMHG | HEIGHT: 68 IN | HEART RATE: 68 BPM | OXYGEN SATURATION: 97 %

## 2025-05-20 VITALS
DIASTOLIC BLOOD PRESSURE: 70 MMHG | HEIGHT: 68 IN | SYSTOLIC BLOOD PRESSURE: 118 MMHG | OXYGEN SATURATION: 97 % | BODY MASS INDEX: 33.36 KG/M2

## 2025-05-20 VITALS
DIASTOLIC BLOOD PRESSURE: 78 MMHG | OXYGEN SATURATION: 97 % | BODY MASS INDEX: 33.13 KG/M2 | WEIGHT: 218.6 LBS | HEIGHT: 68 IN | SYSTOLIC BLOOD PRESSURE: 124 MMHG | HEART RATE: 78 BPM

## 2025-05-20 DIAGNOSIS — I48.0 PAROXYSMAL ATRIAL FIBRILLATION (HCC): ICD-10-CM

## 2025-05-20 DIAGNOSIS — E78.5 DYSLIPIDEMIA: Primary | ICD-10-CM

## 2025-05-20 DIAGNOSIS — E78.5 DYSLIPIDEMIA: ICD-10-CM

## 2025-05-20 DIAGNOSIS — I25.10 CORONARY ARTERY DISEASE INVOLVING NATIVE CORONARY ARTERY OF NATIVE HEART WITHOUT ANGINA PECTORIS: Primary | ICD-10-CM

## 2025-05-20 DIAGNOSIS — Z95.5 PRESENCE OF DRUG-ELUTING STENT IN ANTERIOR DESCENDING BRANCH OF LEFT CORONARY ARTERY: ICD-10-CM

## 2025-05-20 DIAGNOSIS — N40.1 BENIGN PROSTATIC HYPERPLASIA WITH LOWER URINARY TRACT SYMPTOMS, SYMPTOM DETAILS UNSPECIFIED: Primary | ICD-10-CM

## 2025-05-20 DIAGNOSIS — Z78.9 STATIN INTOLERANCE: ICD-10-CM

## 2025-05-20 LAB — BACTERIA UR CULT: ABNORMAL

## 2025-05-20 PROCEDURE — 99214 OFFICE O/P EST MOD 30 MIN: CPT | Performed by: INTERNAL MEDICINE

## 2025-05-20 PROCEDURE — 99024 POSTOP FOLLOW-UP VISIT: CPT | Performed by: UROLOGY

## 2025-05-20 PROCEDURE — 96372 THER/PROPH/DIAG INJ SC/IM: CPT

## 2025-05-20 PROCEDURE — 99211 OFF/OP EST MAY X REQ PHY/QHP: CPT

## 2025-05-20 NOTE — PROGRESS NOTES
Patient presents to office per Dr. Castro for 4th Leqvio injection    GTIN [id number]  NDC [id number]   [id number]  EXP: 03/31/2027  Lot [id number]    Patient observed for 15 minutes and tolerated well    Patient schedule in 6 months for 5th injection

## 2025-05-20 NOTE — TELEPHONE ENCOUNTER
PT returned call and confirmed appt with Dr Trivedi on 6/23    PT asking if the 6/30/25 appt with Veronika is still needed    Please advise pt can be reached at [phone]

## 2025-05-20 NOTE — PROGRESS NOTES
Name: George Ramirez      : 1955      MRN: [id number]  Encounter Provider: Balbir Trivedi MD  Encounter Date: 2025   Encounter department: [facility] [place]  :  Assessment & Plan  Benign prostatic hyperplasia with lower urinary tract symptoms, symptom details unspecified         Impression: BPH, status post TURP, UTI    Plan: Fortunately his PVR is 19 cc in the office today.  We discussed 20 g of benign tissue was resected.  I recommend that he complete the Bactrim and once his infection is been treated proceed with office cystoscopy to assess for a meatal stenosis, fossa navicularis stricture, or bulbar urethral stricture.  The patient and his wife are amenable with this plan.        History of Present Illness   George Ramirez is a 69 y.o. male who presents after TURP.  His procedure was performed in 2025.  20 g of benign tissue were removed.  He was recently seen at [facility] and diagnosed with a Klebsiella UTI.  He is currently on Bactrim which appears to be the appropriate antibiotic.  His wife is present with him in the office today.  PVR 19 cc.  He states that his  AUA SYMPTOM SCORE      Flowsheet Row Most Recent Value   AUA SYMPTOM SCORE    How often have you had a sensation of not emptying your bladder completely after you finished urinating? 5 (P)     How often have you had to urinate again less than two hours after you finished urinating? 5 (P)     How often have you found you stopped and started again several times when you urinate? 5 (P)     How often have you found it difficult to postpone urination? 2 (P)     How often have you had a weak urinary stream? 3 (P)     How often have you had to push or strain to begin urination? 2 (P)     How many times did you most typically get up to urinate from the time you went to bed at night until the time you got up in the morning? 5 (P)     Quality of Life: If you were to spend the rest of your  "life with your urinary condition just the way it is now, how would you feel about that? 6 (P)     AUA SYMPTOM SCORE 27 (P)            Review of Systems       Objective   /78 (BP Location: Left arm, Patient Position: Sitting, Cuff Size: Standard)   Pulse 78   Ht 5' 8\" (1.727 m)   Wt 99.2 kg (218 lb 9.6 oz)   SpO2 97%   BMI 33.24 kg/m²     Physical Exam on examination he is in no acute distress.  Gait normal.  Affect normal    Results   Lab Results   Component Value Date    PSA 2.779 10/14/2024    PSA 3.21 11/03/2023    PSA 2.4 10/13/2022     Lab Results   Component Value Date    GLUCOSE 92 06/11/2015    CALCIUM 9.0 03/21/2025     01/30/2016    K 4.1 03/21/2025    CO2 24 03/21/2025     03/21/2025    BUN 15 03/21/2025    CREATININE 0.72 03/21/2025     Lab Results   Component Value Date    WBC 10.09 03/23/2025    HGB 13.3 03/23/2025    HCT 40.7 03/23/2025    MCV 96 03/23/2025     03/23/2025       Office Urine Dip  No results found for this or any previous visit (from the past hour).      "

## 2025-05-21 ENCOUNTER — OFFICE VISIT (OUTPATIENT)
Dept: INTERNAL MEDICINE CLINIC | Age: 70
End: 2025-05-21

## 2025-05-21 ENCOUNTER — TELEPHONE (OUTPATIENT)
Dept: CARDIOLOGY CLINIC | Facility: CLINIC | Age: 70
End: 2025-05-21

## 2025-05-21 VITALS
HEIGHT: 68 IN | HEART RATE: 70 BPM | OXYGEN SATURATION: 95 % | DIASTOLIC BLOOD PRESSURE: 64 MMHG | BODY MASS INDEX: 33.04 KG/M2 | SYSTOLIC BLOOD PRESSURE: 110 MMHG | TEMPERATURE: 97 F | WEIGHT: 218 LBS

## 2025-05-21 DIAGNOSIS — M54.41 CHRONIC MIDLINE LOW BACK PAIN WITH BILATERAL SCIATICA: ICD-10-CM

## 2025-05-21 DIAGNOSIS — N40.1 BPH WITH OBSTRUCTION/LOWER URINARY TRACT SYMPTOMS: ICD-10-CM

## 2025-05-21 DIAGNOSIS — M54.42 CHRONIC MIDLINE LOW BACK PAIN WITH BILATERAL SCIATICA: ICD-10-CM

## 2025-05-21 DIAGNOSIS — G89.29 CHRONIC MIDLINE LOW BACK PAIN WITH BILATERAL SCIATICA: ICD-10-CM

## 2025-05-21 DIAGNOSIS — I25.10 CORONARY ARTERY DISEASE INVOLVING NATIVE CORONARY ARTERY OF NATIVE HEART WITHOUT ANGINA PECTORIS: ICD-10-CM

## 2025-05-21 DIAGNOSIS — N39.0 URINARY TRACT INFECTION WITH HEMATURIA, SITE UNSPECIFIED: ICD-10-CM

## 2025-05-21 DIAGNOSIS — E78.5 DYSLIPIDEMIA: ICD-10-CM

## 2025-05-21 DIAGNOSIS — N13.8 BPH WITH OBSTRUCTION/LOWER URINARY TRACT SYMPTOMS: ICD-10-CM

## 2025-05-21 DIAGNOSIS — N20.0 NEPHROLITHIASIS: ICD-10-CM

## 2025-05-21 DIAGNOSIS — M54.16 LUMBAR RADICULOPATHY: ICD-10-CM

## 2025-05-21 DIAGNOSIS — R31.9 URINARY TRACT INFECTION WITH HEMATURIA, SITE UNSPECIFIED: ICD-10-CM

## 2025-05-21 DIAGNOSIS — I48.0 PAROXYSMAL ATRIAL FIBRILLATION (HCC): Primary | ICD-10-CM

## 2025-05-21 NOTE — ASSESSMENT & PLAN NOTE
-stable without acute exacerbation and status post stent placement  - continue with metoprolol, WelChol, ezetimibe, inclisiran, aspirin, as needed nitroglycerin  -continue with a heart healthy diet and with exercise  - Continue to follow with cardiology

## 2025-05-21 NOTE — TELEPHONE ENCOUNTER
Spoke to PT. PT is feeling good after Leqvio injection.    Appointment made for 11/24. Prior to seeing Huyen

## 2025-05-21 NOTE — PROGRESS NOTES
Name: George Ramirez      : 1955      MRN: [id number]  Encounter Provider: Viviane Resendiz DO  Encounter Date: 2025   Encounter department: [facility]  :  Assessment & Plan  Paroxysmal atrial fibrillation (HCC)  -Rate controlled  - Continue with metoprolol, diltiazem and apixaban  - Continue to follow with cardiology       BPH with obstruction/lower urinary tract symptoms  -S/p TURP and currently being treated for UTI with Bactrim  - Continue to follow with urology       Nephrolithiasis  -Well-controlled  - Patient was counseled to try to keep well-hydrated       Coronary artery disease involving native coronary artery of native heart without angina pectoris  -stable without acute exacerbation and status post stent placement  - continue with metoprolol, WelChol, ezetimibe, inclisiran, aspirin, as needed nitroglycerin  -continue with a heart healthy diet and with exercise  - Continue to follow with cardiology         Dyslipidemia  - With history of statin intolerance  - Lipids are well controlled  - Continue with inclisiran, WelChol, ezetimibe  -continue with a heart healthy diet  -continue with exercise         Lumbar radiculopathy  -Fairly well-controlled  - Continue Trileptal and tizanidine as needed       Chronic midline low back pain with bilateral sciatica  -Improving  - Continue with Trileptal as needed tizanidine       Urinary tract infection with hematuria, site unspecified  -Urine culture is currently growing Klebsiella aerogenes sensitive to Bactrim  - Continue with Bactrim and finish course and continue to follow-up with urology              History of Present Illness     HPI    Patient presents for a follow-up visit regarding his hyperlipidemia, BPH, coronary artery disease, dyslipidemia, nephrolithiasis and lumbar radiculopathy with chronic low back pain.  He states that he has been taking his medications as prescribed.  Today pt complains that he  was feeling sick last week with chills and fever and had hesitancy and dysuria but states that he has had dysuria since he left the hospital status post his surgery. He states that he also had hypothermia.  Urine culture showed that he had a UTI.    He is taking bactrim for his uti  He states that he still has nocturia x 3 as well as small vol urine and will have a cystoscopy with his urologist next month.    He states that he takes tizanidine on and off for his back pain and muscle spasm.  States that he uses it about once a day when he is working out but otherwise does not use it so frequently   Allergies are okay - takes singulair and chlorpheniramine - states that the chlorpheniramine does not make him too sleepy  He denies any palpitations but admits that he had some sob that has since improved.  Trying to watch his diet and takes his meds   No chest pain   Saw cardiology yesterday   Drinks about  oz of water daily and drinks 10-12 oz of coffee daily -half cafe    Review of Systems   Constitutional:  Positive for fatigue (getting better). Negative for activity change, chills, diaphoresis (resolved), fever and unexpected weight change.   HENT:  Negative for ear pain, postnasal drip, rhinorrhea, sinus pressure and sore throat.    Eyes:  Negative for pain.   Respiratory:  Positive for shortness of breath (improved). Negative for cough, choking, chest tightness and wheezing.    Cardiovascular:  Negative for chest pain, palpitations and leg swelling.   Gastrointestinal:  Positive for diarrhea (mild - with bactrim). Negative for abdominal pain (lower abd cramps on and off), constipation, nausea (resolved) and vomiting.   Genitourinary:  Negative for dysuria and hematuria (resolved).   Musculoskeletal:  Positive for back pain. Negative for arthralgias, gait problem, joint swelling, myalgias and neck stiffness.   Skin:  Negative for pallor and rash.   Neurological:  Negative for dizziness, tremors, seizures,  "syncope, light-headedness and headaches.   Hematological:  Negative for adenopathy.   Psychiatric/Behavioral:  Negative for behavioral problems.        Objective   /64 (BP Location: Left arm, Patient Position: Sitting, Cuff Size: Large)   Pulse 70   Temp (!) 97 °F (36.1 °C) (Temporal)   Ht 5' 8\" (1.727 m)   Wt 98.9 kg (218 lb)   SpO2 95%   BMI 33.15 kg/m²      Physical Exam  Constitutional:       General: He is not in acute distress.     Appearance: He is well-developed. He is not diaphoretic.   HENT:      Head: Normocephalic and atraumatic.      Right Ear: External ear normal.      Left Ear: External ear normal.      Nose: Nose normal.      Mouth/Throat:      Mouth: Mucous membranes are dry.      Pharynx: Posterior oropharyngeal erythema present. No oropharyngeal exudate.     Eyes:      General: No scleral icterus.        Right eye: No discharge.         Left eye: No discharge.      Conjunctiva/sclera: Conjunctivae normal.      Pupils: Pupils are equal, round, and reactive to light.     Neck:      Thyroid: No thyromegaly.      Vascular: No JVD.      Trachea: No tracheal deviation.     Cardiovascular:      Rate and Rhythm: Normal rate and regular rhythm.      Heart sounds: Normal heart sounds. No murmur heard.     No friction rub. No gallop.   Pulmonary:      Effort: Pulmonary effort is normal. No respiratory distress.      Breath sounds: Normal breath sounds. No wheezing or rales.   Chest:      Chest wall: No tenderness.   Abdominal:      General: Bowel sounds are normal. There is no distension.      Palpations: Abdomen is soft. There is no mass.      Tenderness: There is no abdominal tenderness. There is no guarding or rebound.     Musculoskeletal:         General: No tenderness or deformity. Normal range of motion.      Cervical back: Normal range of motion and neck supple.      Left lower leg: Edema (wears compression stockings) present.   Lymphadenopathy:      Cervical: No cervical adenopathy. "     Skin:     General: Skin is warm and dry.      Coloration: Skin is not pale.      Findings: No erythema or rash.     Neurological:      Mental Status: He is alert and oriented to person, place, and time.      Cranial Nerves: No cranial nerve deficit.      Motor: No abnormal muscle tone.      Coordination: Coordination normal.      Deep Tendon Reflexes: Reflexes are normal and symmetric.     Psychiatric:         Behavior: Behavior normal.

## 2025-05-21 NOTE — ASSESSMENT & PLAN NOTE
-S/p TURP and currently being treated for UTI with Bactrim  - Continue to follow with urology

## 2025-05-21 NOTE — ASSESSMENT & PLAN NOTE
- With history of statin intolerance  - Lipids are well controlled  - Continue with inclisiran, WelChol, ezetimibe  -continue with a heart healthy diet  -continue with exercise

## 2025-05-21 NOTE — ASSESSMENT & PLAN NOTE
-Rate controlled  - Continue with metoprolol, diltiazem and apixaban  - Continue to follow with cardiology

## 2025-05-28 ENCOUNTER — NURSE TRIAGE (OUTPATIENT)
Age: 70
End: 2025-05-28

## 2025-05-28 ENCOUNTER — APPOINTMENT (OUTPATIENT)
Dept: LAB | Facility: CLINIC | Age: 70
End: 2025-05-28
Payer: COMMERCIAL

## 2025-05-28 ENCOUNTER — NURSE TRIAGE (OUTPATIENT)
Dept: OTHER | Facility: OTHER | Age: 70
End: 2025-05-28

## 2025-05-28 DIAGNOSIS — R39.9 UTI SYMPTOMS: Primary | ICD-10-CM

## 2025-05-28 DIAGNOSIS — R39.9 UTI SYMPTOMS: ICD-10-CM

## 2025-05-28 DIAGNOSIS — E78.5 DYSLIPIDEMIA: ICD-10-CM

## 2025-05-28 LAB
BACTERIA UR QL AUTO: ABNORMAL /HPF
BILIRUB UR QL STRIP: NEGATIVE
CLARITY UR: CLEAR
COLOR UR: YELLOW
GLUCOSE UR STRIP-MCNC: NEGATIVE MG/DL
HGB UR QL STRIP.AUTO: ABNORMAL
KETONES UR STRIP-MCNC: NEGATIVE MG/DL
LEUKOCYTE ESTERASE UR QL STRIP: ABNORMAL
NITRITE UR QL STRIP: NEGATIVE
NON-SQ EPI CELLS URNS QL MICRO: ABNORMAL /HPF
PH UR STRIP.AUTO: 7 [PH]
PROT UR STRIP-MCNC: ABNORMAL MG/DL
RBC #/AREA URNS AUTO: ABNORMAL /HPF
SP GR UR STRIP.AUTO: 1.02 (ref 1–1.03)
UROBILINOGEN UR STRIP-ACNC: <2 MG/DL
WBC #/AREA URNS AUTO: ABNORMAL /HPF

## 2025-05-28 PROCEDURE — 81001 URINALYSIS AUTO W/SCOPE: CPT

## 2025-05-28 PROCEDURE — 87086 URINE CULTURE/COLONY COUNT: CPT

## 2025-05-28 NOTE — TELEPHONE ENCOUNTER
Regarding: UTI/dizziness  ----- Message from Nakia LERMA sent at 5/28/2025  7:33 AM EDT -----  [facility] [place]     Patient stated he was seen a couple of weeks ago for UTI.  He stated he thinks it is coming back  He is having difficulty emptying his bladder, he feels weak and dizzy.  Please call [phone] to triage    Thank you

## 2025-05-28 NOTE — TELEPHONE ENCOUNTER
"REASON FOR CONVERSATION: Urinary Symptoms    SYMPTOMS: Feeling not emptying bladder all the way, burning with urination, painful urination    OTHER HEALTH INFORMATION: Had a TURP 3/14/25    PROTOCOL DISPOSITION:  When Office is Open (overriding See PCP Within 24 Hours)    CARE ADVICE PROVIDED: Call when office is open for appointment and/or further recommendations. Patient advised to call back or present to ED with new/worsening symptoms. Patient verbalized understanding.     PRACTICE FOLLOW-UP: Please follow up with patient to schedule office visit or send for follow up urine testing/advice. Thanks!             Reason for Disposition   Urinating more frequently than usual (i.e., frequency) OR new-onset of the feeling of an urgent need to urinate (i.e., urgency)    Answer Assessment - Initial Assessment Questions  1. SYMPTOM: \"What's the main symptom you're concerned about?\" (e.g., frequency, incontinence)        Difficulty emptying bladder, burning with urination, pain with urination, able to pass some urine but feels is not emptying all the way     2. ONSET: \"When did the  symptoms  start?\"        Finished antibiotics on Saturday and symptoms never resolved     3. PAIN: \"Is there any pain?\" If Yes, ask: \"How bad is it?\" (Scale: 1-10; mild, moderate, severe)        Sharp at first when trying to urinate 5/10     4. CAUSE: \"What do you think is causing the symptoms?\"        UTI     5. OTHER SYMPTOMS: \"Do you have any other symptoms?\" (e.g., blood in urine, fever, flank pain, pain with urination)        Lower abdominal pain, always has lower back pain, denies blood in urine, denies fever, weakness and dizziness(states felt this way previous UTI), decreased energy, denies other symptoms      TURP 3/14/25    Protocols used: Urinary Symptoms-Adult-[facility]    "

## 2025-05-28 NOTE — TELEPHONE ENCOUNTER
REASON FOR CONVERSATION: possible returned UTI    SYMPTOMS: Burning, frequency, urgency, weakness    OTHER HEALTH INFORMATION: Was just treated for UTI, finished Bactrim Saturday. TURP in March    PROTOCOL DISPOSITION: Order Lab Work (overriding Next Available Appointment with Provider)    CARE ADVICE PROVIDED: Ordered urine testing. Reviewed Hydration, avoidance of bladder irritants and ED precautions including severe pain, persistent nausea and vomiting, inability to urinate, fever over 101     PRACTICE FOLLOW-UP:       Reason for Disposition   The patient has an unknown case of mild dysuria    Answer Assessment - Initial Assessment Questions  1. When did your symptoms start?   Since 10 days ago  2. Do you experience pain or burning with every void?   Burning consistent since TURP  3. Do you have any other urinary symptoms such as urinary frequency, urgency, incontinence, blood in your urine?   Urgency, frequency, no hematuria  4. Do you have any abdominal pain or flank pain?  Lower abdomen  5. Do you have a fever of 101 or higher? How did you take your temperature?   Has been on low side 97.9  6. Does your urine stream spray? (Specifically for males)   no  7. Have you become unable to urinate?   no  8. Do you have a history of urinary tract infections?   yes  9. Have you had a recent urologic procedure, surgery, or any recent urine testing?   Yes recent uti  10. Have you ever been tested for an STD?   no    Protocols used: Urology-Dysuria-ADULT-[place]

## 2025-05-29 ENCOUNTER — OFFICE VISIT (OUTPATIENT)
Dept: UROLOGY | Facility: AMBULATORY SURGERY CENTER | Age: 70
End: 2025-05-29

## 2025-05-29 VITALS
DIASTOLIC BLOOD PRESSURE: 78 MMHG | SYSTOLIC BLOOD PRESSURE: 132 MMHG | HEIGHT: 68 IN | OXYGEN SATURATION: 97 % | BODY MASS INDEX: 33.25 KG/M2 | WEIGHT: 219.4 LBS | HEART RATE: 69 BPM

## 2025-05-29 DIAGNOSIS — Z87.440 HISTORY OF UTI: Primary | ICD-10-CM

## 2025-05-29 LAB — BACTERIA UR CULT: NORMAL

## 2025-05-29 PROCEDURE — 99024 POSTOP FOLLOW-UP VISIT: CPT

## 2025-05-29 RX ORDER — PHENAZOPYRIDINE HYDROCHLORIDE 200 MG/1
200 TABLET, FILM COATED ORAL
Qty: 10 TABLET | Refills: 0 | Status: SHIPPED | OUTPATIENT
Start: 2025-05-29

## 2025-05-29 RX ORDER — OXYBUTYNIN CHLORIDE 5 MG/1
5 TABLET ORAL 3 TIMES DAILY
Qty: 30 TABLET | Refills: 1 | Status: SHIPPED | OUTPATIENT
Start: 2025-05-29

## 2025-05-29 NOTE — ASSESSMENT & PLAN NOTE
Patient recently diagnosed with a UTI on 5/17, positive for Klebsiella, adequately treated with Bactrim.  Reports that after he completed the course of antibiotics his symptoms returned, he had a repeat urine culture that was negative for bacteria.    I discussed with the patient that I believe his symptoms are stemming from some residual inflammation/irritation within the bladder secondary to his recent UTI.  I recommend symptom management with Pyridium, low-dose oxybutynin, increased water intake and over-the-counter pain medication such as Tylenol and NSAIDs.    He does have a follow-up appointment scheduled with Dr. Trivedi on 6/23 for a cystoscopy.  Recommend following up at that time.  Orders:    phenazopyridine (PYRIDIUM) 200 mg tablet; Take 1 tablet (200 mg total) by mouth 3 (three) times a day with meals    oxybutynin (DITROPAN) 5 mg tablet; Take 1 tablet (5 mg total) by mouth 3 (three) times a day

## 2025-05-29 NOTE — PROGRESS NOTES
Name: George Ramirez      : 1955      MRN: [id number]  Encounter Provider: MARGIE Gómez  Encounter Date: 2025   Encounter department: [facility] [place]  :  Assessment & Plan  History of UTI  Patient recently diagnosed with a UTI on , positive for Klebsiella, adequately treated with Bactrim.  Reports that after he completed the course of antibiotics his symptoms returned, he had a repeat urine culture that was negative for bacteria.    I discussed with the patient that I believe his symptoms are stemming from some residual inflammation/irritation within the bladder secondary to his recent UTI.  I recommend symptom management with Pyridium, low-dose oxybutynin, increased water intake and over-the-counter pain medication such as Tylenol and NSAIDs.    He does have a follow-up appointment scheduled with Dr. Trivedi on  for a cystoscopy.  Recommend following up at that time.  Orders:    phenazopyridine (PYRIDIUM) 200 mg tablet; Take 1 tablet (200 mg total) by mouth 3 (three) times a day with meals    oxybutynin (DITROPAN) 5 mg tablet; Take 1 tablet (5 mg total) by mouth 3 (three) times a day        History of Present Illness   George Ramirez is a 69 y.o. male who presents today to the office to discuss his recent UTI.  He states that since he completed his course of antibiotics he developed symptoms again.  He states that he thought his UTI was coming back.  He does report that he completed the entire course of the antibiotics.  He states that he is staying adequately hydrated with water.  He is hopeful that in the next couple of weeks his symptoms will return to normal and he will no longer have any urinary urgency or frequency.    AUA SYMPTOM SCORE      Flowsheet Row Most Recent Value   AUA SYMPTOM SCORE    How often have you had a sensation of not emptying your bladder completely after you finished urinating? 5 (P)     How often have you had to urinate  again less than two hours after you finished urinating? 5 (P)     How often have you found you stopped and started again several times when you urinate? 5 (P)     How often have you found it difficult to postpone urination? 4 (P)     How often have you had a weak urinary stream? 5 (P)     How often have you had to push or strain to begin urination? 1 (P)     How many times did you most typically get up to urinate from the time you went to bed at night until the time you got up in the morning? 5 (P)     Quality of Life: If you were to spend the rest of your life with your urinary condition just the way it is now, how would you feel about that? 6 (P)     AUA SYMPTOM SCORE 30 (P)            Review of Systems   Constitutional:  Negative for chills.   Gastrointestinal:  Negative for nausea and vomiting.   Genitourinary:  Positive for dysuria, frequency and urgency. Negative for flank pain and hematuria.          Objective   There were no vitals taken for this visit.    Physical Exam  Vitals reviewed.   Constitutional:       Appearance: Normal appearance.   HENT:      Head: Normocephalic and atraumatic.     Eyes:      Pupils: Pupils are equal, round, and reactive to light.       Cardiovascular:      Rate and Rhythm: Normal rate.   Pulmonary:      Effort: Pulmonary effort is normal.   Abdominal:      General: Abdomen is flat.      Palpations: Abdomen is soft.     Musculoskeletal:      Cervical back: Normal range of motion.     Skin:     General: Skin is warm and dry.     Neurological:      General: No focal deficit present.      Mental Status: He is alert and oriented to person, place, and time.     Psychiatric:         Mood and Affect: Mood normal.         Behavior: Behavior normal.         Thought Content: Thought content normal.         Judgment: Judgment normal.           Results   Lab Results   Component Value Date    PSA 2.779 10/14/2024    PSA 3.21 11/03/2023    PSA 2.4 10/13/2022     Lab Results   Component Value  Date    GLUCOSE 92 06/11/2015    CALCIUM 9.0 03/21/2025     01/30/2016    K 4.1 03/21/2025    CO2 24 03/21/2025     03/21/2025    BUN 15 03/21/2025    CREATININE 0.72 03/21/2025     Lab Results   Component Value Date    WBC 10.09 03/23/2025    HGB 13.3 03/23/2025    HCT 40.7 03/23/2025    MCV 96 03/23/2025     03/23/2025       Office Urine Dip  No results found for this or any previous visit (from the past hour).

## 2025-06-02 ENCOUNTER — LAB (OUTPATIENT)
Dept: LAB | Facility: CLINIC | Age: 70
End: 2025-06-02
Attending: INTERNAL MEDICINE
Payer: COMMERCIAL

## 2025-06-02 DIAGNOSIS — E78.5 DYSLIPIDEMIA: Primary | ICD-10-CM

## 2025-06-02 LAB
CHOLEST SERPL-MCNC: 148 MG/DL (ref ?–200)
HDLC SERPL-MCNC: 59 MG/DL
LDLC SERPL CALC-MCNC: 76 MG/DL (ref 0–100)
LDLC SERPL DIRECT ASSAY-MCNC: 87 MG/DL (ref 0–100)
NONHDLC SERPL-MCNC: 89 MG/DL
TRIGL SERPL-MCNC: 64 MG/DL (ref ?–150)

## 2025-06-02 PROCEDURE — 83721 ASSAY OF BLOOD LIPOPROTEIN: CPT

## 2025-06-02 PROCEDURE — 80061 LIPID PANEL: CPT

## 2025-06-02 PROCEDURE — 36415 COLL VENOUS BLD VENIPUNCTURE: CPT

## 2025-06-13 DIAGNOSIS — E78.5 DYSLIPIDEMIA: Primary | ICD-10-CM

## 2025-06-13 RX ORDER — BEMPEDOIC ACID 180 MG/1
180 TABLET, FILM COATED ORAL DAILY
Qty: 90 TABLET | Refills: 3 | Status: SHIPPED | OUTPATIENT
Start: 2025-06-13

## 2025-06-18 ENCOUNTER — PROCEDURE VISIT (OUTPATIENT)
Dept: UROLOGY | Facility: AMBULATORY SURGERY CENTER | Age: 70
End: 2025-06-18
Payer: COMMERCIAL

## 2025-06-18 VITALS
WEIGHT: 219 LBS | SYSTOLIC BLOOD PRESSURE: 118 MMHG | OXYGEN SATURATION: 97 % | BODY MASS INDEX: 33.19 KG/M2 | HEART RATE: 67 BPM | HEIGHT: 68 IN | DIASTOLIC BLOOD PRESSURE: 76 MMHG

## 2025-06-18 DIAGNOSIS — N99.115 POSTPROCEDURAL STRICTURE OF URETHRA AT FOSSA NAVICULARIS: ICD-10-CM

## 2025-06-18 DIAGNOSIS — R39.16 BENIGN PROSTATIC HYPERPLASIA (BPH) WITH STRAINING ON URINATION: ICD-10-CM

## 2025-06-18 DIAGNOSIS — R39.9 UTI SYMPTOMS: ICD-10-CM

## 2025-06-18 DIAGNOSIS — N40.1 BENIGN PROSTATIC HYPERPLASIA (BPH) WITH STRAINING ON URINATION: ICD-10-CM

## 2025-06-18 DIAGNOSIS — Z87.440 HISTORY OF UTI: Primary | ICD-10-CM

## 2025-06-18 PROCEDURE — 52281 CYSTOSCOPY AND TREATMENT: CPT | Performed by: UROLOGY

## 2025-06-18 RX ORDER — LEVOFLOXACIN 500 MG/1
500 TABLET, FILM COATED ORAL EVERY 24 HOURS
Qty: 3 TABLET | Refills: 0 | Status: SHIPPED | OUTPATIENT
Start: 2025-06-18 | End: 2025-06-21

## 2025-06-18 NOTE — PROGRESS NOTES
Cystoscopy     Date/Time  6/18/2025 11:00 AM     Performed by  Balbir Trivedi MD   Authorized by  Balbir Trivedi MD         Procedure Details:  Procedure type: cystoscopy      Kristopher is a 70-year-old male with a history of BPH.  He underwent TURP in March 2025.  20 g of benign tissue was removed.  He did have a Klebsiella UTI after his procedure.  He returns to the office today concern for weakening of his urinary stream.  I recommended cystoscopy to assess for possible meatal stenosis, fossa navicularis stricture or possibly even a bulbar urethral stricture.      Male cystoscopy procedure note:  Risk and benefits of flexible cystoscopy were discussed. Informed consent was obtained. The patient was placed in the supine position. His genitalia was prepped and draped in a sterile fashion. Viscous lidocaine jelly was instilled into the urethra.  There was clearly narrowing in the fossa navicularis.  I utilized a cone dilator.  I was able to open the fossa navicularis so that the flexible cystoscope passed easily through the urethra.  The prostatic urethra showed TUR changes and was widely patent.  The bladder was entered and was slightly cloudy appearing.    The bladder was left full and the cystoscope was removed.  The patient then voided with a loud audible urinary stream.    Impression: History of BPH, status post TURP, fossa navicularis stricture, status post dilation    Plan: I provided the patient with reassurance that his bulbar urethra and prostatic urethra are widely patent without stricture.  The issue was a small narrowing at the fossa navicularis.  I provided the patient with a cone dilator and instructions to self dilate if needed.  Follow-up is recommended in 3 months time with a postvoid residual assessment and uroflow evaluation.  In the interim I prescribed 3 days of Levaquin after dilation based on prior cultures.

## 2025-06-19 ENCOUNTER — TELEPHONE (OUTPATIENT)
Age: 70
End: 2025-06-19

## 2025-06-19 NOTE — TELEPHONE ENCOUNTER
PA for nexletol 180 mg  APPROVED     Date(s) approved 4/19/25-6/19/25    Case # [id number]    Patient advised by          []MyChart Message  [x]Phone call   []LMOM  []L/M to call office as no active Communication consent on file  []Unable to leave detailed message as VM not approved on Communication consent       Pharmacy advised by    [x]Fax  []Phone call  []Secure Chat    Specialty Pharmacy    []     Approval letter scanned into [employer] Yes

## 2025-06-19 NOTE — TELEPHONE ENCOUNTER
PA for nexletol 180 mg SUBMITTED to [employer]    via    [x]CMM-KEY: [id number]  []Surescripts-Case ID #   []Availity-Auth ID # NDC #   []Faxed to plan   []Other website   []Phone call Case ID #     []PA sent as URGENT    All office notes, labs and other pertaining documents and studies sent. Clinical questions answered. Awaiting determination from insurance company.     Turnaround time for your insurance to make a decision on your Prior Authorization can take 7-21 business days.

## 2025-06-20 ENCOUNTER — TELEPHONE (OUTPATIENT)
Age: 70
End: 2025-06-20

## 2025-06-20 NOTE — TELEPHONE ENCOUNTER
Received call from pt stating he does not want to start prescribed Nexletol due to the cost and too many side effects. He stated the cost of the medication, even after being approved through his insurance, is too much for him. He also stated he read online all the side effects and does not want to start the medication. He would like an alternative if possible.    Advised will send a message to the provider to review and advise.

## 2025-06-23 NOTE — PROGRESS NOTES
Name: George Ramirez      : 1955      MRN: [id number]  Encounter Provider: MARGIE Cortez  Encounter Date: 2025   [facility]  :  Assessment & Plan  Lumbar radiculopathy    Orders:    MRI lumbar spine wo contrast; Future    Spondylosis of lumbar region without myelopathy or radiculopathy    Orders:    tiZANidine (ZANAFLEX) 2 mg tablet; Take 1 tablet (2 mg total) by mouth 2 (two) times a day as needed for muscle spasms      I will order an updated MRI lumbar spine without contrast  Patient may continue tizanidine as prescribed.  This medication was refilled today  Will avoid NSAIDs secondary to anticoagulation  Patient may continue Tylenol as needed  Continue with home exercise program  Follow-up after imaging or sooner if needed    My impressions and treatment recommendations were discussed in detail with the patient who verbalized understanding and had no further questions.  Discharge instructions were provided. I personally saw and examined the patient and I agree with the above discussed plan of care.    History of Present Illness     George Ramirez is a 70 y.o. male with a history of A-fib on anticoagulation who presents to [facility] for interval re-evaluation in regards to pain in the Low back. The patients last office visit was 2023 . Patient presents today with pain in the low back that will radiate into the right groin and terminate in the right foot with associated numbness and paresthesias.  He denies left lower extremity symptoms, bowel or bladder incontinence or saddle anesthesia. pain is described as Constant and Dull-aching. numeric pain scale of 1-10, the pain typically increases to max of 5 out of 10, which is currently impacting their quality of life.  Patient did have a bilateral L5 TFESI in  with only 20% relief.  I do not have any recent updated imaging of the lumbar spine to  "review.  He has not done any recent formal physical therapy or chiropractic therapy however he does continue with a physician guided home exercise program which she completes the exercises daily for 30 to 45 minutes each session.  Pain has still been intensifying.  He manages his pain with tizanidine 2 mg as needed and Tylenol as needed.  He has been unable to tolerate gabapentin in the past    Review of Systems   Respiratory:  Negative for shortness of breath.    Cardiovascular:  Negative for chest pain.   Gastrointestinal:  Negative for constipation, diarrhea, nausea and vomiting.   Musculoskeletal:  Positive for back pain and gait problem. Negative for arthralgias, joint swelling and myalgias.   Skin:  Negative for rash.   Neurological:  Negative for dizziness, seizures and weakness.   All other systems reviewed and are negative.      Medical History Reviewed by provider this encounter:     .       Objective   Ht 5' 8\" (1.727 m)   Wt 98.4 kg (217 lb)   BMI 32.99 kg/m²      Pain Score:   5  Physical Exam  Constitutional: normal, well developed, well nourished, alert, in no distress and non-toxic and no overt pain behavior.  Eyes: anicteric  HEENT: grossly intact  Neck: supple, symmetric, trachea midline and no masses   Pulmonary: even and unlabored  Cardiovascular: No edema or pitting edema present  Skin: Normal without rashes or lesions and well hydrated  Psychiatric: Mood and affect appropriate  Neurologic: Cranial Nerves II-XII grossly intact  Musculoskeletal: normal gait.  Right lumbar paraspinal musculature tender to palpation.  Bilateral lower extremity strength 5 out of 5 in all muscle groups.  Sensation intact to light touch in L3-S1 dermatomes bilaterally.  Negative straight leg raise.  Negative Adams's test  "

## 2025-06-24 ENCOUNTER — OFFICE VISIT (OUTPATIENT)
Dept: PAIN MEDICINE | Facility: CLINIC | Age: 70
End: 2025-06-24
Payer: COMMERCIAL

## 2025-06-24 VITALS — BODY MASS INDEX: 32.89 KG/M2 | WEIGHT: 217 LBS | HEIGHT: 68 IN

## 2025-06-24 DIAGNOSIS — M54.16 LUMBAR RADICULOPATHY: Primary | ICD-10-CM

## 2025-06-24 DIAGNOSIS — M47.816 SPONDYLOSIS OF LUMBAR REGION WITHOUT MYELOPATHY OR RADICULOPATHY: ICD-10-CM

## 2025-06-24 PROCEDURE — 99214 OFFICE O/P EST MOD 30 MIN: CPT | Performed by: NURSE PRACTITIONER

## 2025-06-24 PROCEDURE — G2211 COMPLEX E/M VISIT ADD ON: HCPCS | Performed by: NURSE PRACTITIONER

## 2025-06-24 RX ORDER — TIZANIDINE 2 MG/1
2 TABLET ORAL 2 TIMES DAILY PRN
Qty: 60 TABLET | Refills: 2 | Status: SHIPPED | OUTPATIENT
Start: 2025-06-24

## 2025-06-24 NOTE — ASSESSMENT & PLAN NOTE
Orders:    tiZANidine (ZANAFLEX) 2 mg tablet; Take 1 tablet (2 mg total) by mouth 2 (two) times a day as needed for muscle spasms

## 2025-06-28 PROBLEM — Z87.440 HISTORY OF UTI: Status: RESOLVED | Noted: 2025-05-29 | Resolved: 2025-06-28

## 2025-07-01 ENCOUNTER — HOSPITAL ENCOUNTER (OUTPATIENT)
Dept: RADIOLOGY | Facility: HOSPITAL | Age: 70
Discharge: HOME | End: 2025-07-01
Attending: NURSE PRACTITIONER
Payer: COMMERCIAL

## 2025-07-01 DIAGNOSIS — M54.16 LUMBAR RADICULOPATHY: ICD-10-CM

## 2025-07-01 PROCEDURE — 72148 MRI LUMBAR SPINE W/O DYE: CPT

## 2025-07-07 ENCOUNTER — TELEPHONE (OUTPATIENT)
Age: 70
End: 2025-07-07

## 2025-07-07 ENCOUNTER — TELEPHONE (OUTPATIENT)
Dept: RADIOLOGY | Facility: CLINIC | Age: 70
End: 2025-07-07

## 2025-07-07 DIAGNOSIS — M54.16 LUMBAR RADICULOPATHY: Primary | ICD-10-CM

## 2025-07-07 NOTE — TELEPHONE ENCOUNTER
S/w pt and advised of same. Pt verbalized understanding and appreciation.     Aware request will be sent to Dr Castro for Mi costello.  Aware he will be contacted to schedule.

## 2025-07-07 NOTE — TELEPHONE ENCOUNTER
George Ramirez    1955    Anti-Coagulant:  Eliquis ( 3 day hold)      Dear Dr. Castro      The above patient is being considered for a neuraxial injection, such as an epidural steroid injection, nerve root block, etc. For the management of their pain.  However, the patient is currently on an anticoagulant per your order.  The [employer] Guideline on neuraxial procedures and anti-coagulation indicates that medication should be held as follows:      Apixaban (Eliquis) 3 days prior           Your permission to discontinue this anti-coagulant is necessary in order to proceed with this procedure.  We will instruct the patient to restart their anti-coagulant immediately after the procedure, unless otherwise specified by you.

## 2025-07-07 NOTE — TELEPHONE ENCOUNTER
Caller: Patient    Doctor: Dr. Stokes     Reason for call: patient states that he was in to see [facility] a few weeks ago and since had an MRI. Patient would like to setup an epidural injection.    Please assist    Call back#: [phone]

## 2025-07-07 NOTE — TELEPHONE ENCOUNTER
Nohelia Castro DO to NAN Rice MA        7/7/25 12:32 PM  Ok to hold Eliquis three days prior to the procedure.       Nohelia

## 2025-07-07 NOTE — TELEPHONE ENCOUNTER
Potentially. His changes are mild at L5 and more moderate at L3 and L4. My office note said he had some groin involvement and anterior thigh so I was trying to address the distribution of his symptoms along with where changes are more severe.

## 2025-07-07 NOTE — TELEPHONE ENCOUNTER
S/w pt and advised of same. Pt verbalized understanding and appreciation.     Pt would like to have injection. Pt takes Eliquis prescribed on chart by Dr Vito Bernal([facility]) but hasn't seen recently. Pt saw Dr Castro(cardiology) on 5/20/25. Pt wanted [facility] to be aware that Dr Castro only approved a 2 day hold for urologic procedure but Dr Pearl( retired cardiology) did approve 3-4 day hold.     Pt reports RLB buttock and hip pain that radiates to lateral thigh, skips the calf , proceeds to anterolateral lower leg. Pt has numbness of plantar foot and great toe. Pt questioned if L5 affected but is ok with whatever injection [facility] offers.       Pt advised hold approval needed and pt will be contacted to schedule.

## 2025-07-19 DIAGNOSIS — M54.16 LUMBAR RADICULOPATHY: ICD-10-CM

## 2025-07-19 DIAGNOSIS — E78.00 HYPERCHOLESTEROLEMIA: ICD-10-CM

## 2025-07-21 RX ORDER — OXCARBAZEPINE 300 MG/1
300 TABLET, FILM COATED ORAL 2 TIMES DAILY
Qty: 180 TABLET | Refills: 1 | Status: SHIPPED | OUTPATIENT
Start: 2025-07-21

## 2025-07-21 RX ORDER — EZETIMIBE 10 MG/1
10 TABLET ORAL DAILY
Qty: 90 TABLET | Refills: 1 | Status: SHIPPED | OUTPATIENT
Start: 2025-07-21

## 2025-07-22 ENCOUNTER — HOSPITAL ENCOUNTER (OUTPATIENT)
Dept: RADIOLOGY | Facility: CLINIC | Age: 70
Discharge: HOME | End: 2025-07-22
Attending: ANESTHESIOLOGY
Payer: COMMERCIAL

## 2025-07-22 VITALS
TEMPERATURE: 97.3 F | SYSTOLIC BLOOD PRESSURE: 138 MMHG | DIASTOLIC BLOOD PRESSURE: 83 MMHG | RESPIRATION RATE: 16 BRPM | HEART RATE: 72 BPM | OXYGEN SATURATION: 96 %

## 2025-07-22 DIAGNOSIS — M54.16 LUMBAR RADICULOPATHY: ICD-10-CM

## 2025-07-22 PROCEDURE — 64484 NJX AA&/STRD TFRM EPI L/S EA: CPT | Performed by: ANESTHESIOLOGY

## 2025-07-22 PROCEDURE — 64483 NJX AA&/STRD TFRM EPI L/S 1: CPT | Performed by: ANESTHESIOLOGY

## 2025-07-22 RX ORDER — PAPAVERINE HCL 150 MG
15 CAPSULE, EXTENDED RELEASE ORAL ONCE
Status: COMPLETED | OUTPATIENT
Start: 2025-07-22 | End: 2025-07-22

## 2025-07-22 RX ADMIN — DEXAMETHASONE SODIUM PHOSPHATE 15 MG: 10 INJECTION, SOLUTION INTRAMUSCULAR; INTRAVENOUS at 14:21

## 2025-07-22 RX ADMIN — IOHEXOL 2 ML: 300 INJECTION, SOLUTION INTRAVENOUS at 14:20

## 2025-07-22 RX ADMIN — LIDOCAINE HYDROCHLORIDE 2 ML: 20 INJECTION, SOLUTION EPIDURAL; INFILTRATION; INTRACAUDAL; PERINEURAL at 14:21

## 2025-07-22 NOTE — H&P
History of Present Illness: The patient is a 70 y.o. male who presents with complaints of low back and leg pain.    Past Medical History[1]    Past Surgical History[2]    Current Medications[3]    Allergies[4]    Physical Exam:   Vitals:    07/22/25 1354   BP: 153/90   Pulse: 69   Resp: 16   Temp: (!) 97.3 °F (36.3 °C)   SpO2: 95%     General: Awake, Alert, Oriented x 3, Mood and affect appropriate  Respiratory: Respirations even and unlabored  Cardiovascular: Peripheral pulses intact; no edema  Musculoskeletal Exam: Antalgic gait    ASA Score: 3    Patient/Chart Verification  Patient ID Verified: Verbal  ID Band Applied: No  Consents Confirmed: To be obtained in the Procedural area  Allergies Reviewed: Yes  Anticoag/NSAID held?: Yes (LD Eliquis 7/18/25 JW aware)  Currently on antibiotics?: No    Assessment:   1. Lumbar radiculopathy        Plan: RIGHT L3 AND L4 TFESI         [1]   Past Medical History:  Diagnosis Date    A-fib (HCC)     Allergic     Allergic rhinitis     Arthritis     Benign cyst of kidney     Benign prostatic hyperplasia 2003    Cancer (HCC) 2015    Cat bite 07/08/2020    Cataract     Cerebral vascular disease     Chronic sinusitis     Coronary artery disease     Cryptogenic stroke (HCC) 03/02/2020    Erectile dysfunction     Heart disease 2011    Hyperlipidemia     Kidney stone     Melanoma (HCC)     Osteoarthritis T.c    Pancreas cyst     Stroke (HCC) 2006   [2]   Past Surgical History:  Procedure Laterality Date    CARDIAC CATHETERIZATION N/A 02/13/2024    Procedure: Cardiac catheterization;  Surgeon: Tripp Cross MD;  Location: [facility];  Service: Cardiology    CARDIAC CATHETERIZATION N/A 02/13/2024    Procedure: Cardiac Coronary Angiogram;  Surgeon: Tripp Cross MD;  Location: [facility];  Service: Cardiology    CARDIAC CATHETERIZATION N/A 02/13/2024    Procedure: Cardiac pci;  Surgeon: Tripp Cross MD;  Location: [facility];  Service: Cardiology    CARDIAC  CATHETERIZATION Left 02/13/2024    Procedure: Cardiac Left Heart Cath;  Surgeon: Tripp Cross MD;  Location: [facility];  Service: Cardiology    COLONOSCOPY      CORONARY STENT PLACEMENT  01/2011    KIDNEY STONE SURGERY  2009    OTHER SURGICAL HISTORY  2003    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    PATENT FORAMEN OVALE CLOSURE      HI TRURL ELECTROSURG RESCJ PROSTATE BLEED COMPLETE N/A 03/14/2025    Procedure: TRANSURETHRAL RESECTION OF PROSTATE (TURP);  Surgeon: Balbir Trivedi MD;  Location: [facility];  Service: Urology    VASCULAR SURGERY      VASECTOMY  ???   [3]   Current Outpatient Medications:     ascorbic acid (VITAMIN C) 250 mg tablet, Take 250 mg by mouth in the morning and 250 mg in the evening., Disp: , Rfl:     ASPIRIN 81 PO, Take by mouth daily, Disp: , Rfl:     Bempedoic Acid (Nexletol) 180 MG TABS, Take 180 mg by mouth daily, Disp: 90 tablet, Rfl: 3    chlorpheniramine (CHLOR-TRIMETON) 4 MG tablet, Take 1 tablet (4 mg total) by mouth every 6 (six) hours as needed for rhinitis, Disp: 30 tablet, Rfl: 0    clobetasol (TEMOVATE) 0.05 % cream, Apply 1 application. topically as needed in the morning and 1 application. as needed in the evening. Apply sparingly to affected areas., Disp: , Rfl:     Coenzyme Q10 (COQ10) 200 MG CAPS, Take 1 capsule by mouth in the morning., Disp: , Rfl:     colesevelam (WELCHOL) 625 mg tablet, TAKE TWO TABLETS BY MOUTH TWICE A DAY WITH MEALS, Disp: 360 tablet, Rfl: 1    diltiazem (CARDIZEM) 30 mg tablet, TAKE ONE TABLET BY MOUTH IF NEEDED FOR AFIB, Disp: 15 tablet, Rfl: 3    Eliquis 5 MG, TAKE ONE TABLET BY MOUTH TWICE A DAY, Disp: 180 tablet, Rfl: 1    ezetimibe (ZETIA) 10 mg tablet, TAKE ONE TABLET BY MOUTH EVERY DAY, Disp: 90 tablet, Rfl: 1    Glucosamine-Chondroit-Vit C-Mn (GLUCOSAMINE CHONDR 1500 COMPLX PO), Take 1 tablet by mouth in the morning and 1 tablet in the evening., Disp: , Rfl:     hyoscyamine (LEVSIN/SL) 0.125 mg SL tablet, , Disp: , Rfl:      Inclisiran Sodium (Leqvio) subcutaneous injection, Inject 1.5 mL (284 mg total) under the skin once for 1 dose, Disp: 1.5 mL, Rfl: 0    metoprolol tartrate (LOPRESSOR) 25 mg tablet, TAKE 1/2 TABLET BY MOUTH EVERY 12 HOURS, Disp: 100 tablet, Rfl: 0    montelukast (SINGULAIR) 10 mg tablet, TAKE ONE TABLET BY MOUTH EVERY EVENING AT BEDTIME, Disp: 90 tablet, Rfl: 1    multivitamin (THERAGRAN) TABS, Take 1 tablet by mouth in the morning and 1 tablet in the evening., Disp: , Rfl:     nitroglycerin (NITROSTAT) 0.4 mg SL tablet, Place 1 tablet (0.4 mg total) under the tongue every 5 (five) minutes as needed for chest pain - do not take if you have recently taken Sildenafil (Revatio), Disp: 30 tablet, Rfl: 0    NON FORMULARY, CBD CAPSULE  AND CREAM USE DAILY PRIN (Patient not taking: Reported on 3/28/2025), Disp: , Rfl:     OXcarbazepine (TRILEPTAL) 300 mg tablet, TAKE ONE TABLET BY MOUTH TWICE A DAY, Disp: 180 tablet, Rfl: 1    oxybutynin (DITROPAN) 5 mg tablet, Take 1 tablet (5 mg total) by mouth 3 (three) times a day (Patient not taking: Reported on 6/24/2025), Disp: 30 tablet, Rfl: 1    phenazopyridine (PYRIDIUM) 200 mg tablet, Take 1 tablet (200 mg total) by mouth 3 (three) times a day with meals, Disp: 10 tablet, Rfl: 0    sildenafil (REVATIO) 20 mg tablet, Take 1-5 tablets on empty stomach one hour prior to sexual activity if needed, Disp: 30 tablet, Rfl: 3    tiZANidine (ZANAFLEX) 2 mg tablet, Take 1 tablet (2 mg total) by mouth 2 (two) times a day as needed for muscle spasms, Disp: 60 tablet, Rfl: 2    Current Facility-Administered Medications:     Inclisiran Sodium (LEQVIO) subcutaneous injection 284 mg, 284 mg, Subcutaneous, Q6 Months,   [4]   Allergies  Allergen Reactions    Augmentin [Amoxicillin-Pot Clavulanate] Hives and Itching    Molds & Smuts Allergic Rhinitis     Category: Allergy;     Monascus Purpureus Went Yeast Fatigue     Category: Adverse Reaction;     Niacin Fatigue     Category: Adverse Reaction;      Pollen Extract Allergic Rhinitis     Category: Allergy;     Pregabalin Fatigue     Category: Adverse Reaction;     Statins Fatigue and GI Intolerance     Category: Adverse Reaction;     Atorvastatin GI Intolerance and Fatigue     Other reaction(s): Muscle pain, constipation, sleepiness  Category: Adverse Reaction;   Other reaction(s): Muscle pain, constipation, sleepiness

## 2025-07-22 NOTE — DISCHARGE INSTR - LAB
Epidural Steroid Injection   WHAT YOU NEED TO KNOW:   An epidural steroid injection (CARLEEN) is a procedure to inject steroid medicine into the epidural space. The epidural space is between your spinal cord and vertebrae. Steroids reduce inflammation and fluid buildup in your spine that may be causing pain. You may be given pain medicine along with the steroids.          ACTIVITY  Do not drive or operate machinery today.  No strenuous activity today - bending, lifting, etc.  You may resume normal activites starting tomorrow - start slowly and as tolerated.  You may shower today, but no tub baths or hot tubs.  You may have numbness for several hours from the local anesthetic. Please use caution and common sense, especially with weight-bearing activities.    CARE OF THE INJECTION SITE  If you have soreness or pain, apply ice to the area today (20 minutes on/20 minutes off).  Starting tomorrow, you may use warm, moist heat or ice if needed.  You may have an increase or change in your discomfort for 36-48 hours after your treatment.  Apply ice and continue with any pain medication you have been prescribed.  Notify the [facility] if you have any of the following: redness, drainage, swelling, headache, stiff neck or fever above 100°F.    SPECIAL INSTRUCTIONS  Our office will contact you in approximately 14 days for a progress report.    MEDICATIONS  Continue to take all routine medications.  Our office may have instructed you to hold some medications.  May resume Eliquis 7/23/25    As no general anesthesia was used in today's procedure, you should not experience any side effects related to anesthesia.     If you are diabetic, the steroids used in today's injection may temporarily increase your blood sugar levels after the first few days after your injection. Please keep a close eye on your sugars and alert the doctor who manages your diabetes if your sugars are significantly high from your baseline or you are  symptomatic.     If you have a problem specifically related to your procedure, please call our office at [phone].  Problems not related to your procedure should be directed to your primary care physician.

## 2025-07-23 ENCOUNTER — TELEPHONE (OUTPATIENT)
Age: 70
End: 2025-07-23

## 2025-07-23 NOTE — TELEPHONE ENCOUNTER
Amparo, Pharmacist with [facility] calling to verify diagnosis of statin intolerance. Patient has allergy to statins: muscle pain, constipation, sleepiness.    Per Amparo, billing code G72.0 will keep patient out of yearly statin outreach.

## 2025-08-05 ENCOUNTER — TELEPHONE (OUTPATIENT)
Dept: PAIN MEDICINE | Facility: MEDICAL CENTER | Age: 70
End: 2025-08-05

## 2025-08-07 ENCOUNTER — OFFICE VISIT (OUTPATIENT)
Dept: PAIN MEDICINE | Facility: CLINIC | Age: 70
End: 2025-08-07
Payer: COMMERCIAL

## 2025-08-07 VITALS — BODY MASS INDEX: 32.99 KG/M2 | WEIGHT: 217 LBS

## 2025-08-07 DIAGNOSIS — M47.816 LUMBAR SPONDYLOSIS: ICD-10-CM

## 2025-08-07 DIAGNOSIS — M54.41 CHRONIC MIDLINE LOW BACK PAIN WITH BILATERAL SCIATICA: Primary | ICD-10-CM

## 2025-08-07 DIAGNOSIS — M54.16 LUMBAR RADICULOPATHY: ICD-10-CM

## 2025-08-07 DIAGNOSIS — G89.29 CHRONIC MIDLINE LOW BACK PAIN WITH BILATERAL SCIATICA: Primary | ICD-10-CM

## 2025-08-07 DIAGNOSIS — M54.42 CHRONIC MIDLINE LOW BACK PAIN WITH BILATERAL SCIATICA: Primary | ICD-10-CM

## 2025-08-07 PROCEDURE — 99214 OFFICE O/P EST MOD 30 MIN: CPT | Performed by: NURSE PRACTITIONER

## 2025-08-12 ENCOUNTER — TELEPHONE (OUTPATIENT)
Age: 70
End: 2025-08-12

## 2025-08-12 ENCOUNTER — TELEPHONE (OUTPATIENT)
Dept: RADIOLOGY | Facility: CLINIC | Age: 70
End: 2025-08-12

## (undated) DEVICE — CHLORHEXIDINE 4PCT 4 OZ

## (undated) DEVICE — Device: Brand: OLYMPUS

## (undated) DEVICE — DGW .035 FC J3MM 260CM TEF: Brand: EMERALD

## (undated) DEVICE — GLOVE SRG BIOGEL ECLIPSE 7.5

## (undated) DEVICE — GLIDESHEATH BASIC HYDROPHILIC COATED INTRODUCER SHEATH: Brand: GLIDESHEATH

## (undated) DEVICE — CATH GUIDE LAUNCHER 6FR EBU 3.5

## (undated) DEVICE — RUNTHROUGH NS EXTRA FLOPPY PTCA GUIDEWIRE: Brand: RUNTHROUGH

## (undated) DEVICE — BAG URINE DRAINAGE 4000ML CONTINUOUS IRR

## (undated) DEVICE — TREK CORONARY DILATATION CATHETER 2.50 MM X 15 MM / RAPID-EXCHANGE: Brand: TREK

## (undated) DEVICE — CATH DIAG 5FR IMPULSE 100CM FR4

## (undated) DEVICE — PACK TUR

## (undated) DEVICE — RADIFOCUS OPTITORQUE ANGIOGRAPHIC CATHETER: Brand: OPTITORQUE

## (undated) DEVICE — BASIC SINGLE BASIN 2-LF: Brand: MEDLINE INDUSTRIES, INC.

## (undated) DEVICE — PRESSURE GUIDEWIRE: Brand: COMET™ II

## (undated) DEVICE — GUIDEWIRE WHOLEY HI TORQUE INTERM MOD J .035 145CM

## (undated) DEVICE — SPECIMEN CONTAINER STERILE PEEL PACK

## (undated) DEVICE — CATH FOLEY COUDE HEMATURIA 24FR 30ML 3 WAY LUBRICATH

## (undated) DEVICE — PREMIUM DRY TRAY LF: Brand: MEDLINE INDUSTRIES, INC.

## (undated) DEVICE — NC TREK NEO™ CORONARY DILATATION CATHETER 3.50 MM X 15 MM / RAPID-EXCHANGE: Brand: NC TREK NEO™

## (undated) DEVICE — INTENDED FOR TISSUE SEPARATION, AND OTHER PROCEDURES THAT REQUIRE A SHARP SURGICAL BLADE TO PUNCTURE OR CUT.: Brand: BARD-PARKER SAFETY BLADES SIZE 15, STERILE

## (undated) DEVICE — EVACUATOR BLADDER ELLIK DISP STRL

## (undated) DEVICE — TR BAND RADIAL ARTERY COMPRESSION DEVICE: Brand: TR BAND

## (undated) DEVICE — CYSTO TUBING TUR Y IRRIGATION

## (undated) DEVICE — GLOVE INDICATOR PI UNDERGLOVE SZ 8 BLUE